# Patient Record
Sex: FEMALE | Race: BLACK OR AFRICAN AMERICAN | NOT HISPANIC OR LATINO | Employment: OTHER | URBAN - METROPOLITAN AREA
[De-identification: names, ages, dates, MRNs, and addresses within clinical notes are randomized per-mention and may not be internally consistent; named-entity substitution may affect disease eponyms.]

---

## 2017-01-01 ENCOUNTER — NURSE TRIAGE (OUTPATIENT)
Dept: ADMINISTRATIVE | Facility: CLINIC | Age: 42
End: 2017-01-01

## 2017-01-02 NOTE — TELEPHONE ENCOUNTER
Pt had hysterectomy on Friday. Pt reports that she is having vaginal pain that comes and goes. Each episode last from 1 minute to 5 minutes.. Denies burning when she urinating. Denies any vaginal bleeding. Denies fever. Pt has pain medicine but has not taken it today. Pt does report that since surgery she has had a few episodes of dizziness that is relieved with resting. Pt denies being dizzy right now. Pt advised to take the pain medication as prescribed to help with the pain. Home care advice was given.    Reason for Disposition   Information only question and nurse able to answer    Protocols used: ST NO PROTOCOL CALL: INFORMATION ONLY-A-[place]

## 2017-01-04 ENCOUNTER — TELEPHONE (OUTPATIENT)
Dept: OBSTETRICS AND GYNECOLOGY | Facility: CLINIC | Age: 42
End: 2017-01-04

## 2017-01-04 ENCOUNTER — OFFICE VISIT (OUTPATIENT)
Dept: OBSTETRICS AND GYNECOLOGY | Facility: CLINIC | Age: 42
End: 2017-01-04
Attending: OBSTETRICS & GYNECOLOGY
Payer: OTHER GOVERNMENT

## 2017-01-04 VITALS
DIASTOLIC BLOOD PRESSURE: 82 MMHG | HEIGHT: 64 IN | BODY MASS INDEX: 30.26 KG/M2 | WEIGHT: 177.25 LBS | SYSTOLIC BLOOD PRESSURE: 124 MMHG

## 2017-01-04 DIAGNOSIS — N32.89 BLADDER SPASMS: Primary | ICD-10-CM

## 2017-01-04 DIAGNOSIS — Z90.710 S/P LAPAROSCOPIC HYSTERECTOMY: ICD-10-CM

## 2017-01-04 PROCEDURE — 81001 URINALYSIS AUTO W/SCOPE: CPT

## 2017-01-04 PROCEDURE — 99213 OFFICE O/P EST LOW 20 MIN: CPT | Mod: PBBFAC | Performed by: OBSTETRICS & GYNECOLOGY

## 2017-01-04 PROCEDURE — 87086 URINE CULTURE/COLONY COUNT: CPT

## 2017-01-04 PROCEDURE — 99999 PR PBB SHADOW E&M-EST. PATIENT-LVL III: CPT | Mod: PBBFAC,,, | Performed by: OBSTETRICS & GYNECOLOGY

## 2017-01-04 PROCEDURE — 99024 POSTOP FOLLOW-UP VISIT: CPT | Mod: ,,, | Performed by: OBSTETRICS & GYNECOLOGY

## 2017-01-04 RX ORDER — PHENAZOPYRIDINE HYDROCHLORIDE 200 MG/1
200 TABLET, FILM COATED ORAL
Qty: 9 TABLET | Refills: 0 | Status: SHIPPED | OUTPATIENT
Start: 2017-01-04 | End: 2017-01-07

## 2017-01-04 RX ORDER — IBUPROFEN 800 MG/1
800 TABLET ORAL EVERY 6 HOURS PRN
Qty: 30 TABLET | Refills: 2 | Status: SHIPPED | OUTPATIENT
Start: 2017-01-04 | End: 2018-01-04

## 2017-01-04 RX ORDER — HYDROCODONE BITARTRATE AND ACETAMINOPHEN 10; 325 MG/1; MG/1
1 TABLET ORAL EVERY 6 HOURS PRN
Qty: 20 TABLET | Refills: 0 | Status: SHIPPED | OUTPATIENT
Start: 2017-01-04 | End: 2017-06-23

## 2017-01-04 NOTE — TELEPHONE ENCOUNTER
Returned pt call regarding experiencing complications after Sx.  Pt stated that she is experiencing pain during urination and a sharp vaginal pain.  Pt stated that she is experiencing nausea and hot and cold flashes.  Pt is also requesting a refill on her pain medication. Informed pt that Dr Wilkins does not refill narcotics and that she already has refills on her Ibuprofen.  Pt stated that the Ibuprofen is not helping her pain and that she thought she would feel better by now.  Informed pt that I will speak with Dr Wilkins to she how she would like to proceed and give her a call back.  Pt verbalized understanding.

## 2017-01-04 NOTE — TELEPHONE ENCOUNTER
Called pt to inform her to come in today to be seen for a post op F/U per Dr RODRIGUEZ.  Pt verbalized understanding.

## 2017-01-04 NOTE — TELEPHONE ENCOUNTER
----- Message from Sondra Arteaga sent at 1/4/2017 11:54 AM CST -----  Contact: pt  x_  1st Request  _  2nd Request  _  3rd Request        Who: BK COLLINS [[id number]]    Why: pt states she is 10 days post-op and os still having complications. Pt also states she is  almost out of her medication. Pt states she would like a call from Keila.     What Number to Call Back: [phone]    When to Expect a call back: (Before the end of the day)   -- if call after 3:00 call back will be tomorrow.

## 2017-01-04 NOTE — PROGRESS NOTES
"Chief Complaint: Interim postpartum visit    Tiffany Cruz is a 41 y.o. female  presents s/p TLH on  with complaint of pain after urination.  She reports after taking percocet, her lips were itching.  No vaginal bleeding or discharge.    Visit Vitals    /82    Ht 5' 4" (1.626 m)    Wt 80.4 kg (177 lb 4 oz)    LMP 2016 (Approximate)    BMI 30.42 kg/m2       ROS:  GENERAL: No fever, chills, fatigability or weight loss.  VULVAR: No pain, no lesions and no itching.  VAGINAL: No relaxation, no itching, no discharge, no abnormal bleeding and no lesions.  ABDOMEN: No abdominal pain. Denies nausea. Denies vomiting. No diarrhea. No constipation  BREAST: Denies pain. No lumps. No discharge.  URINARY: No incontinence, no nocturia, no frequency and positive for dysuria.  CARDIOVASCULAR: No chest pain. No shortness of breath. No leg cramps.  NEUROLOGICAL: No headaches. No vision changes.    Physical exam:      PELVIC: Normal external genitalia without lesions.  Normal hair distribution.  Adequate perineal body, normal urethral meatus.  Vagina moist and well rugated without lesions or discharge.      1. Bladder spasms  phenazopyridine (PYRIDIUM) 200 MG tablet    Urine culture    Urinalysis   2. S/p TLH/BS on  for SUF  ibuprofen (ADVIL,MOTRIN) 800 MG tablet    hydrocodone-acetaminophen 10-325mg (NORCO)  mg Tab       Discussed pain that is most likely related to bladder spasms.  Prescription sent.    "

## 2017-01-05 ENCOUNTER — PATIENT MESSAGE (OUTPATIENT)
Dept: OBSTETRICS AND GYNECOLOGY | Facility: CLINIC | Age: 42
End: 2017-01-05

## 2017-01-05 DIAGNOSIS — N39.0 URINARY TRACT INFECTION, SITE UNSPECIFIED: Primary | ICD-10-CM

## 2017-01-05 LAB
AMORPH CRY UR QL COMP ASSIST: ABNORMAL
BACTERIA #/AREA URNS AUTO: ABNORMAL /HPF
BILIRUB UR QL STRIP: NEGATIVE
CLARITY UR REFRACT.AUTO: ABNORMAL
COLOR UR AUTO: YELLOW
GLUCOSE UR QL STRIP: NEGATIVE
HGB UR QL STRIP: ABNORMAL
KETONES UR QL STRIP: NEGATIVE
LEUKOCYTE ESTERASE UR QL STRIP: ABNORMAL
MICROSCOPIC COMMENT: ABNORMAL
NITRITE UR QL STRIP: NEGATIVE
PH UR STRIP: 5 [PH] (ref 5–8)
PROT UR QL STRIP: NEGATIVE
RBC #/AREA URNS AUTO: 3 /HPF (ref 0–4)
SP GR UR STRIP: 1.02 (ref 1–1.03)
SQUAMOUS #/AREA URNS AUTO: 5 /HPF
URN SPEC COLLECT METH UR: ABNORMAL
UROBILINOGEN UR STRIP-ACNC: NEGATIVE EU/DL
WBC #/AREA URNS AUTO: 13 /HPF (ref 0–5)

## 2017-01-05 RX ORDER — NITROFURANTOIN 25; 75 MG/1; MG/1
100 CAPSULE ORAL 2 TIMES DAILY
Qty: 14 CAPSULE | Refills: 0 | Status: SHIPPED | OUTPATIENT
Start: 2017-01-05 | End: 2017-01-13

## 2017-01-06 LAB — BACTERIA UR CULT: NO GROWTH

## 2017-01-09 ENCOUNTER — LAB VISIT (OUTPATIENT)
Dept: LAB | Facility: HOSPITAL | Age: 42
End: 2017-01-09
Attending: INTERNAL MEDICINE
Payer: OTHER GOVERNMENT

## 2017-01-09 DIAGNOSIS — D50.9 MICROCYTIC ANEMIA: ICD-10-CM

## 2017-01-09 LAB
ANISOCYTOSIS BLD QL SMEAR: SLIGHT
BASOPHILS # BLD AUTO: 0.06 K/UL
BASOPHILS NFR BLD: 0.7 %
DIFFERENTIAL METHOD: ABNORMAL
EOSINOPHIL # BLD AUTO: 0.2 K/UL
EOSINOPHIL NFR BLD: 2.9 %
ERYTHROCYTE [DISTWIDTH] IN BLOOD BY AUTOMATED COUNT: 13.8 %
FERRITIN SERPL-MCNC: 329 NG/ML
HCT VFR BLD AUTO: 33.4 %
HGB BLD-MCNC: 10.7 G/DL
HYPOCHROMIA BLD QL SMEAR: ABNORMAL
IRON SERPL-MCNC: 75 UG/DL
LYMPHOCYTES # BLD AUTO: 3 K/UL
LYMPHOCYTES NFR BLD: 35.7 %
MCH RBC QN AUTO: 22.2 PG
MCHC RBC AUTO-ENTMCNC: 32 %
MCV RBC AUTO: 69 FL
MONOCYTES # BLD AUTO: 1 K/UL
MONOCYTES NFR BLD: 11.6 %
NEUTROPHILS # BLD AUTO: 4.1 K/UL
NEUTROPHILS NFR BLD: 49.1 %
OVALOCYTES BLD QL SMEAR: ABNORMAL
PLATELET # BLD AUTO: 448 K/UL
PMV BLD AUTO: 10.3 FL
RBC # BLD AUTO: 4.83 M/UL
SATURATED IRON: 24 %
TARGETS BLD QL SMEAR: ABNORMAL
TOTAL IRON BINDING CAPACITY: 312 UG/DL
TRANSFERRIN SERPL-MCNC: 211 MG/DL
WBC # BLD AUTO: 8.28 K/UL

## 2017-01-09 PROCEDURE — 36415 COLL VENOUS BLD VENIPUNCTURE: CPT

## 2017-01-09 PROCEDURE — 83540 ASSAY OF IRON: CPT

## 2017-01-09 PROCEDURE — 85025 COMPLETE CBC W/AUTO DIFF WBC: CPT

## 2017-01-09 PROCEDURE — 82728 ASSAY OF FERRITIN: CPT

## 2017-01-10 ENCOUNTER — TELEPHONE (OUTPATIENT)
Dept: NEUROLOGY | Facility: CLINIC | Age: 42
End: 2017-01-10

## 2017-01-10 ENCOUNTER — OFFICE VISIT (OUTPATIENT)
Dept: HEMATOLOGY/ONCOLOGY | Facility: CLINIC | Age: 42
End: 2017-01-10
Attending: INTERNAL MEDICINE
Payer: OTHER GOVERNMENT

## 2017-01-10 VITALS
HEIGHT: 64 IN | TEMPERATURE: 97 F | BODY MASS INDEX: 29.99 KG/M2 | HEART RATE: 78 BPM | DIASTOLIC BLOOD PRESSURE: 70 MMHG | SYSTOLIC BLOOD PRESSURE: 140 MMHG | OXYGEN SATURATION: 98 % | WEIGHT: 175.69 LBS | RESPIRATION RATE: 20 BRPM

## 2017-01-10 DIAGNOSIS — D50.9 MICROCYTIC ANEMIA: Primary | ICD-10-CM

## 2017-01-10 PROCEDURE — 99213 OFFICE O/P EST LOW 20 MIN: CPT | Mod: PBBFAC | Performed by: INTERNAL MEDICINE

## 2017-01-10 PROCEDURE — 99214 OFFICE O/P EST MOD 30 MIN: CPT | Mod: S$PBB,,, | Performed by: INTERNAL MEDICINE

## 2017-01-10 PROCEDURE — 99999 PR PBB SHADOW E&M-EST. PATIENT-LVL III: CPT | Mod: PBBFAC,,, | Performed by: INTERNAL MEDICINE

## 2017-01-10 NOTE — TELEPHONE ENCOUNTER
"Call placed to pt. She states that she saw Dr Lemus today for f/u status post hysterectomy on 12/23. Her blood work and exam are stable. She also states that she is stable from ob/gyn standpoint. However, 2-3 days ago she began having tingling and numbness down her right arm continuously.    As above, denies infections. She did admit that her "mouth is broken out from the percocet." It is difficult to eat because of this--denies that it is thrush. She has been switched to [place] for pain control. She is interested in an urgent appt here. She is scheduled with Roseanna tomorrow at 1:30.    "

## 2017-01-10 NOTE — TELEPHONE ENCOUNTER
----- Message from Ronak Young sent at 1/10/2017  3:01 PM CST -----  Contact: Self [phone]  Patient is requesting requesting a return call from Roseanna she is having complication post surgery, pls call

## 2017-01-10 NOTE — TELEPHONE ENCOUNTER
----- Message from Alyssa Hyde sent at 1/10/2017  3:26 PM CST -----  Contact: self @ [phone]  Pt says she is returning mitch's call.

## 2017-01-10 NOTE — PROGRESS NOTES
Subjective:       Patient ID: Tiffany Cruz is a 41 y.o. female.    Chief Complaint: Follow-up    HPI     Returns for follow-up of anemia  Underwent a TLH 12/23/16 with Dr. Recinos as she had been having heavy periods with fibroids leading to fatigue and iron deficiency anemia  Slowly recovering    In regards to her MS since surgery she has noted some numbness in her right hand off and on - she was advised to notify her neurologist.    Review of Systems   Constitutional: Positive for fatigue. Negative for activity change, appetite change, chills, fever and unexpected weight change.   HENT: Negative for congestion, ear discharge, ear pain, hearing loss, mouth sores, nosebleeds, postnasal drip, rhinorrhea, sinus pressure, sneezing, sore throat and trouble swallowing.    Eyes: Negative for redness and visual disturbance.   Respiratory: Negative for cough, chest tightness, shortness of breath and wheezing.    Cardiovascular: Negative for chest pain, palpitations (noted at times) and leg swelling.   Gastrointestinal: Negative for abdominal distention, abdominal pain, blood in stool, constipation, nausea and vomiting.   Genitourinary: Negative for decreased urine volume, difficulty urinating, dysuria, frequency, hematuria, menstrual problem and urgency.   Musculoskeletal: Negative for arthralgias, back pain and joint swelling.   Skin: Negative for color change, pallor, rash and wound.   Allergic/Immunologic: Positive for environmental allergies.   Neurological: Positive for weakness (generalized, chronic). Negative for dizziness, light-headedness, numbness (rt sided, reports new in hand as well) and headaches.   Hematological: Negative for adenopathy. Does not bruise/bleed easily.   Psychiatric/Behavioral: Negative for confusion, decreased concentration, dysphoric mood and sleep disturbance. The patient is not nervous/anxious.        Objective:      Physical Exam   Constitutional: She is oriented to person,  place, and time. She appears well-developed and well-nourished. No distress.   Presents alone   HENT:   Head: Normocephalic and atraumatic.   Right Ear: External ear normal.   Left Ear: External ear normal.   Nose: Nose normal.   Mouth/Throat: Oropharynx is clear and moist. No oropharyngeal exudate.   Eyes: Conjunctivae and EOM are normal. Pupils are equal, round, and reactive to light. Right eye exhibits no discharge. Left eye exhibits no discharge. No scleral icterus. Right pupil is round and reactive. Left pupil is round and reactive.   Neck: Normal range of motion. Neck supple. No tracheal deviation present. No thyromegaly present.   Cardiovascular: Normal rate, regular rhythm, normal heart sounds and intact distal pulses.  Exam reveals no gallop and no friction rub.    No murmur heard.  Pulmonary/Chest: Effort normal and breath sounds normal. No respiratory distress. She has no wheezes. She has no rales. She exhibits no tenderness.   Abdominal: Soft. Bowel sounds are normal. She exhibits no distension and no mass. There is no hepatosplenomegaly. There is no tenderness. There is no rebound and no guarding.   No organomegaly  Well healed surgical scars   Musculoskeletal: Normal range of motion. She exhibits no edema, tenderness or deformity.   Lymphadenopathy:        Head (right side): No submandibular adenopathy present.        Head (left side): No submandibular adenopathy present.     She has no cervical adenopathy.        Right cervical: No superficial cervical, no deep cervical and no posterior cervical adenopathy present.       Left cervical: No superficial cervical, no deep cervical and no posterior cervical adenopathy present.        Right: No inguinal and no supraclavicular adenopathy present.        Left: No inguinal and no supraclavicular adenopathy present.   Neurological: She is alert and oriented to person, place, and time. She has normal strength. No cranial nerve deficit or sensory deficit. She  exhibits normal muscle tone. Coordination normal.   Skin: Skin is warm and dry. No bruising, no lesion, no petechiae and no rash noted. She is not diaphoretic. No erythema. No pallor.   Psychiatric: She has a normal mood and affect. Her behavior is normal. Judgment and thought content normal. Her mood appears not anxious. She does not exhibit a depressed mood.   Nursing note and vitals reviewed.    Labs- reviewed  Assessment:       1. Microcytic anemia        Plan:     1. Mild anemia, iron corrected  RTC 6-8 weeks for a recheck    Advised to call her neurologist regarding hand numbness noted

## 2017-01-11 ENCOUNTER — TELEPHONE (OUTPATIENT)
Dept: NEUROLOGY | Facility: CLINIC | Age: 42
End: 2017-01-11

## 2017-01-11 ENCOUNTER — OFFICE VISIT (OUTPATIENT)
Dept: NEUROLOGY | Facility: CLINIC | Age: 42
End: 2017-01-11
Payer: OTHER GOVERNMENT

## 2017-01-11 VITALS
HEIGHT: 64 IN | DIASTOLIC BLOOD PRESSURE: 84 MMHG | BODY MASS INDEX: 30.41 KG/M2 | SYSTOLIC BLOOD PRESSURE: 139 MMHG | WEIGHT: 178.13 LBS | HEART RATE: 82 BPM

## 2017-01-11 DIAGNOSIS — Z71.89 COUNSELING REGARDING GOALS OF CARE: ICD-10-CM

## 2017-01-11 DIAGNOSIS — G35 MULTIPLE SCLEROSIS: Primary | ICD-10-CM

## 2017-01-11 DIAGNOSIS — R20.0 NUMBNESS AND TINGLING: ICD-10-CM

## 2017-01-11 DIAGNOSIS — R20.2 NUMBNESS AND TINGLING: ICD-10-CM

## 2017-01-11 PROCEDURE — 99999 PR PBB SHADOW E&M-EST. PATIENT-LVL III: CPT | Mod: PBBFAC,,, | Performed by: CLINICAL NURSE SPECIALIST

## 2017-01-11 PROCEDURE — 99215 OFFICE O/P EST HI 40 MIN: CPT | Mod: S$PBB,,, | Performed by: CLINICAL NURSE SPECIALIST

## 2017-01-11 PROCEDURE — 99213 OFFICE O/P EST LOW 20 MIN: CPT | Mod: PBBFAC | Performed by: CLINICAL NURSE SPECIALIST

## 2017-01-11 RX ORDER — DIAZEPAM 5 MG/1
TABLET ORAL
Qty: 4 TABLET | Refills: 0 | Status: SHIPPED | OUTPATIENT
Start: 2017-01-11 | End: 2017-06-23

## 2017-01-11 RX ORDER — ASPIRIN 325 MG
50000 TABLET, DELAYED RELEASE (ENTERIC COATED) ORAL WEEKLY
Qty: 12 CAPSULE | Refills: 1 | Status: SHIPPED | OUTPATIENT
Start: 2017-01-11 | End: 2017-06-23 | Stop reason: SDUPTHER

## 2017-01-11 NOTE — TELEPHONE ENCOUNTER
Received message from Claudia Kim in pre-auth informing me Dean is requesting referral from pt's PCP prior to approving MRI's. Pt notified and will obtain referral.

## 2017-01-11 NOTE — PROGRESS NOTES
"Subjective:       Patient ID: Tiffany Cruz is a 41 y.o. female who presents today for an urgent clinic visit for MS. She was last seen by Dr. Tompkins in August. The history has been provided by the patient.     MS HPI:  · DMT: N/A  · Side effects from DMT? N/A  · Taking vitamin D3 as recommended? Yes -  Dose: 10,000 units daily.   · She recently had a hysterectomy (ovaries were not removed) on December 23rd.   · For the past 4 days, she has had tingling and numbness and a shooting pain in the right arm and hand. She feels it for about "75% of the day." She also has pain down right thigh. This is not new.  She describes it as an "aching, dull pain." She does not take any medication for this. This pain comes and goes.   ·     SOCIAL HISTORY  Social History   Substance Use Topics    Smoking status: Never Smoker    Smokeless tobacco: Never Used    Alcohol use No     Living arrangements - the patient lives with her family.  Employment: She is an , but she has been out for the past several weeks because of her surgery.     MS ROS:  · Fatigue: Yes - Energy has been low. She is trying to rest throughout the day.   · Sleep Disturbance: Yes - She is having trouble finding a comfortable position at night.   · Bladder Dysfunction: Yes - She is having bladder spasms, and Dr. Wilkins prescribed her an antibiotic.   · Bowel Dysfunction: Yes - Her bowels were more regular after surgery than they are now. She has BM 4-5 times per week.   · Spasticity: No  · Visual Symptoms: No  · Cognitive: Yes - She states that she is having trouble remembering if she took medications.   · Mood Disorder: No  · Gait Disturbance: Yes - She feels like her walking speed has slowed down a bit since surgery.   · Falls: No  · Hand Dysfunction: No  · Pain: Yes - She has abdominal pain since recent surgery. She also has pain in her right leg and lower back pain.   · Sexual Dysfunction: Not Assessed  · Skin Breakdown: Yes - She has " small scars on her abdomen from surgery.   · Tremors: No  · Dysphagia:  No  · Dysarthria:  No  · Heat sensitivity:  Yes - She gets hot flashes at times.   · Any un-met adaptive needs? No  · Copay Assist?  N/A      Objective:        1. 25 foot timed walk: 4.8 seconds today without assist; was 3.7 seconds at last visit without assist (patient states that she is unable to walk faster because it is uncomfortable post-surgery).     Neurologic Exam     Mental Status   Oriented to person, place, and time.   Attention: normal. Concentration: normal.   Speech: speech is normal   Level of consciousness: alert  Knowledge: good.     Cranial Nerves     CN III, IV, VI   Pupils are equal, round, and reactive to light.  Extraocular motions are normal.   Right pupil: Shape: regular. Reactivity: brisk. Consensual response: intact.   Left pupil: Shape: regular. Reactivity: brisk. Consensual response: intact.   CN III: no CN III palsy  CN VI: no CN VI palsy  Nystagmus: none     CN V   Right facial sensation deficit: none (She reports that she feels light touch less on her cheeks than forehead and mandible. )  Left facial sensation deficit: none    CN VII   Right facial weakness: none  Left facial weakness: none    CN VIII   Hearing: intact    CN IX, X   Palate: symmetric    CN XI   Right sternocleidomastoid strength: normal  Left sternocleidomastoid strength: normal  Right trapezius strength: normal  Left trapezius strength: normal    CN XII   Tongue deviation: none    Motor Exam   Muscle bulk: normal  Overall muscle tone: normal  Right arm tone: normal  Left arm tone: normal  Right leg tone: normal  Left leg tone: normal    Strength   Right neck flexion: 5/5  Left neck flexion: 5/5  Right neck extension: 5/5  Left neck extension: 5/5  Right deltoid: 5/5  Left deltoid: 5/5  Right biceps: 5/5  Left biceps: 5/5  Right triceps: 5/5  Left triceps: 5/5  Right wrist flexion: 5/5  Left wrist flexion: 5/5  Right wrist extension: 5/5  Left  wrist extension: 5/5  Right interossei: 5/5  Left interossei: 5/5  Right iliopsoas: 5/5  Left iliopsoas: 5/5  Right quadriceps: 5/5  Left quadriceps: 5/5  Right hamstrin/5  Left hamstrin/5  Right anterior tibial: 5/5  Left anterior tibial: 5/5  Right gastroc: 5/5  Left gastroc: 5/5    Sensory Exam   Right arm light touch: normal  Left arm light touch: normal  Right leg light touch: normal  Left leg light touch: normal  Right arm vibration: normal  Left arm vibration: normal  Right leg vibration: normal  Left leg vibration: normal  Right arm pinprick: normal  Left arm pinprick: normal  Right leg pinprick: normal  Left leg pinprick: normal    Gait, Coordination, and Reflexes     Gait  Gait: normal    Coordination   Romberg: negative  Finger to nose coordination: normal  Tandem walking coordination: abnormal    Tremor   Resting tremor: absent    Reflexes   Right brachioradialis: 2+  Left brachioradialis: 2+  Right biceps: 2+  Left biceps: 2+  Right patellar: 1+  Left patellar: 1+  Right achilles: 1+  Left achilles: 1+  Right plantar: normal  Left plantar: normal            Labs:     .  Lab Results   Component Value Date    WBC 8.28 2017    HGB 10.7 (L) 2017    HCT 33.4 (L) 2017    MCV 69 (L) 2017     (H) 2017       Lab Results   Component Value Date    PPFBIMWI76EL 50 2016    HZYOUANF41PC 45 2015    [id number] 37 2014     No results found for: JCVINDEX, JCVANTIBODY  No results found for: NR6LLZUU, ABSOLUTECD3, XD5HCKAC, ABSOLUTECD8, CM4GPSGA, ABSOLUTECD4, LABCD48    Diagnosis/Assessment/Plan:    1. Multiple Sclerosis  · Assessment: Evelyn is having numbness and tingling in the right arm and hand that is present most of the day. It is not impairing her function, but it is new. She recently had CBC and urine test, and there was no  infection. She did just have surgery, so perhaps this is a pseudoexacerbation from the trauma of surgery. She is due for annual  brain MRI for MS next month, but in light of the arm and hand numbness, we will also get cervical spine MRI.  She also has intermittent pain to the right thigh and lower back pain. Her PCP had ordered L-spine MRI earlier this year, but she did not have this done. She will contact PCP to get this scheduled.   · Imaging: MRI and cervical spine soon. Patient does not want both of these done on the same day, so we will schedule them separately. Will provide Valium to help with anxiety during procedure.   · Disease Modifying Therapies: She is not on DMT. She has an appt with Dr. Tompkins in mid-February, and we will plan to discuss DMT options then. She reports some injection fatigue and pain at injection sites, so perhaps one of the oral options, such as Aubagio, could be a good choice for her. She has taken Tecfidera in the past, but stopped because she felt like she had new symptoms while taking it. Continue Vitamin D. I provided the patient with a printed prescription for Vitamin D 50,000 units to be taken weekly, as per her request.     Over 50% of this 40 minute visit was spent in direct face to face counseling of the patient about MS, her current symptoms, and the plan for MRIs and follow-up. The patient agrees with the plan of care.         There are no diagnoses linked to this encounter.

## 2017-01-12 ENCOUNTER — TELEPHONE (OUTPATIENT)
Dept: OBSTETRICS AND GYNECOLOGY | Facility: CLINIC | Age: 42
End: 2017-01-12

## 2017-01-12 ENCOUNTER — HOSPITAL ENCOUNTER (OUTPATIENT)
Dept: RADIOLOGY | Facility: HOSPITAL | Age: 42
Discharge: HOME | End: 2017-01-12
Attending: PSYCHIATRY & NEUROLOGY
Payer: OTHER GOVERNMENT

## 2017-01-12 DIAGNOSIS — G35 MS (MULTIPLE SCLEROSIS): ICD-10-CM

## 2017-01-12 PROCEDURE — 25500020 PHARM REV CODE 255: Performed by: PSYCHIATRY & NEUROLOGY

## 2017-01-12 PROCEDURE — 70553 MRI BRAIN STEM W/O & W/DYE: CPT | Mod: 26,,, | Performed by: RADIOLOGY

## 2017-01-12 PROCEDURE — A9585 GADOBUTROL INJECTION: HCPCS | Performed by: PSYCHIATRY & NEUROLOGY

## 2017-01-12 PROCEDURE — 70553 MRI BRAIN STEM W/O & W/DYE: CPT | Mod: TC

## 2017-01-12 RX ORDER — GADOBUTROL 604.72 MG/ML
8 INJECTION INTRAVENOUS
Status: COMPLETED | OUTPATIENT
Start: 2017-01-12 | End: 2017-01-12

## 2017-01-12 RX ADMIN — GADOBUTROL 8 ML: 604.72 INJECTION INTRAVENOUS at 03:01

## 2017-01-12 NOTE — TELEPHONE ENCOUNTER
Returned pt call regarding confirming her appt.  Informed pt that her appt is scheduled for 1/13 at 1 pm.  Pt verbalized understanding.

## 2017-01-12 NOTE — TELEPHONE ENCOUNTER
----- Message from Sondra Arteaga sent at 1/12/2017  9:18 AM CST -----  Contact: BK COLLINS [[id number]]  _ x 1st Request  _  2nd Request  _  3rd Request        Who: BK COLLINS [[id number]]    Why: patient states she would like a call back in regards to her appt today    What Number to Call Back: [phone]    When to Expect a call back: (Before the end of the day)   -- if call after 3:00 call back will be tomorrow.

## 2017-01-13 ENCOUNTER — NURSE TRIAGE (OUTPATIENT)
Dept: ADMINISTRATIVE | Facility: CLINIC | Age: 42
End: 2017-01-13

## 2017-01-13 ENCOUNTER — OFFICE VISIT (OUTPATIENT)
Dept: OBSTETRICS AND GYNECOLOGY | Facility: CLINIC | Age: 42
End: 2017-01-13
Attending: OBSTETRICS & GYNECOLOGY
Payer: OTHER GOVERNMENT

## 2017-01-13 VITALS
SYSTOLIC BLOOD PRESSURE: 126 MMHG | BODY MASS INDEX: 30.19 KG/M2 | HEIGHT: 64 IN | DIASTOLIC BLOOD PRESSURE: 88 MMHG | WEIGHT: 176.81 LBS

## 2017-01-13 DIAGNOSIS — Z09 POSTOP CHECK: Primary | ICD-10-CM

## 2017-01-13 PROCEDURE — 99212 OFFICE O/P EST SF 10 MIN: CPT | Mod: PBBFAC | Performed by: OBSTETRICS & GYNECOLOGY

## 2017-01-13 PROCEDURE — 99999 PR PBB SHADOW E&M-EST. PATIENT-LVL II: CPT | Mod: PBBFAC,,, | Performed by: OBSTETRICS & GYNECOLOGY

## 2017-01-13 PROCEDURE — 99024 POSTOP FOLLOW-UP VISIT: CPT | Mod: ,,, | Performed by: OBSTETRICS & GYNECOLOGY

## 2017-01-13 NOTE — PROGRESS NOTES
"CC: Postoperative visit    Tiffany Cruz is a 41 y.o. female  presents for a postoperative visit s/p [facility], bilateral salpingectomy on 2016.  Her postoperative course was uncomplicated.  She is doing well postoperative.    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  FALLOPIAN TUBES (LEFT AND RIGHT): PARATUBAL CYST.    Visit Vitals    /88    Ht 5' 4" (1.626 m)    Wt 80.2 kg (176 lb 12.9 oz)    LMP 2016 (Approximate)    BMI 30.35 kg/m2       ROS:  GENERAL: No fever, chills, fatigability or weight loss.  VULVAR: No pain, no lesions and no itching.  VAGINAL: No relaxation, no itching, no discharge, no abnormal bleeding and no lesions.  ABDOMEN: No abdominal pain. Denies nausea. Denies vomiting. No diarrhea. No constipation  BREAST: Denies pain. No lumps. No discharge.  URINARY: No incontinence, no nocturia, no frequency and no dysuria.  CARDIOVASCULAR: No chest pain. No shortness of breath. No leg cramps.  NEUROLOGICAL: No headaches. No vision changes.    Physical Exam    INCISION: Clean, dry, intact.  Well healed.    PELVIC: Normal external genitalia without lesions.  Normal hair distribution.  Adequate perineal body, normal urethral meatus.  Vagina moist and well rugated without lesions or discharge.  Cervix pink, without lesions, discharge or tenderness.  No significant cystocele or rectocele.  Bimanual exam shows uterus to be normal size, regular, mobile and nontender.  Adnexa without masses or tenderness.      1. Postop check         Discussed with patient that she is having routine post-op course.  Return to normal activities in 3-4 weeks.  "

## 2017-01-13 NOTE — TELEPHONE ENCOUNTER
"  Reason for Disposition   General activity, questions about    Protocols used: ST POST-OP SYMPTOMS AND [id number]-CM  pt states she had hysterectomy 12/23/16/ saw MD for f/u today and was told everything looked fine/ states she got trapped in stairwell and had to walk down 7 flights of steps/ reports her legs feel weak, her abdomen is sore and feels a "pulling" sensation/ unsure if any bleeding/ took Motrin 10 min prior to call/ states she is not home yet    Advised pt to go home and lie down w/ legs elevated and rest/ explained she did a lot of activity for someone who had major surgery < 1 mo ago    Call back for any changes or concerns    Yessenia Elliott RN  "

## 2017-01-15 ENCOUNTER — HOSPITAL ENCOUNTER (OUTPATIENT)
Dept: RADIOLOGY | Facility: HOSPITAL | Age: 42
Discharge: HOME | End: 2017-01-15
Attending: CLINICAL NURSE SPECIALIST
Payer: OTHER GOVERNMENT

## 2017-01-15 DIAGNOSIS — G35 MULTIPLE SCLEROSIS: ICD-10-CM

## 2017-01-15 PROCEDURE — A9585 GADOBUTROL INJECTION: HCPCS | Performed by: CLINICAL NURSE SPECIALIST

## 2017-01-15 PROCEDURE — 25500020 PHARM REV CODE 255: Performed by: CLINICAL NURSE SPECIALIST

## 2017-01-15 PROCEDURE — 72156 MRI NECK SPINE W/O & W/DYE: CPT | Mod: 26,,, | Performed by: RADIOLOGY

## 2017-01-15 PROCEDURE — 72156 MRI NECK SPINE W/O & W/DYE: CPT | Mod: TC

## 2017-01-15 PROCEDURE — 63600175 PHARM REV CODE 636 W HCPCS: Performed by: RADIOLOGY

## 2017-01-15 RX ORDER — MIDAZOLAM HYDROCHLORIDE 1 MG/ML
2 INJECTION INTRAMUSCULAR; INTRAVENOUS ONCE
Status: COMPLETED | OUTPATIENT
Start: 2017-01-15 | End: 2017-01-15

## 2017-01-15 RX ORDER — GADOBUTROL 604.72 MG/ML
8 INJECTION INTRAVENOUS
Status: COMPLETED | OUTPATIENT
Start: 2017-01-15 | End: 2017-01-15

## 2017-01-15 RX ADMIN — MIDAZOLAM HYDROCHLORIDE 2 MG: 1 INJECTION, SOLUTION INTRAMUSCULAR; INTRAVENOUS at 03:01

## 2017-01-15 RX ADMIN — GADOBUTROL 8 ML: 604.72 INJECTION INTRAVENOUS at 04:01

## 2017-01-17 ENCOUNTER — TELEPHONE (OUTPATIENT)
Dept: NEUROLOGY | Facility: CLINIC | Age: 42
End: 2017-01-17

## 2017-01-17 DIAGNOSIS — G35 MULTIPLE SCLEROSIS: Primary | ICD-10-CM

## 2017-01-17 DIAGNOSIS — Z79.899 HIGH RISK MEDICATION USE: ICD-10-CM

## 2017-01-17 RX ORDER — METHYLPREDNISOLONE SODIUM SUCCINATE 1 G/16ML
INJECTION INTRAMUSCULAR; INTRAVENOUS
Qty: 3000 MG | Refills: 0 | Status: SHIPPED | OUTPATIENT
Start: 2017-01-17 | End: 2017-12-18

## 2017-01-17 NOTE — TELEPHONE ENCOUNTER
I spoke with Evelyn and made her aware of cervical spine MRI results. We will plan for 3 days of Solumedrol smoothie starting today, as she is still having numbness  to the right side.     We discussed starting Aubagio as DMT, and she is open to this, but wants to know if she should wait before starting treatment since she is post-op 3 weeks from hysterectomy (procedure was on 12/23).     We discussed side effects of stomach upset, elevated liver enzymes, and hair loss.

## 2017-01-18 ENCOUNTER — LAB VISIT (OUTPATIENT)
Dept: LAB | Facility: HOSPITAL | Age: 42
End: 2017-01-18
Payer: OTHER GOVERNMENT

## 2017-01-18 DIAGNOSIS — Z79.899 HIGH RISK MEDICATION USE: ICD-10-CM

## 2017-01-18 DIAGNOSIS — G35 MULTIPLE SCLEROSIS: ICD-10-CM

## 2017-01-18 PROCEDURE — 86480 TB TEST CELL IMMUN MEASURE: CPT

## 2017-01-18 PROCEDURE — 36415 COLL VENOUS BLD VENIPUNCTURE: CPT

## 2017-01-20 LAB
MITOGEN NIL: >10 IU/ML
NIL: 0.04 IU/ML
TB ANTIGEN NIL: 0 IU/ML
TB ANTIGEN: 0.04 IU/ML
TB GOLD: NEGATIVE

## 2017-01-23 ENCOUNTER — TELEPHONE (OUTPATIENT)
Dept: NEUROLOGY | Facility: CLINIC | Age: 42
End: 2017-01-23

## 2017-01-23 DIAGNOSIS — M79.2 NEUROPATHIC PAIN: Primary | ICD-10-CM

## 2017-01-23 RX ORDER — HEPARIN SODIUM (PORCINE) LOCK FLUSH IV SOLN 100 UNIT/ML 100 UNIT/ML
100 SOLUTION INTRAVENOUS
Status: CANCELLED | OUTPATIENT
Start: 2017-01-23

## 2017-01-23 RX ORDER — GABAPENTIN 100 MG/1
100 CAPSULE ORAL 3 TIMES DAILY
Qty: 90 CAPSULE | Refills: 0 | Status: SHIPPED | OUTPATIENT
Start: 2017-01-23 | End: 2017-06-23 | Stop reason: SDUPTHER

## 2017-01-23 RX ORDER — SODIUM CHLORIDE 0.9 % (FLUSH) 0.9 %
10 SYRINGE (ML) INJECTION
Status: CANCELLED | OUTPATIENT
Start: 2017-01-23

## 2017-01-23 RX ORDER — METHYLPREDNISOLONE SODIUM SUCCINATE 1 G/16ML
1000 INJECTION INTRAMUSCULAR; INTRAVENOUS DAILY
Status: CANCELLED | OUTPATIENT
Start: 2017-01-23

## 2017-01-23 NOTE — TELEPHONE ENCOUNTER
Tiffany continues to have numbness and tingling and pain in her right arm and extreme fatigue. She would like to proceed with 2 additional days of IV steroids. Therapy plan in. Spoke with Ashley in ID infusion. Patient will be scheduled for Thursday and Friday this week.

## 2017-01-23 NOTE — TELEPHONE ENCOUNTER
Received a message from Evelyn stating that she is having sharp shooting pains to both sides of her head today. She will try Advil, but I also sent a Rx for gabapentin 100mg (up to three times daily) to the pharmacy. I made her aware of potential side effects of fatigue and dizziness and advised that she be mindful of operating a vehicle or other machinery until she knows how this medication makes her feel. I advised that she not take it if she doesn't need it.

## 2017-01-24 ENCOUNTER — DOCUMENTATION ONLY (OUTPATIENT)
Dept: NEUROLOGY | Facility: CLINIC | Age: 42
End: 2017-01-24

## 2017-01-24 ENCOUNTER — INFUSION (OUTPATIENT)
Dept: INFUSION THERAPY | Facility: HOSPITAL | Age: 42
End: 2017-01-24
Attending: PSYCHIATRY & NEUROLOGY
Payer: OTHER GOVERNMENT

## 2017-01-24 DIAGNOSIS — G35 MS (MULTIPLE SCLEROSIS): Primary | ICD-10-CM

## 2017-01-24 PROCEDURE — 25000003 PHARM REV CODE 250: Performed by: PSYCHIATRY & NEUROLOGY

## 2017-01-24 PROCEDURE — 96365 THER/PROPH/DIAG IV INF INIT: CPT

## 2017-01-24 PROCEDURE — 63600175 PHARM REV CODE 636 W HCPCS: Performed by: PSYCHIATRY & NEUROLOGY

## 2017-01-24 RX ORDER — METHYLPREDNISOLONE SODIUM SUCCINATE 1 G/16ML
1000 INJECTION INTRAMUSCULAR; INTRAVENOUS DAILY
Status: DISCONTINUED | OUTPATIENT
Start: 2017-01-24 | End: 2017-01-24

## 2017-01-24 RX ORDER — HEPARIN SODIUM (PORCINE) LOCK FLUSH IV SOLN 100 UNIT/ML 100 UNIT/ML
100 SOLUTION INTRAVENOUS
Status: CANCELLED | OUTPATIENT
Start: 2017-01-25

## 2017-01-24 RX ORDER — METHYLPREDNISOLONE SODIUM SUCCINATE 1 G/16ML
1000 INJECTION INTRAMUSCULAR; INTRAVENOUS DAILY
Status: CANCELLED | OUTPATIENT
Start: 2017-01-25

## 2017-01-24 RX ORDER — SODIUM CHLORIDE 0.9 % (FLUSH) 0.9 %
10 SYRINGE (ML) INJECTION
Status: CANCELLED | OUTPATIENT
Start: 2017-01-25

## 2017-01-24 RX ADMIN — DEXTROSE: 50 INJECTION, SOLUTION INTRAVENOUS at 01:01

## 2017-01-24 NOTE — NURSING
Pt tolerated solumedrol well. Discharge instructions given to pt and she verbalized understanding.

## 2017-01-25 ENCOUNTER — INFUSION (OUTPATIENT)
Dept: INFUSION THERAPY | Facility: HOSPITAL | Age: 42
End: 2017-01-25
Attending: PSYCHIATRY & NEUROLOGY
Payer: OTHER GOVERNMENT

## 2017-01-25 VITALS — BODY MASS INDEX: 30.35 KG/M2 | WEIGHT: 176.81 LBS

## 2017-01-25 DIAGNOSIS — G35 MS (MULTIPLE SCLEROSIS): Primary | ICD-10-CM

## 2017-01-25 PROCEDURE — 96365 THER/PROPH/DIAG IV INF INIT: CPT

## 2017-01-25 PROCEDURE — 25000003 PHARM REV CODE 250: Performed by: PSYCHIATRY & NEUROLOGY

## 2017-01-25 PROCEDURE — 63600175 PHARM REV CODE 636 W HCPCS: Performed by: PSYCHIATRY & NEUROLOGY

## 2017-01-25 RX ORDER — HEPARIN SODIUM (PORCINE) LOCK FLUSH IV SOLN 100 UNIT/ML 100 UNIT/ML
100 SOLUTION INTRAVENOUS
Status: CANCELLED | OUTPATIENT
Start: 2017-01-26

## 2017-01-25 RX ORDER — SODIUM CHLORIDE 0.9 % (FLUSH) 0.9 %
10 SYRINGE (ML) INJECTION
Status: CANCELLED | OUTPATIENT
Start: 2017-01-26

## 2017-01-25 RX ORDER — METHYLPREDNISOLONE SODIUM SUCCINATE 1 G/16ML
1000 INJECTION INTRAMUSCULAR; INTRAVENOUS DAILY
Status: CANCELLED | OUTPATIENT
Start: 2017-01-26

## 2017-01-25 RX ORDER — METHYLPREDNISOLONE SODIUM SUCCINATE 1 G/16ML
1000 INJECTION INTRAMUSCULAR; INTRAVENOUS DAILY
Status: DISCONTINUED | OUTPATIENT
Start: 2017-01-25 | End: 2017-01-25

## 2017-01-25 RX ADMIN — DEXTROSE: 50 INJECTION, SOLUTION INTRAVENOUS at 09:01

## 2017-02-03 DIAGNOSIS — G47.00 INSOMNIA, UNSPECIFIED TYPE: ICD-10-CM

## 2017-02-03 RX ORDER — ZOLPIDEM TARTRATE 5 MG/1
5 TABLET ORAL NIGHTLY PRN
Qty: 30 TABLET | Refills: 5 | Status: SHIPPED | OUTPATIENT
Start: 2017-02-03 | End: 2017-06-23 | Stop reason: SDUPTHER

## 2017-02-10 ENCOUNTER — OFFICE VISIT (OUTPATIENT)
Dept: NEUROLOGY | Facility: CLINIC | Age: 42
End: 2017-02-10
Payer: OTHER GOVERNMENT

## 2017-02-10 VITALS
DIASTOLIC BLOOD PRESSURE: 90 MMHG | WEIGHT: 179.31 LBS | HEIGHT: 64 IN | SYSTOLIC BLOOD PRESSURE: 150 MMHG | BODY MASS INDEX: 30.61 KG/M2 | HEART RATE: 83 BPM

## 2017-02-10 DIAGNOSIS — G35 MS (MULTIPLE SCLEROSIS): Primary | ICD-10-CM

## 2017-02-10 DIAGNOSIS — Z71.89 COUNSELING REGARDING GOALS OF CARE: ICD-10-CM

## 2017-02-10 DIAGNOSIS — Z79.899 ENCOUNTER FOR LONG-TERM (CURRENT) USE OF HIGH-RISK MEDICATION: ICD-10-CM

## 2017-02-10 PROCEDURE — 99999 PR PBB SHADOW E&M-EST. PATIENT-LVL III: CPT | Mod: PBBFAC,,, | Performed by: PSYCHIATRY & NEUROLOGY

## 2017-02-10 PROCEDURE — 99214 OFFICE O/P EST MOD 30 MIN: CPT | Mod: S$PBB,,, | Performed by: PSYCHIATRY & NEUROLOGY

## 2017-02-10 PROCEDURE — 99213 OFFICE O/P EST LOW 20 MIN: CPT | Mod: PBBFAC | Performed by: PSYCHIATRY & NEUROLOGY

## 2017-02-10 RX ORDER — TRAZODONE HYDROCHLORIDE 100 MG/1
100 TABLET ORAL NIGHTLY
Qty: 30 TABLET | Refills: 11 | Status: SHIPPED | OUTPATIENT
Start: 2017-02-10 | End: 2017-06-23

## 2017-02-10 RX ORDER — AMOXICILLIN 500 MG
2 CAPSULE ORAL DAILY
COMMUNITY
End: 2019-09-13

## 2017-02-10 RX ORDER — CALCITONIN SALMON 200 [IU]/.09ML
11000 SPRAY, METERED NASAL DAILY
COMMUNITY
End: 2017-06-23

## 2017-02-10 NOTE — MR AVS SNAPSHOT
[facility]  [address]  [facility] [address]  Phone: [phone]                  Tiffany Cruz   2/10/2017 9:40 AM   Office Visit    Description:  Female : 1975   Provider:  Marika Tompkins MD   Department:  [facility]           Reason for Visit     Neurologic Problem                To Do List           Future Appointments        Provider Department Dept Phone    2017 10:30 AM [facility] [phone]    2017 2:00 PM Dori Lemus MD [facility] Hematology Oncology [phone]    2017 8:30 AM Elizabeth Wilkins MD [facility] OB/GYN [address] [phone]    2017 11:30 AM Roseanna Riddle APRN, CNS [facility] Multiple Sclerosis [phone]      Goals (5 Years of Data)     None       These Medications        Disp Refills Start End    trazodone (DESYREL) 100 MG tablet 30 tablet 11 2/10/2017 2/10/2018    Take 1 tablet (100 mg total) by mouth every evening. - Oral    Pharmacy: [facility] [address] [place] [address] [address] AT [facility] Ph #: [phone]         [facility]     [facility] -  Assistance  Registered nurses in the [facility] provide clinical advisement, health education, appointment booking, and other advisory services.  Call for this free service at [phone].             Medications           Message regarding Medications     Verify the changes and/or additions to your medication regime listed below are the same as discussed with your clinician today.  If any of these changes or additions are incorrect, please notify your healthcare provider.        START taking these NEW medications        Refills    trazodone (DESYREL) 100 MG tablet 11    Sig: Take 1 tablet (100 mg total) by mouth every evening.    Class: Normal    Route: Oral      STOP taking these medications      "cholecalciferol, vitamin D3, 10,000 unit Tab Take 1 tablet by mouth as needed.           Verify that the below list of medications is an accurate representation of the medications you are currently taking.  If none reported, the list may be blank. If incorrect, please contact your healthcare provider. Carry this list with you in case of emergency.           Current Medications     calcitonin, salmon, (FORTICAL) 200 unit/actuation nasal spray Take 11,000 sprays by mouth once daily.    chlorhexidine (PERIDEX) 0.12 % solution FOLLOW INDICATED INTRCTIONS UTD    cholecalciferol, vitamin D3, 50,000 unit capsule Take 1 capsule (50,000 Units total) by mouth once a week.    fish oil-omega-3 fatty acids 300-1,000 mg capsule Take 2 g by mouth once daily.    gabapentin (NEURONTIN) 100 MG capsule Take 1 capsule (100 mg total) by mouth 3 (three) times daily.    TURMERIC ROOT EXTRACT ORAL Take 900 mg by mouth.    zolpidem (AMBIEN) 5 MG Tab Take 1 tablet (5 mg total) by mouth nightly as needed.    diazePAM (VALIUM) 5 MG tablet Take 1 tablet by mouth 1 hour prior to and at the time of each MRI.    hydrocodone-acetaminophen 10-325mg (NORCO)  mg Tab Take 1 tablet by mouth every 6 (six) hours as needed for Pain.    ibuprofen (ADVIL,MOTRIN) 800 MG tablet Take 1 tablet (800 mg total) by mouth every 6 (six) hours as needed for Pain.    methylPREDNISolone sodium succinate (SOLU-MEDROL) 1,000 mg injection Solumedrol 1g IV solution to be mixed in smoothie or juice and taken orally daily for 3 days.    olmesartan (BENICAR) 20 MG tablet Take 20 mg by mouth daily as needed.    trazodone (DESYREL) 100 MG tablet Take 1 tablet (100 mg total) by mouth every evening.           Clinical Reference Information           Your Vitals Were     BP Pulse Height Weight Last Period BMI    150/90 83 5' 4" (1.626 m) 81.3 kg (179 lb 4.8 oz) 12/05/2016 (Approximate) 30.78 kg/m2      Blood Pressure          Most Recent Value    BP  (!)  150/90      Allergies " as of 2/10/2017     No Known Allergies      Immunizations Administered on Date of Encounter - 2/10/2017     None      Language Assistance Services     ATTENTION: Language assistance services are available, free of charge. Please call [phone].      ATENCIÓN: Si liam roman, tiene a verdin disposición servicios gratuitos de asistencia lingüística. Llame al [phone].     CHÚ Ý: N?u b?n nói Ti?ng Vi?t, có các d?ch v? h? tr? ngôn ng? mi?n phí dành cho b?n. G?i s? [phone].         [facility] complies with applicable Federal civil rights laws and does not discriminate on the basis of race, color, national origin, age, disability, or sex.

## 2017-02-10 NOTE — PROGRESS NOTES
Subjective:       Patient ID: Tiffany Cruz is a 41 y.o. female who presents today for a routine clinic visit for MS.    MS HPI:  · DMT: N/A  · Side effects from DMT? No  · Taking vitamin D3 as recommended? Yes -  Dose: 10,000 IU daily  · She had a relapse in January described as a right sided pain in hand and leg; there was new joanna + lesion in MRI;   · Aubagio has been started, and she awaits her first shipment;   · On gabapentin 100mg TID which helps with right sided tingling;   · Having issues with insomnia;     SOCIAL HISTORY  Social History   Substance Use Topics    Smoking status: Never Smoker    Smokeless tobacco: Never Used    Alcohol use No     Living arrangements - the patient lives with her family.  Employment She is an         Objective:       25 foot timed walk: 3.6 seconds without assist; was 4.8s in January  Neurologic Exam    MENTAL STATUS: Language is fluent, normal verbal comprehension,    CRANIAL NERVE EXAM: There is no dysarthria.    MOTOR: 5/5 all groups    SENSORY: nl to LT and vib  GAIT: Narrow Based and stable.     Imaging:   MRI C spine 1/2017 with new lesion in high C spine, joanna +  Brain MRI stable    Labs:     Lab Results   Component Value Date    EJHKXFPG62DO 50 03/18/2016    [id number] 45 04/02/2015    [id number] 37 02/11/2014         Diagnosis/Assessment/Plan:    1. Multiple Sclerosis  · Assessment: she has recovered well from recent relapse  · Imaging: will be planned in 6 months--aug 2017  · Disease Modifying Therapies: she plans to start Aubagio soon; will check baseline labs today    2. MS Symptom Assessment / Management  · Sleep Disturbance: will d/c Ambien; start Trazodone 100mg hs;   · Pain: continue gabapentin for NP pain    F/u 3 mo with [facility] Parks    Over 50% of this 30 minute visit was spent in direct face to face counseling of the patient about her MS and the management of her symptoms.        There are no diagnoses linked to this  encounter.

## 2017-02-20 ENCOUNTER — LAB VISIT (OUTPATIENT)
Dept: LAB | Facility: HOSPITAL | Age: 42
End: 2017-02-20
Attending: INTERNAL MEDICINE
Payer: OTHER GOVERNMENT

## 2017-02-20 DIAGNOSIS — D50.9 MICROCYTIC ANEMIA: ICD-10-CM

## 2017-02-20 DIAGNOSIS — Z79.899 HIGH RISK MEDICATION USE: ICD-10-CM

## 2017-02-20 DIAGNOSIS — G35 MULTIPLE SCLEROSIS: ICD-10-CM

## 2017-02-20 LAB
ALBUMIN SERPL BCP-MCNC: 3.8 G/DL
ALP SERPL-CCNC: 66 U/L
ALT SERPL W/O P-5'-P-CCNC: 24 U/L
AST SERPL-CCNC: 17 U/L
BILIRUB DIRECT SERPL-MCNC: 0.2 MG/DL
BILIRUB SERPL-MCNC: 0.4 MG/DL
ERYTHROCYTE [DISTWIDTH] IN BLOOD BY AUTOMATED COUNT: 14.3 %
FERRITIN SERPL-MCNC: 321 NG/ML
HCT VFR BLD AUTO: 34.6 %
HGB BLD-MCNC: 11.1 G/DL
IRON SERPL-MCNC: 60 UG/DL
MCH RBC QN AUTO: 22.3 PG
MCHC RBC AUTO-ENTMCNC: 32.1 %
MCV RBC AUTO: 70 FL
NEUTROPHILS # BLD AUTO: 4.7 K/UL
PLATELET # BLD AUTO: 425 K/UL
PMV BLD AUTO: 10.3 FL
PROT SERPL-MCNC: 7.4 G/DL
RBC # BLD AUTO: 4.97 M/UL
SATURATED IRON: 17 %
TOTAL IRON BINDING CAPACITY: 363 UG/DL
TRANSFERRIN SERPL-MCNC: 245 MG/DL
WBC # BLD AUTO: 7.92 K/UL

## 2017-02-20 PROCEDURE — 80076 HEPATIC FUNCTION PANEL: CPT

## 2017-02-20 PROCEDURE — 82728 ASSAY OF FERRITIN: CPT

## 2017-02-20 PROCEDURE — 85027 COMPLETE CBC AUTOMATED: CPT

## 2017-02-20 PROCEDURE — 36415 COLL VENOUS BLD VENIPUNCTURE: CPT

## 2017-02-20 PROCEDURE — 83540 ASSAY OF IRON: CPT

## 2017-02-21 ENCOUNTER — TELEPHONE (OUTPATIENT)
Dept: HEMATOLOGY/ONCOLOGY | Facility: CLINIC | Age: 42
End: 2017-02-21

## 2017-02-21 NOTE — TELEPHONE ENCOUNTER
Appt cancelled---voicemail left informing pt of this.   Fwd to Anahi Edward for reschedule.       ----- Message from Blanca Frances sent at 2/21/2017 12:58 PM CST -----  Contact: Pt  Pt is calling to r/s appt scheduled for today 2/21/17 and can be reached at [phone].    Thank you

## 2017-02-23 ENCOUNTER — OFFICE VISIT (OUTPATIENT)
Dept: OBSTETRICS AND GYNECOLOGY | Facility: CLINIC | Age: 42
End: 2017-02-23
Attending: OBSTETRICS & GYNECOLOGY
Payer: OTHER GOVERNMENT

## 2017-02-23 VITALS
SYSTOLIC BLOOD PRESSURE: 124 MMHG | DIASTOLIC BLOOD PRESSURE: 84 MMHG | WEIGHT: 178.81 LBS | HEIGHT: 64 IN | BODY MASS INDEX: 30.53 KG/M2

## 2017-02-23 DIAGNOSIS — R10.31 RIGHT LOWER QUADRANT PAIN: Primary | ICD-10-CM

## 2017-02-23 DIAGNOSIS — N63.0 BREAST SWELLING: ICD-10-CM

## 2017-02-23 DIAGNOSIS — R30.0 DYSURIA: ICD-10-CM

## 2017-02-23 DIAGNOSIS — Z80.3 FAMILY HISTORY OF BREAST CANCER IN FIRST DEGREE RELATIVE: ICD-10-CM

## 2017-02-23 PROCEDURE — 99999 PR PBB SHADOW E&M-EST. PATIENT-LVL III: CPT | Mod: PBBFAC,,, | Performed by: OBSTETRICS & GYNECOLOGY

## 2017-02-23 PROCEDURE — 99213 OFFICE O/P EST LOW 20 MIN: CPT | Mod: 24,S$PBB,, | Performed by: OBSTETRICS & GYNECOLOGY

## 2017-02-23 PROCEDURE — 87086 URINE CULTURE/COLONY COUNT: CPT

## 2017-02-23 PROCEDURE — 99213 OFFICE O/P EST LOW 20 MIN: CPT | Mod: PBBFAC | Performed by: OBSTETRICS & GYNECOLOGY

## 2017-02-23 NOTE — PROGRESS NOTES
"Chief Complaint: Right lower quadrant pain    Tiffany Cruz is a 41 y.o. female  presents with complaint of right lower quadrant pain x 3 months.  She reports light bleeding when wiping.  She also reports breast swelling and has a family history of breast cancer.      Visit Vitals    /84    Ht 5' 4" (1.626 m)    Wt 81.1 kg (178 lb 12.7 oz)    LMP 2016 (Approximate)    BMI 30.69 kg/m2       ROS:  GENERAL: No fever, chills, fatigability or weight loss.  VULVAR: No pain, no lesions and no itching.  VAGINAL: No relaxation, no itching, no discharge, no abnormal bleeding and no lesions.  ABDOMEN: Reports right lower quadrant abdominal pain. Denies nausea. Denies vomiting. No diarrhea. No constipation  BREAST: Denies pain. No lumps. No discharge but reports breast swelling.  URINARY: No incontinence, no nocturia, no frequency and no dysuria.  CARDIOVASCULAR: No chest pain. No shortness of breath. No leg cramps.  NEUROLOGICAL: No headaches. No vision changes.    Physical exam:      PELVIC: Normal external genitalia without lesions.  Normal hair distribution.  Adequate perineal body, normal urethral meatus.  Vagina moist and well rugated without lesions or discharge.  Vaginal cuff intact.  No significant cystocele or rectocele.  Bimanual exam shows uterus to be surgically absent.  Adnexa without masses or tenderness.      1. Right lower quadrant pain  US Pelvis Comp with Transvag NON-OB (xpd   2. Dysuria  Urine culture    Urinalysis   3. Breast swelling  Mammo Digital Diagnostic Bilat with Thien    Mammo Digital Diagnostic Bilat with Thien   4. Family history of breast cancer in first degree relative  Mammo Digital Diagnostic Bilat with Thien    Mammo Digital Diagnostic Bilat with Thien         "

## 2017-02-25 LAB — BACTERIA UR CULT: NO GROWTH

## 2017-03-02 ENCOUNTER — TELEPHONE (OUTPATIENT)
Dept: NEUROLOGY | Facility: CLINIC | Age: 42
End: 2017-03-02

## 2017-03-06 ENCOUNTER — PATIENT MESSAGE (OUTPATIENT)
Dept: UROLOGY | Facility: CLINIC | Age: 42
End: 2017-03-06

## 2017-03-06 ENCOUNTER — PATIENT MESSAGE (OUTPATIENT)
Dept: OBSTETRICS AND GYNECOLOGY | Facility: CLINIC | Age: 42
End: 2017-03-06

## 2017-03-06 ENCOUNTER — HOSPITAL ENCOUNTER (OUTPATIENT)
Dept: RADIOLOGY | Facility: OTHER | Age: 42
Discharge: HOME | End: 2017-03-06
Attending: OBSTETRICS & GYNECOLOGY
Payer: OTHER GOVERNMENT

## 2017-03-06 DIAGNOSIS — Z79.899 HIGH RISK MEDICATION USE: ICD-10-CM

## 2017-03-06 DIAGNOSIS — G35 MULTIPLE SCLEROSIS: Primary | ICD-10-CM

## 2017-03-06 DIAGNOSIS — R10.31 RIGHT LOWER QUADRANT PAIN: ICD-10-CM

## 2017-03-06 PROCEDURE — 76856 US EXAM PELVIC COMPLETE: CPT | Mod: 26,,, | Performed by: RADIOLOGY

## 2017-03-06 PROCEDURE — 76830 TRANSVAGINAL US NON-OB: CPT | Mod: 26,,, | Performed by: RADIOLOGY

## 2017-03-06 PROCEDURE — 76856 US EXAM PELVIC COMPLETE: CPT | Mod: TC

## 2017-03-07 ENCOUNTER — PATIENT MESSAGE (OUTPATIENT)
Dept: NEUROLOGY | Facility: CLINIC | Age: 42
End: 2017-03-07

## 2017-03-10 ENCOUNTER — TELEPHONE (OUTPATIENT)
Dept: UROLOGY | Facility: CLINIC | Age: 42
End: 2017-03-10

## 2017-03-10 NOTE — TELEPHONE ENCOUNTER
----- Message from Aimee Ortez sent at 3/10/2017  8:10 AM CST -----  Contact: pt  The pt called because she need an appointment ASAP. The pt states that the GYN told her that she should go to urology. Please call the pt at [phone]

## 2017-03-10 NOTE — TELEPHONE ENCOUNTER
----- Message from Aimee Ortez sent at 3/10/2017  8:10 AM CST -----  Contact: pt  The pt called because she need an appointment ASAP. The pt states that the GYN told her that she should go to urology. Please call the pt at [phone]

## 2017-03-10 NOTE — TELEPHONE ENCOUNTER
Per Dr. Grande, in previous correspondence, pt was to be given first available appointment as she is too soon post-op with her GYN. Pt scheduled for 4/17/2017.

## 2017-03-11 ENCOUNTER — PATIENT MESSAGE (OUTPATIENT)
Dept: UROLOGY | Facility: CLINIC | Age: 42
End: 2017-03-11

## 2017-03-16 ENCOUNTER — OFFICE VISIT (OUTPATIENT)
Dept: UROLOGY | Facility: CLINIC | Age: 42
End: 2017-03-16
Payer: OTHER GOVERNMENT

## 2017-03-16 VITALS
WEIGHT: 180.31 LBS | HEIGHT: 64 IN | SYSTOLIC BLOOD PRESSURE: 157 MMHG | BODY MASS INDEX: 30.78 KG/M2 | DIASTOLIC BLOOD PRESSURE: 94 MMHG | HEART RATE: 83 BPM

## 2017-03-16 DIAGNOSIS — Z87.42 H/O VAGINAL BLEEDING: Primary | ICD-10-CM

## 2017-03-16 DIAGNOSIS — R10.30 LOWER ABDOMINAL PAIN: ICD-10-CM

## 2017-03-16 LAB
CANDIDA RRNA VAG QL PROBE: NEGATIVE
G VAGINALIS RRNA GENITAL QL PROBE: POSITIVE
T VAGINALIS RRNA GENITAL QL PROBE: NEGATIVE

## 2017-03-16 PROCEDURE — 99214 OFFICE O/P EST MOD 30 MIN: CPT | Mod: PBBFAC | Performed by: UROLOGY

## 2017-03-16 PROCEDURE — 99213 OFFICE O/P EST LOW 20 MIN: CPT | Mod: S$PBB,,, | Performed by: UROLOGY

## 2017-03-16 PROCEDURE — 87086 URINE CULTURE/COLONY COUNT: CPT

## 2017-03-16 PROCEDURE — 99999 PR PBB SHADOW E&M-EST. PATIENT-LVL IV: CPT | Mod: PBBFAC,,, | Performed by: UROLOGY

## 2017-03-16 PROCEDURE — 87480 CANDIDA DNA DIR PROBE: CPT
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
## 2017-03-17 ENCOUNTER — TELEPHONE (OUTPATIENT)
Dept: UROLOGY | Facility: CLINIC | Age: 42
End: 2017-03-17

## 2017-03-17 DIAGNOSIS — B96.89 BACTERIAL VAGINOSIS: Primary | ICD-10-CM

## 2017-03-17 DIAGNOSIS — N76.0 BACTERIAL VAGINOSIS: Primary | ICD-10-CM

## 2017-03-17 LAB — BACTERIA UR CULT: NO GROWTH

## 2017-03-17 RX ORDER — METRONIDAZOLE 500 MG/1
500 TABLET ORAL 2 TIMES DAILY
Qty: 14 TABLET | Refills: 0 | Status: SHIPPED | OUTPATIENT
Start: 2017-03-17 | End: 2017-03-24

## 2017-03-17 NOTE — TELEPHONE ENCOUNTER
The patient's vaginosis screen was positive for Gardnarella.  Flagyl 500 mg bid sent to her preferred pharmacy after verifying.  Advised not to drink alcohol while taking the Flagyl

## 2017-04-04 ENCOUNTER — TELEPHONE (OUTPATIENT)
Dept: NEUROLOGY | Facility: CLINIC | Age: 42
End: 2017-04-04

## 2017-04-04 DIAGNOSIS — H53.8 BLURRED VISION: Primary | ICD-10-CM

## 2017-04-04 DIAGNOSIS — G35 MULTIPLE SCLEROSIS: ICD-10-CM

## 2017-04-04 DIAGNOSIS — H57.11 EYE PAIN, RIGHT: ICD-10-CM

## 2017-04-04 NOTE — TELEPHONE ENCOUNTER
Spoke with Evelyn. She reports that she has taken her 1st month of Aubagio, but stopped it on Sunday. She explains that she had a bad cold a few weeks ago and feels like her voice has been cracking more and that it takes much energy to do very simple things, such as folding towels. She reports that she read something online about Aubagio affecting the voice, but I am not aware of this. I will look into it. I explained that perhaps she is experiencing a pseudorelapse from recent cold and that she should take time to rest. She also reports that she has had worsening blurry vision and some discomfort above the right eye for the past few weeks. I will set her up with Dr. Avelar for evaluation. I have encouraged her to continue taking Aubagio for now.

## 2017-04-06 ENCOUNTER — TELEPHONE (OUTPATIENT)
Dept: NEUROLOGY | Facility: CLINIC | Age: 42
End: 2017-04-06

## 2017-04-06 DIAGNOSIS — Z79.899 HIGH RISK MEDICATION USE: ICD-10-CM

## 2017-04-06 DIAGNOSIS — G35 MULTIPLE SCLEROSIS: Primary | ICD-10-CM

## 2017-04-07 ENCOUNTER — INITIAL CONSULT (OUTPATIENT)
Dept: OPHTHALMOLOGY | Facility: CLINIC | Age: 42
End: 2017-04-07
Payer: OTHER GOVERNMENT

## 2017-04-07 ENCOUNTER — LAB VISIT (OUTPATIENT)
Dept: LAB | Facility: HOSPITAL | Age: 42
End: 2017-04-07
Attending: PSYCHIATRY & NEUROLOGY
Payer: OTHER GOVERNMENT

## 2017-04-07 DIAGNOSIS — G35 MS (MULTIPLE SCLEROSIS): Primary | ICD-10-CM

## 2017-04-07 DIAGNOSIS — G35 MULTIPLE SCLEROSIS: ICD-10-CM

## 2017-04-07 DIAGNOSIS — Z79.899 HIGH RISK MEDICATION USE: ICD-10-CM

## 2017-04-07 DIAGNOSIS — H04.123 INSUFFICIENCY OF TEAR FILM OF BOTH EYES: ICD-10-CM

## 2017-04-07 LAB
ALBUMIN SERPL BCP-MCNC: 3.6 G/DL
ALP SERPL-CCNC: 74 U/L
ALT SERPL W/O P-5'-P-CCNC: 38 U/L
AST SERPL-CCNC: 21 U/L
BASOPHILS # BLD AUTO: 0.06 K/UL
BASOPHILS NFR BLD: 1 %
BILIRUB DIRECT SERPL-MCNC: 0.1 MG/DL
BILIRUB SERPL-MCNC: 0.1 MG/DL
DIFFERENTIAL METHOD: ABNORMAL
EOSINOPHIL # BLD AUTO: 0.1 K/UL
EOSINOPHIL NFR BLD: 1.8 %
ERYTHROCYTE [DISTWIDTH] IN BLOOD BY AUTOMATED COUNT: 14.3 %
HCT VFR BLD AUTO: 36.3 %
HGB BLD-MCNC: 11.6 G/DL
LYMPHOCYTES # BLD AUTO: 2.4 K/UL
LYMPHOCYTES NFR BLD: 37.9 %
MCH RBC QN AUTO: 22.1 PG
MCHC RBC AUTO-ENTMCNC: 32 %
MCV RBC AUTO: 69 FL
MONOCYTES # BLD AUTO: 0.9 K/UL
MONOCYTES NFR BLD: 14.3 %
NEUTROPHILS # BLD AUTO: 2.8 K/UL
NEUTROPHILS NFR BLD: 45 %
PLATELET # BLD AUTO: 347 K/UL
PMV BLD AUTO: 10.6 FL
PROT SERPL-MCNC: 7.6 G/DL
RBC # BLD AUTO: 5.24 M/UL
WBC # BLD AUTO: 6.28 K/UL

## 2017-04-07 PROCEDURE — 92004 COMPRE OPH EXAM NEW PT 1/>: CPT | Mod: S$PBB,,, | Performed by: OPHTHALMOLOGY

## 2017-04-07 PROCEDURE — 99212 OFFICE O/P EST SF 10 MIN: CPT | Mod: PBBFAC | Performed by: OPHTHALMOLOGY

## 2017-04-07 PROCEDURE — 99999 PR PBB SHADOW E&M-EST. PATIENT-LVL II: CPT | Mod: PBBFAC,,, | Performed by: OPHTHALMOLOGY

## 2017-04-07 PROCEDURE — 80076 HEPATIC FUNCTION PANEL: CPT

## 2017-04-07 PROCEDURE — 85025 COMPLETE CBC W/AUTO DIFF WBC: CPT

## 2017-04-07 PROCEDURE — 36415 COLL VENOUS BLD VENIPUNCTURE: CPT

## 2017-04-07 RX ORDER — PHENAZOPYRIDINE HYDROCHLORIDE 200 MG/1
TABLET, FILM COATED ORAL
COMMUNITY
Start: 2017-01-10 | End: 2017-12-18

## 2017-04-07 NOTE — PROGRESS NOTES
HPI     Blurred Vision    Additional comments: Multiple sclerosis            Comments   Referred byJessrEshaLeda  Patient here for Evaluation of blurry vision.  Pt states being evaluated for MS.Pt states OU feels irritated and itchy   sometimes. Pt states OU vision blurry that comes and goes. Pt states OD   has a switching sensation that comes and goes.  No eye drops.     I have personally interviewed the patient, reviewed the history and   examined the patient and agree with the technician's exam.       Last edited by Luis Avelar MD on 4/7/2017  3:41 PM. (History)        ROS     Positive for: Neurological, Cardiovascular, Heme/Lymph    Negative for: Constitutional, Gastrointestinal, Skin, Genitourinary,   Musculoskeletal, HENT, Endocrine, Eyes, Respiratory, Psychiatric,   Allergic/Imm    Last edited by Luis Avelar MD on 4/7/2017  3:41 PM. (History)        Assessment /Plan     For exam results, see Encounter Report.    MS (multiple sclerosis)    Insufficiency of tear film of both eyes      Ms. Cruz is having intermittent visual blurring secondary to her tear film insufficiency. I found no evidence of involvement of the visual system by her multiple sclerosis. I recommended artificial tears up to four times daily. She will return as needed.

## 2017-04-07 NOTE — MR AVS SNAPSHOT
[facility]  [address]  [facility] [address]  Phone: [phone]  Fax: [phone]                  Tiffany Cruz   2017 3:00 PM   Initial consult    Description:  Female : 1975   Provider:  Luis Avelar MD   Department:  [facility]           Reason for Visit     Blurred Vision           Diagnoses this Visit        Comments    MS (multiple sclerosis)    -  Primary     Insufficiency of tear film of both eyes                To Do List           Future Appointments        Provider Department Dept Phone    2017 3:30 PM [facility] [phone]    2017 11:30 AM Roseanna Riddle APRN, CNS [facility] [phone]    2017 3:30 PM [facility] [phone]    2017 3:30 PM [facility] [phone]    2017 3:30 PM [facility] [phone]      Goals (5 Years of Data)     None      Follow-Up and Disposition     Return if symptoms worsen or fail to improve.      [facility]     [facility] Care Line -  Assistance  Unless otherwise directed by your provider, please contact [facility], our nurse care line that is available for  assistance.     Registered nurses in the [facility] provide: appointment scheduling, clinical advisement, health education, and other advisory services.  Call: [phone] (toll free)               Medications           Message regarding Medications     Verify the changes and/or additions to your medication regime listed below are the same as discussed with your clinician today.  If any of these changes or additions are incorrect, please notify your healthcare provider.             Verify that the below list of medications is an accurate representation of the medications you are currently taking.  If none reported,  the list may be blank. If incorrect, please contact your healthcare provider. Carry this list with you in case of emergency.           Current Medications     calcitonin, salmon, (FORTICAL) 200 unit/actuation nasal spray Take 11,000 sprays by mouth once daily.    chlorhexidine (PERIDEX) 0.12 % solution FOLLOW INDICATED INTRCTIONS UTD    cholecalciferol, vitamin D3, 50,000 unit capsule Take 1 capsule (50,000 Units total) by mouth once a week.    diazePAM (VALIUM) 5 MG tablet Take 1 tablet by mouth 1 hour prior to and at the time of each MRI.    fish oil-omega-3 fatty acids 300-1,000 mg capsule Take 2 g by mouth once daily.    gabapentin (NEURONTIN) 100 MG capsule Take 1 capsule (100 mg total) by mouth 3 (three) times daily.    hydrocodone-acetaminophen 10-325mg (NORCO)  mg Tab Take 1 tablet by mouth every 6 (six) hours as needed for Pain.    ibuprofen (ADVIL,MOTRIN) 800 MG tablet Take 1 tablet (800 mg total) by mouth every 6 (six) hours as needed for Pain.    methylPREDNISolone sodium succinate (SOLU-MEDROL) 1,000 mg injection Solumedrol 1g IV solution to be mixed in smoothie or juice and taken orally daily for 3 days.    olmesartan (BENICAR) 20 MG tablet Take 20 mg by mouth daily as needed.    phenazopyridine (PYRIDIUM) 200 MG tablet     trazodone (DESYREL) 100 MG tablet Take 1 tablet (100 mg total) by mouth every evening.    TURMERIC ROOT EXTRACT ORAL Take 900 mg by mouth.    zolpidem (AMBIEN) 5 MG Tab Take 1 tablet (5 mg total) by mouth nightly as needed.           Clinical Reference Information           Your Vitals Were     Last Period                   12/05/2016 (Approximate)           Allergies as of 4/7/2017     No Known Allergies      Immunizations Administered on Date of Encounter - 4/7/2017     None      Instructions    Artificial tears as desired up to four times per day. Return to me as needed.       Language Assistance Services     ATTENTION: Language assistance services are available, free of  charge. Please call [phone].      ATENCIÓN: Si habla español, tiene a verdin disposición servicios gratuitos de asistencia lingüística. Llame al [phone].     CHÚ Ý: N?u b?n nói Ti?ng Vi?t, có các d?ch v? h? tr? ngôn ng? mi?n phí dành cho b?n. G?i s? [phone].         [facility] complies with applicable Federal civil rights laws and does not discriminate on the basis of race, color, national origin, age, disability, or sex.

## 2017-04-07 NOTE — LETTER
April 7, 2017      Roseanna Riddle, APRN, CNS  [address]  [facility] [address]           [facility]  [address]  [facility] [address]  Phone: [phone]  Fax: [phone]          Patient: Tiffany Cruz   MR Number: [id number]   YOB: 1975   Date of Visit: 4/7/2017       Dear Roseanna Riddle:    Thank you for referring Tiffany Cruz to me for evaluation. Attached you will find relevant portions of my assessment and plan of care.    If you have questions, please do not hesitate to call me. I look forward to following Tiffany Cruz along with you.    Sincerely,    Luis Avelar MD    Enclosure  CC:  Marika Tompkins MD    If you would like to receive this communication electronically, please contact [email or web address] or [phone] to request more information on [facility] [employer] access.    For providers and/or their staff who would like to refer a patient to [facility], please contact us through our one-stop-shop provider referral line, [facility] [place], at [phone].    If you feel you have received this communication in error or would no longer like to receive these types of communications, please e-mail [email or web address]

## 2017-05-12 ENCOUNTER — TELEPHONE (OUTPATIENT)
Dept: NEUROLOGY | Facility: CLINIC | Age: 42
End: 2017-05-12

## 2017-05-22 ENCOUNTER — PATIENT MESSAGE (OUTPATIENT)
Dept: NEUROLOGY | Facility: CLINIC | Age: 42
End: 2017-05-22

## 2017-06-02 NOTE — TELEPHONE ENCOUNTER
Call placed to pt. Reminded her that she is scheduled for Aubagio safety labs on 6/5 and that she missed her May appt. Also scheduled her to see Roseanna on 6/23 at 2:30. Pt aware.

## 2017-06-06 ENCOUNTER — TELEPHONE (OUTPATIENT)
Dept: NEUROLOGY | Facility: CLINIC | Age: 42
End: 2017-06-06

## 2017-06-23 ENCOUNTER — OFFICE VISIT (OUTPATIENT)
Dept: NEUROLOGY | Facility: CLINIC | Age: 42
End: 2017-06-23
Payer: OTHER GOVERNMENT

## 2017-06-23 VITALS
HEIGHT: 64 IN | SYSTOLIC BLOOD PRESSURE: 117 MMHG | BODY MASS INDEX: 30.38 KG/M2 | WEIGHT: 177.94 LBS | DIASTOLIC BLOOD PRESSURE: 85 MMHG | HEART RATE: 78 BPM

## 2017-06-23 DIAGNOSIS — Z79.899 HIGH RISK MEDICATION USE: ICD-10-CM

## 2017-06-23 DIAGNOSIS — G47.00 INSOMNIA, UNSPECIFIED TYPE: ICD-10-CM

## 2017-06-23 DIAGNOSIS — R20.0 NUMBNESS AND TINGLING: ICD-10-CM

## 2017-06-23 DIAGNOSIS — D22.9 CHANGE IN MULTIPLE NEVI: ICD-10-CM

## 2017-06-23 DIAGNOSIS — Z71.89 COUNSELING REGARDING GOALS OF CARE: ICD-10-CM

## 2017-06-23 DIAGNOSIS — M79.2 NEUROPATHIC PAIN: ICD-10-CM

## 2017-06-23 DIAGNOSIS — R20.2 NUMBNESS AND TINGLING: ICD-10-CM

## 2017-06-23 DIAGNOSIS — G35 MULTIPLE SCLEROSIS: Primary | ICD-10-CM

## 2017-06-23 PROCEDURE — 99214 OFFICE O/P EST MOD 30 MIN: CPT | Mod: PBBFAC | Performed by: CLINICAL NURSE SPECIALIST

## 2017-06-23 PROCEDURE — 99999 PR PBB SHADOW E&M-EST. PATIENT-LVL IV: CPT | Mod: PBBFAC,,, | Performed by: CLINICAL NURSE SPECIALIST

## 2017-06-23 PROCEDURE — 99214 OFFICE O/P EST MOD 30 MIN: CPT | Mod: S$PBB,,, | Performed by: CLINICAL NURSE SPECIALIST

## 2017-06-23 RX ORDER — GABAPENTIN 100 MG/1
100 CAPSULE ORAL 3 TIMES DAILY
Qty: 270 CAPSULE | Refills: 1 | Status: SHIPPED | OUTPATIENT
Start: 2017-06-23 | End: 2018-04-09 | Stop reason: SDUPTHER

## 2017-06-23 RX ORDER — ASPIRIN 325 MG
50000 TABLET, DELAYED RELEASE (ENTERIC COATED) ORAL WEEKLY
Qty: 12 CAPSULE | Refills: 1 | Status: SHIPPED | OUTPATIENT
Start: 2017-06-23 | End: 2018-05-27 | Stop reason: SDUPTHER

## 2017-06-23 RX ORDER — ZOLPIDEM TARTRATE 5 MG/1
5 TABLET ORAL NIGHTLY PRN
Qty: 90 TABLET | Refills: 1 | Status: SHIPPED | OUTPATIENT
Start: 2017-06-23 | End: 2018-04-09 | Stop reason: SDUPTHER

## 2017-06-23 NOTE — PROGRESS NOTES
"Subjective:       Patient ID: Tiffany Cruz is a 41 y.o. female who presents today for a routine clinic visit for MS. She was last seen by Dr. Tompkins in February. The history has been provided by the patient.     MS HPI:  · DMT: Aubagio--She has had some hair loss from Aubagio. She has not taken it in about 30 days.    · Side effects from DMT? As above.   · Taking vitamin D3 as recommended? Yes -  Dose: 50,000 units weekly   She feels that from an MS standpoint, everything has been stable.   She had a fall yesterday, but she reports that her high heel got stuck in a crack on the sidewalk. She fell onto her right knee, and she is taking Advil now.     SOCIAL HISTORY  Social History   Substance Use Topics    Smoking status: Never Smoker    Smokeless tobacco: Never Used    Alcohol use No     Living arrangements - the patient lives with her family.  Employment: works as a ; she is running for [employer] in October    MS ROS:  · Fatigue: Yes - Energy level is "so-so." She does not feel like her fatigue interferes with function.   · Sleep Disturbance: Yes - She has not been taking trazodone. She reports that it made her feel groggy. She has been taking Ambien 5mg, which is helpful.   · Bladder Dysfunction: Yes - She has seen her urologist. She will have cystoscopy on 7/6. She has pain and pressure when urinating and blood in the urine. She is not having urgency, but does have some retention.   · Bowel Dysfunction: Yes - She has "dumping syndrome."   · Spasticity: Yes - Stable. She does not take tizanidine, as it made her feel too sleepy.   · Visual Symptoms: Yes - She wears reading glasses.   · Cognitive: No  · Mood Disorder: No  · Gait Disturbance: No  · Falls: Yes - As above.   · Hand Dysfunction: No  · Pain: Yes - She takes gabapentin as needed for nerve pain down the right side, which is helpful.  · Sexual Dysfunction: Not Assessed  · Skin Breakdown: Yes - She has noticed some changes in multiple moles. "   · Tremors: No  · Dysphagia:  No  · Dysarthria:  No  · Heat sensitivity:  No  · Any un-met adaptive needs? No  · Copay Assist?  N/A         Objective:        1. 25 foot timed walk: 4.4 seconds today without assist (patient has knee pain); was 3.6 seconds at last visit without assist     Neurologic Exam     Mental Status   Oriented to person, place, and time.   Attention: normal. Concentration: normal.   Speech: speech is normal   Level of consciousness: alert  Knowledge: good.     Cranial Nerves     CN III, IV, VI   Pupils are equal, round, and reactive to light.  Extraocular motions are normal.   Right pupil: Shape: regular. Reactivity: brisk. Consensual response: intact.   Left pupil: Shape: regular. Reactivity: brisk. Consensual response: intact.   CN III: no CN III palsy  CN VI: no CN VI palsy  Nystagmus: none     CN V   Right facial sensation deficit: none  Left facial sensation deficit: none    CN VII   Right facial weakness: none  Left facial weakness: none    CN VIII   Hearing: intact    CN IX, X   Palate: symmetric    CN XI   Right sternocleidomastoid strength: normal  Left sternocleidomastoid strength: normal  Right trapezius strength: normal  Left trapezius strength: normal    CN XII   Tongue deviation: none    Motor Exam   Muscle bulk: normal  Overall muscle tone: normal  Right arm tone: normal  Left arm tone: normal  Right leg tone: normal  Left leg tone: normal    Strength   Right neck flexion: 5/5  Left neck flexion: 5/5  Right neck extension: 5/5  Left neck extension: 5/5  Right deltoid: 5/5  Left deltoid: 5/5  Right biceps: 5/5  Left biceps: 5/5  Right triceps: 5/5  Left triceps: 5/5  Right wrist flexion: 5/5  Left wrist flexion: 5/5  Right wrist extension: 5/5  Left wrist extension: 5/5  Right interossei: 5/5  Left interossei: 5/5  Right iliopsoas: 5/5  Left iliopsoas: 5/5  Right quadriceps: 5/5  Left quadriceps: 5/5  Right hamstrin/5  Left hamstrin/5  Right anterior tibial: 5/5  Left  anterior tibial: 5/5  Right gastroc: 5/5  Left gastroc: 5/5    Sensory Exam   Right arm light touch: normal  Left arm light touch: normal  Right leg light touch: normal  Left leg light touch: normal  Right arm vibration: normal  Left arm vibration: normal  Right leg vibration: normal  Left leg vibration: normal  Right arm pinprick: normal  Left arm pinprick: normal  Right leg pinprick: normal  Left leg pinprick: normal    Gait, Coordination, and Reflexes     Gait  Gait: normal    Coordination   Finger to nose coordination: normal  Tandem walking coordination: normal    Tremor   Resting tremor: absent    Reflexes   Right brachioradialis: 2+  Left brachioradialis: 2+  Right biceps: 2+  Left biceps: 2+  Right patellar: 1+  Left patellar: 1+  Right achilles: 1+  Left achilles: 1+  Right plantar: normal  Left plantar: normal          Labs:     Lab Results   Component Value Date    VHGZRPQY26ID 50 03/18/2016    OLUVJPJW74MD 45 04/02/2015    PZGUWEBX49WQ 37 02/11/2014     Lab Results   Component Value Date    WBC 6.28 04/07/2017    HGB 11.6 (L) 04/07/2017    HCT 36.3 (L) 04/07/2017    MCV 69 (L) 04/07/2017     04/07/2017     Lab Results   Component Value Date    ALT 38 04/07/2017    AST 21 04/07/2017    ALKPHOS 74 04/07/2017    BILITOT 0.1 04/07/2017         Diagnosis/Assessment/Plan:    1. Multiple Sclerosis  · Assessment: Evelyn is clinically stable today. Her walk is slightly slower, but this is related to knee pain from fall yesterday.   · Imaging: MRI brain and cervical spine in September   · Disease Modifying Therapies: Patient has not been consistent with Aubagio. We discussed the importance of taking disease modifying therapy, especially in light of the fact that she had an active c-spine lesion in January. We discussed hair loss due to Aubagio and that this should only last for one hair cycle. She will do CBC and LFT labs today. Continue Vitamin D. Rx refilled today. Will check Vitamin D level.     2. MS  Symptom Assessment / Management  · Sleep Disturbance: She is not taking Trazodone. Continue Ambien. Rx refilled today.   · Bladder Dysfunction: She is being evaluated by urology now and has cystoscopy scheduled next month.   · Bowel Dysfunction: Stable.   · Falls: She had a fall yesterday and injured her right knee.   · Pain: Continue gabapentin as needed. Rx refilled today.   · Skin: I have referred her to dermatology for mole changes.     Over 50% of this 35 minute visit was spent in direct face to face counseling of the patient about MS and the management of her symptoms. The patient agrees with the plan of care. She will follow up with Dr. Tompkins in October.         There are no diagnoses linked to this encounter.

## 2017-06-27 ENCOUNTER — TELEPHONE (OUTPATIENT)
Dept: NEUROLOGY | Facility: CLINIC | Age: 42
End: 2017-06-27

## 2017-06-27 NOTE — TELEPHONE ENCOUNTER
"Pt again "no showed" to [facility] lab appt on 6/23 (same day as appt with Roseanna). Roseanna is aware.  "

## 2017-07-28 ENCOUNTER — LAB VISIT (OUTPATIENT)
Dept: LAB | Facility: OTHER | Age: 42
End: 2017-07-28
Attending: CLINICAL NURSE SPECIALIST
Payer: OTHER GOVERNMENT

## 2017-07-28 DIAGNOSIS — Z79.899 HIGH RISK MEDICATION USE: ICD-10-CM

## 2017-07-28 DIAGNOSIS — G35 MULTIPLE SCLEROSIS: ICD-10-CM

## 2017-07-28 LAB
ALBUMIN SERPL BCP-MCNC: 3.7 G/DL
ALP SERPL-CCNC: 66 U/L
ALT SERPL W/O P-5'-P-CCNC: 29 U/L
ANISOCYTOSIS BLD QL SMEAR: SLIGHT
AST SERPL-CCNC: 20 U/L
BASOPHILS # BLD AUTO: 0.04 K/UL
BASOPHILS NFR BLD: 0.4 %
BILIRUB DIRECT SERPL-MCNC: 0.1 MG/DL
BILIRUB SERPL-MCNC: 0.2 MG/DL
DIFFERENTIAL METHOD: ABNORMAL
EOSINOPHIL # BLD AUTO: 0.1 K/UL
EOSINOPHIL NFR BLD: 1.5 %
ERYTHROCYTE [DISTWIDTH] IN BLOOD BY AUTOMATED COUNT: 14.3 %
GIANT PLATELETS BLD QL SMEAR: PRESENT
HCT VFR BLD AUTO: 35.5 %
HGB BLD-MCNC: 11.2 G/DL
HYPOCHROMIA BLD QL SMEAR: ABNORMAL
LYMPHOCYTES # BLD AUTO: 3.1 K/UL
LYMPHOCYTES NFR BLD: 32.5 %
MCH RBC QN AUTO: 21.8 PG
MCHC RBC AUTO-ENTMCNC: 31.5 G/DL
MCV RBC AUTO: 69 FL
MONOCYTES # BLD AUTO: 1.3 K/UL
MONOCYTES NFR BLD: 13.5 %
NEUTROPHILS # BLD AUTO: 4.9 K/UL
NEUTROPHILS NFR BLD: 52.1 %
OVALOCYTES BLD QL SMEAR: ABNORMAL
PLATELET # BLD AUTO: 363 K/UL
PLATELET BLD QL SMEAR: ABNORMAL
PMV BLD AUTO: 10.4 FL
POIKILOCYTOSIS BLD QL SMEAR: SLIGHT
PROT SERPL-MCNC: 7.6 G/DL
RBC # BLD AUTO: 5.14 M/UL
WBC # BLD AUTO: 9.5 K/UL

## 2017-07-28 PROCEDURE — 80076 HEPATIC FUNCTION PANEL: CPT

## 2017-07-28 PROCEDURE — 36415 COLL VENOUS BLD VENIPUNCTURE: CPT

## 2017-07-28 PROCEDURE — 85025 COMPLETE CBC W/AUTO DIFF WBC: CPT

## 2017-09-27 ENCOUNTER — HOSPITAL ENCOUNTER (OUTPATIENT)
Dept: RADIOLOGY | Facility: OTHER | Age: 42
Discharge: HOME | End: 2017-09-27
Attending: CLINICAL NURSE SPECIALIST
Payer: OTHER GOVERNMENT

## 2017-09-27 DIAGNOSIS — G35 MULTIPLE SCLEROSIS: ICD-10-CM

## 2017-09-27 PROCEDURE — A9585 GADOBUTROL INJECTION: HCPCS | Performed by: CLINICAL NURSE SPECIALIST

## 2017-09-27 PROCEDURE — 70553 MRI BRAIN STEM W/O & W/DYE: CPT | Mod: TC

## 2017-09-27 PROCEDURE — 72156 MRI NECK SPINE W/O & W/DYE: CPT | Mod: TC

## 2017-09-27 PROCEDURE — 72156 MRI NECK SPINE W/O & W/DYE: CPT | Mod: 26,,, | Performed by: RADIOLOGY

## 2017-09-27 PROCEDURE — 25500020 PHARM REV CODE 255: Performed by: CLINICAL NURSE SPECIALIST

## 2017-09-27 PROCEDURE — 70553 MRI BRAIN STEM W/O & W/DYE: CPT | Mod: 26,,, | Performed by: RADIOLOGY

## 2017-09-27 RX ORDER — GADOBUTROL 604.72 MG/ML
7 INJECTION INTRAVENOUS
Status: COMPLETED | OUTPATIENT
Start: 2017-09-27 | End: 2017-09-27

## 2017-09-27 RX ADMIN — GADOBUTROL 7 ML: 604.72 INJECTION INTRAVENOUS at 11:09

## 2017-09-29 ENCOUNTER — TELEPHONE (OUTPATIENT)
Dept: NEUROLOGY | Facility: CLINIC | Age: 42
End: 2017-09-29

## 2017-10-20 ENCOUNTER — TELEPHONE (OUTPATIENT)
Dept: NEUROLOGY | Facility: CLINIC | Age: 42
End: 2017-10-20

## 2017-10-23 ENCOUNTER — PATIENT MESSAGE (OUTPATIENT)
Dept: ENDOCRINOLOGY | Facility: CLINIC | Age: 42
End: 2017-10-23

## 2017-10-24 ENCOUNTER — PATIENT MESSAGE (OUTPATIENT)
Dept: NEUROLOGY | Facility: CLINIC | Age: 42
End: 2017-10-24

## 2017-10-24 NOTE — TELEPHONE ENCOUNTER
Call placed to pt. Informed her that recent MRI showed new brain lesion. Pt admits that she only took a few doses of Aubagio in September and a few in October. She states she is probably going to start taking it consistently in November. Reminded pt of the risk she is taking by not consistently taking DMT. Verbalizes understanding and will keep this office informed. States she will go to the lab this week as requested. Advised pt to let this office know once she restarts DMT as she will need monthly lab work. Verbalizes understanding of all.

## 2017-12-05 NOTE — TELEPHONE ENCOUNTER
"Call placed to Evelyn. She states she forgot to go to the lab this week and is agreeable to going today before her [facility] appt. Reminded her of the importance of monthly lab monitoring at the beginning of Aubagio therapy. Verbalizes understanding.    Pt also wanted to let Roseanna know that she believes she is having some side effects from the Aubagio. She has noticed recently that she occasionally has short lived palpitations/flutters and pain in her chest (last only a few minutes but painful). She also feels as though she has a "pebble" on one side of her throat. She read on the internet that this is caused by Aubagio. She really likes this medication, however, and doesn't want to be taken off of it. She would like to know what she should do.     Of note, she has seen a cardiologist a little over a year ago for chest pains and had stress test, which was normal. At that time she was diagnosed with a mild murmur. She states this chest pain is different than what she was having back then.  "
"Pt missed lab appt for [facility] labs on 4/4/17. Attempted to call pt. Pt answers phone and states "I'm in class right now" and then hangs up. Appt rescheduled for tomorrow prior to visit with Dr Avelar. Will attempt again later.  "
Call placed to pt. Informed her that neither Roseanna nor Dr Tompkins feel as though what she's experiencing is related to the Aubagio. She states she read through the symptoms listed on drugs.com, and cardiac implications and white sores in mouth were listed as possible side effects. I cautioned pt regarding untrusted websites and obtain medical advice from them. Verbalizes understanding and states she will continue to take Aubagio as directed.  
I have reached out to [facility].   
Other (specify)/1. Suggest: PO diet rx: Regular; PO supplement: Ensure Enlive 8oz. 3x daily (will provide additional ~1050 Kcal, ~60 gm Protein);           2. Encourage & assist Pt with meals; Monitor PO diet tolerance; Honor food preferences;         3. Suggest: Standing weights if feasible;           4. Monitor labs, hydration status;

## 2017-12-14 ENCOUNTER — TELEPHONE (OUTPATIENT)
Dept: NEUROLOGY | Facility: CLINIC | Age: 42
End: 2017-12-14

## 2017-12-14 NOTE — TELEPHONE ENCOUNTER
----- Message from Jerome Mayes MA sent at 12/14/2017  2:59 PM CST -----  Pt is requesting a refill:    1.Vit D3 50,000 iu    2.Ibuprofen 800 mg     Pt stated that she would be getting labs soon; informed of what labs needs to be drawn. Pt also rescheduled her 12/15 appointment to 1/2/18-due to starting a new job.

## 2017-12-18 ENCOUNTER — HOSPITAL ENCOUNTER (EMERGENCY)
Facility: OTHER | Age: 42
Discharge: HOME | End: 2017-12-18
Attending: EMERGENCY MEDICINE
Payer: OTHER GOVERNMENT

## 2017-12-18 VITALS
OXYGEN SATURATION: 98 % | BODY MASS INDEX: 28.32 KG/M2 | RESPIRATION RATE: 18 BRPM | DIASTOLIC BLOOD PRESSURE: 76 MMHG | HEIGHT: 65 IN | HEART RATE: 74 BPM | WEIGHT: 170 LBS | TEMPERATURE: 98 F | SYSTOLIC BLOOD PRESSURE: 142 MMHG

## 2017-12-18 DIAGNOSIS — R51.9 NONINTRACTABLE EPISODIC HEADACHE, UNSPECIFIED HEADACHE TYPE: Primary | ICD-10-CM

## 2017-12-18 DIAGNOSIS — I10 ESSENTIAL HYPERTENSION: ICD-10-CM

## 2017-12-18 DIAGNOSIS — R07.9 CHEST PAIN: ICD-10-CM

## 2017-12-18 LAB
ALBUMIN SERPL-MCNC: 3.4 G/DL (ref 3.3–5.5)
ALP SERPL-CCNC: 62 U/L (ref 42–141)
BILIRUB SERPL-MCNC: 0.4 MG/DL (ref 0.2–1.6)
BUN SERPL-MCNC: 9 MG/DL (ref 7–22)
CALCIUM SERPL-MCNC: 8.6 MG/DL (ref 8–10.3)
CHLORIDE SERPL-SCNC: 100 MMOL/L (ref 98–108)
CREAT SERPL-MCNC: 0.6 MG/DL (ref 0.6–1.2)
GLUCOSE SERPL-MCNC: 86 MG/DL (ref 73–118)
POC ALT (SGPT): 21 U/L (ref 10–47)
POC AST (SGOT): 21 U/L (ref 11–38)
POC B-TYPE NATRIURETIC PEPTIDE: 17.5 PG/ML (ref 0–100)
POC CARDIAC TROPONIN I: 0 NG/ML
POC TCO2: 25 MMOL/L (ref 18–33)
POTASSIUM BLD-SCNC: 3 MMOL/L (ref 3.6–5.1)
PROTEIN, POC: 6.9 G/DL (ref 6.4–8.1)
SAMPLE: NORMAL
SODIUM BLD-SCNC: 140 MMOL/L (ref 128–145)

## 2017-12-18 PROCEDURE — 80053 COMPREHEN METABOLIC PANEL: CPT

## 2017-12-18 PROCEDURE — 99284 EMERGENCY DEPT VISIT MOD MDM: CPT

## 2017-12-18 PROCEDURE — 85025 COMPLETE CBC W/AUTO DIFF WBC: CPT

## 2017-12-18 PROCEDURE — 80048 BASIC METABOLIC PNL TOTAL CA: CPT

## 2017-12-18 PROCEDURE — 25000003 PHARM REV CODE 250: Performed by: EMERGENCY MEDICINE

## 2017-12-18 PROCEDURE — 84484 ASSAY OF TROPONIN QUANT: CPT

## 2017-12-18 RX ORDER — LORATADINE 10 MG/1
10 TABLET ORAL DAILY
Qty: 30 TABLET | Refills: 0 | Status: SHIPPED | OUTPATIENT
Start: 2017-12-18 | End: 2018-11-01

## 2017-12-18 RX ORDER — ASPIRIN 325 MG
325 TABLET ORAL
Status: COMPLETED | OUTPATIENT
Start: 2017-12-18 | End: 2017-12-18

## 2017-12-18 RX ORDER — BENZONATATE 100 MG/1
100 CAPSULE ORAL 3 TIMES DAILY PRN
Qty: 20 CAPSULE | Refills: 0 | Status: SHIPPED | OUTPATIENT
Start: 2017-12-18 | End: 2017-12-28

## 2017-12-18 RX ORDER — BUTALBITAL, ACETAMINOPHEN AND CAFFEINE 50; 325; 40 MG/1; MG/1; MG/1
1 TABLET ORAL
Status: COMPLETED | OUTPATIENT
Start: 2017-12-18 | End: 2017-12-18

## 2017-12-18 RX ORDER — FLUTICASONE PROPIONATE 50 MCG
2 SPRAY, SUSPENSION (ML) NASAL DAILY
Qty: 16 G | Refills: 0 | Status: SHIPPED | OUTPATIENT
Start: 2017-12-18 | End: 2018-11-01

## 2017-12-18 RX ADMIN — BUTALBITAL, ACETAMINOPHEN, AND CAFFEINE 1 TABLET: 50; 325; 40 TABLET ORAL at 02:12

## 2017-12-18 RX ADMIN — ASPIRIN 325 MG ORAL TABLET 325 MG: 325 PILL ORAL at 02:12

## 2017-12-18 NOTE — ED TRIAGE NOTES
Pt presents to ER with c/o her blood pressure running high today.  She took it about 3 times at home and most recent was 153/107.  Pt took an extra Benicar that she takes daily as instructed in the past if her pressure is running high.  She is also c/o head, neck and back pain that started when her blood pressure was high.  She denies any other symptoms at present.
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
## 2018-01-29 ENCOUNTER — LAB VISIT (OUTPATIENT)
Dept: LAB | Facility: HOSPITAL | Age: 43
End: 2018-01-29
Payer: OTHER GOVERNMENT

## 2018-01-29 DIAGNOSIS — Z79.899 HIGH RISK MEDICATION USE: ICD-10-CM

## 2018-01-29 DIAGNOSIS — G35 MULTIPLE SCLEROSIS: ICD-10-CM

## 2018-01-29 LAB
ALBUMIN SERPL BCP-MCNC: 3.8 G/DL
ALBUMIN SERPL BCP-MCNC: 3.8 G/DL
ALP SERPL-CCNC: 68 U/L
ALP SERPL-CCNC: 68 U/L
ALT SERPL W/O P-5'-P-CCNC: 23 U/L
ALT SERPL W/O P-5'-P-CCNC: 23 U/L
ANISOCYTOSIS BLD QL SMEAR: SLIGHT
AST SERPL-CCNC: 15 U/L
AST SERPL-CCNC: 15 U/L
BASOPHILS # BLD AUTO: 0.06 K/UL
BASOPHILS NFR BLD: 0.8 %
BILIRUB DIRECT SERPL-MCNC: 0.1 MG/DL
BILIRUB DIRECT SERPL-MCNC: 0.1 MG/DL
BILIRUB SERPL-MCNC: 0.3 MG/DL
BILIRUB SERPL-MCNC: 0.3 MG/DL
DIFFERENTIAL METHOD: ABNORMAL
EOSINOPHIL # BLD AUTO: 0.1 K/UL
EOSINOPHIL NFR BLD: 1.1 %
ERYTHROCYTE [DISTWIDTH] IN BLOOD BY AUTOMATED COUNT: 14 %
HCT VFR BLD AUTO: 35.6 %
HGB BLD-MCNC: 11.4 G/DL
HYPOCHROMIA BLD QL SMEAR: ABNORMAL
LYMPHOCYTES # BLD AUTO: 3 K/UL
LYMPHOCYTES NFR BLD: 38.3 %
MCH RBC QN AUTO: 22.2 PG
MCHC RBC AUTO-ENTMCNC: 32 G/DL
MCV RBC AUTO: 69 FL
MONOCYTES # BLD AUTO: 0.7 K/UL
MONOCYTES NFR BLD: 9.2 %
NEUTROPHILS # BLD AUTO: 4 K/UL
NEUTROPHILS NFR BLD: 50.7 %
PLATELET # BLD AUTO: 328 K/UL
PMV BLD AUTO: 11.3 FL
PROT SERPL-MCNC: 7.2 G/DL
PROT SERPL-MCNC: 7.2 G/DL
RBC # BLD AUTO: 5.13 M/UL
WBC # BLD AUTO: 7.91 K/UL

## 2018-01-29 PROCEDURE — 85025 COMPLETE CBC W/AUTO DIFF WBC: CPT

## 2018-01-29 PROCEDURE — 80076 HEPATIC FUNCTION PANEL: CPT

## 2018-01-29 PROCEDURE — 36415 COLL VENOUS BLD VENIPUNCTURE: CPT

## 2018-01-29 PROCEDURE — 82306 VITAMIN D 25 HYDROXY: CPT

## 2018-01-30 LAB — 25(OH)D3+25(OH)D2 SERPL-MCNC: 33 NG/ML

## 2018-03-15 RX ORDER — TERIFLUNOMIDE 14 MG/1
TABLET, FILM COATED ORAL
Qty: 84 TABLET | Refills: 50 | OUTPATIENT
Start: 2018-03-15

## 2018-04-06 ENCOUNTER — TELEPHONE (OUTPATIENT)
Dept: NEUROLOGY | Facility: CLINIC | Age: 43
End: 2018-04-06

## 2018-04-06 DIAGNOSIS — G35 MULTIPLE SCLEROSIS: Primary | ICD-10-CM

## 2018-04-07 ENCOUNTER — LAB VISIT (OUTPATIENT)
Dept: LAB | Facility: HOSPITAL | Age: 43
End: 2018-04-07
Payer: OTHER GOVERNMENT

## 2018-04-07 DIAGNOSIS — G35 MULTIPLE SCLEROSIS: Primary | ICD-10-CM

## 2018-04-07 LAB
ALBUMIN SERPL BCP-MCNC: 3.8 G/DL
ALP SERPL-CCNC: 68 U/L
ALT SERPL W/O P-5'-P-CCNC: 26 U/L
ANION GAP SERPL CALC-SCNC: 10 MMOL/L
ANISOCYTOSIS BLD QL SMEAR: SLIGHT
AST SERPL-CCNC: 18 U/L
BASOPHILS # BLD AUTO: 0.07 K/UL
BASOPHILS NFR BLD: 0.8 %
BILIRUB SERPL-MCNC: 0.3 MG/DL
BILIRUB UR QL STRIP: NEGATIVE
BUN SERPL-MCNC: 12 MG/DL
CALCIUM SERPL-MCNC: 9.1 MG/DL
CHLORIDE SERPL-SCNC: 107 MMOL/L
CLARITY UR: CLEAR
CO2 SERPL-SCNC: 22 MMOL/L
COLOR UR: YELLOW
CREAT SERPL-MCNC: 0.8 MG/DL
DIFFERENTIAL METHOD: ABNORMAL
EOSINOPHIL # BLD AUTO: 0.2 K/UL
EOSINOPHIL NFR BLD: 2.3 %
ERYTHROCYTE [DISTWIDTH] IN BLOOD BY AUTOMATED COUNT: 14.5 %
EST. GFR  (AFRICAN AMERICAN): >60 ML/MIN/1.73 M^2
EST. GFR  (NON AFRICAN AMERICAN): >60 ML/MIN/1.73 M^2
GLUCOSE SERPL-MCNC: 82 MG/DL
GLUCOSE UR QL STRIP: NEGATIVE
HCT VFR BLD AUTO: 35.4 %
HGB BLD-MCNC: 11.4 G/DL
HGB UR QL STRIP: ABNORMAL
HYPOCHROMIA BLD QL SMEAR: ABNORMAL
KETONES UR QL STRIP: NEGATIVE
LEUKOCYTE ESTERASE UR QL STRIP: NEGATIVE
LYMPHOCYTES # BLD AUTO: 3.1 K/UL
LYMPHOCYTES NFR BLD: 35.3 %
MCH RBC QN AUTO: 21.6 PG
MCHC RBC AUTO-ENTMCNC: 32.2 G/DL
MCV RBC AUTO: 67 FL
MICROSCOPIC COMMENT: NORMAL
MONOCYTES # BLD AUTO: 1 K/UL
MONOCYTES NFR BLD: 11.2 %
NEUTROPHILS # BLD AUTO: 4.4 K/UL
NEUTROPHILS NFR BLD: 50.4 %
NITRITE UR QL STRIP: NEGATIVE
OVALOCYTES BLD QL SMEAR: ABNORMAL
PH UR STRIP: 5 [PH] (ref 5–8)
PLATELET # BLD AUTO: 301 K/UL
PMV BLD AUTO: 11.8 FL
POTASSIUM SERPL-SCNC: 4.1 MMOL/L
PROT SERPL-MCNC: 7.3 G/DL
PROT UR QL STRIP: NEGATIVE
RBC # BLD AUTO: 5.27 M/UL
RBC #/AREA URNS HPF: 1 /HPF (ref 0–4)
SODIUM SERPL-SCNC: 139 MMOL/L
SP GR UR STRIP: 1.02 (ref 1–1.03)
SQUAMOUS #/AREA URNS HPF: 2 /HPF
URN SPEC COLLECT METH UR: ABNORMAL
UROBILINOGEN UR STRIP-ACNC: NEGATIVE EU/DL
WBC # BLD AUTO: 8.81 K/UL

## 2018-04-07 PROCEDURE — 80053 COMPREHEN METABOLIC PANEL: CPT

## 2018-04-07 PROCEDURE — 81000 URINALYSIS NONAUTO W/SCOPE: CPT

## 2018-04-07 PROCEDURE — 85025 COMPLETE CBC W/AUTO DIFF WBC: CPT

## 2018-04-07 PROCEDURE — 36415 COLL VENOUS BLD VENIPUNCTURE: CPT

## 2018-04-09 ENCOUNTER — OFFICE VISIT (OUTPATIENT)
Dept: NEUROLOGY | Facility: CLINIC | Age: 43
End: 2018-04-09
Payer: OTHER GOVERNMENT

## 2018-04-09 VITALS — HEIGHT: 65 IN | WEIGHT: 165 LBS | BODY MASS INDEX: 27.49 KG/M2

## 2018-04-09 DIAGNOSIS — G35 MULTIPLE SCLEROSIS: Primary | ICD-10-CM

## 2018-04-09 DIAGNOSIS — Z79.899 HIGH RISK MEDICATION USE: ICD-10-CM

## 2018-04-09 DIAGNOSIS — Z29.89 PROPHYLACTIC IMMUNOTHERAPY: ICD-10-CM

## 2018-04-09 DIAGNOSIS — R51.9 FREQUENT HEADACHES: ICD-10-CM

## 2018-04-09 DIAGNOSIS — G47.00 INSOMNIA, UNSPECIFIED TYPE: ICD-10-CM

## 2018-04-09 DIAGNOSIS — M79.2 NEUROPATHIC PAIN: ICD-10-CM

## 2018-04-09 DIAGNOSIS — B00.1 COLD SORE: ICD-10-CM

## 2018-04-09 DIAGNOSIS — Z71.89 COUNSELING REGARDING GOALS OF CARE: ICD-10-CM

## 2018-04-09 PROCEDURE — 99999 PR PBB SHADOW E&M-EST. PATIENT-LVL II: CPT | Mod: PBBFAC,,, | Performed by: CLINICAL NURSE SPECIALIST

## 2018-04-09 PROCEDURE — 99212 OFFICE O/P EST SF 10 MIN: CPT | Mod: PBBFAC | Performed by: CLINICAL NURSE SPECIALIST

## 2018-04-09 PROCEDURE — 99215 OFFICE O/P EST HI 40 MIN: CPT | Mod: S$PBB,,, | Performed by: CLINICAL NURSE SPECIALIST

## 2018-04-09 RX ORDER — GABAPENTIN 100 MG/1
100 CAPSULE ORAL 3 TIMES DAILY
Qty: 270 CAPSULE | Refills: 1 | Status: SHIPPED | OUTPATIENT
Start: 2018-04-09 | End: 2019-02-12 | Stop reason: SDUPTHER

## 2018-04-09 RX ORDER — VALACYCLOVIR HYDROCHLORIDE 1 G/1
2000 TABLET, FILM COATED ORAL 2 TIMES DAILY
Qty: 4 TABLET | Refills: 1 | Status: SHIPPED | OUTPATIENT
Start: 2018-04-09 | End: 2018-11-01

## 2018-04-09 RX ORDER — TERIFLUNOMIDE 14 MG/1
14 TABLET, FILM COATED ORAL DAILY
Qty: 90 TABLET | Refills: 0 | Status: SHIPPED | OUTPATIENT
Start: 2018-04-09 | End: 2018-08-06 | Stop reason: SDUPTHER

## 2018-04-09 RX ORDER — ZOLPIDEM TARTRATE 5 MG/1
5 TABLET ORAL NIGHTLY PRN
Qty: 90 TABLET | Refills: 1 | Status: SHIPPED | OUTPATIENT
Start: 2018-04-09 | End: 2018-11-01

## 2018-04-09 NOTE — Clinical Note
"Evelyn had MRI brain and c-spine in September when I was out. MRI brain showed new lesion, but she wasn't consistent with Aubagio then.  MRI c-spine report read as follows: "Diffusely decreased T1 marrow signal suggesting diffuse marrow replacement process. This can be seen with red marrow reconversion, though this can also be seen with myelodysplastic type processes."   What does this mean?  I was going to repeat them at [facility] in September, especially since she just restarted the Aubagio in early February. "

## 2018-04-09 NOTE — Clinical Note
Ask BB about Rx for ibuprofen. She has tried Aleve, and she had Motrin Rx that lasted for 6 months. Is this ok to refill for her? This is the only thing that has helped her right sided pain, in combination with gabapentin.

## 2018-04-09 NOTE — PROGRESS NOTES
"Subjective:       Patient ID: Tiffany Cruz is a 42 y.o. female who presents today for a routine clinic visit for MS.  The history has been provided by the patient.  She was last seen in June 2017.     MS HPI:  · DMT: Aubagio --she ran out about 2 weeks ago, but had been consistently taking it since early February.   · Side effects from DMT? Yes - Some hair thinning and development of "cold sores."   · Taking vitamin D3 as recommended? Yes -  Dose: not consistently   She has had some headaches lately. She has had some issues with controlling her blood pressure and has been to the ER twice with headaches coinciding with elevated BP. She also has right arm and leg pain that is not new, but is persistent. Motrin and gabapentin help (takes as needed).     SOCIAL HISTORY  Social History   Substance Use Topics    Smoking status: Never Smoker    Smokeless tobacco: Never Used    Alcohol use No     Living arrangements - the patient lives with her family.  Employment:      MS ROS:  · Fatigue: Yes - Energy level is "ok." She gets tired at times, but she is very busy. She has been drinking green tea.   · Sleep Disturbance: She takes Ambien as needed.   · Bladder Dysfunction: Yes -She has pain when urinating, but she has control over her bladder. She has seen Dr. Grande.   · Bowel Dysfunction: Yes - She has "dumping syndrome."   · Spasticity: No. She has dull, aching pain to the right side. She is not sure if this is spasticity or not.   · Visual Symptoms: Yes - She wears reading glasses.   · Cognitive: No. She is taking DHA minis. She says the wrong word sometimes.   · Mood Disorder: No  · Gait Disturbance: No  · Falls: Yes -No fall, but has had a near miss.   · Hand Dysfunction: No  · Pain: Yes - She takes gabapentin as needed for nerve pain down the right side, which is helpful. She feels that the nerve pain has gotten worse.   · Sexual Dysfunction: No  · Skin Breakdown: Yes - She has noticed some changes in " multiple moles. She has not seen dermatology.   · Tremors: No  · Dysphagia:  No  · Dysarthria:  No  · Heat sensitivity:  No  · Any un-met adaptive needs? No  · Copay Assist?  N/A         Objective:        1. 25 foot timed walk: 3.95 seconds today without assist; was 4.4 seconds at last visit without assist (patient had knee pain from fall)  Neurologic Exam     Mental Status   Oriented to person, place, and time.   Attention: normal. Concentration: normal.   Speech: speech is normal   Level of consciousness: alert  Knowledge: good.   Normal comprehension.     Cranial Nerves     CN III, IV, VI   Pupils are equal, round, and reactive to light.  Extraocular motions are normal.   Right pupil: Shape: regular. Reactivity: brisk. Consensual response: intact.   Left pupil: Shape: regular. Reactivity: brisk. Consensual response: intact.   CN III: no CN III palsy  CN VI: no CN VI palsy  Nystagmus: none     CN V   Right facial sensation deficit: none  Left facial sensation deficit: none    CN VII   Right facial weakness: none  Left facial weakness: none    CN VIII   Hearing: intact    CN IX, X   Palate: symmetric    CN XI   Right sternocleidomastoid strength: normal  Left sternocleidomastoid strength: normal  Right trapezius strength: normal  Left trapezius strength: normal    CN XII   Tongue deviation: none    Motor Exam   Muscle bulk: normal  Overall muscle tone: normal  Right arm tone: normal  Left arm tone: normal  Right leg tone: normal  Left leg tone: normal    Strength   Right neck flexion: 5/5  Left neck flexion: 5/5  Right neck extension: 5/5  Left neck extension: 5/5  Right deltoid: 5/5  Left deltoid: 5/5  Right biceps: 5/5  Left biceps: 5/5  Right triceps: 5/5  Left triceps: 5/5  Right wrist flexion: 5/5  Left wrist flexion: 5/5  Right wrist extension: 5/5  Left wrist extension: 5/5  Right interossei: 5/5  Left interossei: 5/5  Right iliopsoas: 5/5  Left iliopsoas: 5/5  Right quadriceps: 5/5  Left quadriceps:  5/5  Right hamstrin/5  Left hamstrin/5  Right anterior tibial: 5/5  Left anterior tibial: 5/5  Right gastroc: 5/5  Left gastroc: 5/5    Sensory Exam   Right arm vibration: normal  Left arm vibration: normal  Right leg vibration: normal  Left leg vibration: normal  Right arm pinprick: normal  Left arm pinprick: normal  Right leg pinprick: normal  Left leg pinprick: normal    Gait, Coordination, and Reflexes     Gait  Gait: normal    Coordination   Romberg: negative  Finger to nose coordination: normal  Tandem walking coordination: normal    Tremor   Resting tremor: absent    Reflexes   Right brachioradialis: 2+  Left brachioradialis: 2+  Right biceps: 2+  Left biceps: 2+  Right triceps: 1+  Left triceps: 1+  Right patellar: 1+  Left patellar: 1+  Right achilles: 1+  Left achilles: 1+  Right plantar: normal  Left plantar: normal  Normal rapid sequential movements in upper extremities.              Imaging:     Results for orders placed during the hospital encounter of 17   MRI Brain W WO Contrast    Impression    Numerous supratentorial White matter lesions in keeping with diagnosis of multiple sclerosis with moderate plaque burden.  Single new lesion in the left corona radiata without enhancing or diffusion restricting lesions to suggest active demyelination.       Electronically signed by: RISSA MCGEE  Date:     17  Time:    11:32      Results for orders placed during the hospital encounter of 17   MRI Cervical Spine W WO Cont    Impression No cord signal abnormality. Specifically, the T2 hyperintense lesion at the dorsal lateral cord at the level of C2 is no longer present.     Diffusely decreased T1 marrow signal suggesting diffuse marrow replacement process. This can be seen with red marrow reconversion, though this can also be seen with myelodysplastic type processes.      Electronically signed by: RISSA MCGEE  Date:     17  Time:    11:34          Labs:     Lab Results   Component  Value Date    [id number] 33 01/29/2018    [id number] 50 03/18/2016    [id number] 45 04/02/2015       Lab Results   Component Value Date    WBC 8.81 04/07/2018    RBC 5.27 04/07/2018    HGB 11.4 (L) 04/07/2018    HCT 35.4 (L) 04/07/2018    MCV 67 (L) 04/07/2018    MCH 21.6 (L) 04/07/2018    MCHC 32.2 04/07/2018    RDW 14.5 04/07/2018     04/07/2018    MPV 11.8 04/07/2018    GRAN 4.4 04/07/2018    GRAN 50.4 04/07/2018    LYMPH 3.1 04/07/2018    LYMPH 35.3 04/07/2018    MONO 1.0 04/07/2018    MONO 11.2 04/07/2018    EOS 0.2 04/07/2018    BASO 0.07 04/07/2018    EOSINOPHIL 2.3 04/07/2018    BASOPHIL 0.8 04/07/2018     Sodium   Date Value Ref Range Status   04/07/2018 139 136 - 145 mmol/L Final     Potassium   Date Value Ref Range Status   04/07/2018 4.1 3.5 - 5.1 mmol/L Final     Chloride   Date Value Ref Range Status   04/07/2018 107 95 - 110 mmol/L Final     CO2   Date Value Ref Range Status   04/07/2018 22 (L) 23 - 29 mmol/L Final     Glucose   Date Value Ref Range Status   04/07/2018 82 70 - 110 mg/dL Final     BUN, Bld   Date Value Ref Range Status   04/07/2018 12 6 - 20 mg/dL Final     Creatinine   Date Value Ref Range Status   04/07/2018 0.8 0.5 - 1.4 mg/dL Final     Calcium   Date Value Ref Range Status   04/07/2018 9.1 8.7 - 10.5 mg/dL Final     Total Protein   Date Value Ref Range Status   04/07/2018 7.3 6.0 - 8.4 g/dL Final     Albumin   Date Value Ref Range Status   04/07/2018 3.8 3.5 - 5.2 g/dL Final     Total Bilirubin   Date Value Ref Range Status   04/07/2018 0.3 0.1 - 1.0 mg/dL Final     Comment:     For infants and newborns, interpretation of results should be based  on gestational age, weight and in agreement with clinical  observations.  Premature Infant recommended reference ranges:  Up to 24 hours.............<8.0 mg/dL  Up to 48 hours............<12.0 mg/dL  3-5 days..................<15.0 mg/dL  6-29 days.................<15.0 mg/dL       Alkaline Phosphatase   Date Value Ref Range  Status   04/07/2018 68 55 - 135 U/L Final     AST   Date Value Ref Range Status   04/07/2018 18 10 - 40 U/L Final     ALT   Date Value Ref Range Status   04/07/2018 26 10 - 44 U/L Final     Anion Gap   Date Value Ref Range Status   04/07/2018 10 8 - 16 mmol/L Final     eGFR if    Date Value Ref Range Status   04/07/2018 >60 >60 mL/min/1.73 m^2 Final     eGFR if non    Date Value Ref Range Status   04/07/2018 >60 >60 mL/min/1.73 m^2 Final     Comment:     Calculation used to obtain the estimated glomerular filtration  rate (eGFR) is the CKD-EPI equation.        Lab Results   Component Value Date    COLORU Yellow 04/07/2018    APPEARANCEUA Clear 04/07/2018    PHUR 5.0 04/07/2018    SPECGRAV 1.025 04/07/2018    PROTEINUA Negative 04/07/2018    GLUCUA Negative 04/07/2018    KETONESU Negative 04/07/2018    BILIRUBINUA Negative 04/07/2018    OCCULTUA 2+ (A) 04/07/2018    NITRITE Negative 04/07/2018    UROBILINOGEN Negative 04/07/2018    LEUKOCYTESUR Negative 04/07/2018       Diagnosis/Assessment/Plan:    1. Multiple Sclerosis  · Assessment: Ms. Cruz is clinically stable today in [facility]. She did have a new brain lesion in September, but was not consistent with taking Aubagio at that time.   · Imaging: MRI brain and c-spine in September. Will order at next visit.   · Disease Modifying Therapies: Continue Aubagio. Refilled today. She must do CBC and LFT monthly for the next 6 months since she has essentially just restarted the medication. Standing orders have been placed, and she will plan to go to the lab at [facility] monthly.     2. MS Symptom Assessment / Management  · Sleep Disturbance: Continue Ambien. Rx sent to pharmacy.   · Pain: Gabapentin Rx refilled today. Will discuss Motrin Rx with Dr. Tomkpins.   · Skin Breakdown: I'm not sure if the sore in her mouth is an actual herpetic cold sore or if it is more of a canker sore. I advised that she try over the counter canker sore  remedies first. If these are not helpful, she can try Valtrex. I did send an Rx to pharmacy for 2g every 12 hours for 1 day, but she should not take this if she doesn't need it. She feels that she has gotten these sores more often since being on Aubagio, but never had cold sores or canker sores before. We will monitor. I encourage her to see dermatology for mole changes discussed at last visit and again today.       Over 50% of this 40 minute visit was spent in direct face to face counseling of the patient about MS, DMT considerations, and MS symptom management. The patient agrees with the plan of care. She will follow up with Dr. Tompkins in 4 months.     Roseanna Riddle, [facility]-BC, MSCN        There are no diagnoses linked to this encounter.

## 2018-04-10 ENCOUNTER — TELEPHONE (OUTPATIENT)
Dept: NEUROLOGY | Facility: CLINIC | Age: 43
End: 2018-04-10

## 2018-04-10 DIAGNOSIS — M79.10 MUSCLE PAIN: Primary | ICD-10-CM

## 2018-04-10 RX ORDER — IBUPROFEN 800 MG/1
800 TABLET ORAL DAILY PRN
Qty: 20 TABLET | Refills: 0 | Status: SHIPPED | OUTPATIENT
Start: 2018-04-10 | End: 2018-11-01

## 2018-04-10 NOTE — TELEPHONE ENCOUNTER
----- Message from Marika Tompkins MD sent at 4/9/2018  4:47 PM CDT -----  Sure, just make sure to limit them on sig  ----- Message -----  From: DIEUDONNE Hernandez CNS  Sent: 4/9/2018   3:31 PM  To: Marika Tompkins MD    Evelyn has this chronic right-sided nerve/muscle pain, for which she takes gabapentin as needed and ibuprofen 800mg very sparingly (24 pills lasted her for 4 months). The ibuprofen was originally prescribed by her GYN after a surgery, but it helps. Are you opposed to us prescribing this for her? She does plan to start taking gabapentin more regularly.   ----- Message -----  From: DIEUDONNE Hernandez CNS  Sent: 4/9/2018   2:39 PM  To: DIEUDONNE Hernandez CNS    Ask BB about Rx for ibuprofen. She has tried Aleve, and she had Motrin Rx that lasted for 6 months. Is this ok to refill for her? This is the only thing that has helped her right sided pain, in combination with gabapentin.

## 2018-05-27 DIAGNOSIS — R20.0 NUMBNESS AND TINGLING: ICD-10-CM

## 2018-05-27 DIAGNOSIS — R20.2 NUMBNESS AND TINGLING: ICD-10-CM

## 2018-05-27 DIAGNOSIS — G35 MULTIPLE SCLEROSIS: ICD-10-CM

## 2018-05-28 RX ORDER — CHOLECALCIFEROL (VITAMIN D3) 1250 MCG
CAPSULE ORAL
Qty: 12 CAPSULE | Refills: 0 | Status: SHIPPED | OUTPATIENT
Start: 2018-05-28 | End: 2018-11-01

## 2018-07-17 ENCOUNTER — OFFICE VISIT (OUTPATIENT)
Dept: NEUROLOGY | Facility: CLINIC | Age: 43
End: 2018-07-17
Payer: OTHER GOVERNMENT

## 2018-07-17 ENCOUNTER — LAB VISIT (OUTPATIENT)
Dept: LAB | Facility: HOSPITAL | Age: 43
End: 2018-07-17
Payer: OTHER GOVERNMENT

## 2018-07-17 VITALS
DIASTOLIC BLOOD PRESSURE: 99 MMHG | BODY MASS INDEX: 28.95 KG/M2 | WEIGHT: 173.75 LBS | HEIGHT: 65 IN | SYSTOLIC BLOOD PRESSURE: 146 MMHG | HEART RATE: 86 BPM

## 2018-07-17 DIAGNOSIS — Z87.440 HISTORY OF UTI: ICD-10-CM

## 2018-07-17 DIAGNOSIS — M62.838 MUSCLE SPASM: ICD-10-CM

## 2018-07-17 DIAGNOSIS — F41.9 ANXIETY: ICD-10-CM

## 2018-07-17 DIAGNOSIS — G35 MS (MULTIPLE SCLEROSIS): ICD-10-CM

## 2018-07-17 DIAGNOSIS — M54.9 BACK PAIN, UNSPECIFIED BACK LOCATION, UNSPECIFIED BACK PAIN LATERALITY, UNSPECIFIED CHRONICITY: ICD-10-CM

## 2018-07-17 DIAGNOSIS — M54.9 BACK PAIN, UNSPECIFIED BACK LOCATION, UNSPECIFIED BACK PAIN LATERALITY, UNSPECIFIED CHRONICITY: Primary | ICD-10-CM

## 2018-07-17 DIAGNOSIS — M54.2 NECK PAIN: ICD-10-CM

## 2018-07-17 LAB
BASOPHILS # BLD AUTO: 0.07 K/UL
BASOPHILS NFR BLD: 0.7 %
DIFFERENTIAL METHOD: ABNORMAL
EOSINOPHIL # BLD AUTO: 0.1 K/UL
EOSINOPHIL NFR BLD: 1.1 %
ERYTHROCYTE [DISTWIDTH] IN BLOOD BY AUTOMATED COUNT: 14.6 %
HCT VFR BLD AUTO: 39.5 %
HGB BLD-MCNC: 11.9 G/DL
IMM GRANULOCYTES # BLD AUTO: 0.02 K/UL
IMM GRANULOCYTES NFR BLD AUTO: 0.2 %
LYMPHOCYTES # BLD AUTO: 3.2 K/UL
LYMPHOCYTES NFR BLD: 33.1 %
MCH RBC QN AUTO: 21.4 PG
MCHC RBC AUTO-ENTMCNC: 30.1 G/DL
MCV RBC AUTO: 71 FL
MONOCYTES # BLD AUTO: 1 K/UL
MONOCYTES NFR BLD: 10.6 %
NEUTROPHILS # BLD AUTO: 5.2 K/UL
NEUTROPHILS NFR BLD: 54.3 %
NRBC BLD-RTO: 0 /100 WBC
PLATELET # BLD AUTO: 345 K/UL
PMV BLD AUTO: 11.5 FL
RBC # BLD AUTO: 5.55 M/UL
WBC # BLD AUTO: 9.63 K/UL

## 2018-07-17 PROCEDURE — 99999 PR PBB SHADOW E&M-EST. PATIENT-LVL III: CPT | Mod: PBBFAC,,, | Performed by: CLINICAL NURSE SPECIALIST

## 2018-07-17 PROCEDURE — 36415 COLL VENOUS BLD VENIPUNCTURE: CPT

## 2018-07-17 PROCEDURE — 85025 COMPLETE CBC W/AUTO DIFF WBC: CPT

## 2018-07-17 PROCEDURE — 99214 OFFICE O/P EST MOD 30 MIN: CPT | Mod: S$PBB,,, | Performed by: CLINICAL NURSE SPECIALIST

## 2018-07-17 PROCEDURE — 99213 OFFICE O/P EST LOW 20 MIN: CPT | Mod: PBBFAC | Performed by: CLINICAL NURSE SPECIALIST

## 2018-07-17 RX ORDER — BACLOFEN 10 MG/1
TABLET ORAL
Qty: 90 TABLET | Refills: 6 | Status: SHIPPED | OUTPATIENT
Start: 2018-07-17 | End: 2018-12-05 | Stop reason: SDUPTHER

## 2018-07-17 RX ORDER — DIAZEPAM 5 MG/1
TABLET ORAL
Qty: 2 TABLET | Refills: 0 | Status: SHIPPED | OUTPATIENT
Start: 2018-07-17 | End: 2018-11-01

## 2018-07-17 NOTE — PROGRESS NOTES
"Subjective:       Patient ID: Tiffany Cruz is a 42 y.o. female who presents today for a fit-in clinic visit for MS.  The history has been provided by the patient. She was last seen in April 2018.     MS HPI:  · DMT: She is not taking Aubagio regularly.   · Taking vitamin D3 as recommended? No  · She developed pain to the right middle back last Thursday. She tried OTC pain meds and Tramadol, which were not helpful. She rates this pain as 10/10. The pain is constant. She states "It feels like someone punched me in the back." She also has neck pain 4-5/10.   · Heat helps the pain a little bit (heated seats in her car). Wet heat last night was not helpful.   · She has never had pain like this before.   · She had a fall this morning while going up some wet stairs. She was able to get up quickly.   · She had some pain when urinating a few weeks ago. She saw Dr. Fleming, who ordered an antibiotic. She got a bad yeast infection from the antibiotics.   · Bowels have been regular.   · She denies any numbness or tingling.   · She denies any walking issues.     SOCIAL HISTORY  Social History   Substance Use Topics    Smoking status: Never Smoker    Smokeless tobacco: Never Used    Alcohol use No     Living arrangements - the patient lives with her family.  Employment:         Objective:        1. 25 foot timed walk: 3.69 seconds today without assist; was 3.95 seconds at last visit without assist   Neurologic Exam     Mental Status   Oriented to person, place, and time.   Attention: normal. Concentration: normal.   Speech: speech is normal   Level of consciousness: alert  Knowledge: good.   Normal comprehension.     Motor Exam   Muscle bulk: normal  Overall muscle tone: normal  Right arm tone: normal  Left arm tone: normal  Right leg tone: normal  Left leg tone: normal    Strength   Right neck flexion: 5/5  Left neck flexion: 5/5  Right neck extension: 5/5  Left neck extension: 5/5  Right deltoid: 5/5  Left " "deltoid: 5/5  Right biceps: 5/5  Left biceps: 5/5  Right triceps: 5/5  Left triceps: 5/5  Right wrist flexion: 5/5  Left wrist flexion: 5/5  Right wrist extension: 5/5  Left wrist extension: 5/5  Right interossei: 5/5  Left interossei: 5/5  Right iliopsoas: 5/5  Left iliopsoas: 5/5  Right quadriceps: 5/5  Left quadriceps: 5/5  Right hamstrin/5  Left hamstrin/5  Right anterior tibial: 5/5  Left anterior tibial: 5/5  Right gastroc: 5/5  Left gastroc: 5/5    Sensory Exam   Right arm pinprick: normal  Left arm pinprick: normal  Right leg pinprick: normal  Left leg pinprick: normal    Gait, Coordination, and Reflexes     Gait  Gait: normal    Tremor   Resting tremor: absent           She has large muscle spasm to right mid thoracic region. Area is tender to touch and wraps around left side. There are no signs of rash. She has some CVA tenderness.     Imaging:     Results for orders placed during the hospital encounter of 17   MRI Brain W WO Contrast    Impression    Numerous supratentorial White matter lesions in keeping with diagnosis of multiple sclerosis with moderate plaque burden.  Single new lesion in the left corona radiata without enhancing or diffusion restricting lesions to suggest active demyelination.       Electronically signed by: RISSA MCGEE  Date:     17  Time:    11:32      Results for orders placed during the hospital encounter of 17   MRI Cervical Spine W WO Cont    Impression No cord signal abnormality. Specifically, the T2 hyperintense lesion at the dorsal lateral cord at the level of C2 is no longer present.     Diffusely decreased T1 marrow signal suggesting diffuse marrow replacement process. This can be seen with red marrow reconversion, though this can also be seen with myelodysplastic type processes.      Electronically signed by: RISSA MCGEE  Date:     17  Time:    11:34      T-spine xray done externally on 18 with "mild dextroscoliosis and degenerative type " "findings within the thoracic spine. Otherwise unremarkable radiographic appearance of the thoracic portion of the spine with no compression fracture identified." Incidental note of mild anterior wedging of formation of what appears to be the C5 vertebral body where is also osteophyte formation on the swimmer's view."   Labs:     Lab Results   Component Value Date    WZZFWYCH32YI 33 01/29/2018    QVZCBMUX55IP 50 03/18/2016    CXGYGYYS90XG 45 04/02/2015     Lab Results   Component Value Date    WBC 8.81 04/07/2018    RBC 5.27 04/07/2018    HGB 11.4 (L) 04/07/2018    HCT 35.4 (L) 04/07/2018    MCV 67 (L) 04/07/2018    MCH 21.6 (L) 04/07/2018    MCHC 32.2 04/07/2018    RDW 14.5 04/07/2018     04/07/2018    MPV 11.8 04/07/2018    GRAN 4.4 04/07/2018    GRAN 50.4 04/07/2018    LYMPH 3.1 04/07/2018    LYMPH 35.3 04/07/2018    MONO 1.0 04/07/2018    MONO 11.2 04/07/2018    EOS 0.2 04/07/2018    BASO 0.07 04/07/2018    EOSINOPHIL 2.3 04/07/2018    BASOPHIL 0.8 04/07/2018     Sodium   Date Value Ref Range Status   04/07/2018 139 136 - 145 mmol/L Final     Potassium   Date Value Ref Range Status   04/07/2018 4.1 3.5 - 5.1 mmol/L Final     Chloride   Date Value Ref Range Status   04/07/2018 107 95 - 110 mmol/L Final     CO2   Date Value Ref Range Status   04/07/2018 22 (L) 23 - 29 mmol/L Final     Glucose   Date Value Ref Range Status   04/07/2018 82 70 - 110 mg/dL Final     BUN, Bld   Date Value Ref Range Status   04/07/2018 12 6 - 20 mg/dL Final     Creatinine   Date Value Ref Range Status   04/07/2018 0.8 0.5 - 1.4 mg/dL Final     Calcium   Date Value Ref Range Status   04/07/2018 9.1 8.7 - 10.5 mg/dL Final     Total Protein   Date Value Ref Range Status   04/07/2018 7.3 6.0 - 8.4 g/dL Final     Albumin   Date Value Ref Range Status   04/07/2018 3.8 3.5 - 5.2 g/dL Final     Total Bilirubin   Date Value Ref Range Status   04/07/2018 0.3 0.1 - 1.0 mg/dL Final     Comment:     For infants and newborns, interpretation of " results should be based  on gestational age, weight and in agreement with clinical  observations.  Premature Infant recommended reference ranges:  Up to 24 hours.............<8.0 mg/dL  Up to 48 hours............<12.0 mg/dL  3-5 days..................<15.0 mg/dL  6-29 days.................<15.0 mg/dL       Alkaline Phosphatase   Date Value Ref Range Status   04/07/2018 68 55 - 135 U/L Final     AST   Date Value Ref Range Status   04/07/2018 18 10 - 40 U/L Final     ALT   Date Value Ref Range Status   04/07/2018 26 10 - 44 U/L Final     Anion Gap   Date Value Ref Range Status   04/07/2018 10 8 - 16 mmol/L Final     eGFR if    Date Value Ref Range Status   04/07/2018 >60 >60 mL/min/1.73 m^2 Final     eGFR if non    Date Value Ref Range Status   04/07/2018 >60 >60 mL/min/1.73 m^2 Final     Comment:     Calculation used to obtain the estimated glomerular filtration  rate (eGFR) is the CKD-EPI equation.        Lab Results   Component Value Date    COLORU Yellow 04/07/2018    APPEARANCEUA Clear 04/07/2018    PHUR 5.0 04/07/2018    SPECGRAV 1.025 04/07/2018    PROTEINUA Negative 04/07/2018    GLUCUA Negative 04/07/2018    KETONESU Negative 04/07/2018    BILIRUBINUA Negative 04/07/2018    OCCULTUA 2+ (A) 04/07/2018    NITRITE Negative 04/07/2018    UROBILINOGEN Negative 04/07/2018    LEUKOCYTESUR Negative 04/07/2018       Diagnosis/Assessment/Plan:    1. Multiple Sclerosis        Mid thoracic right back pain  · Assessment: Tiffany is having significant back and neck pain. X-rays of thoracic spine were not conclusive of cause for pain. Her strength is intact, and she denies any sensory disturbance. Will proceed with MRI of cervical and thoracic spine to explore cause of pain further. She did have UTI and yeast infection a few weeks ago and had some CVA tenderness on exam today. Will also check CBC, UA, and Cx today to explore for possibility of pyelonephritis or kidney stones. If no sign of  infection, may treat with Medrol Dose glen to help with pain. In the meantime, I have prescribed Baclofen 5-10mg at bedtime for muscle spasm relief.   · Imaging: MRI C and T spine now; Valium sent to pharmacy.   · Disease Modifying Therapies: Not consistent; will discuss with Dr. Tompkins at next visit, or I can see her sooner.     2. MS Symptom Assessment / Management  · Bladder Dysfunction: As above, will check UA and culture   · Spasticity: Order for 5-10mg of Baclofen at bedtime sent to pharmacy.     Over 50% of this 25 minute visit was spent in direct face to face counseling of the patient about MS, DMT considerations, and MS symptom management. The patient agrees with the plan of care. She has follow up scheduled with Dr. Tompkins in August, but will probably need to reschedule this.     Roseanna Riddle, [facility], MSCN      There are no diagnoses linked to this encounter.

## 2018-07-18 ENCOUNTER — OFFICE VISIT (OUTPATIENT)
Dept: OPHTHALMOLOGY | Facility: CLINIC | Age: 43
End: 2018-07-18
Payer: OTHER GOVERNMENT

## 2018-07-18 DIAGNOSIS — H04.123 INSUFFICIENCY OF TEAR FILM OF BOTH EYES: Primary | ICD-10-CM

## 2018-07-18 DIAGNOSIS — G35 MULTIPLE SCLEROSIS: ICD-10-CM

## 2018-07-18 PROCEDURE — 99212 OFFICE O/P EST SF 10 MIN: CPT | Mod: PBBFAC | Performed by: OPHTHALMOLOGY

## 2018-07-18 PROCEDURE — 92014 COMPRE OPH EXAM EST PT 1/>: CPT | Mod: S$PBB,,, | Performed by: OPHTHALMOLOGY

## 2018-07-18 PROCEDURE — 99999 PR PBB SHADOW E&M-EST. PATIENT-LVL II: CPT | Mod: PBBFAC,,, | Performed by: OPHTHALMOLOGY

## 2018-07-18 NOTE — PROGRESS NOTES
HPI     Concerns About Ocular Health    Additional comments: MS (multiple sclerosis)            Comments   DLS:04/07/2017 Rosio  Patient here for yearly check up MS. Pt states OU vision blurry sometimes.  Pt has increased in readers +1.00 to +1.75.  No eye pain, but not lately.    Eye Drops: Systane prn OU    I have personally interviewed the patient, reviewed the history and   examined the patient and agree with the technician's exam.       Last edited by Luis Avelar MD on 7/18/2018 11:22 AM. (History)            Assessment /Plan     For exam results, see Encounter Report.    Insufficiency of tear film of both eyes    Multiple sclerosis      Ms. Cruz is doing well. She will continue using artificial tears as desired. She will return to me in one year or as needed.

## 2018-07-19 ENCOUNTER — HOSPITAL ENCOUNTER (OUTPATIENT)
Dept: RADIOLOGY | Facility: HOSPITAL | Age: 43
Discharge: HOME | End: 2018-07-19
Attending: CLINICAL NURSE SPECIALIST
Payer: OTHER GOVERNMENT

## 2018-07-19 DIAGNOSIS — G35 MS (MULTIPLE SCLEROSIS): ICD-10-CM

## 2018-07-19 DIAGNOSIS — M54.9 BACK PAIN, UNSPECIFIED BACK LOCATION, UNSPECIFIED BACK PAIN LATERALITY, UNSPECIFIED CHRONICITY: ICD-10-CM

## 2018-07-19 PROCEDURE — 72156 MRI NECK SPINE W/O & W/DYE: CPT | Mod: TC

## 2018-07-19 PROCEDURE — 72157 MRI CHEST SPINE W/O & W/DYE: CPT | Mod: TC

## 2018-07-19 PROCEDURE — A9585 GADOBUTROL INJECTION: HCPCS | Performed by: CLINICAL NURSE SPECIALIST

## 2018-07-19 PROCEDURE — 72157 MRI CHEST SPINE W/O & W/DYE: CPT | Mod: 26,,, | Performed by: RADIOLOGY

## 2018-07-19 PROCEDURE — 25500020 PHARM REV CODE 255: Performed by: CLINICAL NURSE SPECIALIST

## 2018-07-19 PROCEDURE — 72156 MRI NECK SPINE W/O & W/DYE: CPT | Mod: 26,,, | Performed by: RADIOLOGY

## 2018-07-19 RX ORDER — GADOBUTROL 604.72 MG/ML
7 INJECTION INTRAVENOUS
Status: COMPLETED | OUTPATIENT
Start: 2018-07-19 | End: 2018-07-19

## 2018-07-19 RX ADMIN — GADOBUTROL 7 ML: 604.72 INJECTION INTRAVENOUS at 08:07

## 2018-07-20 ENCOUNTER — PATIENT MESSAGE (OUTPATIENT)
Dept: NEUROLOGY | Facility: CLINIC | Age: 43
End: 2018-07-20

## 2018-07-20 DIAGNOSIS — M54.9 BACK PAIN, UNSPECIFIED BACK LOCATION, UNSPECIFIED BACK PAIN LATERALITY, UNSPECIFIED CHRONICITY: Primary | ICD-10-CM

## 2018-07-20 RX ORDER — METHYLPREDNISOLONE 4 MG/1
TABLET ORAL
Qty: 1 PACKAGE | Refills: 0 | Status: SHIPPED | OUTPATIENT
Start: 2018-07-20 | End: 2018-08-10

## 2018-08-02 ENCOUNTER — TELEPHONE (OUTPATIENT)
Dept: NEUROLOGY | Facility: CLINIC | Age: 43
End: 2018-08-02

## 2018-08-02 DIAGNOSIS — R79.89 ABNORMAL CBC: Primary | ICD-10-CM

## 2018-08-06 DIAGNOSIS — G35 MULTIPLE SCLEROSIS: ICD-10-CM

## 2018-08-07 DIAGNOSIS — G35 MULTIPLE SCLEROSIS: Primary | ICD-10-CM

## 2018-08-11 ENCOUNTER — LAB VISIT (OUTPATIENT)
Dept: LAB | Facility: HOSPITAL | Age: 43
End: 2018-08-11
Attending: PSYCHIATRY & NEUROLOGY
Payer: OTHER GOVERNMENT

## 2018-08-11 DIAGNOSIS — G35 MULTIPLE SCLEROSIS: ICD-10-CM

## 2018-08-11 LAB
ALBUMIN SERPL BCP-MCNC: 3.8 G/DL
ALP SERPL-CCNC: 54 U/L
ALT SERPL W/O P-5'-P-CCNC: 18 U/L
ANISOCYTOSIS BLD QL SMEAR: SLIGHT
AST SERPL-CCNC: 16 U/L
BASOPHILS # BLD AUTO: 0.05 K/UL
BASOPHILS NFR BLD: 0.7 %
BILIRUB DIRECT SERPL-MCNC: 0.1 MG/DL
BILIRUB SERPL-MCNC: 0.4 MG/DL
DIFFERENTIAL METHOD: ABNORMAL
EOSINOPHIL # BLD AUTO: 0.2 K/UL
EOSINOPHIL NFR BLD: 2.1 %
ERYTHROCYTE [DISTWIDTH] IN BLOOD BY AUTOMATED COUNT: 14.5 %
HCT VFR BLD AUTO: 34.6 %
HGB BLD-MCNC: 11.3 G/DL
LYMPHOCYTES # BLD AUTO: 3 K/UL
LYMPHOCYTES NFR BLD: 39.5 %
MCH RBC QN AUTO: 22.1 PG
MCHC RBC AUTO-ENTMCNC: 32.7 G/DL
MCV RBC AUTO: 68 FL
MONOCYTES # BLD AUTO: 0.7 K/UL
MONOCYTES NFR BLD: 9 %
NEUTROPHILS # BLD AUTO: 3.8 K/UL
NEUTROPHILS NFR BLD: 48.7 %
OVALOCYTES BLD QL SMEAR: ABNORMAL
PLATELET # BLD AUTO: 341 K/UL
PMV BLD AUTO: 10.9 FL
PROT SERPL-MCNC: 7 G/DL
RBC # BLD AUTO: 5.12 M/UL
WBC # BLD AUTO: 7.68 K/UL

## 2018-08-11 PROCEDURE — 36415 COLL VENOUS BLD VENIPUNCTURE: CPT

## 2018-08-11 PROCEDURE — 80076 HEPATIC FUNCTION PANEL: CPT

## 2018-08-11 PROCEDURE — 85025 COMPLETE CBC W/AUTO DIFF WBC: CPT

## 2018-08-21 RX ORDER — TERIFLUNOMIDE 14 MG/1
TABLET, FILM COATED ORAL
Qty: 84 TABLET | Refills: 0 | Status: SHIPPED | OUTPATIENT
Start: 2018-08-21 | End: 2018-12-16 | Stop reason: SDUPTHER

## 2018-09-06 ENCOUNTER — HOSPITAL ENCOUNTER (EMERGENCY)
Facility: HOSPITAL | Age: 43
Discharge: HOME | End: 2018-09-06
Attending: EMERGENCY MEDICINE
Payer: OTHER GOVERNMENT

## 2018-09-06 VITALS
DIASTOLIC BLOOD PRESSURE: 81 MMHG | SYSTOLIC BLOOD PRESSURE: 176 MMHG | TEMPERATURE: 99 F | BODY MASS INDEX: 28.32 KG/M2 | RESPIRATION RATE: 18 BRPM | HEIGHT: 65 IN | OXYGEN SATURATION: 100 % | HEART RATE: 76 BPM | WEIGHT: 170 LBS

## 2018-09-06 DIAGNOSIS — R07.9 CHEST PAIN: ICD-10-CM

## 2018-09-06 LAB
ALBUMIN SERPL BCP-MCNC: 4 G/DL
ALP SERPL-CCNC: 66 U/L
ALT SERPL W/O P-5'-P-CCNC: 23 U/L
ANION GAP SERPL CALC-SCNC: 9 MMOL/L
ANISOCYTOSIS BLD QL SMEAR: SLIGHT
AST SERPL-CCNC: 19 U/L
BASOPHILS # BLD AUTO: 0.04 K/UL
BASOPHILS NFR BLD: 0.5 %
BILIRUB SERPL-MCNC: 0.4 MG/DL
BNP SERPL-MCNC: 29 PG/ML
BUN SERPL-MCNC: 11 MG/DL
CALCIUM SERPL-MCNC: 9.3 MG/DL
CHLORIDE SERPL-SCNC: 103 MMOL/L
CO2 SERPL-SCNC: 26 MMOL/L
CREAT SERPL-MCNC: 0.8 MG/DL
D DIMER PPP IA.FEU-MCNC: 0.39 MG/L FEU
DIFFERENTIAL METHOD: ABNORMAL
EOSINOPHIL # BLD AUTO: 0.1 K/UL
EOSINOPHIL NFR BLD: 1.7 %
ERYTHROCYTE [DISTWIDTH] IN BLOOD BY AUTOMATED COUNT: 14.9 %
EST. GFR  (AFRICAN AMERICAN): >60 ML/MIN/1.73 M^2
EST. GFR  (NON AFRICAN AMERICAN): >60 ML/MIN/1.73 M^2
GLUCOSE SERPL-MCNC: 83 MG/DL
HCT VFR BLD AUTO: 37.7 %
HGB BLD-MCNC: 12.1 G/DL
LYMPHOCYTES # BLD AUTO: 2.3 K/UL
LYMPHOCYTES NFR BLD: 30.5 %
MCH RBC QN AUTO: 21.5 PG
MCHC RBC AUTO-ENTMCNC: 32.1 G/DL
MCV RBC AUTO: 67 FL
MONOCYTES # BLD AUTO: 0.9 K/UL
MONOCYTES NFR BLD: 11.3 %
NEUTROPHILS # BLD AUTO: 4.2 K/UL
NEUTROPHILS NFR BLD: 56 %
PLATELET # BLD AUTO: 316 K/UL
PMV BLD AUTO: 11 FL
POLYCHROMASIA BLD QL SMEAR: ABNORMAL
POTASSIUM SERPL-SCNC: 3.8 MMOL/L
PROT SERPL-MCNC: 7.7 G/DL
RBC # BLD AUTO: 5.62 M/UL
SODIUM SERPL-SCNC: 138 MMOL/L
TROPONIN I SERPL DL<=0.01 NG/ML-MCNC: <0.006 NG/ML
WBC # BLD AUTO: 7.5 K/UL

## 2018-09-06 PROCEDURE — 84484 ASSAY OF TROPONIN QUANT: CPT

## 2018-09-06 PROCEDURE — 93010 ELECTROCARDIOGRAM REPORT: CPT | Mod: ,,, | Performed by: INTERNAL MEDICINE

## 2018-09-06 PROCEDURE — 83880 ASSAY OF NATRIURETIC PEPTIDE: CPT

## 2018-09-06 PROCEDURE — 85379 FIBRIN DEGRADATION QUANT: CPT

## 2018-09-06 PROCEDURE — 85025 COMPLETE CBC W/AUTO DIFF WBC: CPT

## 2018-09-06 PROCEDURE — 93005 ELECTROCARDIOGRAM TRACING: CPT

## 2018-09-06 PROCEDURE — 25000003 PHARM REV CODE 250: Performed by: EMERGENCY MEDICINE

## 2018-09-06 PROCEDURE — 99284 EMERGENCY DEPT VISIT MOD MDM: CPT | Mod: 25

## 2018-09-06 PROCEDURE — 80053 COMPREHEN METABOLIC PANEL: CPT

## 2018-09-06 RX ORDER — OLMESARTAN MEDOXOMIL 5 MG/1
10 TABLET ORAL DAILY
Status: DISCONTINUED | OUTPATIENT
Start: 2018-09-06 | End: 2018-09-06 | Stop reason: HOSPADM

## 2018-09-06 RX ORDER — PANTOPRAZOLE SODIUM 20 MG/1
20 TABLET, DELAYED RELEASE ORAL DAILY
Qty: 30 TABLET | Refills: 0 | Status: SHIPPED | OUTPATIENT
Start: 2018-09-06 | End: 2018-11-01

## 2018-09-06 RX ADMIN — LIDOCAINE HYDROCHLORIDE: 20 SOLUTION ORAL; TOPICAL at 12:09

## 2018-09-06 RX ADMIN — OLMESARTAN MEDOXOMIL 10 MG: 5 TABLET, FILM COATED ORAL at 10:09
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
## 2018-09-06 NOTE — DISCHARGE INSTRUCTIONS
Rest.  Drink plenty of fluids.  Wait at least 30 min after eating before you lay down.  Return for any new or acute problems or concerns.

## 2018-09-06 NOTE — ED TRIAGE NOTES
"Pt arrived to ED via personal transport with c/o chest pain, neck pain and left shoulder pain. Pt reports "chest pain and left arm pain started approx 4 days ago and neck pain started this morning PTA". Pt denies SOB, nausea or vomiting. Pt reports dizziness that started 3 days ago intermittent.  "

## 2018-10-01 ENCOUNTER — TELEPHONE (OUTPATIENT)
Dept: NEUROLOGY | Facility: CLINIC | Age: 43
End: 2018-10-01

## 2018-10-01 NOTE — TELEPHONE ENCOUNTER
Left VM at 2:03PM informing patient that provider, Roseanna was leaving the office due to becoming ill.  Patient appointment will need to be rescheduled

## 2018-10-02 ENCOUNTER — TELEPHONE (OUTPATIENT)
Dept: NEUROLOGY | Facility: CLINIC | Age: 43
End: 2018-10-02

## 2018-10-10 ENCOUNTER — PATIENT MESSAGE (OUTPATIENT)
Dept: NEUROLOGY | Facility: CLINIC | Age: 43
End: 2018-10-10

## 2018-10-12 ENCOUNTER — TELEPHONE (OUTPATIENT)
Dept: NEUROLOGY | Facility: CLINIC | Age: 43
End: 2018-10-12

## 2018-10-12 NOTE — TELEPHONE ENCOUNTER
Unable to leave message for patient to r/s her appointment with Roseanna Riddle.  Mailbox is full at this time

## 2018-11-01 ENCOUNTER — TELEPHONE (OUTPATIENT)
Dept: UROLOGY | Facility: CLINIC | Age: 43
End: 2018-11-01

## 2018-11-01 ENCOUNTER — OFFICE VISIT (OUTPATIENT)
Dept: UROLOGY | Facility: CLINIC | Age: 43
End: 2018-11-01
Payer: OTHER GOVERNMENT

## 2018-11-01 VITALS
BODY MASS INDEX: 29.74 KG/M2 | WEIGHT: 174.19 LBS | DIASTOLIC BLOOD PRESSURE: 100 MMHG | SYSTOLIC BLOOD PRESSURE: 178 MMHG | HEIGHT: 64 IN | HEART RATE: 76 BPM

## 2018-11-01 DIAGNOSIS — R10.2 PELVIC PAIN IN FEMALE: ICD-10-CM

## 2018-11-01 DIAGNOSIS — R10.2 PELVIC PAIN IN FEMALE: Primary | ICD-10-CM

## 2018-11-01 DIAGNOSIS — Z90.710 H/O: HYSTERECTOMY: ICD-10-CM

## 2018-11-01 DIAGNOSIS — R30.0 DYSURIA: Primary | ICD-10-CM

## 2018-11-01 PROCEDURE — 99999 PR PBB SHADOW E&M-EST. PATIENT-LVL III: CPT | Mod: PBBFAC,,, | Performed by: UROLOGY

## 2018-11-01 PROCEDURE — 81002 URINALYSIS NONAUTO W/O SCOPE: CPT | Mod: PBBFAC | Performed by: UROLOGY

## 2018-11-01 PROCEDURE — 51701 INSERT BLADDER CATHETER: CPT | Mod: PBBFAC | Performed by: UROLOGY

## 2018-11-01 PROCEDURE — 99213 OFFICE O/P EST LOW 20 MIN: CPT | Mod: S$PBB,,, | Performed by: UROLOGY

## 2018-11-01 PROCEDURE — 99213 OFFICE O/P EST LOW 20 MIN: CPT | Mod: PBBFAC,25 | Performed by: UROLOGY

## 2018-11-01 PROCEDURE — 87086 URINE CULTURE/COLONY COUNT: CPT

## 2018-11-02 LAB — BACTERIA UR CULT: NO GROWTH
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
## 2018-11-05 ENCOUNTER — PATIENT MESSAGE (OUTPATIENT)
Dept: NEUROLOGY | Facility: CLINIC | Age: 43
End: 2018-11-05

## 2018-11-18 ENCOUNTER — LAB VISIT (OUTPATIENT)
Dept: LAB | Facility: HOSPITAL | Age: 43
End: 2018-11-18
Attending: PSYCHIATRY & NEUROLOGY
Payer: OTHER GOVERNMENT

## 2018-11-18 DIAGNOSIS — G35 MULTIPLE SCLEROSIS: ICD-10-CM

## 2018-11-18 PROCEDURE — 80053 COMPREHEN METABOLIC PANEL: CPT

## 2018-11-18 PROCEDURE — 85025 COMPLETE CBC W/AUTO DIFF WBC: CPT

## 2018-11-18 PROCEDURE — 36415 COLL VENOUS BLD VENIPUNCTURE: CPT

## 2018-11-19 LAB
ALBUMIN SERPL BCP-MCNC: 3.9 G/DL
ALBUMIN SERPL BCP-MCNC: 3.9 G/DL
ALP SERPL-CCNC: 69 U/L
ALP SERPL-CCNC: 69 U/L
ALT SERPL W/O P-5'-P-CCNC: 33 U/L
ALT SERPL W/O P-5'-P-CCNC: 33 U/L
ANION GAP SERPL CALC-SCNC: 9 MMOL/L
AST SERPL-CCNC: 22 U/L
AST SERPL-CCNC: 22 U/L
BASOPHILS # BLD AUTO: 0.08 K/UL
BASOPHILS NFR BLD: 0.9 %
BILIRUB DIRECT SERPL-MCNC: 0.1 MG/DL
BILIRUB SERPL-MCNC: 0.1 MG/DL
BILIRUB SERPL-MCNC: 0.1 MG/DL
BUN SERPL-MCNC: 16 MG/DL
CALCIUM SERPL-MCNC: 9.2 MG/DL
CHLORIDE SERPL-SCNC: 107 MMOL/L
CO2 SERPL-SCNC: 25 MMOL/L
CREAT SERPL-MCNC: 0.8 MG/DL
DIFFERENTIAL METHOD: ABNORMAL
EOSINOPHIL # BLD AUTO: 0.3 K/UL
EOSINOPHIL NFR BLD: 2.9 %
ERYTHROCYTE [DISTWIDTH] IN BLOOD BY AUTOMATED COUNT: 13.7 %
EST. GFR  (AFRICAN AMERICAN): >60 ML/MIN/1.73 M^2
EST. GFR  (NON AFRICAN AMERICAN): >60 ML/MIN/1.73 M^2
GLUCOSE SERPL-MCNC: 82 MG/DL
HCT VFR BLD AUTO: 37.1 %
HGB BLD-MCNC: 11.8 G/DL
LYMPHOCYTES # BLD AUTO: 3.4 K/UL
LYMPHOCYTES NFR BLD: 39.7 %
MCH RBC QN AUTO: 21.6 PG
MCHC RBC AUTO-ENTMCNC: 31.8 G/DL
MCV RBC AUTO: 68 FL
MONOCYTES # BLD AUTO: 0.9 K/UL
MONOCYTES NFR BLD: 10.5 %
NEUTROPHILS # BLD AUTO: 4 K/UL
NEUTROPHILS NFR BLD: 46.2 %
PLATELET # BLD AUTO: 358 K/UL
PMV BLD AUTO: 11.1 FL
POTASSIUM SERPL-SCNC: 3.6 MMOL/L
PROT SERPL-MCNC: 7.4 G/DL
PROT SERPL-MCNC: 7.4 G/DL
RBC # BLD AUTO: 5.47 M/UL
SODIUM SERPL-SCNC: 141 MMOL/L
WBC # BLD AUTO: 8.67 K/UL

## 2018-11-26 ENCOUNTER — OFFICE VISIT (OUTPATIENT)
Dept: NEUROLOGY | Facility: CLINIC | Age: 43
End: 2018-11-26
Payer: OTHER GOVERNMENT

## 2018-11-26 ENCOUNTER — TELEPHONE (OUTPATIENT)
Dept: NEUROLOGY | Facility: CLINIC | Age: 43
End: 2018-11-26

## 2018-11-26 VITALS
WEIGHT: 175.25 LBS | BODY MASS INDEX: 29.92 KG/M2 | HEIGHT: 64 IN | HEART RATE: 82 BPM | SYSTOLIC BLOOD PRESSURE: 153 MMHG | DIASTOLIC BLOOD PRESSURE: 99 MMHG

## 2018-11-26 DIAGNOSIS — Z79.899 ENCOUNTER FOR LONG-TERM (CURRENT) USE OF HIGH-RISK MEDICATION: ICD-10-CM

## 2018-11-26 DIAGNOSIS — G47.00 INSOMNIA, UNSPECIFIED TYPE: ICD-10-CM

## 2018-11-26 DIAGNOSIS — G35 MULTIPLE SCLEROSIS: ICD-10-CM

## 2018-11-26 DIAGNOSIS — Z29.89 PROPHYLACTIC IMMUNOTHERAPY: ICD-10-CM

## 2018-11-26 DIAGNOSIS — G35 MS (MULTIPLE SCLEROSIS): ICD-10-CM

## 2018-11-26 DIAGNOSIS — E55.9 VITAMIN D DEFICIENCY: Primary | ICD-10-CM

## 2018-11-26 DIAGNOSIS — G56.00 CARPAL TUNNEL SYNDROME, UNSPECIFIED LATERALITY: ICD-10-CM

## 2018-11-26 PROCEDURE — 99999 PR PBB SHADOW E&M-EST. PATIENT-LVL III: CPT | Mod: PBBFAC,,, | Performed by: PSYCHIATRY & NEUROLOGY

## 2018-11-26 PROCEDURE — 99215 OFFICE O/P EST HI 40 MIN: CPT | Mod: S$PBB,,, | Performed by: PSYCHIATRY & NEUROLOGY

## 2018-11-26 PROCEDURE — 99213 OFFICE O/P EST LOW 20 MIN: CPT | Mod: PBBFAC | Performed by: PSYCHIATRY & NEUROLOGY

## 2018-11-26 RX ORDER — IBUPROFEN 800 MG/1
800 TABLET ORAL
COMMUNITY
End: 2019-09-13 | Stop reason: SDUPTHER

## 2018-11-26 RX ORDER — ZOLPIDEM TARTRATE 5 MG/1
5 TABLET ORAL NIGHTLY PRN
Qty: 30 TABLET | Refills: 3 | Status: SHIPPED | OUTPATIENT
Start: 2018-11-26 | End: 2022-06-23

## 2018-11-26 RX ORDER — ASPIRIN 325 MG
50000 TABLET, DELAYED RELEASE (ENTERIC COATED) ORAL WEEKLY
Qty: 12 CAPSULE | Refills: 3 | COMMUNITY
Start: 2018-11-26 | End: 2018-12-05 | Stop reason: SDUPTHER

## 2018-11-26 RX ORDER — ERGOCALCIFEROL 1.25 MG/1
50000 CAPSULE ORAL
COMMUNITY
End: 2019-02-19 | Stop reason: SDUPTHER

## 2018-11-26 RX ORDER — ZOLPIDEM TARTRATE 5 MG/1
5 TABLET ORAL NIGHTLY PRN
COMMUNITY
End: 2018-11-26 | Stop reason: SDUPTHER

## 2018-11-26 NOTE — TELEPHONE ENCOUNTER
PLEASE CONTACT PATIENT TO SCHEDULED FOR EMG ASAP. AS OF RIGHT NOW, PT IS SCHEDULED FOR 1/23/19. ORDER/REFERRAL IN BY DR. GRANGER     [phone].

## 2018-11-26 NOTE — PROGRESS NOTES
Subjective:       Patient ID: Tiffany Cruz is a 43 y.o. female who presents today for a routine clinic visit for MS.      MS HPI:  · DMT: Aubagio, taking regularly;  Having significant hair loss, and does not feel its growing back.     · Taking vitamin D3 as recommended? Out of vitamin D;  Has not taken for several   · She describes a constant ache in her hands / arms that's worse in the AM.  Feels her hands are swollen; This wakes her up during the night;  Described as pins / needles;    · Walking is stable;     SOCIAL HISTORY  Social History     Tobacco Use    Smoking status: Never Smoker    Smokeless tobacco: Never Used   Substance Use Topics    Alcohol use: No     Alcohol/week: 0.0 oz    Drug use: No     Living arrangements - the patient lives with her family.  Employment:     MS ROS:  · Fatigue: Yes - but very mild;   · Sleep Disturbance: Yes - mild insomnia at times; takes Ambien prn  · Bladder Dysfunction: seeing Dr. Grande; has painful urination; w/u is ongoing;   · Bowel Dysfunction: No  · Spasticity: Yes - takes baclofen at night; stable;   · Visual Symptoms: No; has seen Dr. Avelar;   · Cognitive: No  · Mood Disorder: No  · Gait Disturbance: No  · Falls: No  · Hand Dysfunction: Yes - as in HPI  · Pain: Yes - as in HPI  · Sexual Dysfunction: Not Assessed  · Skin Breakdown: No  · Tremors: No  · Dysphagia:  No  · Dysarthria:  No  · Heat sensitivity:  no  · Any un-met adaptive needs? No  · Copay Assist?  Yes - $0  · Clinical Trial candidate? No        Objective:        25 foot timed walk: 3.9s without assist; was 3.69 seconds last visit;     Negative Phalan's and Tinnels;   Decreased PP in hands along median n. distribution bilaterally; no atrophy or hand weakness;     MENTAL STATUS: grossly intact  CRANIAL NERVE EXAM: There is no internuclear ophthalmoplegia. Extraocular   muscles are intact.  No facial   asymmetry.There is no dysarthria.   MOTOR EXAM: Normal bulk and tone throughout UE  and LE bilaterally. Rapid sequential movements are normal. Strength is 5/5 in all groups   in the lower extremities and upper extremities.   REFLEXES: Symmetric and 1+ throughout in all 4 extremities.   SENSORY EXAM: Normal to vibration t/o  COORDINATION: Normal finger-to-nose exam.   GAIT: Narrow based and stable.      Imaging:     Results for orders placed during the hospital encounter of 09/27/17   MRI Brain W WO Contrast    Impression    Numerous supratentorial White matter lesions in keeping with diagnosis of multiple sclerosis with moderate plaque burden.  Single new lesion in the left corona radiata without enhancing or diffusion restricting lesions to suggest active demyelination.       Electronically signed by: RISSA MCGEE  Date:     09/28/17  Time:    11:32      Results for orders placed during the hospital encounter of 09/27/17   MRI Cervical Spine W WO Cont    Impression No cord signal abnormality. Specifically, the T2 hyperintense lesion at the dorsal lateral cord at the level of C2 is no longer present.     Diffusely decreased T1 marrow signal suggesting diffuse marrow replacement process. This can be seen with red marrow reconversion, though this can also be seen with myelodysplastic type processes.      Electronically signed by: IRSSA MCGEE  Date:     09/28/17  Time:    11:34        Labs:     Lab Results   Component Value Date    [id number] 33 01/29/2018    [id number] 50 03/18/2016    SRSIUDIA34EY 45 04/02/2015     Lab Results   Component Value Date    WBC 8.67 11/18/2018    RBC 5.47 (H) 11/18/2018    HGB 11.8 (L) 11/18/2018    HCT 37.1 11/18/2018    MCV 68 (L) 11/18/2018    MCH 21.6 (L) 11/18/2018    MCHC 31.8 (L) 11/18/2018    RDW 13.7 11/18/2018     (H) 11/18/2018    MPV 11.1 11/18/2018    GRAN 4.0 11/18/2018    GRAN 46.2 11/18/2018    LYMPH 3.4 11/18/2018    LYMPH 39.7 11/18/2018    MONO 0.9 11/18/2018    MONO 10.5 11/18/2018    EOS 0.3 11/18/2018    BASO 0.08 11/18/2018    EOSINOPHIL 2.9  11/18/2018    BASOPHIL 0.9 11/18/2018     Sodium   Date Value Ref Range Status   11/18/2018 141 136 - 145 mmol/L Final     Potassium   Date Value Ref Range Status   11/18/2018 3.6 3.5 - 5.1 mmol/L Final     Chloride   Date Value Ref Range Status   11/18/2018 107 95 - 110 mmol/L Final     CO2   Date Value Ref Range Status   11/18/2018 25 23 - 29 mmol/L Final     Glucose   Date Value Ref Range Status   11/18/2018 82 70 - 110 mg/dL Final     BUN, Bld   Date Value Ref Range Status   11/18/2018 16 6 - 20 mg/dL Final     Creatinine   Date Value Ref Range Status   11/18/2018 0.8 0.5 - 1.4 mg/dL Final     Calcium   Date Value Ref Range Status   11/18/2018 9.2 8.7 - 10.5 mg/dL Final     Total Protein   Date Value Ref Range Status   11/18/2018 7.4 6.0 - 8.4 g/dL Final   11/18/2018 7.4 6.0 - 8.4 g/dL Final     Albumin   Date Value Ref Range Status   11/18/2018 3.9 3.5 - 5.2 g/dL Final   11/18/2018 3.9 3.5 - 5.2 g/dL Final     Total Bilirubin   Date Value Ref Range Status   11/18/2018 0.1 0.1 - 1.0 mg/dL Final     Comment:     For infants and newborns, interpretation of results should be based  on gestational age, weight and in agreement with clinical  observations.  Premature Infant recommended reference ranges:  Up to 24 hours.............<8.0 mg/dL  Up to 48 hours............<12.0 mg/dL  3-5 days..................<15.0 mg/dL  6-29 days.................<15.0 mg/dL     11/18/2018 0.1 0.1 - 1.0 mg/dL Final     Comment:     For infants and newborns, interpretation of results should be based  on gestational age, weight and in agreement with clinical  observations.  Premature Infant recommended reference ranges:  Up to 24 hours.............<8.0 mg/dL  Up to 48 hours............<12.0 mg/dL  3-5 days..................<15.0 mg/dL  6-29 days.................<15.0 mg/dL       Alkaline Phosphatase   Date Value Ref Range Status   11/18/2018 69 55 - 135 U/L Final   11/18/2018 69 55 - 135 U/L Final     AST   Date Value Ref Range Status    11/18/2018 22 10 - 40 U/L Final   11/18/2018 22 10 - 40 U/L Final     ALT   Date Value Ref Range Status   11/18/2018 33 10 - 44 U/L Final   11/18/2018 33 10 - 44 U/L Final     Anion Gap   Date Value Ref Range Status   11/18/2018 9 8 - 16 mmol/L Final     eGFR if    Date Value Ref Range Status   11/18/2018 >60 >60 mL/min/1.73 m^2 Final     eGFR if non    Date Value Ref Range Status   11/18/2018 >60 >60 mL/min/1.73 m^2 Final     Comment:     Calculation used to obtain the estimated glomerular filtration  rate (eGFR) is the CKD-EPI equation.        Lab Results   Component Value Date    COLORU Yellow 07/17/2018    APPEARANCEUA Hazy (A) 07/17/2018    PHUR 5.0 07/17/2018    SPECGRAV 1.015 07/17/2018    PROTEINUA Negative 07/17/2018    GLUCUA Negative 07/17/2018    KETONESU Negative 07/17/2018    BILIRUBINUA Negative 07/17/2018    OCCULTUA 2+ (A) 07/17/2018    NITRITE Negative 07/17/2018    UROBILINOGEN Negative 07/17/2018    LEUKOCYTESUR Negative 07/17/2018       Diagnosis/Assessment/Plan:    1. Multiple Sclerosis  · Assessment: Pt is clinically stable on Aubagio  · Imaging: MRI brain planned in next few weeks  · Disease Modifying Therapies: continue Aubagio; labs scheduled for today and quarterly over next year; continue vitamin D.     2. MS Symptom Assessment / Management  · Sleep: ambien refilled;   · No other changes to regimen described in ROS above            3. CTS: exam and history suggest CTS; advised she wear neutral posture wrist splint for now; EMG ordered;     F/u Roseanna Peskin CSN in 3mo    Over 50% of this 40 minute visit was spent in direct face to face counseling of the patient about MS, DMT considerations, and MS symptom management.     Problem List Items Addressed This Visit        1 - High    Multiple sclerosis    Overview     On Aubagio  Clinically stable            2     Encounter for long-term (current) use of high-risk medication    Overview     Aubagio; has  medication related alopecia            3     Prophylactic immunotherapy       4     Carpal tunnel syndrome    Overview     By exam;   NCV ordered  Wrist splints advised         Relevant Orders    EMG W/ ULTRASOUND AND NERVE CONDUCTION TEST 2 Extremities       Unprioritized    MS (multiple sclerosis)    Relevant Orders    CBC auto differential    Hepatic function panel    MRI Brain W WO Contrast      Other Visit Diagnoses     Vitamin D deficiency    -  Primary    Relevant Medications    cholecalciferol, vitamin D3, 50,000 unit capsule    Other Relevant Orders    Vitamin D    Insomnia, unspecified type        Relevant Medications    zolpidem (AMBIEN) 5 MG Tab

## 2018-11-27 ENCOUNTER — TELEPHONE (OUTPATIENT)
Dept: NEUROLOGY | Facility: CLINIC | Age: 43
End: 2018-11-27

## 2018-11-27 ENCOUNTER — DOCUMENTATION ONLY (OUTPATIENT)
Dept: NEUROLOGY | Facility: CLINIC | Age: 43
End: 2018-11-27

## 2018-11-27 DIAGNOSIS — R20.2 PARESTHESIAS: ICD-10-CM

## 2018-11-27 DIAGNOSIS — G35 MULTIPLE SCLEROSIS: ICD-10-CM

## 2018-11-27 DIAGNOSIS — M79.601 BILATERAL ARM PAIN: Primary | ICD-10-CM

## 2018-11-27 DIAGNOSIS — M79.602 BILATERAL ARM PAIN: Primary | ICD-10-CM

## 2018-11-27 NOTE — TELEPHONE ENCOUNTER
----- Message from Ronak Young sent at 11/27/2018 11:50 AM CST -----  Contact: Kevin Christensen office @ [phone]  Caller ( Dr Christensen office ) calling to get an authorization from [facility] for the EMG, pls call to advise

## 2018-11-27 NOTE — TELEPHONE ENCOUNTER
Received call from Dr. Christensen's office requesting auth from [facility] for EMG. Referral updated.

## 2018-11-28 NOTE — TELEPHONE ENCOUNTER
Call placed to Arianne. Informed her that referral/auth must come from PCP for  pts. States she will contact the pt.

## 2018-11-29 ENCOUNTER — PATIENT MESSAGE (OUTPATIENT)
Dept: NEUROLOGY | Facility: CLINIC | Age: 43
End: 2018-11-29

## 2018-12-03 PROBLEM — G56.00 CARPAL TUNNEL SYNDROME: Status: ACTIVE | Noted: 2018-12-03

## 2018-12-04 ENCOUNTER — DOCUMENTATION ONLY (OUTPATIENT)
Dept: NEUROLOGY | Facility: CLINIC | Age: 43
End: 2018-12-04

## 2018-12-04 ENCOUNTER — PATIENT MESSAGE (OUTPATIENT)
Dept: NEUROLOGY | Facility: CLINIC | Age: 43
End: 2018-12-04

## 2018-12-04 DIAGNOSIS — E55.9 VITAMIN D DEFICIENCY: ICD-10-CM

## 2018-12-04 DIAGNOSIS — G35 MULTIPLE SCLEROSIS: ICD-10-CM

## 2018-12-04 DIAGNOSIS — G35 MS (MULTIPLE SCLEROSIS): ICD-10-CM

## 2018-12-04 DIAGNOSIS — M54.9 BACK PAIN, UNSPECIFIED BACK LOCATION, UNSPECIFIED BACK PAIN LATERALITY, UNSPECIFIED CHRONICITY: ICD-10-CM

## 2018-12-04 DIAGNOSIS — R20.2 NUMBNESS AND TINGLING: ICD-10-CM

## 2018-12-04 DIAGNOSIS — M62.838 MUSCLE SPASM: ICD-10-CM

## 2018-12-04 DIAGNOSIS — R20.0 NUMBNESS AND TINGLING: ICD-10-CM

## 2018-12-04 NOTE — PROGRESS NOTES
Received EMG reports, completed on 11/29/18, from Dr. Christensen's office. Placed in Dr. Tompkins's folder for review.

## 2018-12-05 RX ORDER — CHOLECALCIFEROL (VITAMIN D3) 1250 MCG
CAPSULE ORAL
Qty: 12 CAPSULE | Refills: 0 | Status: SHIPPED | OUTPATIENT
Start: 2018-12-05 | End: 2019-02-19 | Stop reason: SDUPTHER

## 2018-12-07 RX ORDER — BACLOFEN 10 MG/1
TABLET ORAL
Qty: 90 TABLET | Refills: 1 | Status: SHIPPED | OUTPATIENT
Start: 2018-12-07 | End: 2019-04-25 | Stop reason: SDUPTHER

## 2018-12-07 RX ORDER — ASPIRIN 325 MG
50000 TABLET, DELAYED RELEASE (ENTERIC COATED) ORAL WEEKLY
Qty: 12 CAPSULE | Refills: 3 | Status: SHIPPED | OUTPATIENT
Start: 2018-12-07 | End: 2019-09-03

## 2018-12-13 ENCOUNTER — ANESTHESIA EVENT (OUTPATIENT)
Dept: SURGERY | Facility: HOSPITAL | Age: 43
End: 2018-12-13
Payer: OTHER GOVERNMENT

## 2018-12-13 RX ORDER — TIZANIDINE HYDROCHLORIDE 6 MG/1
6 CAPSULE, GELATIN COATED ORAL 3 TIMES DAILY
COMMUNITY
End: 2019-02-19 | Stop reason: SDUPTHER

## 2018-12-13 NOTE — PRE ADMISSION SCREENING
Anesthesia Assessment: Preoperative EQUATION    Planned Procedure: Procedure(s) (LRB):  CYSTOSCOPY, WITH BLADDER BIOPSY, WITH FULGURATION IF INDICATED (N/A)  Requested Anesthesia Type:Monitor Anesthesia Care  Surgeon: Monse Grande MD  Service: Urology  Known or anticipated Date of Surgery:12/18/2018    Surgeon notes: reviewed    Electronic QUestionnaire Assessment completed via nurse interview with patient.      NO AQ    Triage considerations:     The patient has no apparent active cardiac condition (No unstable coronary Syndrome such as severe unstable angina or recent [<1 month] myocardial infarction, decompensated CHF, severe valvular   disease or significant arrhythmia)    Previous anesthesia records:GETA and No problems   12/23/2016 TLH, with BSO, cysto  Airway/Jaw/Neck:  Jaw/Neck Findings: Neck ROM: Normal ROM        Last PCP note: No PCP visits noted  Subspecialty notes: Hematology/Oncology, Neurology    Other important co-morbidities: MS, HTN, Migraine, Thalassemia Trait, Lumbar Herniated Nucleus pulposus     Tests already available:  Available tests,  within 1 month , within [facility] . 11/19/18 Hepatic Function Panel, CBC, CMP; 9/6/2018 EKG            Instructions given. (See in Nurse's note)    Optimization:  Anesthesia Preop Clinic Assessment  Indicated: Not required for this procedure      Plan:   Testing:  None needed    Patient  has previously scheduled Medical Appointment: 12/20/2018    Navigation: Straight Line to surgery.              No tests, anesthesia preop clinic visit, or consult required.

## 2018-12-13 NOTE — ANESTHESIA PREPROCEDURE EVALUATION
Erica Chaparro RN   Registered Nurse      Pre Admission Screening   Signed                     Anesthesia Assessment: Preoperative EQUATION     Planned Procedure: Procedure(s) (LRB):  CYSTOSCOPY, WITH BLADDER BIOPSY, WITH FULGURATION IF INDICATED (N/A)  Requested Anesthesia Type:Monitor Anesthesia Care  Surgeon: Monse Grande MD  Service: Urology  Known or anticipated Date of Surgery:12/18/2018     Surgeon notes: reviewed     Electronic QUestionnaire Assessment completed via nurse interview with patient.      NO AQ     Triage considerations:      The patient has no apparent active cardiac condition (No unstable coronary Syndrome such as severe unstable angina or recent [<1 month] myocardial infarction, decompensated CHF, severe valvular   disease or significant arrhythmia)     Previous anesthesia records:GETA and No problems   12/23/2016 TLH, with BSO, cysto  Airway/Jaw/Neck:  Jaw/Neck Findings: Neck ROM: Normal ROM         Last PCP note: No PCP visits noted  Subspecialty notes: Hematology/Oncology, Neurology     Other important co-morbidities: MS, HTN, Migraine, Thalassemia Trait, Lumbar Herniated Nucleus pulposus     Tests already available:  Available tests,  within 1 month , within [facility] . 11/19/18 Hepatic Function Panel, CBC, CMP; 9/6/2018 EKG                            Instructions given. (See in Nurse's note)     Optimization:  Anesthesia Preop Clinic Assessment  Indicated: Not required for this procedure                Plan:   Testing:  None needed               Patient  has previously scheduled Medical Appointment: 12/20/2018     Navigation: Straight Line to surgery.                         No tests, anesthesia preop clinic visit, or consult required.          Electronically signed by Erica Chaparro RN at 12/13/2018  9:34 AM                                                                                                                 12/13/2018  Tiffany Cruz is a 43 y.o.,  female.    Anesthesia Evaluation         Review of Systems  Anesthesia Hx:  No problems with previous Anesthesia History of prior surgery of interest to airway management or planning: Previous anesthesia: General 12/23/2016 TLH with BSO, cysto with general anesthesia.  Procedure performed at an [facility] Facility. Denies Family Hx of Anesthesia complications.   Denies Personal Hx of Anesthesia complications.   Social:  Non-Smoker, No Alcohol Use    Hematology/Oncology:     Oncology Normal    -- Anemia: Hereditary Anemia (Thalassemia Trait)    EENT/Dental:  EENT/Dental Normal Denies Active Dental Problems  Denies Jaw Problems   Cardiovascular:   Hypertension (borderline) Denies MI.    Denies Angina.  Functional Capacity good / => 4 METS  Hypertension, Essential Hypertension , Untreated    Pulmonary:  Pulmonary Normal  Denies Asthma.  Denies Shortness of breath.  Denies Recent URI.    Renal/:  Renal/ Normal  Denies Chronic Renal Disease.     Hepatic/GI:  Hepatic/GI Normal Denies Liver Disease.    Musculoskeletal:  Lumbar Spine Disorders Lumbar herniated nucleus pulposus  Neurological:   Denies TIA. Denies CVA. Denies Seizures.  Dx of Headaches, Migraine Headache Neuromuscular Disease, Multiple Sclerosis   Endocrine:  Endocrine Normal Denies Diabetes.    Dermatological:  Skin Normal    Psych:   depression          Physical Exam  General:  Well nourished    Airway/Jaw/Neck:  Airway Findings: Mouth Opening: Normal Tongue: Normal  General Airway Assessment: Adult, Average  Mallampati: II  TM Distance: Normal, at least 6 cm  Jaw/Neck Findings:  Neck ROM: Normal ROM       Chest/Lungs:  Chest/Lungs Findings: Clear to auscultation     Heart/Vascular:  Heart Findings: Rate: Normal  Rhythm: Regular Rhythm  Sounds: Normal        Mental Status:  Mental Status Findings:  Cooperative, Alert and Oriented         Anesthesia Plan  Type of Anesthesia, risks & benefits discussed:  Anesthesia Type:  general  Patient's Preference:    Intra-op Monitoring Plan:   Intra-op Monitoring Plan Comments:   Post Op Pain Control Plan:   Post Op Pain Control Plan Comments: As per surgeon's plan  Induction:   IV  Beta Blocker:  Patient is not currently on a Beta-Blocker (No further documentation required).       Informed Consent: Patient understands risks and agrees with Anesthesia plan.  Questions answered. Anesthesia consent signed with patient.  ASA Score: 2     Day of Surgery Review of History & Physical:    H&P update referred to the surgeon.         Ready For Surgery From Anesthesia Perspective.

## 2018-12-16 DIAGNOSIS — G35 MULTIPLE SCLEROSIS: ICD-10-CM

## 2018-12-17 ENCOUNTER — TELEPHONE (OUTPATIENT)
Dept: UROLOGY | Facility: CLINIC | Age: 43
End: 2018-12-17

## 2018-12-17 RX ORDER — TERIFLUNOMIDE 14 MG/1
TABLET, FILM COATED ORAL
Qty: 84 TABLET | Refills: 0 | Status: SHIPPED | OUTPATIENT
Start: 2018-12-17 | End: 2019-02-04 | Stop reason: SDUPTHER

## 2018-12-18 ENCOUNTER — ANESTHESIA (OUTPATIENT)
Dept: SURGERY | Facility: HOSPITAL | Age: 43
End: 2018-12-18
Payer: OTHER GOVERNMENT

## 2018-12-18 ENCOUNTER — HOSPITAL ENCOUNTER (OUTPATIENT)
Facility: HOSPITAL | Age: 43
Discharge: HOME | End: 2018-12-18
Attending: UROLOGY | Admitting: UROLOGY
Payer: OTHER GOVERNMENT

## 2018-12-18 VITALS
BODY MASS INDEX: 30.05 KG/M2 | SYSTOLIC BLOOD PRESSURE: 168 MMHG | HEIGHT: 64 IN | WEIGHT: 176 LBS | TEMPERATURE: 98 F | OXYGEN SATURATION: 100 % | DIASTOLIC BLOOD PRESSURE: 73 MMHG | HEART RATE: 81 BPM | RESPIRATION RATE: 18 BRPM

## 2018-12-18 DIAGNOSIS — R10.2 PELVIC PAIN: ICD-10-CM

## 2018-12-18 PROCEDURE — D9220A PRA ANESTHESIA: Mod: ANES,,, | Performed by: ANESTHESIOLOGY

## 2018-12-18 PROCEDURE — D9220A PRA ANESTHESIA: Mod: CRNA,,, | Performed by: NURSE ANESTHETIST, CERTIFIED REGISTERED

## 2018-12-18 PROCEDURE — 63600175 PHARM REV CODE 636 W HCPCS: Performed by: NURSE ANESTHETIST, CERTIFIED REGISTERED

## 2018-12-18 PROCEDURE — 63600175 PHARM REV CODE 636 W HCPCS: Performed by: STUDENT IN AN ORGANIZED HEALTH CARE EDUCATION/TRAINING PROGRAM

## 2018-12-18 PROCEDURE — 58999 UNLISTED PX FML GENITAL SYS: CPT | Mod: ,,, | Performed by: UROLOGY

## 2018-12-18 PROCEDURE — 71000015 HC POSTOP RECOV 1ST HR: Performed by: UROLOGY

## 2018-12-18 PROCEDURE — 25000003 PHARM REV CODE 250

## 2018-12-18 PROCEDURE — 00910 ANES TRANSURETHRAL PX NOS: CPT | Performed by: UROLOGY

## 2018-12-18 PROCEDURE — 52000 CYSTOURETHROSCOPY: CPT | Mod: 59,,, | Performed by: UROLOGY

## 2018-12-18 PROCEDURE — 37000009 HC ANESTHESIA EA ADD 15 MINS: Performed by: UROLOGY

## 2018-12-18 PROCEDURE — 37000008 HC ANESTHESIA 1ST 15 MINUTES: Performed by: UROLOGY

## 2018-12-18 PROCEDURE — 36000707: Performed by: UROLOGY

## 2018-12-18 PROCEDURE — 25000003 PHARM REV CODE 250: Performed by: UROLOGY

## 2018-12-18 PROCEDURE — 25000003 PHARM REV CODE 250: Performed by: STUDENT IN AN ORGANIZED HEALTH CARE EDUCATION/TRAINING PROGRAM

## 2018-12-18 PROCEDURE — 71000044 HC DOSC ROUTINE RECOVERY FIRST HOUR: Performed by: UROLOGY

## 2018-12-18 PROCEDURE — 36000706: Performed by: UROLOGY

## 2018-12-18 RX ORDER — PROPOFOL 10 MG/ML
VIAL (ML) INTRAVENOUS CONTINUOUS PRN
Status: DISCONTINUED | OUTPATIENT
Start: 2018-12-18 | End: 2018-12-18

## 2018-12-18 RX ORDER — MEPERIDINE HYDROCHLORIDE 25 MG/ML
12.5 INJECTION INTRAMUSCULAR; INTRAVENOUS; SUBCUTANEOUS EVERY 10 MIN PRN
Status: DISCONTINUED | OUTPATIENT
Start: 2018-12-18 | End: 2018-12-18 | Stop reason: HOSPADM

## 2018-12-18 RX ORDER — TRAMADOL HYDROCHLORIDE 50 MG/1
50 TABLET ORAL EVERY 4 HOURS PRN
Status: DISCONTINUED | OUTPATIENT
Start: 2018-12-18 | End: 2018-12-18 | Stop reason: HOSPADM

## 2018-12-18 RX ORDER — SODIUM CHLORIDE 9 MG/ML
INJECTION, SOLUTION INTRAVENOUS CONTINUOUS
Status: DISCONTINUED | OUTPATIENT
Start: 2018-12-18 | End: 2018-12-18 | Stop reason: HOSPADM

## 2018-12-18 RX ORDER — FENTANYL CITRATE 50 UG/ML
INJECTION, SOLUTION INTRAMUSCULAR; INTRAVENOUS
Status: DISCONTINUED | OUTPATIENT
Start: 2018-12-18 | End: 2018-12-18

## 2018-12-18 RX ORDER — TRAMADOL HYDROCHLORIDE 50 MG/1
50 TABLET ORAL EVERY 6 HOURS PRN
Qty: 3 TABLET | Refills: 0 | Status: SHIPPED | OUTPATIENT
Start: 2018-12-18 | End: 2018-12-28

## 2018-12-18 RX ORDER — SODIUM CHLORIDE 0.9 % (FLUSH) 0.9 %
3 SYRINGE (ML) INJECTION
Status: DISCONTINUED | OUTPATIENT
Start: 2018-12-18 | End: 2018-12-18 | Stop reason: HOSPADM

## 2018-12-18 RX ORDER — LIDOCAINE HYDROCHLORIDE 20 MG/ML
JELLY TOPICAL
Status: DISCONTINUED | OUTPATIENT
Start: 2018-12-18 | End: 2018-12-18 | Stop reason: HOSPADM

## 2018-12-18 RX ORDER — LABETALOL HYDROCHLORIDE 5 MG/ML
5 INJECTION, SOLUTION INTRAVENOUS ONCE
Status: DISCONTINUED | OUTPATIENT
Start: 2018-12-18 | End: 2018-12-18

## 2018-12-18 RX ORDER — LIDOCAINE HYDROCHLORIDE 10 MG/ML
1 INJECTION, SOLUTION EPIDURAL; INFILTRATION; INTRACAUDAL; PERINEURAL ONCE
Status: COMPLETED | OUTPATIENT
Start: 2018-12-18 | End: 2018-12-18

## 2018-12-18 RX ORDER — TRAMADOL HYDROCHLORIDE 50 MG/1
TABLET ORAL
Status: COMPLETED
Start: 2018-12-18 | End: 2018-12-18

## 2018-12-18 RX ORDER — CEFAZOLIN SODIUM 1 G/3ML
2 INJECTION, POWDER, FOR SOLUTION INTRAMUSCULAR; INTRAVENOUS
Status: COMPLETED | OUTPATIENT
Start: 2018-12-18 | End: 2018-12-18

## 2018-12-18 RX ORDER — HYDROMORPHONE HYDROCHLORIDE 1 MG/ML
0.2 INJECTION, SOLUTION INTRAMUSCULAR; INTRAVENOUS; SUBCUTANEOUS EVERY 5 MIN PRN
Status: DISCONTINUED | OUTPATIENT
Start: 2018-12-18 | End: 2018-12-18 | Stop reason: HOSPADM

## 2018-12-18 RX ORDER — LABETALOL HYDROCHLORIDE 5 MG/ML
INJECTION, SOLUTION INTRAVENOUS
Status: DISCONTINUED
Start: 2018-12-18 | End: 2018-12-18 | Stop reason: WASHOUT

## 2018-12-18 RX ORDER — LIDOCAINE HCL/PF 100 MG/5ML
SYRINGE (ML) INTRAVENOUS
Status: DISCONTINUED | OUTPATIENT
Start: 2018-12-18 | End: 2018-12-18

## 2018-12-18 RX ORDER — ONDANSETRON 8 MG/1
8 TABLET, ORALLY DISINTEGRATING ORAL EVERY 8 HOURS PRN
Status: DISCONTINUED | OUTPATIENT
Start: 2018-12-18 | End: 2018-12-18 | Stop reason: HOSPADM

## 2018-12-18 RX ORDER — MIDAZOLAM HYDROCHLORIDE 1 MG/ML
INJECTION, SOLUTION INTRAMUSCULAR; INTRAVENOUS
Status: DISCONTINUED | OUTPATIENT
Start: 2018-12-18 | End: 2018-12-18

## 2018-12-18 RX ORDER — TELMISARTAN 20 MG/1
20 TABLET ORAL DAILY PRN
COMMUNITY
End: 2019-03-15 | Stop reason: CLARIF

## 2018-12-18 RX ADMIN — LIDOCAINE HYDROCHLORIDE 0.3 MG: 10 INJECTION, SOLUTION EPIDURAL; INFILTRATION; INTRACAUDAL at 02:12

## 2018-12-18 RX ADMIN — FENTANYL CITRATE 50 MCG: 50 INJECTION, SOLUTION INTRAMUSCULAR; INTRAVENOUS at 03:12

## 2018-12-18 RX ADMIN — LIDOCAINE HYDROCHLORIDE 60 MG: 20 INJECTION, SOLUTION INTRAVENOUS at 03:12

## 2018-12-18 RX ADMIN — CEFAZOLIN 2 G: 330 INJECTION, POWDER, FOR SOLUTION INTRAMUSCULAR; INTRAVENOUS at 03:12

## 2018-12-18 RX ADMIN — SODIUM CHLORIDE: 0.9 INJECTION, SOLUTION INTRAVENOUS at 02:12

## 2018-12-18 RX ADMIN — MIDAZOLAM HYDROCHLORIDE 1 MG: 1 INJECTION, SOLUTION INTRAMUSCULAR; INTRAVENOUS at 03:12

## 2018-12-18 RX ADMIN — TRAMADOL HYDROCHLORIDE 50 MG: 50 TABLET ORAL at 03:12

## 2018-12-18 RX ADMIN — PROPOFOL 150 MCG/KG/MIN: 10 INJECTION, EMULSION INTRAVENOUS at 03:12

## 2018-12-18 RX ADMIN — ONDANSETRON 8 MG: 8 TABLET, ORALLY DISINTEGRATING ORAL at 04:12

## 2018-12-18 RX ADMIN — MIDAZOLAM HYDROCHLORIDE 2 MG: 1 INJECTION, SOLUTION INTRAMUSCULAR; INTRAVENOUS at 03:12

## 2018-12-18 RX ADMIN — TRAMADOL HYDROCHLORIDE 50 MG: 50 TABLET, FILM COATED ORAL at 03:12

## 2018-12-18 NOTE — PLAN OF CARE
Problem: Adult Inpatient Plan of Care  Goal: Plan of Care Review  Outcome: Outcome(s) achieved Date Met: 12/18/18    Discharge instructions and prescription given to patient and spouse. Verbalized understanding. Patient stable, tolerating fluids. Voided. No complaints at this time. Patient adequate for discharge.

## 2018-12-18 NOTE — TRANSFER OF CARE
"Anesthesia Transfer of Care Note    Patient: Tiffany Cruz    Procedure(s) Performed: Procedure(s):  VAGINOSCOPY  CYSTOSCOPY    Patient location: PACU    Anesthesia Type: general    Transport from OR: Transported from OR on room air with adequate spontaneous ventilation    Post pain: adequate analgesia    Post assessment: no apparent anesthetic complications    Post vital signs: stable    Level of consciousness: awake and sedated    Nausea/Vomiting: no nausea/vomiting    Complications: none    Transfer of care protocol was followed      Last vitals:   Visit Vitals  BP (!) 154/84 (BP Location: Left arm, Patient Position: Lying)   Pulse 77   Temp 36.8 °C (98.2 °F) (Oral)   Resp 17   Ht 5' 4" (1.626 m)   Wt 79.8 kg (176 lb)   LMP 12/05/2016 (Approximate)   SpO2 98%   Breastfeeding? No   BMI 30.21 kg/m²     "

## 2018-12-18 NOTE — DISCHARGE INSTRUCTIONS
Cystoscopy    Cystoscopy is a procedure that lets your doctor look directly inside your urethra and bladder. It can be used to:  · Help diagnose a problem with your urethra, bladder, or kidneys.  · Take a sample (biopsy) of bladder or urethral tissue.  · Treat certain problems (such as removing kidney stones).  · Place a stent to bypass an obstruction.  · Take special X-rays of the kidneys.  Based on the findings, your doctor may recommend other tests or treatments.  What is a cystoscope?  A cystoscope is a telescope-like instrument that contains lenses and fiberoptics (small glass wires that make bright light). The cystoscope may be straight and rigid, or flexible to bend around curves in the urethra. The doctor may look directly into the cystoscope, or project the image onto a monitor.  Getting ready  · Ask your doctor if you should stop taking any medicines before the procedure.  · Ask whether you should avoid eating or drinking anything after midnight before the procedure.  · Follow any other instructions your doctor gives you.  Tell your doctor before the exam if you:  · Take any medicines, such as aspirin or blood thinners  · Have allergies to any medicines  · Are pregnant   The procedure  Cystoscopy is done in the doctors office, surgery center, or hospital. The doctor and a nurse are present during the procedure. It takes only a few minutes, longer if a biopsy, X-ray, or treatment needs to be done.  During the procedure:  · You lie on an exam table on your back, knees bent and legs apart. You are covered with a drape.  · Your urethra and the area around it are washed. Anesthetic jelly may be applied to numb the urethra. Other pain medicine is usually not needed. In some cases, you may be offered a mild sedative to help you relax. If a more extensive procedure is to be done, such as a biopsy or kidney stone removal, general anesthesia may be needed.  · The cystoscope is inserted. A sterile fluid is put  into the bladder to expand it. You may feel pressure from this fluid.  · When the procedure is done, the cystoscope is removed.  After the procedure  If you had a sedative, general anesthesia, or spinal anesthesia, you must have someone drive you home. Once youre home:  · Drink plenty of fluids.  · You may have burning or light bleeding when you urinate--this is normal.  · Medicines may be prescribed to ease any discomfort or prevent infection. Take these as directed.  · Call your doctor if you have heavy bleeding or blood clots, burning that lasts more than a day, a fever over 100°F  (38° C), or trouble urinating.  Date Last Reviewed: 1/1/2017  © 0788-1090 [employer]. [address] [place] [address], [place] [address]. All rights reserved. This information is not intended as a substitute for professional medical care. Always follow your healthcare professional's instructions.

## 2018-12-18 NOTE — OP NOTE
Date of procedure:  12/18/2018    CYSTOSCOPY REPORT    Pre Procedure Diagnosis:  dysuria    Post Procedure Diagnosis:  Normal lower urinary tract    Anesthesia: MAC    IV Fluids:  Please see anesthesia report    EBL:  None    Complications:  None    Rigid cystoscopy performed    FINDINGS:  Dome, anterior, posterior, lateral walls and bladder base free of urothelial abnormalities. Right and left ureteral orifices in the normal postion and configuration, both effluxed clear urine.  Bladder neck and urethra were normal.  There was no discoloration, foreign body or lesions.      Specimen:  none    The patient was taken to the cystoscopy OR suite and anesthesia was established.  She was then placed in dorsal lithotomy position.  The genitalia was prepped and draped  in the usual sterile fashion.  The cystoscope was inserted in the urethra and passed into the bladder with an obturator in place.  The dome, anterior, posterior and lateral walls were examined systematically.  The ureteral orifices were in their usual position and and both were noted to be heaped, but when they effluxed, there was no ureterocele.  The urethra was visualized upon removal of the cystoscope and it was noted to be normal.  The sheath with obturator was reinserted to drain the bladder and 2% lidocaine gel was placed in the bladder.      Next, after the cystoscopy was performed, vaginoscopy was performed and there were no foreign bodies, no sutures in the vagina.  The apex is well healed.  The vaginal epithelium was pink.  There were no palpable masses with bimanual exam.        ASSESSMENT/PLAN:  43 year old woman status post cystoscopy and vaginoscopy.  1. Push fluids for 24 hours.  2. May see blood in the urine, this should gradually improve over the next 2-3 days.  3. The patient was instructed to return to the office or go to the emergency should fever, chills, cloudy urine, or inability to urinate develop.  4. Follow up as needed.

## 2018-12-18 NOTE — DISCHARGE SUMMARY
[facility]  Discharge Note  Short Stay    Admit Date: 12/18/2018    Discharge Date and Time: 12/18/2018 3:33 PM      Attending Physician: Monse Grande MD     Discharge Provider: Saul Guajardo    Diagnoses:  Active Hospital Problems    Diagnosis  POA    Pelvic pain [R10.2]  Yes      Resolved Hospital Problems   No resolved problems to display.       Discharged Condition: good    Hospital Course: Patient was admitted for cystoscopy, and vaginoscopy and tolerated the procedure well with no complications. The patient was discharged home in good condition on the same day.       Final Diagnoses: Same as principal problem.    Disposition: Home or Self Care    Follow up/Patient Instructions:    Medications:  Reconciled Home Medications:   Current Discharge Medication List      START taking these medications    Details   traMADol (ULTRAM) 50 mg tablet Take 1 tablet (50 mg total) by mouth every 6 (six) hours as needed for Pain.  Qty: 3 tablet, Refills: 0         CONTINUE these medications which have NOT CHANGED    Details   AUBAGIO 14 mg Tab TAKE 1 TABLET DAILY  Qty: 84 tablet, Refills: 0    Associated Diagnoses: Multiple sclerosis      baclofen (LIORESAL) 10 MG tablet Take 1/2 to 1 tab by mouth at bedtime.  Qty: 90 tablet, Refills: 1    Associated Diagnoses: MS (multiple sclerosis); Back pain, unspecified back location, unspecified back pain laterality, unspecified chronicity; Muscle spasm      !! cholecalciferol, vitamin D3, 50,000 unit capsule Take 1 capsule (50,000 Units total) by mouth once a week.  Qty: 12 capsule, Refills: 3    Associated Diagnoses: Vitamin D deficiency      !! DECARA 50,000 unit capsule TAKE ONE CAPSULE BY MOUTH ONCE A WEEK  Qty: 12 capsule, Refills: 0    Associated Diagnoses: Multiple sclerosis; Numbness and tingling      ergocalciferol (VITAMIN D2) 50,000 unit Cap Take 50,000 Units by mouth every 7 days.      gabapentin (NEURONTIN) 100 MG capsule Take 1 capsule (100 mg total) by mouth  3 (three) times daily.  Qty: 270 capsule, Refills: 1    Associated Diagnoses: Neuropathic pain; Multiple sclerosis      ibuprofen (ADVIL,MOTRIN) 800 MG tablet Take 800 mg by mouth as needed for Pain.      telmisartan (MICARDIS) 20 MG Tab Take 20 mg by mouth daily as needed.      tiZANidine (ZANAFLEX) 6 mg capsule Take 6 mg by mouth 3 (three) times daily.      zolpidem (AMBIEN) 5 MG Tab Take 1 tablet (5 mg total) by mouth nightly as needed.  Qty: 30 tablet, Refills: 3    Associated Diagnoses: Insomnia, unspecified type      fish oil-omega-3 fatty acids 300-1,000 mg capsule Take 2 g by mouth once daily.       !! - Potential duplicate medications found. Please discuss with provider.        Discharge Procedure Orders   Diet general     Call MD for:  temperature >100.4     Call MD for:  persistent nausea and vomiting     Call MD for:  severe uncontrolled pain     Call MD for:  difficulty breathing, headache or visual disturbances     Call MD for:  hives     Call MD for:  persistent dizziness or light-headedness     Call MD for:  extreme fatigue     Follow-up Information     Monse Grande MD In 2 weeks.    Specialty:  Urology  Contact information:  [address] [facility] [id number] [phone]                 As above.

## 2018-12-18 NOTE — PLAN OF CARE
Patient assigned to this writer by charge nurse. The patient was  escorted to [facility] room 3 by Saira Iqbal. The patient is currently  changing into a hospital gown. This writer is completing a chart review and reviewing MD orders.
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
## 2018-12-19 ENCOUNTER — TELEPHONE (OUTPATIENT)
Dept: UROLOGY | Facility: HOSPITAL | Age: 43
End: 2018-12-19

## 2018-12-19 NOTE — ANESTHESIA POSTPROCEDURE EVALUATION
"Anesthesia Post Evaluation    Patient: Tiffany Cruz    Procedure(s) Performed: Procedure(s):  VAGINOSCOPY  CYSTOSCOPY    Final Anesthesia Type: general  Patient location during evaluation: PACU  Patient participation: Yes- Able to Participate  Level of consciousness: awake and alert and oriented  Post-procedure vital signs: reviewed and stable  Pain management: adequate  Airway patency: patent  PONV status at discharge: No PONV  Anesthetic complications: no      Cardiovascular status: stable  Respiratory status: unassisted, spontaneous ventilation and room air  Hydration status: euvolemic  Follow-up not needed.  Comments: Interview done in PACU yesterday but charted today            Visit Vitals  BP (!) 168/73   Pulse 81   Temp 36.6 °C (97.9 °F) (Temporal)   Resp 18   Ht 5' 4" (1.626 m)   Wt 79.8 kg (176 lb)   LMP 12/05/2016 (Approximate)   SpO2 100%   Breastfeeding? No   BMI 30.21 kg/m²       Pain/Nina Score: Pain Rating Prior to Med Admin: 3 (12/18/2018  3:44 PM)  Nina Score: 10 (12/18/2018  4:52 PM)        "

## 2018-12-19 NOTE — TELEPHONE ENCOUNTER
Called the patient to reassure her that the bladder was completely normal.  No foreign bodies, no sutures, no lesions.  Also looked in the vagina and again, the cuff was well healed, there were no sutures, the walls were pink.  I did a bimanual exam and did not feel a mass.     She asked about the discomfort that occurs on occasion when she urinates.  Possibilities include an adhesion, either to the bowels or to the abdominal wall.  As she did not have a lesion within the bladder, we treat symptomatically.  OK to take AZO Sandard, 1-2 days out of the month.  If she takes daily for several weeks, can cause liver damage.  She expressed understanding and will let me know how she does.      Return prn.

## 2018-12-19 NOTE — ANESTHESIA RELEASE NOTE
"Anesthesia Release from PACU Note    Patient: Tiffany Cruz    Procedure(s) Performed: Procedure(s):  VAGINOSCOPY  CYSTOSCOPY    Anesthesia type: GEN    Post pain: Adequate analgesia reported    Post assessment: no apparent anesthetic complications, tolerated procedure well and no evidence of recall    Post vital signs: BP (!) 168/73   Pulse 81   Temp 36.6 °C (97.9 °F) (Temporal)   Resp 18   Ht 5' 4" (1.626 m)   Wt 79.8 kg (176 lb)   LMP 12/05/2016 (Approximate)   SpO2 100%   Breastfeeding? No   BMI 30.21 kg/m²     Level of consciousness: awake, alert and oriented    Nausea/Vomiting: no nausea/no vomiting    Complications: none    Airway Patency: patent    Respiratory: unassisted, spontaneous ventilation, room air    Cardiovascular: stable and blood pressure at baseline    Hydration: euvolemic    Interview done in PACU yesterday but charted today    "

## 2019-01-03 ENCOUNTER — OFFICE VISIT (OUTPATIENT)
Dept: UROLOGY | Facility: CLINIC | Age: 44
End: 2019-01-03
Payer: OTHER GOVERNMENT

## 2019-01-03 VITALS
HEIGHT: 64 IN | DIASTOLIC BLOOD PRESSURE: 98 MMHG | WEIGHT: 175.94 LBS | SYSTOLIC BLOOD PRESSURE: 152 MMHG | BODY MASS INDEX: 30.04 KG/M2 | HEART RATE: 72 BPM

## 2019-01-03 DIAGNOSIS — B37.31 VAGINAL YEAST INFECTION: Primary | ICD-10-CM

## 2019-01-03 LAB
CANDIDA RRNA VAG QL PROBE: NEGATIVE
G VAGINALIS RRNA GENITAL QL PROBE: NEGATIVE
T VAGINALIS RRNA GENITAL QL PROBE: POSITIVE

## 2019-01-03 PROCEDURE — 99999 PR PBB SHADOW E&M-EST. PATIENT-LVL IV: CPT | Mod: PBBFAC,,, | Performed by: UROLOGY

## 2019-01-03 PROCEDURE — 99213 OFFICE O/P EST LOW 20 MIN: CPT | Mod: S$PBB,,, | Performed by: UROLOGY

## 2019-01-03 PROCEDURE — 99214 OFFICE O/P EST MOD 30 MIN: CPT | Mod: PBBFAC | Performed by: UROLOGY

## 2019-01-03 PROCEDURE — 99213 PR OFFICE/OUTPT VISIT, EST, LEVL III, 20-29 MIN: ICD-10-PCS | Mod: S$PBB,,, | Performed by: UROLOGY

## 2019-01-03 PROCEDURE — 87480 CANDIDA DNA DIR PROBE: CPT

## 2019-01-03 PROCEDURE — 99999 PR PBB SHADOW E&M-EST. PATIENT-LVL IV: ICD-10-PCS | Mod: PBBFAC,,, | Performed by: UROLOGY

## 2019-01-03 PROCEDURE — 81002 URINALYSIS NONAUTO W/O SCOPE: CPT | Mod: PBBFAC | Performed by: UROLOGY

## 2019-01-03 RX ORDER — FLUCONAZOLE 150 MG/1
150 TABLET ORAL ONCE
Qty: 2 TABLET | Refills: 0 | Status: SHIPPED | OUTPATIENT
Start: 2019-01-03 | End: 2019-01-03
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
## 2019-01-07 ENCOUNTER — TELEPHONE (OUTPATIENT)
Dept: UROLOGY | Facility: CLINIC | Age: 44
End: 2019-01-07

## 2019-01-07 DIAGNOSIS — A59.01 TRICHOMONAS VAGINALIS (TV) INFECTION: Primary | ICD-10-CM

## 2019-01-07 RX ORDER — METRONIDAZOLE 500 MG/1
2000 TABLET ORAL ONCE
Qty: 4 TABLET | Refills: 0 | Status: SHIPPED | OUTPATIENT
Start: 2019-01-07 | End: 2019-01-07

## 2019-01-08 ENCOUNTER — PATIENT MESSAGE (OUTPATIENT)
Dept: UROLOGY | Facility: CLINIC | Age: 44
End: 2019-01-08

## 2019-01-23 ENCOUNTER — PATIENT MESSAGE (OUTPATIENT)
Dept: UROLOGY | Facility: CLINIC | Age: 44
End: 2019-01-23

## 2019-01-23 DIAGNOSIS — N76.0 BACTERIAL VAGINOSIS: Primary | ICD-10-CM

## 2019-01-23 DIAGNOSIS — B96.89 BACTERIAL VAGINOSIS: Primary | ICD-10-CM

## 2019-01-24 RX ORDER — METRONIDAZOLE 500 MG/1
500 TABLET ORAL 2 TIMES DAILY
Qty: 14 TABLET | Refills: 0 | Status: SHIPPED | OUTPATIENT
Start: 2019-01-24 | End: 2019-04-25

## 2019-02-04 ENCOUNTER — PATIENT MESSAGE (OUTPATIENT)
Dept: NEUROLOGY | Facility: CLINIC | Age: 44
End: 2019-02-04

## 2019-02-04 ENCOUNTER — PATIENT MESSAGE (OUTPATIENT)
Dept: UROLOGY | Facility: CLINIC | Age: 44
End: 2019-02-04

## 2019-02-04 DIAGNOSIS — G35 MULTIPLE SCLEROSIS: ICD-10-CM

## 2019-02-04 RX ORDER — TERIFLUNOMIDE 14 MG/1
1 TABLET, FILM COATED ORAL DAILY
Qty: 28 TABLET | Refills: 0 | Status: SHIPPED | OUTPATIENT
Start: 2019-02-04 | End: 2019-02-21 | Stop reason: SDUPTHER

## 2019-02-12 DIAGNOSIS — G35 MULTIPLE SCLEROSIS: ICD-10-CM

## 2019-02-12 DIAGNOSIS — M79.2 NEUROPATHIC PAIN: ICD-10-CM

## 2019-02-12 RX ORDER — GABAPENTIN 100 MG/1
100 CAPSULE ORAL 3 TIMES DAILY
Qty: 270 CAPSULE | Refills: 1 | Status: SHIPPED | OUTPATIENT
Start: 2019-02-12 | End: 2019-09-13

## 2019-02-19 ENCOUNTER — OFFICE VISIT (OUTPATIENT)
Dept: NEUROLOGY | Facility: CLINIC | Age: 44
End: 2019-02-19
Payer: OTHER GOVERNMENT

## 2019-02-19 ENCOUNTER — TELEPHONE (OUTPATIENT)
Dept: NEUROLOGY | Facility: CLINIC | Age: 44
End: 2019-02-19

## 2019-02-19 ENCOUNTER — LAB VISIT (OUTPATIENT)
Dept: LAB | Facility: HOSPITAL | Age: 44
End: 2019-02-19
Attending: PSYCHIATRY & NEUROLOGY
Payer: OTHER GOVERNMENT

## 2019-02-19 VITALS
WEIGHT: 175.63 LBS | HEART RATE: 79 BPM | DIASTOLIC BLOOD PRESSURE: 82 MMHG | BODY MASS INDEX: 29.98 KG/M2 | HEIGHT: 64 IN | SYSTOLIC BLOOD PRESSURE: 139 MMHG

## 2019-02-19 DIAGNOSIS — G35 MULTIPLE SCLEROSIS: ICD-10-CM

## 2019-02-19 DIAGNOSIS — G56.00 CARPAL TUNNEL SYNDROME, UNSPECIFIED LATERALITY: ICD-10-CM

## 2019-02-19 DIAGNOSIS — M79.2 NEUROPATHIC PAIN: ICD-10-CM

## 2019-02-19 DIAGNOSIS — M62.838 MUSCLE SPASM: ICD-10-CM

## 2019-02-19 DIAGNOSIS — Z29.89 PROPHYLACTIC IMMUNOTHERAPY: ICD-10-CM

## 2019-02-19 DIAGNOSIS — Z71.89 COUNSELING REGARDING GOALS OF CARE: ICD-10-CM

## 2019-02-19 DIAGNOSIS — G35 MS (MULTIPLE SCLEROSIS): ICD-10-CM

## 2019-02-19 DIAGNOSIS — G35 MULTIPLE SCLEROSIS: Primary | ICD-10-CM

## 2019-02-19 LAB
25(OH)D3+25(OH)D2 SERPL-MCNC: 31 NG/ML
ALBUMIN SERPL BCP-MCNC: 3.7 G/DL
ALBUMIN SERPL BCP-MCNC: 3.7 G/DL
ALP SERPL-CCNC: 67 U/L
ALP SERPL-CCNC: 67 U/L
ALT SERPL W/O P-5'-P-CCNC: 41 U/L
ALT SERPL W/O P-5'-P-CCNC: 41 U/L
ANION GAP SERPL CALC-SCNC: 8 MMOL/L
AST SERPL-CCNC: 27 U/L
AST SERPL-CCNC: 27 U/L
BASOPHILS # BLD AUTO: 0.09 K/UL
BASOPHILS NFR BLD: 1.2 %
BILIRUB DIRECT SERPL-MCNC: <0.1 MG/DL
BILIRUB SERPL-MCNC: 0.2 MG/DL
BILIRUB SERPL-MCNC: 0.2 MG/DL
BUN SERPL-MCNC: 11 MG/DL
CALCIUM SERPL-MCNC: 9.3 MG/DL
CHLORIDE SERPL-SCNC: 105 MMOL/L
CO2 SERPL-SCNC: 26 MMOL/L
CREAT SERPL-MCNC: 0.7 MG/DL
DIFFERENTIAL METHOD: ABNORMAL
EOSINOPHIL # BLD AUTO: 0.2 K/UL
EOSINOPHIL NFR BLD: 2.1 %
ERYTHROCYTE [DISTWIDTH] IN BLOOD BY AUTOMATED COUNT: 14.6 %
EST. GFR  (AFRICAN AMERICAN): >60 ML/MIN/1.73 M^2
EST. GFR  (NON AFRICAN AMERICAN): >60 ML/MIN/1.73 M^2
GLUCOSE SERPL-MCNC: 83 MG/DL
HCT VFR BLD AUTO: 34.8 %
HGB BLD-MCNC: 10.5 G/DL
IMM GRANULOCYTES # BLD AUTO: 0.04 K/UL
IMM GRANULOCYTES NFR BLD AUTO: 0.5 %
LYMPHOCYTES # BLD AUTO: 3 K/UL
LYMPHOCYTES NFR BLD: 38.9 %
MCH RBC QN AUTO: 21.1 PG
MCHC RBC AUTO-ENTMCNC: 30.2 G/DL
MCV RBC AUTO: 70 FL
MONOCYTES # BLD AUTO: 0.9 K/UL
MONOCYTES NFR BLD: 11.6 %
NEUTROPHILS # BLD AUTO: 3.5 K/UL
NEUTROPHILS NFR BLD: 45.7 %
NRBC BLD-RTO: 0 /100 WBC
PLATELET # BLD AUTO: 295 K/UL
PMV BLD AUTO: 12 FL
POTASSIUM SERPL-SCNC: 3.4 MMOL/L
PROT SERPL-MCNC: 7.2 G/DL
PROT SERPL-MCNC: 7.2 G/DL
RBC # BLD AUTO: 4.97 M/UL
SODIUM SERPL-SCNC: 139 MMOL/L
WBC # BLD AUTO: 7.6 K/UL

## 2019-02-19 PROCEDURE — 99999 PR PBB SHADOW E&M-EST. PATIENT-LVL III: ICD-10-PCS | Mod: PBBFAC,,, | Performed by: CLINICAL NURSE SPECIALIST

## 2019-02-19 PROCEDURE — 85025 COMPLETE CBC W/AUTO DIFF WBC: CPT

## 2019-02-19 PROCEDURE — 99214 PR OFFICE/OUTPT VISIT, EST, LEVL IV, 30-39 MIN: ICD-10-PCS | Mod: S$PBB,,, | Performed by: CLINICAL NURSE SPECIALIST

## 2019-02-19 PROCEDURE — 99999 PR PBB SHADOW E&M-EST. PATIENT-LVL III: CPT | Mod: PBBFAC,,, | Performed by: CLINICAL NURSE SPECIALIST

## 2019-02-19 PROCEDURE — 80076 HEPATIC FUNCTION PANEL: CPT

## 2019-02-19 PROCEDURE — 36415 COLL VENOUS BLD VENIPUNCTURE: CPT

## 2019-02-19 PROCEDURE — 99214 OFFICE O/P EST MOD 30 MIN: CPT | Mod: S$PBB,,, | Performed by: CLINICAL NURSE SPECIALIST

## 2019-02-19 PROCEDURE — 99213 OFFICE O/P EST LOW 20 MIN: CPT | Mod: PBBFAC | Performed by: CLINICAL NURSE SPECIALIST

## 2019-02-19 PROCEDURE — 80053 COMPREHEN METABOLIC PANEL: CPT

## 2019-02-19 PROCEDURE — 82306 VITAMIN D 25 HYDROXY: CPT

## 2019-02-19 RX ORDER — TERIFLUNOMIDE 14 MG/1
1 TABLET, FILM COATED ORAL DAILY
Qty: 28 TABLET | Refills: 0 | Status: CANCELLED | OUTPATIENT
Start: 2019-02-19

## 2019-02-19 RX ORDER — TIZANIDINE HYDROCHLORIDE 6 MG/1
6 CAPSULE, GELATIN COATED ORAL 3 TIMES DAILY
Qty: 90 CAPSULE | Refills: 3 | Status: SHIPPED | OUTPATIENT
Start: 2019-02-19 | End: 2021-08-04 | Stop reason: SDUPTHER

## 2019-02-19 RX ORDER — GABAPENTIN 100 MG/1
100 CAPSULE ORAL 3 TIMES DAILY
Qty: 270 CAPSULE | Refills: 1 | Status: CANCELLED | OUTPATIENT
Start: 2019-02-19 | End: 2020-02-19

## 2019-02-19 NOTE — TELEPHONE ENCOUNTER
----- Message from Joel Zazueta sent at 2/19/2019  1:31 PM CST -----  Contact: Self/ [phone]  Patient was calling to let the office know that she on her way to her appt now.

## 2019-02-19 NOTE — PROGRESS NOTES
Subjective:       Patient ID: Tiffany Cruz is a 43 y.o. female who presents today for a routine clinic visit for MS.  The history has been provided by the patient. She was last seen by Dr. Tompkins in November 2018.     MS HPI:  · DMT: Aubagio; she states that she is more adherent to this medication than she ever has been to another DMT.   · Side effects from DMT? Yes - Hair loss   · Taking vitamin D3 as recommended? Yes -  Dose: 10,000 units about once a month  She did not do her MRI after last visit.   She continues to have some issues with hair loss.   She has been taking apple cider vinegar for high blood pressure.   She denies any other new or different symptoms.   She saw Dr. Christensen and had EMG and was diagnosed with bilateral carpal tunnel. She is using wrist splints at night.     SOCIAL HISTORY  Social History     Tobacco Use    Smoking status: Never Smoker    Smokeless tobacco: Never Used   Substance Use Topics    Alcohol use: No     Alcohol/week: 0.0 oz    Drug use: No     Living arrangements - the patient lives with their family.  Employment:      MS ROS:  · Fatigue: Yes -Some days are better than others  · Sleep Disturbance: Yes - sleep is overall ok; takes Ambien prn  · Bladder Dysfunction: She has been seeing Dr. Grande. She has not had any recent UTIs. She feels like she is emptying completely.   · Bowel Dysfunction: No; regular for her   · Spasticity: Yes - takes baclofen at night; stable;   · Visual Symptoms: No  · Cognitive: No  · Mood Disorder: No; she denies any depression or anxiety  · Gait Disturbance: No  · Falls: No  · Hand Dysfunction: Yes - as in HPI;  Feels carpal tunnel symptoms more in the morning. Her hands get tired with certain activities. She would like a referral to a hand specialist.   · Pain: Yes--stable   · Sexual Dysfunction: Not active right now   · Skin Breakdown: No  · Tremors: No  · Dysphagia:  No  · Dysarthria:  No  · Heat sensitivity:  No. She feels tired  when overheated.   · Any un-met adaptive needs? No  · Copay Assist?  Yes - $0  · Clinical Trial candidate? No         Objective:        1. 25 foot timed walk: 3.6 seconds today without assist; was 3.9 seconds at last visit without assist   Neurologic Exam     Mental Status   Oriented to person, place, and time.   Attention: normal. Concentration: normal.   Speech: speech is normal   Level of consciousness: alert  Knowledge: good.   Normal comprehension.     Cranial Nerves     CN III, IV, VI   Pupils are equal, round, and reactive to light.  Extraocular motions are normal.   Right pupil: Shape: regular. Reactivity: brisk.   Left pupil: Shape: regular. Reactivity: brisk.   Nystagmus: none     CN V   Right facial sensation deficit: none  Left facial sensation deficit: none    CN VII   Right facial weakness: none  Left facial weakness: none    CN VIII   Hearing: intact    CN IX, X   Palate: symmetric    CN XI   Right sternocleidomastoid strength: normal  Left sternocleidomastoid strength: normal  Right trapezius strength: normal  Left trapezius strength: normal    CN XII   Tongue deviation: none    Motor Exam   Muscle bulk: normal  Overall muscle tone: normal  Right arm tone: normal  Left arm tone: normal  Right leg tone: normal  Left leg tone: normal    Strength   Right neck flexion: 5/5  Left neck flexion: 5/5  Right neck extension: 5/5  Left neck extension: 5/5  Right deltoid: 5/5  Left deltoid: 5/5  Right biceps: 5/5  Left biceps: 5/5  Right triceps: 5/5  Left triceps: 5/5  Right wrist flexion: 5/5  Left wrist flexion: 5/5  Right wrist extension: 5/5  Left wrist extension: 5/5  Right interossei: 5/5  Left interossei: 5/5  Right iliopsoas: 5/5  Left iliopsoas: 5/5  Right quadriceps: 5/5  Left quadriceps: 5/5  Right hamstrin/5  Left hamstrin/5  Right anterior tibial: 5/5  Left anterior tibial: 5/5  Right gastroc: 5/5  Left gastroc: 5/5    Sensory Exam   Right arm vibration: normal  Left arm vibration:  normal  Right leg vibration: normal  Left leg vibration: normal    Gait, Coordination, and Reflexes     Gait  Gait: normal    Coordination   Romberg: positive (mild sway)  Finger to nose coordination: normal  Tandem walking coordination: normal    Tremor   Resting tremor: absent    Reflexes   Right brachioradialis: 1+  Left brachioradialis: 1+  Right biceps: 1+  Left biceps: 1+  Right triceps: 1+  Left triceps: 1+  Right patellar: 0  Left patellar: 1+  Right achilles: 1+  Left achilles: 1+  Right plantar: equivocal  Left plantar: equivocal    Normal rapid sequential movements in upper and lower extremities.            Imaging:     No new imaging to review.     Labs:     Lab Results   Component Value Date    WBC 8.67 11/18/2018    RBC 5.47 (H) 11/18/2018    HGB 11.8 (L) 11/18/2018    HCT 37.1 11/18/2018    MCV 68 (L) 11/18/2018    MCH 21.6 (L) 11/18/2018    MCHC 31.8 (L) 11/18/2018    RDW 13.7 11/18/2018     (H) 11/18/2018    MPV 11.1 11/18/2018    GRAN 4.0 11/18/2018    GRAN 46.2 11/18/2018    LYMPH 3.4 11/18/2018    LYMPH 39.7 11/18/2018    MONO 0.9 11/18/2018    MONO 10.5 11/18/2018    EOS 0.3 11/18/2018    BASO 0.08 11/18/2018    EOSINOPHIL 2.9 11/18/2018    BASOPHIL 0.9 11/18/2018     Sodium   Date Value Ref Range Status   11/18/2018 141 136 - 145 mmol/L Final     Potassium   Date Value Ref Range Status   11/18/2018 3.6 3.5 - 5.1 mmol/L Final     Chloride   Date Value Ref Range Status   11/18/2018 107 95 - 110 mmol/L Final     CO2   Date Value Ref Range Status   11/18/2018 25 23 - 29 mmol/L Final     Glucose   Date Value Ref Range Status   11/18/2018 82 70 - 110 mg/dL Final     BUN, Bld   Date Value Ref Range Status   11/18/2018 16 6 - 20 mg/dL Final     Creatinine   Date Value Ref Range Status   11/18/2018 0.8 0.5 - 1.4 mg/dL Final     Calcium   Date Value Ref Range Status   11/18/2018 9.2 8.7 - 10.5 mg/dL Final     Total Protein   Date Value Ref Range Status   11/18/2018 7.4 6.0 - 8.4 g/dL Final    11/18/2018 7.4 6.0 - 8.4 g/dL Final     Albumin   Date Value Ref Range Status   11/18/2018 3.9 3.5 - 5.2 g/dL Final   11/18/2018 3.9 3.5 - 5.2 g/dL Final     Total Bilirubin   Date Value Ref Range Status   11/18/2018 0.1 0.1 - 1.0 mg/dL Final     Comment:     For infants and newborns, interpretation of results should be based  on gestational age, weight and in agreement with clinical  observations.  Premature Infant recommended reference ranges:  Up to 24 hours.............<8.0 mg/dL  Up to 48 hours............<12.0 mg/dL  3-5 days..................<15.0 mg/dL  6-29 days.................<15.0 mg/dL     11/18/2018 0.1 0.1 - 1.0 mg/dL Final     Comment:     For infants and newborns, interpretation of results should be based  on gestational age, weight and in agreement with clinical  observations.  Premature Infant recommended reference ranges:  Up to 24 hours.............<8.0 mg/dL  Up to 48 hours............<12.0 mg/dL  3-5 days..................<15.0 mg/dL  6-29 days.................<15.0 mg/dL       Alkaline Phosphatase   Date Value Ref Range Status   11/18/2018 69 55 - 135 U/L Final   11/18/2018 69 55 - 135 U/L Final     AST   Date Value Ref Range Status   11/18/2018 22 10 - 40 U/L Final   11/18/2018 22 10 - 40 U/L Final     ALT   Date Value Ref Range Status   11/18/2018 33 10 - 44 U/L Final   11/18/2018 33 10 - 44 U/L Final     Anion Gap   Date Value Ref Range Status   11/18/2018 9 8 - 16 mmol/L Final     eGFR if    Date Value Ref Range Status   11/18/2018 >60 >60 mL/min/1.73 m^2 Final     eGFR if non    Date Value Ref Range Status   11/18/2018 >60 >60 mL/min/1.73 m^2 Final     Comment:     Calculation used to obtain the estimated glomerular filtration  rate (eGFR) is the CKD-EPI equation.            Diagnosis/Assessment/Plan:    1. Multiple Sclerosis  · Assessment: Ms. Cruz is clinically stable on Aubagio.   · Imaging: Will prioritize brain MRI that was ordered at last  visit.   · Disease Modifying Therapies: Continue Aubagio and Vitamin D. Will check LFT, CBC, and Vitamin D today.     2. MS Symptom Assessment / Management  · Spasticity: Continue Baclofen and Tizanidine.   · Hand Dysfunction: Referral entered for Dr. Marmolejo for carpal tunnel       Over 50% of this 30 minute visit was spent in direct face to face counseling of the patient about MS, DMT considerations, and MS symptom management. The patient agrees with the plan of care. I will see her back in 3-4 months.    Roseanna Riddle, [facility]-BC, MSCN        There are no diagnoses linked to this encounter.

## 2019-02-21 RX ORDER — TERIFLUNOMIDE 14 MG/1
1 TABLET, FILM COATED ORAL DAILY
Qty: 28 TABLET | Refills: 2 | Status: SHIPPED | OUTPATIENT
Start: 2019-02-21 | End: 2019-09-13

## 2019-02-22 DIAGNOSIS — M79.642 BILATERAL HAND PAIN: Primary | ICD-10-CM

## 2019-02-22 DIAGNOSIS — M79.641 BILATERAL HAND PAIN: Primary | ICD-10-CM

## 2019-02-28 ENCOUNTER — OFFICE VISIT (OUTPATIENT)
Dept: ORTHOPEDICS | Facility: CLINIC | Age: 44
End: 2019-02-28
Payer: OTHER GOVERNMENT

## 2019-02-28 ENCOUNTER — HOSPITAL ENCOUNTER (OUTPATIENT)
Dept: RADIOLOGY | Facility: OTHER | Age: 44
Discharge: HOME | End: 2019-02-28
Attending: ORTHOPAEDIC SURGERY
Payer: OTHER GOVERNMENT

## 2019-02-28 VITALS
BODY MASS INDEX: 29.88 KG/M2 | HEIGHT: 64 IN | HEART RATE: 80 BPM | SYSTOLIC BLOOD PRESSURE: 145 MMHG | DIASTOLIC BLOOD PRESSURE: 93 MMHG | WEIGHT: 175 LBS

## 2019-02-28 DIAGNOSIS — M79.642 BILATERAL HAND PAIN: ICD-10-CM

## 2019-02-28 DIAGNOSIS — M79.641 BILATERAL HAND PAIN: ICD-10-CM

## 2019-02-28 DIAGNOSIS — G56.00 CARPAL TUNNEL SYNDROME, UNSPECIFIED LATERALITY: Primary | ICD-10-CM

## 2019-02-28 PROCEDURE — 73130 X-RAY EXAM OF HAND: CPT | Mod: 50,TC,FY

## 2019-02-28 PROCEDURE — 99999 PR PBB SHADOW E&M-EST. PATIENT-LVL III: ICD-10-PCS | Mod: PBBFAC,,, | Performed by: ORTHOPAEDIC SURGERY

## 2019-02-28 PROCEDURE — 73130 XR HAND COMPLETE 3 VIEWS BILATERAL: ICD-10-PCS | Mod: 26,50,, | Performed by: RADIOLOGY

## 2019-02-28 PROCEDURE — 99999 PR PBB SHADOW E&M-EST. PATIENT-LVL III: CPT | Mod: PBBFAC,,, | Performed by: ORTHOPAEDIC SURGERY

## 2019-02-28 PROCEDURE — 20526 PR INJECT CARPAL TUNNEL: ICD-10-PCS | Mod: S$PBB,RT,, | Performed by: ORTHOPAEDIC SURGERY

## 2019-02-28 PROCEDURE — 76942 ECHO GUIDE FOR BIOPSY: CPT | Mod: PBBFAC | Performed by: ORTHOPAEDIC SURGERY

## 2019-02-28 PROCEDURE — 76942 PR U/S GUIDANCE FOR NEEDLE GUIDANCE: ICD-10-PCS | Mod: 26,S$PBB,, | Performed by: ORTHOPAEDIC SURGERY

## 2019-02-28 PROCEDURE — 99204 PR OFFICE/OUTPT VISIT, NEW, LEVL IV, 45-59 MIN: ICD-10-PCS | Mod: 25,S$PBB,, | Performed by: ORTHOPAEDIC SURGERY

## 2019-02-28 PROCEDURE — 20526 THER INJECTION CARP TUNNEL: CPT | Mod: S$PBB,RT,, | Performed by: ORTHOPAEDIC SURGERY

## 2019-02-28 PROCEDURE — 99213 OFFICE O/P EST LOW 20 MIN: CPT | Mod: PBBFAC,25 | Performed by: ORTHOPAEDIC SURGERY

## 2019-02-28 PROCEDURE — 76942 ECHO GUIDE FOR BIOPSY: CPT | Mod: 26,S$PBB,, | Performed by: ORTHOPAEDIC SURGERY

## 2019-02-28 PROCEDURE — 99204 OFFICE O/P NEW MOD 45 MIN: CPT | Mod: 25,S$PBB,, | Performed by: ORTHOPAEDIC SURGERY

## 2019-02-28 PROCEDURE — 73130 X-RAY EXAM OF HAND: CPT | Mod: 26,50,, | Performed by: RADIOLOGY
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
## 2019-03-01 ENCOUNTER — PATIENT MESSAGE (OUTPATIENT)
Dept: ORTHOPEDICS | Facility: CLINIC | Age: 44
End: 2019-03-01

## 2019-03-01 ENCOUNTER — PATIENT MESSAGE (OUTPATIENT)
Dept: NEUROLOGY | Facility: CLINIC | Age: 44
End: 2019-03-01

## 2019-03-01 NOTE — TELEPHONE ENCOUNTER
Called to inform patient that she can call on 3/11/19 [phone] to schedule pre-op appointment, and that I will book her surgery for 3/18/19. Patient verbalized understanding.

## 2019-03-04 RX ORDER — DEXAMETHASONE SODIUM PHOSPHATE 4 MG/ML
4 INJECTION, SOLUTION INTRA-ARTICULAR; INTRALESIONAL; INTRAMUSCULAR; INTRAVENOUS; SOFT TISSUE
Status: DISCONTINUED | OUTPATIENT
Start: 2019-03-28 | End: 2019-03-04 | Stop reason: HOSPADM

## 2019-03-04 NOTE — PROCEDURES
Carpal Tunnel: R radiocarpal  Date/Time: 3/28/2019 9:02 AM  Performed by: Tiffany Marmolejo MD  Authorized by: Tiffany Marmolejo MD     Consent Done?:  Yes (Verbal)  Indications:  Pain  Timeout: Prior to procedure the correct patient, procedure, and site was verified      Location:  Wrist  Site:  R radiocarpal  Prep: Patient was prepped and draped in usual sterile fashion    Ultrasonic Guidance for needle placement: Yes  Images are saved and documented.  Needle size:  25 G  Approach:  Volar  Medications:  4 mg dexamethasone 4 mg/mL

## 2019-03-04 NOTE — PROGRESS NOTES
I have personally taken the history and examined this patient. I agree with the resident's note as stated above. Pt seen and examined. Plan for CT injection  Left CT and cubital tunnel release.  I have explained the risks, benefits, and alternatives of the procedure to the patient in great detail. The patient voices understanding and all questions have been answered. The patient agrees with to proceed as planned. Consents were performed in clinic.  Pt having a lot of pain and wishes to have injection today.

## 2019-03-11 DIAGNOSIS — G56.22 ULNAR NERVE COMPRESSION, LEFT: ICD-10-CM

## 2019-03-11 DIAGNOSIS — G56.02 CARPAL TUNNEL SYNDROME OF LEFT WRIST: Primary | ICD-10-CM

## 2019-03-14 ENCOUNTER — PATIENT MESSAGE (OUTPATIENT)
Dept: ORTHOPEDICS | Facility: CLINIC | Age: 44
End: 2019-03-14

## 2019-03-14 RX ORDER — ACETAMINOPHEN AND CODEINE PHOSPHATE 300; 30 MG/1; MG/1
1 TABLET ORAL EVERY 4 HOURS PRN
Qty: 33 TABLET | Refills: 0 | Status: ON HOLD | OUTPATIENT
Start: 2019-03-14 | End: 2019-03-18 | Stop reason: SDUPTHER

## 2019-03-15 ENCOUNTER — TELEPHONE (OUTPATIENT)
Dept: ORTHOPEDICS | Facility: CLINIC | Age: 44
End: 2019-03-15

## 2019-03-15 RX ORDER — TELMISARTAN AND HYDROCHLORTHIAZIDE 80; 12.5 MG/1; MG/1
1 TABLET ORAL 2 TIMES DAILY
COMMUNITY
End: 2019-09-13 | Stop reason: SDUPTHER

## 2019-03-15 NOTE — TELEPHONE ENCOUNTER
Tiffany Cruz notified of arrival time 0600 for surgery on 3/18/19 with Dr. LILLIAM Marmolejo at [facility]. Post Op appointment made, slip in mail. Reminded of need for a ride home from surgery.

## 2019-03-18 ENCOUNTER — HOSPITAL ENCOUNTER (OUTPATIENT)
Facility: OTHER | Age: 44
Discharge: HOME | End: 2019-03-18
Attending: ORTHOPAEDIC SURGERY | Admitting: ORTHOPAEDIC SURGERY
Payer: OTHER GOVERNMENT

## 2019-03-18 ENCOUNTER — ANESTHESIA EVENT (OUTPATIENT)
Dept: SURGERY | Facility: OTHER | Age: 44
End: 2019-03-18
Payer: OTHER GOVERNMENT

## 2019-03-18 ENCOUNTER — ANESTHESIA (OUTPATIENT)
Dept: SURGERY | Facility: OTHER | Age: 44
End: 2019-03-18
Payer: OTHER GOVERNMENT

## 2019-03-18 VITALS
OXYGEN SATURATION: 100 % | WEIGHT: 172 LBS | TEMPERATURE: 98 F | RESPIRATION RATE: 16 BRPM | SYSTOLIC BLOOD PRESSURE: 142 MMHG | HEIGHT: 64 IN | HEART RATE: 80 BPM | BODY MASS INDEX: 29.37 KG/M2 | DIASTOLIC BLOOD PRESSURE: 73 MMHG

## 2019-03-18 DIAGNOSIS — G56.00 CARPAL TUNNEL SYNDROME, UNSPECIFIED LATERALITY: Primary | ICD-10-CM

## 2019-03-18 DIAGNOSIS — G56.02 CARPAL TUNNEL SYNDROME ON LEFT: ICD-10-CM

## 2019-03-18 DIAGNOSIS — G56.21 LESION OF RIGHT ULNAR NERVE: ICD-10-CM

## 2019-03-18 PROCEDURE — 64721 CARPAL TUNNEL SURGERY: CPT | Mod: 51,LT,, | Performed by: ORTHOPAEDIC SURGERY

## 2019-03-18 PROCEDURE — 01710 ANES PX NRV MUSC UPR A&E NOS: CPT | Performed by: ORTHOPAEDIC SURGERY

## 2019-03-18 PROCEDURE — 63600175 PHARM REV CODE 636 W HCPCS: Performed by: STUDENT IN AN ORGANIZED HEALTH CARE EDUCATION/TRAINING PROGRAM

## 2019-03-18 PROCEDURE — 25000003 PHARM REV CODE 250: Performed by: STUDENT IN AN ORGANIZED HEALTH CARE EDUCATION/TRAINING PROGRAM

## 2019-03-18 PROCEDURE — 71000039 HC RECOVERY, EACH ADD'L HOUR: Performed by: ORTHOPAEDIC SURGERY

## 2019-03-18 PROCEDURE — 64721 PR REVISE MEDIAN N/CARPAL TUNNEL SURG: ICD-10-PCS | Mod: 51,LT,, | Performed by: ORTHOPAEDIC SURGERY

## 2019-03-18 PROCEDURE — 25000003 PHARM REV CODE 250: Performed by: ANESTHESIOLOGY

## 2019-03-18 PROCEDURE — 36000707: Performed by: ORTHOPAEDIC SURGERY

## 2019-03-18 PROCEDURE — 64718 PR REVISE ULNAR NERVE AT ELBOW: ICD-10-PCS | Mod: LT,,, | Performed by: ORTHOPAEDIC SURGERY

## 2019-03-18 PROCEDURE — 25000003 PHARM REV CODE 250: Performed by: ORTHOPAEDIC SURGERY

## 2019-03-18 PROCEDURE — 36000706: Performed by: ORTHOPAEDIC SURGERY

## 2019-03-18 PROCEDURE — 71000016 HC POSTOP RECOV ADDL HR: Performed by: ORTHOPAEDIC SURGERY

## 2019-03-18 PROCEDURE — 71000033 HC RECOVERY, INTIAL HOUR: Performed by: ORTHOPAEDIC SURGERY

## 2019-03-18 PROCEDURE — 25000003 PHARM REV CODE 250: Performed by: NURSE ANESTHETIST, CERTIFIED REGISTERED

## 2019-03-18 PROCEDURE — 71000015 HC POSTOP RECOV 1ST HR: Performed by: ORTHOPAEDIC SURGERY

## 2019-03-18 PROCEDURE — 37000008 HC ANESTHESIA 1ST 15 MINUTES: Performed by: ORTHOPAEDIC SURGERY

## 2019-03-18 PROCEDURE — 63600175 PHARM REV CODE 636 W HCPCS: Performed by: NURSE ANESTHETIST, CERTIFIED REGISTERED

## 2019-03-18 PROCEDURE — 37000009 HC ANESTHESIA EA ADD 15 MINS: Performed by: ORTHOPAEDIC SURGERY

## 2019-03-18 PROCEDURE — 64718 REVISE ULNAR NERVE AT ELBOW: CPT | Mod: LT,,, | Performed by: ORTHOPAEDIC SURGERY

## 2019-03-18 PROCEDURE — 63600175 PHARM REV CODE 636 W HCPCS: Performed by: ANESTHESIOLOGY

## 2019-03-18 DEVICE — MATRIX AMNIOCLARIX FLO 50MG: Type: IMPLANTABLE DEVICE | Site: ARM | Status: FUNCTIONAL

## 2019-03-18 RX ORDER — MUPIROCIN 20 MG/G
1 OINTMENT TOPICAL 2 TIMES DAILY
Status: CANCELLED | OUTPATIENT
Start: 2019-03-18 | End: 2019-03-23

## 2019-03-18 RX ORDER — ONDANSETRON 2 MG/ML
4 INJECTION INTRAMUSCULAR; INTRAVENOUS DAILY PRN
Status: DISCONTINUED | OUTPATIENT
Start: 2019-03-18 | End: 2019-03-18 | Stop reason: HOSPADM

## 2019-03-18 RX ORDER — CEFAZOLIN SODIUM 1 G/3ML
2 INJECTION, POWDER, FOR SOLUTION INTRAMUSCULAR; INTRAVENOUS
Status: COMPLETED | OUTPATIENT
Start: 2019-03-18 | End: 2019-03-18

## 2019-03-18 RX ORDER — SODIUM CHLORIDE 0.9 % (FLUSH) 0.9 %
3 SYRINGE (ML) INJECTION
Status: DISCONTINUED | OUTPATIENT
Start: 2019-03-18 | End: 2019-03-18 | Stop reason: HOSPADM

## 2019-03-18 RX ORDER — OXYCODONE HYDROCHLORIDE 5 MG/1
5 TABLET ORAL
Status: DISCONTINUED | OUTPATIENT
Start: 2019-03-18 | End: 2019-03-18 | Stop reason: HOSPADM

## 2019-03-18 RX ORDER — PHENYLEPHRINE HYDROCHLORIDE 10 MG/ML
INJECTION INTRAVENOUS
Status: DISCONTINUED | OUTPATIENT
Start: 2019-03-18 | End: 2019-03-18

## 2019-03-18 RX ORDER — SODIUM CHLORIDE 9 MG/ML
INJECTION, SOLUTION INTRAVENOUS CONTINUOUS
Status: ACTIVE | OUTPATIENT
Start: 2019-03-18

## 2019-03-18 RX ORDER — ROPIVACAINE HYDROCHLORIDE 5 MG/ML
INJECTION, SOLUTION EPIDURAL; INFILTRATION; PERINEURAL
Status: DISCONTINUED | OUTPATIENT
Start: 2019-03-18 | End: 2019-03-18

## 2019-03-18 RX ORDER — PROMETHAZINE HYDROCHLORIDE 12.5 MG/1
12.5 TABLET ORAL EVERY 6 HOURS PRN
Qty: 60 TABLET | Refills: 0 | Status: SHIPPED | OUTPATIENT
Start: 2019-03-18 | End: 2019-04-25

## 2019-03-18 RX ORDER — MIDAZOLAM HYDROCHLORIDE 5 MG/ML
INJECTION INTRAMUSCULAR; INTRAVENOUS
Status: DISCONTINUED | OUTPATIENT
Start: 2019-03-18 | End: 2019-03-18

## 2019-03-18 RX ORDER — ACETAMINOPHEN AND CODEINE PHOSPHATE 300; 30 MG/1; MG/1
1 TABLET ORAL EVERY 4 HOURS PRN
Qty: 33 TABLET | Refills: 0 | Status: SHIPPED | OUTPATIENT
Start: 2019-03-18 | End: 2019-04-25

## 2019-03-18 RX ORDER — MUPIROCIN 20 MG/G
OINTMENT TOPICAL
Status: DISPENSED | OUTPATIENT
Start: 2019-03-18

## 2019-03-18 RX ORDER — SODIUM CHLORIDE 0.9 % (FLUSH) 0.9 %
5 SYRINGE (ML) INJECTION
Status: DISCONTINUED | OUTPATIENT
Start: 2019-03-18 | End: 2019-03-18 | Stop reason: HOSPADM

## 2019-03-18 RX ORDER — ONDANSETRON HYDROCHLORIDE 2 MG/ML
INJECTION, SOLUTION INTRAMUSCULAR; INTRAVENOUS
Status: DISCONTINUED | OUTPATIENT
Start: 2019-03-18 | End: 2019-03-18

## 2019-03-18 RX ORDER — MIDAZOLAM HYDROCHLORIDE 1 MG/ML
2 INJECTION INTRAMUSCULAR; INTRAVENOUS
Status: CANCELLED | OUTPATIENT
Start: 2019-03-18 | End: 2019-03-18

## 2019-03-18 RX ORDER — HYDROCODONE BITARTRATE AND ACETAMINOPHEN 5; 325 MG/1; MG/1
1 TABLET ORAL EVERY 4 HOURS PRN
Status: CANCELLED | OUTPATIENT
Start: 2019-03-18

## 2019-03-18 RX ORDER — FENTANYL CITRATE 50 UG/ML
25 INJECTION, SOLUTION INTRAMUSCULAR; INTRAVENOUS EVERY 5 MIN PRN
Status: COMPLETED | OUTPATIENT
Start: 2019-03-18 | End: 2019-03-18

## 2019-03-18 RX ORDER — DEXAMETHASONE SODIUM PHOSPHATE 4 MG/ML
INJECTION, SOLUTION INTRA-ARTICULAR; INTRALESIONAL; INTRAMUSCULAR; INTRAVENOUS; SOFT TISSUE
Status: DISCONTINUED | OUTPATIENT
Start: 2019-03-18 | End: 2019-03-18

## 2019-03-18 RX ORDER — FENTANYL CITRATE 50 UG/ML
INJECTION, SOLUTION INTRAMUSCULAR; INTRAVENOUS
Status: DISCONTINUED | OUTPATIENT
Start: 2019-03-18 | End: 2019-03-18

## 2019-03-18 RX ORDER — MEPERIDINE HYDROCHLORIDE 25 MG/ML
12.5 INJECTION INTRAMUSCULAR; INTRAVENOUS; SUBCUTANEOUS ONCE AS NEEDED
Status: DISCONTINUED | OUTPATIENT
Start: 2019-03-18 | End: 2019-03-18 | Stop reason: HOSPADM

## 2019-03-18 RX ORDER — KETOROLAC TROMETHAMINE 30 MG/ML
30 INJECTION, SOLUTION INTRAMUSCULAR; INTRAVENOUS ONCE
Status: COMPLETED | OUTPATIENT
Start: 2019-03-18 | End: 2019-03-18

## 2019-03-18 RX ORDER — LIDOCAINE HCL/PF 100 MG/5ML
SYRINGE (ML) INTRAVENOUS
Status: DISCONTINUED | OUTPATIENT
Start: 2019-03-18 | End: 2019-03-18

## 2019-03-18 RX ORDER — SODIUM CHLORIDE, SODIUM LACTATE, POTASSIUM CHLORIDE, CALCIUM CHLORIDE 600; 310; 30; 20 MG/100ML; MG/100ML; MG/100ML; MG/100ML
INJECTION, SOLUTION INTRAVENOUS CONTINUOUS PRN
Status: DISCONTINUED | OUTPATIENT
Start: 2019-03-18 | End: 2019-03-18

## 2019-03-18 RX ORDER — HYDROMORPHONE HYDROCHLORIDE 2 MG/ML
0.4 INJECTION, SOLUTION INTRAMUSCULAR; INTRAVENOUS; SUBCUTANEOUS EVERY 5 MIN PRN
Status: DISCONTINUED | OUTPATIENT
Start: 2019-03-18 | End: 2019-03-18 | Stop reason: HOSPADM

## 2019-03-18 RX ORDER — PROPOFOL 10 MG/ML
VIAL (ML) INTRAVENOUS
Status: DISCONTINUED | OUTPATIENT
Start: 2019-03-18 | End: 2019-03-18

## 2019-03-18 RX ORDER — ACETAMINOPHEN 10 MG/ML
INJECTION, SOLUTION INTRAVENOUS
Status: DISCONTINUED | OUTPATIENT
Start: 2019-03-18 | End: 2019-03-18

## 2019-03-18 RX ORDER — ONDANSETRON 8 MG/1
8 TABLET, ORALLY DISINTEGRATING ORAL EVERY 8 HOURS PRN
Status: CANCELLED | OUTPATIENT
Start: 2019-03-18

## 2019-03-18 RX ORDER — FENTANYL CITRATE 50 UG/ML
100 INJECTION, SOLUTION INTRAMUSCULAR; INTRAVENOUS EVERY 5 MIN PRN
Status: DISCONTINUED | OUTPATIENT
Start: 2019-03-18 | End: 2019-03-18 | Stop reason: HOSPADM

## 2019-03-18 RX ORDER — BUPIVACAINE HYDROCHLORIDE 2.5 MG/ML
INJECTION, SOLUTION EPIDURAL; INFILTRATION; INTRACAUDAL
Status: DISCONTINUED | OUTPATIENT
Start: 2019-03-18 | End: 2019-03-18 | Stop reason: HOSPADM

## 2019-03-18 RX ADMIN — SODIUM CHLORIDE, SODIUM LACTATE, POTASSIUM CHLORIDE, AND CALCIUM CHLORIDE: 600; 310; 30; 20 INJECTION, SOLUTION INTRAVENOUS at 07:03

## 2019-03-18 RX ADMIN — ONDANSETRON 4 MG: 2 INJECTION INTRAMUSCULAR; INTRAVENOUS at 09:03

## 2019-03-18 RX ADMIN — FENTANYL CITRATE 50 MCG: 50 INJECTION, SOLUTION INTRAMUSCULAR; INTRAVENOUS at 08:03

## 2019-03-18 RX ADMIN — CEFAZOLIN 2 G: 330 INJECTION, POWDER, FOR SOLUTION INTRAMUSCULAR; INTRAVENOUS at 07:03

## 2019-03-18 RX ADMIN — PROPOFOL 200 MG: 10 INJECTION, EMULSION INTRAVENOUS at 07:03

## 2019-03-18 RX ADMIN — Medication 10 MG: at 08:03

## 2019-03-18 RX ADMIN — PROMETHAZINE HYDROCHLORIDE 6.25 MG: 25 INJECTION INTRAMUSCULAR; INTRAVENOUS at 09:03

## 2019-03-18 RX ADMIN — ONDANSETRON 4 MG: 2 INJECTION, SOLUTION INTRAMUSCULAR; INTRAVENOUS at 07:03

## 2019-03-18 RX ADMIN — FENTANYL CITRATE 25 MCG: 50 INJECTION, SOLUTION INTRAMUSCULAR; INTRAVENOUS at 09:03

## 2019-03-18 RX ADMIN — MIDAZOLAM 2 MG: 5 INJECTION INTRAMUSCULAR; INTRAVENOUS at 07:03

## 2019-03-18 RX ADMIN — MUPIROCIN: 20 OINTMENT TOPICAL at 07:03

## 2019-03-18 RX ADMIN — FENTANYL CITRATE 100 MCG: 50 INJECTION, SOLUTION INTRAMUSCULAR; INTRAVENOUS at 07:03

## 2019-03-18 RX ADMIN — DEXAMETHASONE SODIUM PHOSPHATE 8 MG: 4 INJECTION, SOLUTION INTRAMUSCULAR; INTRAVENOUS at 07:03

## 2019-03-18 RX ADMIN — ACETAMINOPHEN 1000 MG: 10 INJECTION, SOLUTION INTRAVENOUS at 08:03

## 2019-03-18 RX ADMIN — KETOROLAC TROMETHAMINE 30 MG: 30 INJECTION, SOLUTION INTRAMUSCULAR; INTRAVENOUS at 09:03

## 2019-03-18 RX ADMIN — HYDROMORPHONE HYDROCHLORIDE 0.4 MG: 2 INJECTION INTRAMUSCULAR; INTRAVENOUS; SUBCUTANEOUS at 09:03

## 2019-03-18 RX ADMIN — PHENYLEPHRINE HYDROCHLORIDE 100 MCG: 10 INJECTION INTRAVENOUS at 08:03

## 2019-03-18 RX ADMIN — OXYCODONE HYDROCHLORIDE 5 MG: 5 TABLET ORAL at 09:03

## 2019-03-18 RX ADMIN — ROPIVACAINE HYDROCHLORIDE 30 ML: 5 INJECTION, SOLUTION EPIDURAL; INFILTRATION; PERINEURAL at 10:03

## 2019-03-18 RX ADMIN — LIDOCAINE HYDROCHLORIDE 60 MG: 20 INJECTION, SOLUTION INTRAVENOUS at 07:03

## 2019-03-18 NOTE — ANESTHESIA POSTPROCEDURE EVALUATION
"Anesthesia Post Evaluation    Patient: Tiffany Cruz    Procedure(s) Performed: Procedure(s) (LRB):  RELEASE, CARPAL TUNNEL left (Left)  DECOMPRESSION, NERVE, ULNAR left elbow (Left)    Final Anesthesia Type: general  Patient location during evaluation: PACU  Patient participation: Yes- Able to Participate  Level of consciousness: awake and alert  Post-procedure vital signs: reviewed and stable  Pain management: adequate  Airway patency: patent  PONV status at discharge: No PONV  Anesthetic complications: no      Cardiovascular status: hemodynamically stable  Respiratory status: unassisted  Hydration status: euvolemic  Follow-up not needed.        Visit Vitals  /63   Pulse 82   Temp 37.1 °C (98.8 °F) (Oral)   Resp 16   Ht 5' 4" (1.626 m)   Wt 78 kg (172 lb)   LMP 12/05/2016 (Approximate)   SpO2 96%   Breastfeeding? No   BMI 29.52 kg/m²       Pain/Nina Score: Pain Rating Prior to Med Admin: 5 (3/18/2019  9:54 AM)  Pain Rating Post Med Admin: 5 (3/18/2019  9:50 AM)  Nina Score: 10 (3/18/2019  9:50 AM)        "

## 2019-03-18 NOTE — ANESTHESIA PREPROCEDURE EVALUATION
03/18/2019  Tiffany Cruz is a 43 y.o., female.    Anesthesia Evaluation    I have reviewed the Patient Summary Reports.    I have reviewed the Nursing Notes.   I have reviewed the Medications.     Review of Systems  Anesthesia Hx:  No problems with previous Anesthesia    Social:  Non-Smoker    Cardiovascular:   Hypertension, well controlled    Pulmonary:  Pulmonary Normal    Hepatic/GI:  Hepatic/GI Normal    Neurological:   Neuromuscular Disease, Headaches    Endocrine:  Endocrine Normal    Psych:   Psychiatric History          Physical Exam  General:  Well nourished    Airway/Jaw/Neck:  Airway Findings: Mouth Opening: Normal Tongue: Normal  General Airway Assessment: Adult  Mallampati: II  TM Distance: Normal, at least 6 cm  Jaw/Neck Findings:     Neck ROM: Normal ROM      Dental:  Dental Findings: In tact             Anesthesia Plan  Type of Anesthesia, risks & benefits discussed:  Anesthesia Type:  MAC  Patient's Preference:   Intra-op Monitoring Plan:   Intra-op Monitoring Plan Comments:   Post Op Pain Control Plan:   Post Op Pain Control Plan Comments:   Induction:   IV  Beta Blocker:         Informed Consent: Patient understands risks and agrees with Anesthesia plan.  Questions answered. Anesthesia consent signed with patient.  ASA Score: 2     Day of Surgery Review of History & Physical:    H&P update referred to the surgeon.         Ready For Surgery From Anesthesia Perspective.

## 2019-03-18 NOTE — OR NURSING
Requests prescription for Phenergan be called in  To [facility] pharmacy [facility]  Dr. Dhillon's resident notified.  States he will escribe prescription.

## 2019-03-18 NOTE — TRANSFER OF CARE
"Anesthesia Transfer of Care Note    Patient: Tiffany Cruz    Procedure(s) Performed: Procedure(s) (LRB):  RELEASE, CARPAL TUNNEL left (Left)  DECOMPRESSION, NERVE, ULNAR left elbow (Left)    Patient location: PACU    Anesthesia Type: general    Transport from OR: Transported from OR on 2-3 L/min O2 by NC with adequate spontaneous ventilation    Post pain: adequate analgesia    Post assessment: no apparent anesthetic complications and tolerated procedure well    Post vital signs: stable    Level of consciousness: awake and alert    Nausea/Vomiting: no nausea/vomiting    Complications: none    Transfer of care protocol was followed      Last vitals:   Visit Vitals  /78 (BP Location: Right arm, Patient Position: Lying)   Pulse (P) 100   Temp (P) 37.1 °C (98.8 °F) (Oral)   Resp (P) 16   Ht 5' 4" (1.626 m)   Wt 78 kg (172 lb)   LMP 12/05/2016 (Approximate)   SpO2 (P) 100%   Breastfeeding? No   BMI 29.52 kg/m²     "

## 2019-03-18 NOTE — DISCHARGE INSTRUCTIONS
Please keep splint clean dry and intact until follow up  Follow up in  2 weeks  Agresively ice and elevate extremity  Non weight bearing LUE  Pain control per prescription           Discharge Instructions: After Your Surgery  Youve just had surgery. During surgery, you were given medicine called anesthesia to keep you relaxed and free of pain. After surgery, you may have some pain or nausea. This is common. Here are some tips for feeling better and getting well after surgery.     Stay on schedule with your medicine.     Going home  Your healthcare provider will show you how to take care of yourself when you go home. He or she will also answer your questions. Have an adult family member or friend drive you home. For the first 24 hours after your surgery:    · Do not drive or use heavy equipment.  · Do not make important decisions or sign legal papers.  · Do not drink alcohol.  · Have someone stay with you, if needed. He or she can watch for problems and help keep you safe.    Be sure to go to all follow-up visits with your healthcare provider. And rest after your surgery for as long as your healthcare provider tells you to.    Coping with pain  If you have pain after surgery, pain medicine will help you feel better. Take it as told, before pain becomes severe. Also, ask your healthcare provider or pharmacist about other ways to control pain. This might be with heat, ice, or relaxation. And follow any other instructions your surgeon or nurse gives you.    Tips for taking pain medicine  To get the best relief possible, remember these points:    · Pain medicines can upset your stomach. Taking them with a little food may help.  · Most pain relievers taken by mouth need at least 20 to 30 minutes to start to work.  · Taking medicine on a schedule can help you remember to take it. Try to time your medicine so that you can take it before starting an activity. This might be before you get dressed, go for a walk, or sit down  for dinner.  · Constipation is a common side effect of pain medicines. Call your healthcare provider before taking any medicines such as laxatives or stool softeners to help ease constipation. Also ask if you should skip any foods. Drinking lots of fluids and eating foods such as fruits and vegetables that are high in fiber can also help. Remember, do not take laxatives unless your surgeon has prescribed them.  · Drinking alcohol and taking pain medicine can cause dizziness and slow your breathing. It can even be deadly. Do not drink alcohol while taking pain medicine.  · Pain medicine can make you react more slowly to things. Do not drive or run machinery while taking pain medicine.    Your healthcare provider may tell you to take acetaminophen to help ease your pain. Ask him or her how much you are supposed to take each day. Acetaminophen or other pain relievers may interact with your prescription medicines or other over-the-counter (OTC) medicines. Some prescription medicines have acetaminophen and other ingredients. Using both prescription and OTC acetaminophen for pain can cause you to overdose. Read the labels on your OTC medicines with care. This will help you to clearly know the list of ingredients, how much to take, and any warnings. It may also help you not take too much acetaminophen. If you have questions or do not understand the information, ask your pharmacist or healthcare provider to explain it to you before you take the OTC medicine.    Managing nausea  Some people have an upset stomach after surgery. This is often because of anesthesia, pain, or pain medicine, or the stress of surgery. These tips will help you handle nausea and eat healthy foods as you get better. If you were on a special food plan before surgery, ask your healthcare provider if you should follow it while you get better. These tips may help:    · Do not push yourself to eat. Your body will tell you when to eat and how  much.  · Start off with clear liquids and soup. They are easier to digest.  · Next try semi-solid foods, such as mashed potatoes, applesauce, and gelatin, as you feel ready.  · Slowly move to solid foods. Dont eat fatty, rich, or spicy foods at first.  · Do not force yourself to have 3 large meals a day. Instead eat smaller amounts more often.  · Take pain medicines with a small amount of solid food, such as crackers or toast, to avoid nausea.     Call your surgeon if  · You still have pain an hour after taking medicine. The medicine may not be strong enough.  · You feel too sleepy, dizzy, or groggy. The medicine may be too strong.  · You have side effects like nausea, vomiting, or skin changes, such as rash, itching, or hives.       If you have obstructive sleep apnea  You were given anesthesia medicine during surgery to keep you comfortable and free of pain. After surgery, you may have more apnea spells because of this medicine and other medicines you were given. The spells may last longer than usual.   At home:    · Keep using the continuous positive airway pressure (CPAP) device when you sleep. Unless your healthcare provider tells you not to, use it when you sleep, day or night. CPAP is a common device used to treat obstructive sleep apnea.  · Talk with your provider before taking any pain medicine, muscle relaxants, or sedatives. Your provider will tell you about the possible dangers of taking these medicines.    Date Last Reviewed: 12/1/2016  © 8110-0326 [employer]. [address] [place] [address], [place] [address]. All rights reserved. This information is not intended as a substitute for professional medical care. Always follow your healthcare professional's instructions.    PLEASE FOLLOW ANY OTHER INSTRUCTIONS PROVIDED TO YOU BY DR. ELLIOTT!

## 2019-03-18 NOTE — BRIEF OP NOTE
[facility]  Brief Operative Note     SUMMARY     Surgery Date: 3/18/2019     Surgeon(s) and Role:     * Tiffany Marmolejo MD - Primary    Assisting Surgeon: None    Pre-op Diagnosis:  Carpal tunnel syndrome of left wrist [G56.02]  Ulnar nerve compression, left [G56.22]    Post-op Diagnosis:  Post-Op Diagnosis Codes:     * Carpal tunnel syndrome of left wrist [G56.02]     * Ulnar nerve compression, left [G56.22]    Procedure(s) (LRB):  RELEASE, CARPAL TUNNEL left (Left)  DECOMPRESSION, NERVE, ULNAR left elbow (Left)    Anesthesia: General    Description of the findings of the procedure: see op note    Findings/Key Components: see op note    Estimated Blood Loss: * No values recorded between 3/18/2019  8:02 AM and 3/18/2019  8:44 AM *         Specimens:   Specimen (12h ago, onward)    None          Discharge Note    SUMMARY     Admit Date: 3/18/2019    Discharge Date and Time: No discharge date for patient encounter.    Hospital Course (synopsis of major diagnoses, care, treatment, and services provided during the course of the hospital stay): the patient arrived to pre-op area for proper pre-operative management.  Upon completion of pre-operative preparation, the patient was taken back to the operative theatre.  A Left cubital tunnel release and left carpal tunnel release was performed without complication and the patient was transported to the post anesthesia care unit in stable condition. After appropriate recovery from the anaesthetic agents used during the surgery the patient was deemed safe for DC, they were discharged home.           Final Diagnosis: Post-Op Diagnosis Codes:     * Carpal tunnel syndrome of left wrist [G56.02]     * Ulnar nerve compression, left [G56.22]    Disposition: Home or Self Care    Follow Up/Patient Instructions:     Medications:  Reconciled Home Medications:      Medication List      CHANGE how you take these medications    teriflunomide 14 mg Tab  Commonly  known as:  AUBAGIO  Take 1 tablet by mouth once daily.  What changed:  when to take this        CONTINUE taking these medications    acetaminophen-codeine 300-30mg 300-30 mg Tab  Commonly known as:  TYLENOL #3  Take 1 tablet by mouth every 4 (four) hours as needed.     APPLE CIDER VINEGAR ORAL  Take by mouth.     baclofen 10 MG tablet  Commonly known as:  LIORESAL  Take 1/2 to 1 tab by mouth at bedtime.     cholecalciferol (vitamin D3) 50,000 unit capsule  Take 1 capsule (50,000 Units total) by mouth once a week.     fish oil-omega-3 fatty acids 300-1,000 mg capsule  Take 2 g by mouth once daily.     gabapentin 100 MG capsule  Commonly known as:  NEURONTIN  Take 1 capsule (100 mg total) by mouth 3 (three) times daily.     ibuprofen 800 MG tablet  Commonly known as:  ADVIL,MOTRIN  Take 800 mg by mouth as needed for Pain.     metroNIDAZOLE 500 MG tablet  Commonly known as:  FLAGYL  Take 1 tablet (500 mg total) by mouth 2 (two) times daily.     telmisartan-hydrochlorothiazide 80-12.5 mg per tablet  Commonly known as:  MICARDIS HCT  Take 1 tablet by mouth 2 (two) times daily.     tiZANidine 6 mg capsule  Commonly known as:  ZANAFLEX  Take 1 capsule (6 mg total) by mouth 3 (three) times daily.     VICKS DAYQUIL LIQUICAPS ORAL  Take by mouth.     zolpidem 5 MG Tab  Commonly known as:  AMBIEN  Take 1 tablet (5 mg total) by mouth nightly as needed.          Discharge Procedure Orders   [facility] [employer] MEDIUM FOR HOME USE     Diet general     Diet general     Call MD for:  temperature >100.4     Call MD for:  persistent nausea and vomiting     Call MD for:  severe uncontrolled pain     Call MD for:  difficulty breathing, headache or visual disturbances     Call MD for:  redness, tenderness, or signs of infection (pain, swelling, redness, odor or green/yellow discharge around incision site)     Call MD for:  hives     Call MD for:  persistent dizziness or light-headedness     Call MD for:  extreme fatigue     Keep surgical  extremity elevated     No driving, operating heavy equipment or signing legal documents while taking pain medication.     Leave dressing on - Keep it clean, dry, and intact until clinic visit     Call MD for:  temperature >100.4     Call MD for:  persistent nausea and vomiting     Call MD for:  severe uncontrolled pain     Call MD for:  difficulty breathing, headache or visual disturbances     Call MD for:  redness, tenderness, or signs of infection (pain, swelling, redness, odor or green/yellow discharge around incision site)     Call MD for:  hives     Call MD for:  persistent dizziness or light-headedness     Call MD for:  extreme fatigue     Keep surgical extremity elevated     Ice to affected area     Leave dressing on - Keep it clean, dry, and intact until clinic visit

## 2019-03-18 NOTE — PROGRESS NOTES
Pt reports pain is 5 out of 10 on pain scale.   Pt is sleeping/snoring.   Pt states pain 5 is not tolerable.   Notified anesthesia Dr. Lombardi   Pt will be blocked   Pt tolerated well     vital signs within normal range.

## 2019-03-18 NOTE — ANESTHESIA PROCEDURE NOTES
Peripheral Block    Patient location during procedure: pre-op   Block not for primary anesthetic.  Reason for block: at surgeon's request and post-op pain management   Post-op Pain Location: left elbow pain  Start time: 3/18/2019 10:16 AM  Timeout: 3/18/2019 10:15 AM   End time: 3/18/2019 10:20 AM  Staffing  Anesthesiologist: Cordell Lombardi MD  Performed: anesthesiologist   Preanesthetic Checklist  Completed: patient identified, site marked, surgical consent, pre-op evaluation, timeout performed, IV checked, risks and benefits discussed and monitors and equipment checked  Peripheral Block  Patient position: supine  Prep: ChloraPrep  Patient monitoring: heart rate, cardiac monitor, continuous pulse ox, continuous capnometry and frequent blood pressure checks  Block type: supraclavicular  Laterality: left  Injection technique: single shot  Needle  Needle type: Stimuplex   Needle gauge: 21 G  Needle length: 3.5 in  Needle localization: anatomical landmarks and ultrasound guidance   -ultrasound image captured on disc.  Assessment  Injection assessment: negative aspiration, negative parasthesia and local visualized surrounding nerve  Paresthesia pain: none  Heart rate change: no  Slow fractionated injection: yes  Additional Notes  VSS.  [facility] RN monitoring vitals throughout procedure.  Patient tolerated procedure well.

## 2019-03-18 NOTE — PLAN OF CARE
Tiffany Cruz has met all discharge criteria from Phase II. Vital Signs are stable, ambulating  without difficulty.Pain is now under control and tolerable for the pt. Pain score 0 at this time. States nausea has lessened but still present.  Refuses medication, stating  that it will make her too drowsy.  Discharge instructions given, patient verbalized understanding. Discharged from facility via wheelchair in stable condition.

## 2019-03-18 NOTE — BRIEF OP NOTE
[facility]  Surgery Department  Operative Note    SUMMARY     Date of Procedure: 3/18/2019     Procedure: Procedure(s) (LRB):  RELEASE, CARPAL TUNNEL left (Left)  DECOMPRESSION, NERVE, ULNAR left elbow (Left)     Surgeon(s) and Role:     * Tiffany Marmolejo MD - Primary    Assisting Surgeon: None    Pre-Operative Diagnosis: Carpal tunnel syndrome of left wrist [G56.02]  Ulnar nerve compression, left [G56.22]    Post-Operative Diagnosis: Post-Op Diagnosis Codes:     * Carpal tunnel syndrome of left wrist [G56.02]     * Ulnar nerve compression, left [G56.22]    Anesthesia: General    Technical Procedures Used:  1.  Ulnar nerve decompression at the elbow left 2.  Carpal tunnel release left 3.  Splint application left upper extremity    Description of the Findings of the Procedure:  Ms. Thornton 43-year-old female with numbness and tingling of her left upper extremity EMG nerve conduction studies were performed which do show carpal tunnel syndrome on provocative examination she also has ulnar nerve compression at the elbow after failing conservative treatment surgical treatment was indicated risk and benefits were explained to the patient in clinic since before formed in clinic procedure in detail after correct site was marked with the patient patient holding area patient was brought to the operating placed in supine position underwent general anesthesia left upper extremity was prepped and draped in normal sterile fashion. A time-out was called with correct type seizure patient be indicated IV antibiotics were given to the patient preoperatively well-padded sterile tourniquet was placed on the left upper extremity incisions marked out 1.  Posterior to the medial epicondyle the left side 2.  Using Sutherland's cardinal lines in the palm for the carpal tunnel there was exsanguinated and Esmarch tourniquet was insufflated 250 mmHg knots were remained for the remainder of the case was less than 1 hr  our attention was 1st turned to the elbow incision was made careful dissection down to the ulnar nerve was maintained all the while protecting the medial antebrachial cutaneous nerve of the ulnar nerve was identified it was completely decompressed proximal to the cubital tunnel throughout the cubital tunnel distal to the cubital tunnel including the fascia of the FCU the nerve did appear very hyperemic and flattened in appearance of the elbow was then placed through range of motion showed that the nerve did not sublux the areas irrigated copious amounts normal saline Vicryl Monocryl closed the skin clear injectable was placed to decrease scarring our attention was then turned to the carpal tunnel incision was made careful dissection down to the palmar fascia was maintained the palmar fascia was sharply incised transverse ligament was identified and sharply incised the mosquito hemostat was passed on the undersurface of the transverse ligament proximally and distally to free it from the median nerve Metzenbaum scissors were then used to cut the transverse ligament proximally and distally mosquito hemostat was passed once again to confirm a complete release the areas irrigated copious amounts normal saline nylon closed the skin sterile dressing was applied tourniquet was deflated brisk cap refill and seated patient was placed in a well-padded long-arm posterior splint tolerated procedure well was brought to recovery in stable condition    Postop plans patient she is to keep the dressing clean dry and intact nonweightbearing for the left upper extremity 2 weeks postoperative she will come back to clinic we will take the splint down sutures are to be removed at that time we will assess whether or not patient does need formal hand therapy    Significant Surgical Tasks Conducted by the Assistant(s), if Applicable: none    Complications: No    Estimated Blood Loss (EBL): * No values recorded between 3/18/2019  8:02 AM and  3/18/2019  8:35 AM *           Implants:   Implant Name Type Inv. Item Serial No.  Lot No. LRB No. Used   MATRIX AMNIOCLARIX JOSELO 50MG - [id number]  MATRIX AMNIOCLARIX JOSELO 50MG [id number] [employer]  Left 1       Specimens:   Specimen (12h ago, onward)    None                  Condition: Stable    Disposition: PACU - hemodynamically stable.    Attestation: I was present and scrubbed for the entire procedure.    Discharge Note    SUMMARY     Admit Date: 3/18/2019    Discharge Date and Time:  03/18/2019 8:44 AM    Hospital Course (synopsis of major diagnoses, care, treatment, and services provided during the course of the hospital stay): surgery     Final Diagnosis: Post-Op Diagnosis Codes:     * Carpal tunnel syndrome of left wrist [G56.02]     * Ulnar nerve compression, left [G56.22]    Disposition: Home or Self Care    Follow Up/Patient Instructions:     Medications:  Reconciled Home Medications:      Medication List      CHANGE how you take these medications    teriflunomide 14 mg Tab  Commonly known as:  AUBAGIO  Take 1 tablet by mouth once daily.  What changed:  when to take this        CONTINUE taking these medications    acetaminophen-codeine 300-30mg 300-30 mg Tab  Commonly known as:  TYLENOL #3  Take 1 tablet by mouth every 4 (four) hours as needed.     APPLE CIDER VINEGAR ORAL  Take by mouth.     baclofen 10 MG tablet  Commonly known as:  LIORESAL  Take 1/2 to 1 tab by mouth at bedtime.     cholecalciferol (vitamin D3) 50,000 unit capsule  Take 1 capsule (50,000 Units total) by mouth once a week.     fish oil-omega-3 fatty acids 300-1,000 mg capsule  Take 2 g by mouth once daily.     gabapentin 100 MG capsule  Commonly known as:  NEURONTIN  Take 1 capsule (100 mg total) by mouth 3 (three) times daily.     ibuprofen 800 MG tablet  Commonly known as:  ADVIL,MOTRIN  Take 800 mg by mouth as needed for Pain.     metroNIDAZOLE 500 MG tablet  Commonly known as:  FLAGYL  Take 1  tablet (500 mg total) by mouth 2 (two) times daily.     telmisartan-hydrochlorothiazide 80-12.5 mg per tablet  Commonly known as:  MICARDIS HCT  Take 1 tablet by mouth 2 (two) times daily.     tiZANidine 6 mg capsule  Commonly known as:  ZANAFLEX  Take 1 capsule (6 mg total) by mouth 3 (three) times daily.     VICKS DAYQUIL LIQUICAPS ORAL  Take by mouth.     zolpidem 5 MG Tab  Commonly known as:  AMBIEN  Take 1 tablet (5 mg total) by mouth nightly as needed.          Discharge Procedure Orders   [facility]IUM FOR HOME USE     Diet general     Call MD for:  temperature >100.4     Call MD for:  persistent nausea and vomiting     Call MD for:  severe uncontrolled pain     Call MD for:  difficulty breathing, headache or visual disturbances     Call MD for:  redness, tenderness, or signs of infection (pain, swelling, redness, odor or green/yellow discharge around incision site)     Call MD for:  hives     Call MD for:  persistent dizziness or light-headedness     Call MD for:  extreme fatigue     Keep surgical extremity elevated     No driving, operating heavy equipment or signing legal documents while taking pain medication.     Leave dressing on - Keep it clean, dry, and intact until clinic visit

## 2019-03-21 ENCOUNTER — TELEPHONE (OUTPATIENT)
Dept: ORTHOPEDICS | Facility: CLINIC | Age: 44
End: 2019-03-21

## 2019-03-21 ENCOUNTER — OFFICE VISIT (OUTPATIENT)
Dept: ORTHOPEDICS | Facility: CLINIC | Age: 44
End: 2019-03-21
Payer: OTHER GOVERNMENT

## 2019-03-21 VITALS
WEIGHT: 171.94 LBS | SYSTOLIC BLOOD PRESSURE: 130 MMHG | BODY MASS INDEX: 29.35 KG/M2 | DIASTOLIC BLOOD PRESSURE: 91 MMHG | HEART RATE: 89 BPM | HEIGHT: 64 IN

## 2019-03-21 DIAGNOSIS — Z98.890 POST-OPERATIVE STATE: Primary | ICD-10-CM

## 2019-03-21 PROCEDURE — 99213 OFFICE O/P EST LOW 20 MIN: CPT | Mod: PBBFAC | Performed by: PHYSICIAN ASSISTANT

## 2019-03-21 PROCEDURE — 99024 PR POST-OP FOLLOW-UP VISIT: ICD-10-PCS | Mod: ,,, | Performed by: PHYSICIAN ASSISTANT

## 2019-03-21 PROCEDURE — 99999 PR PBB SHADOW E&M-EST. PATIENT-LVL III: CPT | Mod: PBBFAC,,, | Performed by: PHYSICIAN ASSISTANT

## 2019-03-21 PROCEDURE — 99999 PR PBB SHADOW E&M-EST. PATIENT-LVL III: ICD-10-PCS | Mod: PBBFAC,,, | Performed by: PHYSICIAN ASSISTANT

## 2019-03-21 PROCEDURE — 99024 POSTOP FOLLOW-UP VISIT: CPT | Mod: ,,, | Performed by: PHYSICIAN ASSISTANT

## 2019-03-21 NOTE — TELEPHONE ENCOUNTER
Patient states that she is in excruciating pain, and needs to be seen today. Informed patient that pain is expected since she is currently 3 days s/p. Patient would like to discuss concerns today .Patient scheduled for an appointment today. Patient verbalized understanding.

## 2019-03-21 NOTE — PROGRESS NOTES
"Tiffany Cruz is 3 days s/p left carpal tunnel release and ulnar nerve decompression at the elbow by Dr. Marmolejo on 3/18/19. She notes pain in the area of the proximal forearm and elbow. She has been taking Tylenol 3 as needed with some pain relief. She does not feel the splint is too tight.     Exam   Vitals:    03/21/19 1445   BP: (!) 130/91   Pulse: 89   Weight: 78 kg (171 lb 15.3 oz)   Height: 5' 4" (1.626 m)   PainSc:   7     Long arm plaster splint in place. Appears to be well fitting. NVI. Good motion of exposed fingers. Normal capillary refill.     Plans   -options discussed with pt. She does not feel splint needs to be loosened/adjusted. We will keep splint in place and adjust her pain medications. Script for Norco 5-325 #28 given. She may also take Ibuprofen prn pain.   -RTC regularly scheduled post op, call sooner if needed       Marylu Barr PA-C  [facility]   [facility]    "

## 2019-03-28 RX ADMIN — DEXAMETHASONE SODIUM PHOSPHATE 4 MG: 4 INJECTION INTRA-ARTICULAR; INTRALESIONAL; INTRAMUSCULAR; INTRAVENOUS; SOFT TISSUE at 09:03

## 2019-04-01 ENCOUNTER — OFFICE VISIT (OUTPATIENT)
Dept: ORTHOPEDICS | Facility: CLINIC | Age: 44
End: 2019-04-01
Payer: OTHER GOVERNMENT

## 2019-04-01 VITALS
DIASTOLIC BLOOD PRESSURE: 85 MMHG | SYSTOLIC BLOOD PRESSURE: 135 MMHG | WEIGHT: 171 LBS | HEART RATE: 96 BPM | HEIGHT: 64 IN | BODY MASS INDEX: 29.19 KG/M2

## 2019-04-01 DIAGNOSIS — Z47.89 ORTHOPEDIC AFTERCARE: ICD-10-CM

## 2019-04-01 DIAGNOSIS — G56.22 LESION OF LEFT ULNAR NERVE: ICD-10-CM

## 2019-04-01 DIAGNOSIS — G56.02 CARPAL TUNNEL SYNDROME ON LEFT: Primary | ICD-10-CM

## 2019-04-01 PROBLEM — G56.00 CARPAL TUNNEL SYNDROME: Status: RESOLVED | Noted: 2018-12-03 | Resolved: 2019-04-01

## 2019-04-01 PROCEDURE — 99999 PR PBB SHADOW E&M-EST. PATIENT-LVL V: ICD-10-PCS | Mod: PBBFAC,,, | Performed by: PHYSICIAN ASSISTANT

## 2019-04-01 PROCEDURE — 99999 PR PBB SHADOW E&M-EST. PATIENT-LVL V: CPT | Mod: PBBFAC,,, | Performed by: PHYSICIAN ASSISTANT

## 2019-04-01 PROCEDURE — 99215 OFFICE O/P EST HI 40 MIN: CPT | Mod: PBBFAC | Performed by: PHYSICIAN ASSISTANT

## 2019-04-01 PROCEDURE — 99024 PR POST-OP FOLLOW-UP VISIT: ICD-10-PCS | Mod: ,,, | Performed by: PHYSICIAN ASSISTANT

## 2019-04-01 PROCEDURE — 99024 POSTOP FOLLOW-UP VISIT: CPT | Mod: ,,, | Performed by: PHYSICIAN ASSISTANT

## 2019-04-01 NOTE — PROGRESS NOTES
"Ms. Cruz is here today for a post-operative visit.  She is 14 days status post Ulnar nerve decompression at the elbow left and Carpal tunnel release left by Dr. Marmolejo on 3/18/19. She reports that she is doing well.  Pain is mild.  She is not taking pain medication. She has mild intermittent shooting pains in the hand.  She denies fever, chills, and sweats since the time of the surgery.     Physical exam:    Vitals:    04/01/19 1402   BP: 135/85   Pulse: 96   Weight: 77.6 kg (171 lb)   Height: 5' 4" (1.626 m)   PainSc:   2     Vital signs are stable, patient is afebrile.  Patient is well dressed and well groomed, no acute distress.  Alert and oriented to person, place, and time.  Post op dressing taken down.  Incision is clean, dry and intact.  There is no erythema or exudate.  There is no sign of any infection. She is NVI. Sutures and suture tails removed without difficulty.      Assessment: 14 days status post Ulnar nerve decompression at the elbow left and Carpal tunnel release left        Plan:  Tiffany was seen today for post-op evaluation.    Diagnoses and all orders for this visit:    Carpal tunnel syndrome on left    Lesion of left ulnar nerve    Orthopedic aftercare        - PO instruction reviewed and provided to patient  - Gel brace provided  - ACE wrap for comfort  - Orders for OT  - Discussed scar massage  - Follow up in 4 weeks   - Call with questions or concerns    "

## 2019-04-10 ENCOUNTER — TELEPHONE (OUTPATIENT)
Dept: ORTHOPEDICS | Facility: CLINIC | Age: 44
End: 2019-04-10

## 2019-04-10 NOTE — TELEPHONE ENCOUNTER
----- Message from Cassandra Beaver LPN sent at 4/9/2019  6:12 PM CDT -----  Contact: Pt      ----- Message -----  From: Yael Brooks  Sent: 4/9/2019   1:02 PM  To: Fredy Alexander Staff    Name of Who is Calling:BK COLLINS [[id number]]    What is the request in detail: patient would like a call back regarding her incision Please contact to further discuss and advise    Can the clinic reply by [facility]: No    [facility] Back [facility]: [phone]

## 2019-04-14 ENCOUNTER — PATIENT MESSAGE (OUTPATIENT)
Dept: ORTHOPEDICS | Facility: CLINIC | Age: 44
End: 2019-04-14

## 2019-04-16 ENCOUNTER — CLINICAL SUPPORT (OUTPATIENT)
Dept: REHABILITATION | Facility: HOSPITAL | Age: 44
End: 2019-04-16
Payer: OTHER GOVERNMENT

## 2019-04-16 DIAGNOSIS — M25.60 RANGE OF MOTION DEFICIT: ICD-10-CM

## 2019-04-16 DIAGNOSIS — M79.642 HAND PAIN, LEFT: ICD-10-CM

## 2019-04-16 DIAGNOSIS — R29.898 DECREASED GRIP STRENGTH OF LEFT HAND: ICD-10-CM

## 2019-04-16 PROCEDURE — G8984 CARRY CURRENT STATUS: HCPCS | Mod: CL

## 2019-04-16 PROCEDURE — 97110 THERAPEUTIC EXERCISES: CPT

## 2019-04-16 PROCEDURE — G8985 CARRY GOAL STATUS: HCPCS | Mod: CK

## 2019-04-16 PROCEDURE — 97165 OT EVAL LOW COMPLEX 30 MIN: CPT

## 2019-04-16 PROCEDURE — 97018 PARAFFIN BATH THERAPY: CPT | Mod: 59

## 2019-04-16 NOTE — PROGRESS NOTES
[facility] Occupational Therapy  Initial Hand Evaluation     Date: 4/16/2019  Name: Tiffany Cruz  Clinic Number: [id number]    Medical Diagnosis: left CT and ulnar nerve decompression 3/18/2019   Therapy Diagnosis:   Encounter Diagnoses   Name Primary?    Hand pain, left     Decreased  strength of left hand     Range of motion deficit      Physician: Billie Daniel PA; Dr. LILLIAM Vaughan     Physician Orders: status post Ulnar nerve decompression at the elbow left and Carpal tunnel release left  Eval and Treat, modalities as needed  1-2 times/week for 6+ weeks    Surgical Procedure and Date: 3/18/2019  Evaluation Date: 4/16/2019    Plan of Care Certification Period: *6/16/2019  Date of Return to MD: 4/30/2019    Visit # / Visits authorized: 1 / 16  Insurance Authorization Period Expiration: 8/8/2019  FOTO  - perform next visit due to late visit.     Time In:830  Time Out: 915  Total Billable Time: 45 minutes    Precautions:  Standard    Subjective     Involved Side: left   Dominant Side: Right  Date of Onset: 3/18/2019   Mechanism of Injury: Repetitive stress   History of Current Condition: worsening condition resulting in surgery option   Imaging:  See chart   Previous Therapy: none    Past Medical History/Physical Systems Review:   Tiffany Cruz  has a past medical history of Anemia, Essential hypertension, HNP (herniated nucleus pulposus), lumbar, Insomnia, and Migraine headache.    Tiffany Cruz  has a past surgical history that includes none; Cholecystectomy; Hysterectomy; Vaginoscopy (12/18/2018); Cystoscopy (12/18/2018); and Carpal tunnel release (Left, 3/18/2019).    Tiffany has a current medication list which includes the following prescription(s): acetaminophen-codeine 300-30mg, cider vinegar, baclofen, cholecalciferol (vitamin d3), fish oil-omega-3 fatty acids, gabapentin, ibuprofen, metronidazole, promethazine, pseudoeph/dm/guaifen/acetamin,  "telmisartan-hydrochlorothiazide, teriflunomide, tizanidine, and zolpidem, and the following Facility-Administered Medications: sodium chloride 0.9%, alteplase, ferric carboxymaltose, heparin, porcine (pf), mupirocin, sodium chloride 0.9% 100 mL flush bag, and sodium chloride 0.9%.    Review of patient's allergies indicates:  No Known Allergies     Patient's Goals for Therapy: get full use of my hand back     Pain:  Functional Pain Scale Rating 0-10:   4/10 on average  3/10 at best  8/10 at worst  Location: volar wrist/palm   Description: Sharp and stinging "feels dead"   Aggravating Factors: use    Easing Factors: rest    Occupation:  Legal Profession and Teacher   Working presently: employed  Duties: typing, writing, holding things, court, lectures     Functional Limitations/Social History:    Previous functional status includes: Independent with all ADLs.     Current FunctionalStatus   Home/Living environment : lives with their daughter      Limitation of Functional Status as follows:   ADLs/IADLs:     - Feeding: IND     - Bathing/ Hygiene: limited use of left for bathing, hygiene     - Dressing/Grooming: limited use for buttons, zippers, hair care , increased time required in am.     - Driving: limited use for gripping     - writing / typing limited use for typing, writing     - /pinch limited use for pinching, gripping, opening containers, jars, bottles, holding things, carrying things.     - work activities: writing, typing, lifting, holding files      Leisure: "work is my hobby"         Objective     Observation/Appearance:  Skin dry       Sensation:   Slightly diminished median nerve distribution per report     Wound/Scar:   3 cm volar wrist and 9 cm post elbow, both healing well, slightly hypertrophic and hypersensitive. Elbow "aching"     Edema. Measured in centimeters.   MP's 18 cm   PPC 20.5 cm   PWC 16.5 cm     Hand ROM. Measured in degrees.     Wrist ext/flex 20 / 50   RD / UD 5 / 18     Thumb MP " "0/46   Thumb IP 0/30   opp to middle finger ,   Finger ROM WFLs          Manual Muscle Testing   FPL/B 3-/5   EPL/B 3-/5   OP 3-/5   FCR/U 3-/5     Special Tests:  NT         Strength (Dyanmometer) and Pinch Strength (Pinch Gauge)  Measured in pounds and psi. Average of three trials.   4/16/2019 4/16/2019    RIGHT LEFT    Rung II TBA TBA   Key Pinch TBA  TBA    3pt Pinch TBA  TBA    2pt Pinch TBA  TBA          CMS Impairment/Limitation/Restriction for FOTO Initial Survey    Therapist reviewed FOTO scores for Tiffany Cruz on 4/16/2019.   FOTO documents entered into [employer] - see Media section.    Limitation Score: __63_____%  Category: "Carrying"           Treatment     Treatment Time In: 850 AM   Treatment Time Out: 915 AM N  Total Treatment time separate from Evaluation time:25     Tiffany received the following supervised modalities after being cleared for contradictions for 10 minutes:   -Patient received paraffin bath to LEFT  hand(s) for 10 minutes to increase blood flow, circulation, pain management and for tissue elasticity prior to therex.     Tiffany received therapeutic exercises for 15 minutes including:  - Tendon glides including hook, wave, flat and composite fist, ab/ad and digit extension, thumb IP flexion, thumb flexion, opposition , ab/ad and wrist flex, ext, RD, UD x 10 reps, 3-4 x daily.  Instructed and performed scar massage with mini vibrator to decrease adhesions and improve tensile glide.  Reviewed modality use, mederma and scar pad use.  Performed brief debridement of eschar tissue and re-dressed with band aid at patient's request.     Home Exercise Program/Education:  Issued HEP (see patient instructions in EMR) and educated on  use of hot/cold modality use for pain, stiffness and edema management . Exercises were reviewed and Tiffany was able to demonstrate them prior to the end of the session.   Pt received a written copy of exercises to perform at home. Tiffany demonstrated " good  understanding of the education provided.  Pt was advised to perform these exercises with minimal pain/discomfort of 3-4 out of 10 at worse, discontinue if pain worsens.    Patient/Family Education: role of OT, goals for OT, scheduling/cancellations - pt verbalized understanding. Discussed insurance limitations with patient.    Additional Education provided: per above    Assessment     Tiffany Cruz is a 43 y.o. year old referred to outpatient occupational therapy and presents with a medical diagnosis of left CTR and ulnar nerve decompession , resulting in Decreased ROM, Decreased  strength, Decreased pinch strength, Decreased muscle strength, Decreased functional hand use, Increased pain, Edema, Joint Stiffness, Scar Adhesions and Diminished/Impaired Sensation and demonstrates limitations as described in the chart below.   Following medical record review it is determined that pt will benefit from occupational therapy services in order to maximize pain free and/or functional use of left hand/UE   The following goals were discussed with the patient and patient is in agreement with them as to be addressed in the treatment plan. The patient's rehab potential is Good.     Anticipated barriers to occupational therapy: None   Pt has no cultural, educational or language barriers to learning provided.    Profile and History Assessment of Occupational Performance Level of Clinical Decision Making Complexity Score   Occupational Profile:   Tiffany Cruz is a 43 y.o. female who lives with their daughter and is currently employed as legal profession and as a teacher . Tiffany Cruz has difficulty with  bathing, grooming and dressing  driving/transportation management, shopping, phone/computer use and housework/household chores  affecting his/her daily functional abilities. His/her main goal for therapy is regain full use of hand .     Comorbidities:   Cholecystectomy; Hysterectomy;  Vaginoscopy (12/18/2018); Cystoscopy (12/18/2018); and Carpal tunnel release (Left, 3/18/2019).    Medical and Therapy History Review:   Brief               Performance Deficits    Physical:  Joint Mobility  Muscle Power/Strength  Muscle Endurance  Skin Integrity/Scar Formation  Edema   Strength  Pinch Strength  Gross Motor Coordination  Fine Motor Coordination  Proprioception Functions  Pain    Cognitive:  No Deficits    Psychosocial:    Habits  Routines     Clinical Decision Making:  low    Assessment Process:  Problem-Focused Assessments    Modification/Need for Assistance:  Not Necessary    Intervention Selection:  Limited Treatment Options       Low   Based on PMHX, co morbidities , data from assessments and functional level of assistance required with task and clinical presentation directly impacting function.       The following goals were discussed with the patient and patient is in agreement with them as to be addressed in the treatment plan.       Goals:   Short Term Goals (4 weeks)   1)  Patient to be IND with HEP and modalities for pain management  2)  Increase ROM 3-5 degrees to increase functional hand use for ADLs/work/leisure activities  3)   Decrease edema .2-.3 mm to increase joint mobility /flexibility for functional hand use.   4)  Assess  and pinch and set goals accordingly   5)  Patient to score at _30-50____%  or less on FOTO to demonstrate improved perception of functional Left hand  Use.      Long Term Goals (8 weeks)   1)  Increase  strength 2-8 lbs. For holding files, driving.   2)  Increase pinch strength 1-4 psi's for typing, buttons, zippers       Plan   Recommend continued Outpatient Occupataional Therapy  1x week for 8 weeks to include the following interventions Paraffin, Manual therapy/joint mobilizations, Modalities for pain management, US 3 mhz, Therapeutic exercises/activities., Strengthening, Edema Control, Scar Management, Wound Care and Electrical  Modalities.    Within the Certification Period/Plan of care expiration: 4/16/2019 to 6/16/2019    I certify the need for these services furnished under the plan of treatment and while under my care.     ____________________________________________________________________  Physician/Referring Practitioner   Date of Signature   .      Amanda Chacon, OT

## 2019-04-16 NOTE — PATIENT INSTRUCTIONS
"[facility] - OCCUPATIONAL THERAPY  HOME EXERCISE PROGRAM     RUN HAND UNDER HOT WATER OR USE HOT WET COMPRESS FOR 5-10 MIN BEFORE EXERCISES    USE COLD PACK X 10 MIN AT NIGHT FOR PAIN, SWELLING.   Complete the following massages for 5 minutes each, 1-2x/day.                       Complete the following exercises with 10 repetitions each, 3-5x/day.       AROM: Isolated MCP Flexion / Extension ("Wave")   Bend only your large, bottom knuckles. Hold 3 seconds. Keep the tips of your fingers straight. Straighten fingers.    AROM: Isolated IPJ Flexion / Extension ("Hook")  Bend only your middle and end knuckles. Hold 3 seconds.   Straighten your fingers.        AROM: Composite Flexion / Extension ("Full Fist")  Bend every joint in your hand into a fist. Hold 3 seconds. Straighten your fingers.       AROM: Abduction / Adduction  With hand flat on table, spread all fingers apart, then bring them together as close as possible.                                           AROM: Wrist Flexion / Extension               Bend your wrist forward and back as far as possible.            AROM: Wrist Radial / Ulnar Deviation   Make a fist then bend your wrist toward your body, then away.         Copyright © I. All rights reserved.     Therapist: AVERY Christianson CHT           "

## 2019-04-25 ENCOUNTER — OFFICE VISIT (OUTPATIENT)
Dept: NEUROLOGY | Facility: CLINIC | Age: 44
End: 2019-04-25
Payer: OTHER GOVERNMENT

## 2019-04-25 VITALS
HEIGHT: 64 IN | SYSTOLIC BLOOD PRESSURE: 134 MMHG | BODY MASS INDEX: 30.08 KG/M2 | WEIGHT: 176.19 LBS | DIASTOLIC BLOOD PRESSURE: 82 MMHG

## 2019-04-25 DIAGNOSIS — M62.838 MUSCLE SPASM: ICD-10-CM

## 2019-04-25 DIAGNOSIS — M79.2 NEUROPATHIC PAIN: ICD-10-CM

## 2019-04-25 DIAGNOSIS — Z29.89 PROPHYLACTIC IMMUNOTHERAPY: ICD-10-CM

## 2019-04-25 DIAGNOSIS — G35 MS (MULTIPLE SCLEROSIS): Primary | ICD-10-CM

## 2019-04-25 DIAGNOSIS — Z79.899 HIGH RISK MEDICATION USE: ICD-10-CM

## 2019-04-25 DIAGNOSIS — M54.9 BACK PAIN, UNSPECIFIED BACK LOCATION, UNSPECIFIED BACK PAIN LATERALITY, UNSPECIFIED CHRONICITY: ICD-10-CM

## 2019-04-25 DIAGNOSIS — G35 MULTIPLE SCLEROSIS: ICD-10-CM

## 2019-04-25 DIAGNOSIS — Z71.89 COUNSELING REGARDING GOALS OF CARE: ICD-10-CM

## 2019-04-25 PROCEDURE — 99999 PR PBB SHADOW E&M-EST. PATIENT-LVL II: ICD-10-PCS | Mod: PBBFAC,,, | Performed by: CLINICAL NURSE SPECIALIST

## 2019-04-25 PROCEDURE — 99214 PR OFFICE/OUTPT VISIT, EST, LEVL IV, 30-39 MIN: ICD-10-PCS | Mod: S$PBB,,, | Performed by: CLINICAL NURSE SPECIALIST

## 2019-04-25 PROCEDURE — 99999 PR PBB SHADOW E&M-EST. PATIENT-LVL II: CPT | Mod: PBBFAC,,, | Performed by: CLINICAL NURSE SPECIALIST

## 2019-04-25 PROCEDURE — 99214 OFFICE O/P EST MOD 30 MIN: CPT | Mod: S$PBB,,, | Performed by: CLINICAL NURSE SPECIALIST

## 2019-04-25 PROCEDURE — 99212 OFFICE O/P EST SF 10 MIN: CPT | Mod: PBBFAC | Performed by: CLINICAL NURSE SPECIALIST

## 2019-04-25 RX ORDER — BACLOFEN 10 MG/1
TABLET ORAL
Qty: 90 TABLET | Refills: 1 | Status: SHIPPED | OUTPATIENT
Start: 2019-04-25 | End: 2021-08-04 | Stop reason: SDUPTHER

## 2019-04-25 RX ORDER — GABAPENTIN 300 MG/1
300 CAPSULE ORAL NIGHTLY
Qty: 90 CAPSULE | Refills: 1 | Status: SHIPPED | OUTPATIENT
Start: 2019-04-25 | End: 2020-04-24

## 2019-04-25 RX ORDER — DIAZEPAM 5 MG/1
TABLET ORAL
Qty: 2 TABLET | Refills: 0 | Status: SHIPPED | OUTPATIENT
Start: 2019-04-25 | End: 2019-06-13 | Stop reason: ALTCHOICE

## 2019-04-25 NOTE — PROGRESS NOTES
Subjective:       Patient ID: Tiffany Cruz is a 43 y.o. female who presents today for a fit-in clinic visit for MS.  The history has been provided by the patient. She was last seen in February 2019.     MS HPI:  · DMT: Aubagio 14mg daily--She has been consistent with taking this medication.   · Side effects from DMT? Yes - She had hair loss, which seems to have stalled. She has not had any regrowth. She has developed some sores in her mouth since starting Aubagio that always appear in the same spots. These are bothersome.   · Taking vitamin D3 as recommended? Yes -  Dose: 10,000 units daily   · She is interested in increasing gabapentin at night for right-sided nerve pain. She has been taking 300mg at night, which has helped her to sleep, but only has 100mg tablets. She would prefer 300mg tabs. She does not take gabapentin when she takes baclofen/tizanidine.   · She had left carpal tunnel surgery on 3/18. She is still having a lot of pain in the left hand and arm from the surgery, and she is also having weakness and pain on the right hand from carpal tunnel. She does not plan to go through the surgery on the right hand.   · She reports that her tongue feels swollen, and if she talks for 6-7 minutes, her tongue gets heavy, and she feels like she has an accumulation of saliva. Dr. Fleming encouraged her to   · She also has episodes of light-headedness and imbalance that last for 30 seconds to a minute.   · She denies any current infection, but did have a sinus infection over Mardi Gras.   · She has not started any new medications.    · She denies any issues with swallowing. She thinks she may have a mild slur at times when she is speaking.   · She has noticed that her abdomen feels hard at times.     SOCIAL HISTORY  Social History     Tobacco Use    Smoking status: Never Smoker    Smokeless tobacco: Never Used   Substance Use Topics    Alcohol use: No     Alcohol/week: 0.0 oz    Drug use: No     Living  arrangements - the patient lives with their family.  Employment:          Objective:        1. 25 foot timed walk: 3.9 seconds today without assist (wearing heels); was 3.6 seconds at last visit without assist   Neurologic Exam     Mental Status   Oriented to person, place, and time.   Attention: normal. Concentration: normal.   Speech: speech is normal   Level of consciousness: alert  Knowledge: good.   Normal comprehension.     Cranial Nerves     CN III, IV, VI   Pupils are equal, round, and reactive to light.  Extraocular motions are normal.   Right pupil: Shape: regular. Reactivity: brisk.   Left pupil: Shape: regular. Reactivity: brisk.   Nystagmus: none     CN V   Right facial sensation deficit: none  Left facial sensation deficit: none    CN VII   Right facial weakness: none  Left facial weakness: none    CN VIII   Hearing: intact    CN IX, X   Palate: symmetric    CN XI   Right sternocleidomastoid strength: normal  Left sternocleidomastoid strength: normal  Right trapezius strength: normal  Left trapezius strength: normal    CN XII   Tongue deviation: none    Motor Exam   Muscle bulk: normal  Overall muscle tone: normal  Right arm tone: normal  Left arm tone: normal  Right leg tone: normal  Left leg tone: normal    Strength   Right neck flexion: 5/5  Left neck flexion: 5/5  Right neck extension: 5/5  Left neck extension: 5/5  Right deltoid: 5/5  Left deltoid: 5/5  Right biceps: 5/5  Left biceps: 5/5  Right triceps: 5/5  Left triceps: 4/5  Right wrist flexion: 5/5  Right wrist extension: 5/5  Right interossei: 5/5  Left interossei: 4/5  Right iliopsoas: 5/5  Left iliopsoas: 5/5  Right quadriceps: 5/5  Left quadriceps: 5/5  Right hamstrin/5  Left hamstrin/5  Right anterior tibial: 5/5  Left anterior tibial: 5/5  Right gastroc: 5/5  Left gastroc: 5/5Weakness to left arm since carpal tunnel surgery     Sensory Exam   Right arm vibration: normal  Left arm vibration: normal  Right leg vibration:  normal  Left leg vibration: normal    Gait, Coordination, and Reflexes     Gait  Gait: normal    Coordination   Romberg: negative  Finger to nose coordination: normal  Tandem walking coordination: normal    Tremor   Resting tremor: absent    Reflexes   Right brachioradialis: 1+  Left brachioradialis: 1+  Right biceps: 1+  Left biceps: 1+  Right triceps: 1+  Left triceps: 1+  Right patellar: 0  Left patellar: 1+  Right achilles: 1+  Left achilles: 1+  Right plantar: equivocal  Left plantar: equivocal    Normal rapid sequential movements in upper and lower extremities.          Imaging:     Results for orders placed during the hospital encounter of 09/27/17   MRI Brain W WO Contrast    Impression    Numerous supratentorial White matter lesions in keeping with diagnosis of multiple sclerosis with moderate plaque burden.  Single new lesion in the left corona radiata without enhancing or diffusion restricting lesions to suggest active demyelination.       Electronically signed by: RISSA MCGEE  Date:     09/28/17  Time:    11:32      Results for orders placed during the hospital encounter of 09/27/17   MRI Cervical Spine W WO Cont    Impression No cord signal abnormality. Specifically, the T2 hyperintense lesion at the dorsal lateral cord at the level of C2 is no longer present.     Diffusely decreased T1 marrow signal suggesting diffuse marrow replacement process. This can be seen with red marrow reconversion, though this can also be seen with myelodysplastic type processes.      Electronically signed by: RISSA MCGEE  Date:     09/28/17  Time:    11:34      Results for orders placed during the hospital encounter of 11/08/11   MRI Thoracic Spine W WO Cont         Labs:     Lab Results   Component Value Date    YNCFRLKS13CU 31 02/19/2019    NHSVHHOR84JU 33 01/29/2018    AUZKZZHQ74KJ 50 03/18/2016     No results found for: JCVINDEX, JCVANTIBODY  No results found for: JW6BJLTE, ABSOLUTECD3, YZ7BPRQL, ABSOLUTECD8, EO9RQJVJ,  ABSOLUTECD4, LABCD48  Lab Results   Component Value Date    WBC 7.60 02/19/2019    RBC 4.97 02/19/2019    HGB 10.5 (L) 02/19/2019    HCT 34.8 (L) 02/19/2019    MCV 70 (L) 02/19/2019    MCH 21.1 (L) 02/19/2019    MCHC 30.2 (L) 02/19/2019    RDW 14.6 (H) 02/19/2019     02/19/2019    MPV 12.0 02/19/2019    GRAN 3.5 02/19/2019    GRAN 45.7 02/19/2019    LYMPH 3.0 02/19/2019    LYMPH 38.9 02/19/2019    MONO 0.9 02/19/2019    MONO 11.6 02/19/2019    EOS 0.2 02/19/2019    BASO 0.09 02/19/2019    EOSINOPHIL 2.1 02/19/2019    BASOPHIL 1.2 02/19/2019     Sodium   Date Value Ref Range Status   02/19/2019 139 136 - 145 mmol/L Final     Potassium   Date Value Ref Range Status   02/19/2019 3.4 (L) 3.5 - 5.1 mmol/L Final     Chloride   Date Value Ref Range Status   02/19/2019 105 95 - 110 mmol/L Final     CO2   Date Value Ref Range Status   02/19/2019 26 23 - 29 mmol/L Final     Glucose   Date Value Ref Range Status   02/19/2019 83 70 - 110 mg/dL Final     BUN, Bld   Date Value Ref Range Status   02/19/2019 11 6 - 20 mg/dL Final     Creatinine   Date Value Ref Range Status   02/19/2019 0.7 0.5 - 1.4 mg/dL Final     Calcium   Date Value Ref Range Status   02/19/2019 9.3 8.7 - 10.5 mg/dL Final     Total Protein   Date Value Ref Range Status   02/19/2019 7.2 6.0 - 8.4 g/dL Final   02/19/2019 7.2 6.0 - 8.4 g/dL Final     Albumin   Date Value Ref Range Status   02/19/2019 3.7 3.5 - 5.2 g/dL Final   02/19/2019 3.7 3.5 - 5.2 g/dL Final     Total Bilirubin   Date Value Ref Range Status   02/19/2019 0.2 0.1 - 1.0 mg/dL Final     Comment:     For infants and newborns, interpretation of results should be based  on gestational age, weight and in agreement with clinical  observations.  Premature Infant recommended reference ranges:  Up to 24 hours.............<8.0 mg/dL  Up to 48 hours............<12.0 mg/dL  3-5 days..................<15.0 mg/dL  6-29 days.................<15.0 mg/dL     02/19/2019 0.2 0.1 - 1.0 mg/dL Final      Comment:     For infants and newborns, interpretation of results should be based  on gestational age, weight and in agreement with clinical  observations.  Premature Infant recommended reference ranges:  Up to 24 hours.............<8.0 mg/dL  Up to 48 hours............<12.0 mg/dL  3-5 days..................<15.0 mg/dL  6-29 days.................<15.0 mg/dL       Alkaline Phosphatase   Date Value Ref Range Status   02/19/2019 67 55 - 135 U/L Final   02/19/2019 67 55 - 135 U/L Final     AST   Date Value Ref Range Status   02/19/2019 27 10 - 40 U/L Final   02/19/2019 27 10 - 40 U/L Final     ALT   Date Value Ref Range Status   02/19/2019 41 10 - 44 U/L Final   02/19/2019 41 10 - 44 U/L Final     Anion Gap   Date Value Ref Range Status   02/19/2019 8 8 - 16 mmol/L Final     eGFR if    Date Value Ref Range Status   02/19/2019 >60.0 >60 mL/min/1.73 m^2 Final     eGFR if non    Date Value Ref Range Status   02/19/2019 >60.0 >60 mL/min/1.73 m^2 Final     Comment:     Calculation used to obtain the estimated glomerular filtration  rate (eGFR) is the CKD-EPI equation.        Lab Results   Component Value Date    COLORU Yellow 07/17/2018    APPEARANCEUA Hazy (A) 07/17/2018    PHUR 5.0 07/17/2018    SPECGRAV 1.015 07/17/2018    PROTEINUA Negative 07/17/2018    GLUCUA Negative 07/17/2018    KETONESU Negative 07/17/2018    BILIRUBINUA Negative 07/17/2018    OCCULTUA 2+ (A) 07/17/2018    NITRITE Negative 07/17/2018    UROBILINOGEN Negative 07/17/2018    LEUKOCYTESUR Negative 07/17/2018       Diagnosis/Assessment/Plan:    1. Multiple Sclerosis  · Assessment: In light of the new symptoms (tongue heaviness and lightheadedness), I recommend that we get her brain MRI as soon as possible. She has been adherent to Aubagio. If she has disease activity, we will consider DMT switch. If MRIs are stable, we may still consider a switch since she has had significant hair loss and development of mouth sores since  starting Aubagio.   · Imaging: MRI brain now; Valium sent in for MRI   · Disease Modifying Therapies: Continue Aubagio and Vitamin D for now. Will check CBC and CMP today.     2. MS Symptom Assessment / Management  · No change to symptom management plan.   · Baclofen and gabapentin renewed. She has been taking gabapentin 300mg at bedtime and reports that this works well for her. New Rx sent for 300mg tabs.       Over 50% of this 30 minute visit was spent in direct face to face counseling of the patient about MS, DMT considerations, and MS symptom management. The patient agrees with the plan of care. I will see her back in 3-4 weeks.     Roseanna Riddle, [facility]-BC, MSCN        There are no diagnoses linked to this encounter.

## 2019-04-26 ENCOUNTER — HOSPITAL ENCOUNTER (OUTPATIENT)
Dept: RADIOLOGY | Facility: HOSPITAL | Age: 44
Discharge: HOME | End: 2019-04-26
Attending: PSYCHIATRY & NEUROLOGY
Payer: OTHER GOVERNMENT

## 2019-04-26 DIAGNOSIS — G35 MS (MULTIPLE SCLEROSIS): ICD-10-CM

## 2019-04-26 PROCEDURE — 70553 MRI BRAIN STEM W/O & W/DYE: CPT | Mod: 26,,, | Performed by: RADIOLOGY

## 2019-04-26 PROCEDURE — 70553 MRI BRAIN STEM W/O & W/DYE: CPT | Mod: TC

## 2019-04-26 PROCEDURE — 70553 MRI BRAIN DEMYELINATING W/ WO CONTRAST: ICD-10-PCS | Mod: 26,,, | Performed by: RADIOLOGY

## 2019-04-26 PROCEDURE — 25500020 PHARM REV CODE 255: Performed by: PSYCHIATRY & NEUROLOGY

## 2019-04-26 PROCEDURE — A9585 GADOBUTROL INJECTION: HCPCS | Performed by: PSYCHIATRY & NEUROLOGY

## 2019-04-26 RX ORDER — GADOBUTROL 604.72 MG/ML
9 INJECTION INTRAVENOUS
Status: COMPLETED | OUTPATIENT
Start: 2019-04-26 | End: 2019-04-26

## 2019-04-26 RX ADMIN — GADOBUTROL 9 ML: 604.72 INJECTION INTRAVENOUS at 06:04

## 2019-04-29 ENCOUNTER — CLINICAL SUPPORT (OUTPATIENT)
Dept: REHABILITATION | Facility: HOSPITAL | Age: 44
End: 2019-04-29
Payer: OTHER GOVERNMENT

## 2019-04-29 DIAGNOSIS — R29.898 DECREASED GRIP STRENGTH OF LEFT HAND: Primary | ICD-10-CM

## 2019-04-29 DIAGNOSIS — M79.642 HAND PAIN, LEFT: ICD-10-CM

## 2019-04-29 DIAGNOSIS — M25.60 RANGE OF MOTION DEFICIT: ICD-10-CM

## 2019-04-29 PROCEDURE — 97140 MANUAL THERAPY 1/> REGIONS: CPT

## 2019-04-29 PROCEDURE — 97035 APP MDLTY 1+ULTRASOUND EA 15: CPT

## 2019-04-29 PROCEDURE — 97110 THERAPEUTIC EXERCISES: CPT

## 2019-04-29 NOTE — PATIENT INSTRUCTIONS
"[facility]  OCCUPATIONAL THERAPY  HOME EXERCISE PROGRAM     Complete the following strengthening program 10 x each 1-2x/day.                       2pt - thumb and index   3pt - thumb, index and middle finger       _   Key pinch "biscuit" - thumb and side of index                        STACY Lund, AVERY, CHT  Certified Hand Therapist  Occupational Therapist          "

## 2019-04-29 NOTE — PROGRESS NOTES
"  Occupational Therapy Daily Treatment Note     Date: 4/29/2019  Name: Tiffany Cruz  Clinic Number: [id number]    Medical Diagnosis: Left CT and ulnar nerve decompression 3/18/2019   Therapy Diagnosis:        Encounter Diagnoses   Name Primary?    Hand pain, left      Decreased  strength of left hand      Range of motion deficit        Physician: Billie Daniel PA; Dr. LILLIAM Vaughan      Physician Orders: status post Ulnar nerve decompression at the elbow left and Carpal tunnel release left  Eval and Treat, modalities as needed  1-2 times/week for 6+ weeks     Surgical Procedure and Date: 3/18/2019  Evaluation Date: 4/16/2019     Plan of Care Certification Period: *6/16/2019  Date of Return to MD: 4/30/2019     Visit # / Visits authorized: 1 / 16  Insurance Authorization Period Expiration: 8/8/2019  FOTO (visit 2)     Time In:115  Time Out: 2  Total Billable Time: 45 minutes    Precautions:  Standard      Subjective     Pt reports: "The elbow is just a little sensitive, its mostly the scar on my wrist is painful, but I'm using it"   she was compliant with home exercise program given last session.   Response to previous treatment: Improved ROM   Functional change: Increase functional use for typing    Pain: 3/10  Location: left wrists      Objective     Tiffany received the following supervised modalities after being cleared for contradictions for 10  minutes:   -Paraffin bath  x 10 min to left  hand(s) to increase blood flow, circulation and tissue elasticity prior to therex.     Tiffany received the following direct contact modalities after being cleared for contraindications for 8 minutes:  -Ultrasound 3 mhz 0.5 w/cm2 x 8 min @ 100% continuous to left volar wrist  to increase blood flow, circulation, tissue elasticity, for pain management and to promote scar/wound healing     Tiffany received the following manual therapy techniques for 12 minutes:   -Scar massage with massage stick and mini " vibrator to decrease adhesions and improve tensile glide.     Tiffany received therapeutic exercises for 15 minutes including:  -  Tendon glides including hook, wave, flat and composite fist, ab/ad and digit extension, thumb IP flexion, thumb flexion, opposition , ab/ad and wrist flex, ext, RD, UD x 10 reps.      -  Instructed and performed red theraputty for gross , key, 2 and 3 pt pinch  And weightbearing thru palm x 10 reps each.   Reviewed modality use, mederma and scar pad use.  Provided red theraputty for HEP 1-2 x daily as tolerated.     - desensitization with texture cube and rice x 1-2 min each for nerve re-education and pain management.  Encouraged performance at night for pain as needed.     - Provided scar tape for elbow/wrist for scar management and instructed in use/wearing schedule.         Home Exercises and Education Provided     Education provided:   - written HEP with theraputty; provided theraputty ; Reviewed desensitization techniques   - Progress towards goals     Written Home Exercises Provided: yes.  Exercises were reviewed and Tiffany was able to demonstrate them prior to the end of the session.  Tiffany demonstrated good  understanding of the HEP provided.   .   See EMR under Patient Instructions for exercises provided 4/29/2019. And prior visit        Assessment       Tiffany is progressing well towards her goals and there are no updates to goals at this time. Pt prognosis is Good.     Volar scar wound healing well, moderate hypertrophy noted.  Hypersensitivity noted.  Pt will continue to benefit from skilled outpatient occupational therapy to address the deficits listed in the problem list on initial evaluation to improve ROM, strength, pain management, /pinch strength, functional use, scar and edema management and to maximize pt's level of independence with ADLs in the home, work  and community environment.     Anticipated barriers to occupational therapy: None     Pt's  spiritual, cultural and educational needs considered and pt agreeable to plan of care and goals.    The following goals were discussed with the patient and patient is in agreement with them as to be addressed in the treatment plan.       Goals:   Short Term Goals (4 weeks)   1)  Patient to be IND with HEP and modalities for pain management  2)  Increase ROM 3-5 degrees to increase functional hand use for ADLs/work/leisure activities  3)   Decrease edema .2-.3 mm to increase joint mobility /flexibility for functional hand use.   4)  Assess  and pinch and set goals accordingly   5)  Patient to score at _30-40%____%  or less on FOTO to demonstrate improved perception of functional Left hand  Use.        Long Term Goals (8 weeks)   1)  Increase  strength 2-8 lbs. For holding files, driving.   2)  Increase pinch strength 1-4 psi's for typing, buttons, zippers         Plan   Recommend continued Outpatient Occupataional Therapy  1x week for 8 weeks to include the following interventions Paraffin, Manual therapy/joint mobilizations, Modalities for pain management, US 3 mhz, Therapeutic exercises/activities., Strengthening, Edema Control, Scar Management, Wound Care and Electrical Modalities.     Within the Certification Period/Plan of care expiration: 4/16/2019 to 6/16/2019         AVERY Christianson, MICAT

## 2019-05-01 ENCOUNTER — OFFICE VISIT (OUTPATIENT)
Dept: ORTHOPEDICS | Facility: CLINIC | Age: 44
End: 2019-05-01
Payer: OTHER GOVERNMENT

## 2019-05-01 VITALS
SYSTOLIC BLOOD PRESSURE: 131 MMHG | BODY MASS INDEX: 30.07 KG/M2 | HEART RATE: 82 BPM | DIASTOLIC BLOOD PRESSURE: 81 MMHG | HEIGHT: 64 IN | WEIGHT: 176.13 LBS

## 2019-05-01 DIAGNOSIS — G56.02 CARPAL TUNNEL SYNDROME ON LEFT: Primary | ICD-10-CM

## 2019-05-01 DIAGNOSIS — Z98.890 POST-OPERATIVE STATE: ICD-10-CM

## 2019-05-01 DIAGNOSIS — G56.22 LESION OF LEFT ULNAR NERVE: ICD-10-CM

## 2019-05-01 PROCEDURE — 99213 OFFICE O/P EST LOW 20 MIN: CPT | Mod: PBBFAC | Performed by: PHYSICIAN ASSISTANT

## 2019-05-01 PROCEDURE — 99024 PR POST-OP FOLLOW-UP VISIT: ICD-10-PCS | Mod: ,,, | Performed by: PHYSICIAN ASSISTANT

## 2019-05-01 PROCEDURE — 99999 PR PBB SHADOW E&M-EST. PATIENT-LVL III: CPT | Mod: PBBFAC,,, | Performed by: PHYSICIAN ASSISTANT

## 2019-05-01 PROCEDURE — 99024 POSTOP FOLLOW-UP VISIT: CPT | Mod: ,,, | Performed by: PHYSICIAN ASSISTANT

## 2019-05-01 PROCEDURE — 99999 PR PBB SHADOW E&M-EST. PATIENT-LVL III: ICD-10-PCS | Mod: PBBFAC,,, | Performed by: PHYSICIAN ASSISTANT

## 2019-05-01 NOTE — PROGRESS NOTES
"Ms. Cruz is here today for a post-operative visit.  She is 6 weeks status post Ulnar nerve decompression at the elbow left and Carpal tunnel release left by Dr. Marmolejo on 3/18/19. She reports that she has intermittent pain. She states pain is better than it was before surgery. She describes intermittent aching pain in the hand and forearm, both on the volar and dorsal aspect. She has some sensitivity over the scar. Pt is in therapy, she has attended two visits. She denies fever, chills, and sweats since the time of the surgery. She states she is not interested in discussing the right side at this time since the left is still painful.    Physical exam:    Vitals:    05/01/19 0845   BP: 131/81   Pulse: 82   Weight: 79.9 kg (176 lb 2.4 oz)   Height: 5' 4" (1.626 m)   PainSc:   2   PainLoc: Wrist     Vital signs are stable, patient is afebrile.  Patient is well dressed and well groomed, no acute distress.  Alert and oriented to person, place, and time.   Incision is clean, dry and intact, well healed.  There is no erythema or exudate.  There is no sign of any infection. She is NVI. Good finger, wrist motion.     Assessment: status post Ulnar nerve decompression at the elbow left and Carpal tunnel release left    Plan:  Diagnoses and all orders for this visit:    Carpal tunnel syndrome on left    Lesion of left ulnar nerve    Post-operative state      - PO instruction reviewed and provided to patient  -continue OT/HEP   -RTC 6 wks with Dr. Marmolejo if needed       Marylu Barr PA-C  [facility]   [facility] [place], [place]    "

## 2019-05-06 ENCOUNTER — CLINICAL SUPPORT (OUTPATIENT)
Dept: REHABILITATION | Facility: HOSPITAL | Age: 44
End: 2019-05-06
Payer: OTHER GOVERNMENT

## 2019-05-06 DIAGNOSIS — R29.898 DECREASED GRIP STRENGTH OF LEFT HAND: ICD-10-CM

## 2019-05-06 DIAGNOSIS — M25.60 RANGE OF MOTION DEFICIT: ICD-10-CM

## 2019-05-06 DIAGNOSIS — M79.642 HAND PAIN, LEFT: Primary | ICD-10-CM

## 2019-05-06 PROCEDURE — 97140 MANUAL THERAPY 1/> REGIONS: CPT

## 2019-05-06 PROCEDURE — 97110 THERAPEUTIC EXERCISES: CPT

## 2019-05-06 PROCEDURE — 97035 APP MDLTY 1+ULTRASOUND EA 15: CPT

## 2019-05-06 NOTE — PROGRESS NOTES
"  Occupational Therapy Daily Treatment Note     Date: 5/6/2019  Name: Tiffany Cruz  Clinic Number: [id number]    Medical Diagnosis: Left CT and ulnar nerve decompression 3/18/2019   Therapy Diagnosis:        Encounter Diagnoses   Name Primary?    Hand pain, left      Decreased  strength of left hand      Range of motion deficit        Physician: Billie Daniel PA; Dr. LILLIAM Vaughan      Physician Orders: status post Ulnar nerve decompression at the elbow left and Carpal tunnel release left  Eval and Treat, modalities as needed  1-2 times/week for 6+ weeks     Surgical Procedure and Date: 3/18/2019  Evaluation Date: 4/16/2019     Plan of Care Certification Period: 6/16/2019  Date of Return to MD: 4/30/2019     Visit # / Visits authorized: 3 / 16  Insurance Authorization Period Expiration: 8/8/2019  FOTO (visit 2)     Time In:835  Time Out: 915  Total Billable Time: 40 minutes    Precautions:  Standard      Subjective     Pt reports:  Patient apologized for being late, but due to her student at [employer] dying in car crash this weekend.  "I don't mean to take advantage of your time, I know I'm late, but my hand felt better after last session."   she was compliant with home exercise program given last session.   Response to previous treatment: Improved ROM   Functional change: Increase functional use for typing    Pain: 3/10  Location: left wrists      Objective     Tiffany received the following supervised modalities after being cleared for contradictions for 10  minutes:   -Paraffin bath  x 10 min to left  hand(s) to increase blood flow, circulation and tissue elasticity prior to therex.     Tiffany received the following direct contact modalities after being cleared for contraindications for 8 minutes:  -Ultrasound 3 mhz 0.5 w/cm2 x 8 min @ 100% continuous to left volar wrist  to increase blood flow, circulation, tissue elasticity, for pain management and to promote scar/wound healing "     Tiffany received the following manual therapy techniques for 12 minutes:   -Scar massage with massage stick and mini vibrator to decrease adhesions and improve tensile glide.     Tiffany received therapeutic exercises for 15 minutes including:  -  Tendon glides including hook, wave, flat and composite fist, ab/ad and digit extension, thumb IP flexion, thumb flexion, opposition , ab/ad and wrist flex, ext, RD, UD x 10 reps.      -  Instructed and performed red theraputty for gross , key, 2 and 3 pt pinch  And weightbearing thru palm x 10 reps each.   Reviewed modality use, mederma and scar pad use.  Provided red theraputty for HEP 1-2 x daily as tolerated.     - desensitization with texture cube and rice x 1-2 min each for nerve re-education and pain management.  Encouraged performance at night for pain as needed.     - Provided scar tape for elbow/wrist for scar management and instructed in use/wearing schedule.       Home Exercises and Education Provided     Education provided:   - written HEP with theraputty; provided theraputty ; Reviewed desensitization techniques   - Progress towards goals     Written Home Exercises Provided: yes.  Exercises were reviewed and Tiffany was able to demonstrate them prior to the end of the session.  Tiffany demonstrated good  understanding of the HEP provided.   .   See EMR under Patient Instructions for exercises provided 4/29/2019. And prior visit        Assessment       Tiffany is progressing well towards her goals and there are no updates to goals at this time. Pt prognosis is Good.     Volar scar wound healing well, moderate hypertrophy noted.  Hypersensitivity noted.  Pt will continue to benefit from skilled outpatient occupational therapy to address the deficits listed in the problem list on initial evaluation to improve ROM, strength, pain management, /pinch strength, functional use, scar and edema management and to maximize pt's level of independence with ADLs  in the home, work  and community environment.     Anticipated barriers to occupational therapy: None     Pt's spiritual, cultural and educational needs considered and pt agreeable to plan of care and goals.    The following goals were discussed with the patient and patient is in agreement with them as to be addressed in the treatment plan.       Goals:   Short Term Goals (4 weeks)   1)  Patient to be IND with HEP and modalities for pain management  2)  Increase ROM 3-5 degrees to increase functional hand use for ADLs/work/leisure activities  3)   Decrease edema .2-.3 mm to increase joint mobility /flexibility for functional hand use.   4)  Assess  and pinch and set goals accordingly   5)  Patient to score at _30-40%____%  or less on FOTO to demonstrate improved perception of functional Left hand  Use.        Long Term Goals (8 weeks)   1)  Increase  strength 2-8 lbs. For holding files, driving.   2)  Increase pinch strength 1-4 psi's for typing, buttons, zippers         Plan   Recommend continued Outpatient Occupataional Therapy  1x week for 8 weeks to include the following interventions Paraffin, Manual therapy/joint mobilizations, Modalities for pain management, US 3 mhz, Therapeutic exercises/activities., Strengthening, Edema Control, Scar Management, Wound Care and Electrical Modalities.     Within the Certification Period/Plan of care expiration: 4/16/2019 to 6/16/2019         AVERY Christianson, MICAT

## 2019-06-13 ENCOUNTER — OFFICE VISIT (OUTPATIENT)
Dept: ORTHOPEDICS | Facility: CLINIC | Age: 44
End: 2019-06-13
Payer: OTHER GOVERNMENT

## 2019-06-13 ENCOUNTER — OFFICE VISIT (OUTPATIENT)
Dept: NEUROLOGY | Facility: CLINIC | Age: 44
End: 2019-06-13
Payer: OTHER GOVERNMENT

## 2019-06-13 VITALS — HEIGHT: 64 IN | WEIGHT: 179.38 LBS | BODY MASS INDEX: 30.63 KG/M2

## 2019-06-13 VITALS
BODY MASS INDEX: 30.07 KG/M2 | HEIGHT: 64 IN | DIASTOLIC BLOOD PRESSURE: 87 MMHG | SYSTOLIC BLOOD PRESSURE: 154 MMHG | WEIGHT: 176.13 LBS | HEART RATE: 76 BPM

## 2019-06-13 DIAGNOSIS — G35 MULTIPLE SCLEROSIS: Primary | ICD-10-CM

## 2019-06-13 DIAGNOSIS — Z79.899 HIGH RISK MEDICATION USE: ICD-10-CM

## 2019-06-13 DIAGNOSIS — Z71.89 COUNSELING REGARDING GOALS OF CARE: ICD-10-CM

## 2019-06-13 DIAGNOSIS — K13.79 MOUTH SORES: ICD-10-CM

## 2019-06-13 DIAGNOSIS — Z29.89 PROPHYLACTIC IMMUNOTHERAPY: ICD-10-CM

## 2019-06-13 DIAGNOSIS — Z98.890 POST-OPERATIVE STATE: Primary | ICD-10-CM

## 2019-06-13 PROCEDURE — 99213 OFFICE O/P EST LOW 20 MIN: CPT | Mod: PBBFAC,27 | Performed by: ORTHOPAEDIC SURGERY

## 2019-06-13 PROCEDURE — 99999 PR PBB SHADOW E&M-EST. PATIENT-LVL III: CPT | Mod: PBBFAC,,, | Performed by: ORTHOPAEDIC SURGERY

## 2019-06-13 PROCEDURE — 99213 OFFICE O/P EST LOW 20 MIN: CPT | Mod: PBBFAC | Performed by: CLINICAL NURSE SPECIALIST

## 2019-06-13 PROCEDURE — 99999 PR PBB SHADOW E&M-EST. PATIENT-LVL III: CPT | Mod: PBBFAC,,, | Performed by: CLINICAL NURSE SPECIALIST

## 2019-06-13 PROCEDURE — 99999 PR PBB SHADOW E&M-EST. PATIENT-LVL III: ICD-10-PCS | Mod: PBBFAC,,, | Performed by: CLINICAL NURSE SPECIALIST

## 2019-06-13 PROCEDURE — 99024 POSTOP FOLLOW-UP VISIT: CPT | Mod: ,,, | Performed by: ORTHOPAEDIC SURGERY

## 2019-06-13 PROCEDURE — 99215 OFFICE O/P EST HI 40 MIN: CPT | Mod: S$PBB,,, | Performed by: CLINICAL NURSE SPECIALIST

## 2019-06-13 PROCEDURE — 99999 PR PBB SHADOW E&M-EST. PATIENT-LVL III: ICD-10-PCS | Mod: PBBFAC,,, | Performed by: ORTHOPAEDIC SURGERY

## 2019-06-13 PROCEDURE — 99215 PR OFFICE/OUTPT VISIT, EST, LEVL V, 40-54 MIN: ICD-10-PCS | Mod: S$PBB,,, | Performed by: CLINICAL NURSE SPECIALIST

## 2019-06-13 PROCEDURE — 99024 PR POST-OP FOLLOW-UP VISIT: ICD-10-PCS | Mod: ,,, | Performed by: ORTHOPAEDIC SURGERY

## 2019-06-13 NOTE — PROGRESS NOTES
Subjective:       Patient ID: Tiffany Cruz is a 43 y.o. female who presents today for a fit-in clinic visit for MS.  She was last seen in April 2019. The history has been provided by the patient.     MS HPI:  · DMT: Aubagio 14mg daily   · Side effects from DMT? Yes - See below  · Taking vitamin D3 as recommended? Yes -  Dose: 50,000 units once a week  · She is here today to discuss frequent mouth sores that she has experienced since starting Aubagio.  She uses baking soda, Orajel, and OTC canker sore treatment (Kanka). She reports that she has mouth sores about 75% of the month.  She usually gets them on the underside of her lips and the sides of her mouth. Certain foods irritate her mouth more, and talking can even be uncomfortable.  She has noticed that she has been involuntarily biting the inside of her mouth, which is painful. She has lost some hair, as well, but wears wigs, so this is not a huge deal for her.   · In the past, she has been on Tecfidera (did not tolerate--had flushing/itching) and Copaxone (had trouble with adherence).   · She has more itching lately, mostly at night.   · She has three unusual moles on her left thigh.   · She denies any other frequent infections.   · She is not interested in returning to injections. She would like to consider an alternative DMT. She does not think she can be adherent to Aubagio for the long term.     SOCIAL HISTORY  Social History     Tobacco Use    Smoking status: Never Smoker    Smokeless tobacco: Never Used   Substance Use Topics    Alcohol use: No     Alcohol/week: 0.0 oz    Drug use: No     Living arrangements - the patient lives with her family   Employment:          Objective:        1. 25 foot timed walk: was 3.9 seconds at last visit without assist   Neurologic Exam      Neuro exam deferred today.     Imaging:     Results for orders placed during the hospital encounter of 04/26/19   MRI Brain Demyelinating W W/O Contrast    Impression  Findings in the cerebral white matter which are typical for multiple sclerosis with moderate plaque burden.  No new or enhancing lesions to suggest active disease.    Decrease in signal intensity of previous lesion in the left corona radiata.      Electronically signed by: Deangelo Hart MD  Date:    04/28/2019  Time:    15:01     Labs:     Lab Results   Component Value Date    ELSJGLHW61YP 31 02/19/2019    YKYWMUFB71UB 33 01/29/2018    HSMNXRRS13UR 50 03/18/2016     Lab Results   Component Value Date    WBC 7.60 02/19/2019    RBC 4.97 02/19/2019    HGB 10.5 (L) 02/19/2019    HCT 34.8 (L) 02/19/2019    MCV 70 (L) 02/19/2019    MCH 21.1 (L) 02/19/2019    MCHC 30.2 (L) 02/19/2019    RDW 14.6 (H) 02/19/2019     02/19/2019    MPV 12.0 02/19/2019    GRAN 3.5 02/19/2019    GRAN 45.7 02/19/2019    LYMPH 3.0 02/19/2019    LYMPH 38.9 02/19/2019    MONO 0.9 02/19/2019    MONO 11.6 02/19/2019    EOS 0.2 02/19/2019    BASO 0.09 02/19/2019    EOSINOPHIL 2.1 02/19/2019    BASOPHIL 1.2 02/19/2019     Sodium   Date Value Ref Range Status   02/19/2019 139 136 - 145 mmol/L Final     Potassium   Date Value Ref Range Status   02/19/2019 3.4 (L) 3.5 - 5.1 mmol/L Final     Chloride   Date Value Ref Range Status   02/19/2019 105 95 - 110 mmol/L Final     CO2   Date Value Ref Range Status   02/19/2019 26 23 - 29 mmol/L Final     Glucose   Date Value Ref Range Status   02/19/2019 83 70 - 110 mg/dL Final     BUN, Bld   Date Value Ref Range Status   02/19/2019 11 6 - 20 mg/dL Final     Creatinine   Date Value Ref Range Status   02/19/2019 0.7 0.5 - 1.4 mg/dL Final     Calcium   Date Value Ref Range Status   02/19/2019 9.3 8.7 - 10.5 mg/dL Final     Total Protein   Date Value Ref Range Status   02/19/2019 7.2 6.0 - 8.4 g/dL Final   02/19/2019 7.2 6.0 - 8.4 g/dL Final     Albumin   Date Value Ref Range Status   02/19/2019 3.7 3.5 - 5.2 g/dL Final   02/19/2019 3.7 3.5 - 5.2 g/dL Final     Total Bilirubin   Date Value Ref Range Status    02/19/2019 0.2 0.1 - 1.0 mg/dL Final     Comment:     For infants and newborns, interpretation of results should be based  on gestational age, weight and in agreement with clinical  observations.  Premature Infant recommended reference ranges:  Up to 24 hours.............<8.0 mg/dL  Up to 48 hours............<12.0 mg/dL  3-5 days..................<15.0 mg/dL  6-29 days.................<15.0 mg/dL     02/19/2019 0.2 0.1 - 1.0 mg/dL Final     Comment:     For infants and newborns, interpretation of results should be based  on gestational age, weight and in agreement with clinical  observations.  Premature Infant recommended reference ranges:  Up to 24 hours.............<8.0 mg/dL  Up to 48 hours............<12.0 mg/dL  3-5 days..................<15.0 mg/dL  6-29 days.................<15.0 mg/dL       Alkaline Phosphatase   Date Value Ref Range Status   02/19/2019 67 55 - 135 U/L Final   02/19/2019 67 55 - 135 U/L Final     AST   Date Value Ref Range Status   02/19/2019 27 10 - 40 U/L Final   02/19/2019 27 10 - 40 U/L Final     ALT   Date Value Ref Range Status   02/19/2019 41 10 - 44 U/L Final   02/19/2019 41 10 - 44 U/L Final     Anion Gap   Date Value Ref Range Status   02/19/2019 8 8 - 16 mmol/L Final     eGFR if    Date Value Ref Range Status   02/19/2019 >60.0 >60 mL/min/1.73 m^2 Final     eGFR if non    Date Value Ref Range Status   02/19/2019 >60.0 >60 mL/min/1.73 m^2 Final     Comment:     Calculation used to obtain the estimated glomerular filtration  rate (eGFR) is the CKD-EPI equation.                    Diagnosis/Assessment/Plan:    1. Multiple Sclerosis  · Assessment: Evelyn has bothersome mouth sores that started after she initiated treatment with Aubagio. She also has hair loss that is not improving. Because of the bothersome mouth sores, she does not feel like Aubagio is a medication to which she can be adherent for much longer. She does not want to return to  injections. She understands the value in taking a disease modifying therapy to prevent new lesions, relapses, and progression of disability. My recommendation is Ocrevus. I provided her with some literature on this medication today, and we will speak on the phone next week to make the final decision.   · Imaging: MRI brain 6 months after Ocrevus start   · Disease Modifying Therapies: She will not proceed with future Aubagio doses at this time. As above, we will plan to start Ocrevus if patient agrees to this during our conversation next week. The patient was counseled about the risks associated with immune suppressive therapy, including a higher risk of serious infections (URI, UTI, PML) and malignancy, as well as the importance of avoiding all live virus vaccines while on immune suppressive medication. We discussed side effects of infusion reactions (itching, rash, swelling or itchiness in the throat) and administration of pre-meds to lessen this response. We also discussed other possible risks of side effects of an allergic reaction, constipation/diarrhea, nausea/vomiting, local damage at the IV site, sores on the feet, and blood in the urine. The patient verbalized understanding of risk and signed consent form. If she is ok with moving forward, we will get all pre-immunosuppression labs next week. We discussed referral to ID in light of immunosuppression, and she is open to this. She understands that she will need to do the accelerated elimination procedure for Aubagio and take Cholestyramine three times daily for 11 days. We will plan to schedule first infusion one month after completion of Cholestyramine. Once lab results are received and confirmed as negative, we can schedule infusion. She understands that she will need to do lab work for CBC and Rituxan sensitivity at month 5.     2. MS Symptom Assessment / Management  · No change to symptom management plan today.     Our visit today lasted 40 minutes, and  100% of this time was spent face to face with the patient. Over 50% of this visit included discussion of the treatment plan/medication changes/coordination of care. The patient agrees with the plan of care. I will see her back in 3 months.     Roseanna Riddle, [facility], MSCN          Problem List Items Addressed This Visit     None

## 2019-06-13 NOTE — Clinical Note
Jerome, just copying you on this because we might need to schedule labs next week. This is a reminder for me to touch base with her next week. Call patient next week--call twice in a rowIf agrees with Brittney Clancy accelerated elimination Rx to pharmacy ocrevus pre-labs and ID referral Wait a month after elimination, then start Ocrevus

## 2019-06-13 NOTE — PROGRESS NOTES
I have personally taken the history and examined the patient. I agree with the Hand Surgery PA's note. The plan will be OT . Pt has no N/T  Pt was late for appt this AM. SHe has misssed or been late for some of her scheduled postop OT appts. SHe has been to only 3 visits. She c/o hand swelling- no swelling noted on exam but did discuss the pathology of swelling after surgery, alsos discussed that the pt does not have erythema or warmth so infection is loww on DD. Pt c/o stretching difficulty.   Incision well healed but thickened scar, FROM hand, fingers, wrist, elbow. Sensation intact.  Plan for OT for scar massage

## 2019-06-13 NOTE — PROGRESS NOTES
"Ms. Cruz is here today for a post-operative visit.  She is 3 mos status post Ulnar nerve decompression at the elbow left and Carpal tunnel release left by Dr. Marmolejo on 3/18/19. She reports that she has intermittent pain throughout the arm from the elbow into the hand. She has tenderness over the elbow. She also complains of pain over the dorsal hand as well as volarly over the MCP joints. Pt has attended three therapy sessions since surgery, last visit over a month ago. She denies fever, chills, and sweats since the time of the surgery.       Physical exam:    Vitals:    06/13/19 0850   BP: (!) 154/87   Pulse: 76   Weight: 79.9 kg (176 lb 2.4 oz)   Height: 5' 4" (1.626 m)   PainSc:   2     Vital signs are stable, patient is afebrile.  Patient is well dressed and well groomed, no acute distress.  Alert and oriented to person, place, and time.   Incisions are clean, dry and intact, well healed.  There is no erythema or exudate.  There is no sign of any infection. She is NVI. Good elbow, wrist, and finger motion.     Assessment: status post Ulnar nerve decompression at the elbow left and Carpal tunnel release left    Plan:  Tiffany was seen today for pain and pain.    Diagnoses and all orders for this visit:    Post-operative state      -discussed need for OT   -      Marylu Barr PA-C  [facility]   [facility]  [place], [place]    "

## 2019-06-19 ENCOUNTER — CLINICAL SUPPORT (OUTPATIENT)
Dept: REHABILITATION | Facility: HOSPITAL | Age: 44
End: 2019-06-19
Payer: OTHER GOVERNMENT

## 2019-06-19 DIAGNOSIS — M25.522 ELBOW PAIN, LEFT: ICD-10-CM

## 2019-06-19 DIAGNOSIS — L90.5 SCAR ADHERENT: ICD-10-CM

## 2019-06-19 DIAGNOSIS — M25.60 RANGE OF MOTION DEFICIT: ICD-10-CM

## 2019-06-19 DIAGNOSIS — M79.642 HAND PAIN, LEFT: Primary | ICD-10-CM

## 2019-06-19 PROCEDURE — 97140 MANUAL THERAPY 1/> REGIONS: CPT

## 2019-06-19 PROCEDURE — 97035 APP MDLTY 1+ULTRASOUND EA 15: CPT

## 2019-06-19 PROCEDURE — 97110 THERAPEUTIC EXERCISES: CPT

## 2019-06-19 NOTE — PROGRESS NOTES
"  Occupational Therapy Daily Treatment Note     Date: 6/19/2019  Name: Tiffany Cruz  Clinic Number: [id number]    Medical Diagnosis: Left CT and ulnar nerve decompression 3/18/2019   Therapy Diagnosis:        Encounter Diagnoses   Name Primary?    Hand pain, left      Decreased  strength of left hand      Range of motion deficit        Physician: Billie Daniel PA; Dr. LILLIAM Vaughan      Physician Orders: status post Ulnar nerve decompression at the elbow left and Carpal tunnel release left  Eval and Treat, modalities as needed  1-2 times/week for 6+ weeks     Surgical Procedure and Date: 3/18/2019  Evaluation Date: 4/16/2019     Plan of Care Certification Period: 6/16/2019  Date of Return to MD:      Visit # / Visits authorized: 4 / 16  Insurance Authorization Period Expiration: 8/8/2019  FOTO (visit 2)     Time In:220 pm   Time Out: 3 pm   Total Billable Time: 40 minutes    Precautions:  Standard      Subjective     Pt reports: " I have mostly stiffness, and can't lean on my elbow"  she was compliant with home exercise program given last session.   Response to previous treatment: Improved ROM   Functional change: Increase functional use for typing    Pain: 3/10  Location: left wrists      Objective     Tiffany received the following supervised modalities after being cleared for contradictions for 10  minutes:   -Paraffin bath  x 10 min to left  hand(s) to increase blood flow, circulation and tissue elasticity prior to therex.     Tiffany received the following direct contact modalities after being cleared for contraindications for 10 min (5 each)  minutes:  -Ultrasound 3 mhz 0.5 w/cm2 x 8 min @ 100% continuous to left volar wrist (5 min) and elbow scar adhesion (5 min)  to increase blood flow, circulation, tissue elasticity, for pain management and to promote scar/wound healing     Tiffany received the following manual therapy techniques for 12 minutes:   -Scar massage with massage stick and " mini vibrator to decrease adhesions and improve tensile glide.     Tiffany received therapeutic exercises for 15 minutes including:  -  Tendon glides including hook, wave, flat and composite fist, ab/ad and digit extension, thumb IP flexion, thumb flexion, opposition , ab/ad and wrist flex, ext, RD, UD x 10 reps.      -  Instructed and performed red theraputty for gross , key, 2 and 3 pt pinch  And weightbearing thru palm x 10 reps each.   Reviewed modality use, mederma and scar pad use.      - desensitization with texture cube and rice x 1-2 min each for nerve re-education and pain management.  Encouraged performance at night for pain as needed.     - Provided scar tape for elbow/wrist for scar management and instructed in use/wearing schedule.       Home Exercises and Education Provided     Education provided:   - written HEP with theraputty; provided theraputty ; Reviewed desensitization techniques   - Progress towards goals     Written Home Exercises Provided: yes.  Exercises were reviewed and Tiffany was able to demonstrate them prior to the end of the session.  Tiffany demonstrated good  understanding of the HEP provided.   .   See EMR under Patient Instructions for exercises provided 4/29/2019. And prior visit        Assessment       Tiffany is progressing well towards her goals and there are no updates to goals at this time. Pt prognosis is Good.      Mild keloids noted on elbow incision; Attempting US and scar pads to assist with scar management.  Generalized finger stiffness; Will progress with strengthening as tolerated. Pt will continue to benefit from skilled outpatient occupational therapy to address the deficits listed in the problem list on initial evaluation to improve ROM, strength, pain management, /pinch strength, functional use, scar and edema management and to maximize pt's level of independence with ADLs in the home, work  and community environment.     Anticipated barriers to  occupational therapy: None     Pt's spiritual, cultural and educational needs considered and pt agreeable to plan of care and goals.    The following goals were discussed with the patient and patient is in agreement with them as to be addressed in the treatment plan.       Goals:   Short Term Goals (4 weeks)   1)  Patient to be IND with HEP and modalities for pain management  2)  Increase ROM 3-5 degrees to increase functional hand use for ADLs/work/leisure activities  3)   Decrease edema .2-.3 mm to increase joint mobility /flexibility for functional hand use.   4)  Assess  and pinch and set goals accordingly   5)  Patient to score at _30-40%____%  or less on FOTO to demonstrate improved perception of functional Left hand  Use.        Long Term Goals (8 weeks)   1)  Increase  strength 2-8 lbs. For holding files, driving.   2)  Increase pinch strength 1-4 psi's for typing, buttons, zippers         Plan   Recommend continued Outpatient Occupataional Therapy  1x week for 8 weeks to include the following interventions Paraffin, Manual therapy/joint mobilizations, Modalities for pain management, US 3 mhz, Therapeutic exercises/activities., Strengthening, Edema Control, Scar Management, Wound Care and Electrical Modalities.     Within the Certification Period/Plan of care expiration: 4/16/2019 to 6/16/2019         AVERY Christianson, MICAT

## 2019-06-24 ENCOUNTER — TELEPHONE (OUTPATIENT)
Dept: NEUROLOGY | Facility: CLINIC | Age: 44
End: 2019-06-24

## 2019-06-24 ENCOUNTER — CLINICAL SUPPORT (OUTPATIENT)
Dept: REHABILITATION | Facility: HOSPITAL | Age: 44
End: 2019-06-24
Payer: OTHER GOVERNMENT

## 2019-06-24 DIAGNOSIS — L90.5 SCAR ADHERENT: ICD-10-CM

## 2019-06-24 DIAGNOSIS — D84.9 IMMUNOSUPPRESSION: Primary | ICD-10-CM

## 2019-06-24 DIAGNOSIS — G35 MULTIPLE SCLEROSIS: ICD-10-CM

## 2019-06-24 DIAGNOSIS — M25.522 ELBOW PAIN, LEFT: ICD-10-CM

## 2019-06-24 PROCEDURE — 97035 APP MDLTY 1+ULTRASOUND EA 15: CPT

## 2019-06-24 PROCEDURE — 97110 THERAPEUTIC EXERCISES: CPT

## 2019-06-24 PROCEDURE — 97140 MANUAL THERAPY 1/> REGIONS: CPT

## 2019-06-24 RX ORDER — CHOLESTYRAMINE 4 G/9G
8 POWDER, FOR SUSPENSION ORAL EVERY 8 HOURS
Qty: 66 PACKET | Refills: 0 | Status: SHIPPED | OUTPATIENT
Start: 2019-06-24 | End: 2019-09-13 | Stop reason: ALTCHOICE

## 2019-06-24 NOTE — PROGRESS NOTES
"  Occupational Therapy Daily Treatment Note     Date: 6/24/2019  Name: Tiffany Cruz  Clinic Number: [id number]    Medical Diagnosis: Left CT and ulnar nerve decompression 3/18/2019   Therapy Diagnosis:        Encounter Diagnoses   Name Primary?    Hand pain, left      Decreased  strength of left hand      Range of motion deficit        Physician: Billie Daniel PA; Dr. LILLIAM Vaughan      Physician Orders: status post Ulnar nerve decompression at the elbow left and Carpal tunnel release left  Eval and Treat, modalities as needed  1-2 times/week for 6+ weeks     Surgical Procedure and Date: 3/18/2019  Evaluation Date: 4/16/2019     Plan of Care Certification Period: 6/16/2019  Date of Return to MD:      Visit # / Visits authorized: 5 / 16  Insurance Authorization Period Expiration: 8/8/2019  FOTO (visit 2)     Time In:820 am   Time Out: 900 am   Total Billable Time: 40 minutes    Precautions:  Standard      Subjective     Pt reports:  "I worked on it this weekend, bending the finger."   she was compliant with home exercise program given last session.   Response to previous treatment: Improved ROM   Functional change: Increase functional use for typing    Pain: 3/10  Location: left wrists      Objective     Tiffany received the following supervised modalities after being cleared for contradictions for 10  minutes:   -Paraffin bath  x 10 min to left  hand(s) to increase blood flow, circulation and tissue elasticity prior to therex.     Tiffany received the following direct contact modalities after being cleared for contraindications for 10 min (5 each)  minutes:  -Ultrasound 3 mhz 0.5 w/cm2 x 8 min @ 100% continuous to left volar wrist (5 min) and elbow scar adhesion (5 min)  to increase blood flow, circulation, tissue elasticity, for pain management and to promote scar/wound healing     Tiffany received the following manual therapy techniques for 12 minutes:   -Scar massage with massage stick and " mini vibrator to decrease adhesions and improve tensile glide.     Tiffany received therapeutic exercises for 15 minutes including:  -  Tendon glides including hook, wave, flat and composite fist, ab/ad and digit extension, thumb IP flexion, thumb flexion, opposition , ab/ad and wrist flex, ext, RD, UD x 10 reps.      -  Instructed and performed red theraputty for gross , key, 2 and 3 pt pinch  And weightbearing thru palm x 10 reps each.   Reviewed modality use, mederma and scar pad use.      - desensitization with texture cube and rice x 1-2 min each for nerve re-education and pain management.  Encouraged performance at night for pain as needed.     - Provided scar tape for elbow/wrist for scar management and instructed in use/wearing schedule.       Home Exercises and Education Provided     Education provided:   - written HEP with theraputty; provided theraputty ; Reviewed desensitization techniques   - Progress towards goals     Written Home Exercises Provided: yes.  Exercises were reviewed and Tiffany was able to demonstrate them prior to the end of the session.  Tiffany demonstrated good  understanding of the HEP provided.   .   See EMR under Patient Instructions for exercises provided 4/29/2019. And prior visit        Assessment       Tiffany is progressing well towards her goals and there are no updates to goals at this time. Pt prognosis is Good.      Decreased pain following treatment session reported.  Scar adhesion healing well, mild keloid formation on elbow.  Progressing with therex. Pt will continue to benefit from skilled outpatient occupational therapy to address the deficits listed in the problem list on initial evaluation to improve ROM, strength, pain management, /pinch strength, functional use, scar and edema management and to maximize pt's level of independence with ADLs in the home, work  and community environment.     Anticipated barriers to occupational therapy: None     Pt's  spiritual, cultural and educational needs considered and pt agreeable to plan of care and goals.    The following goals were discussed with the patient and patient is in agreement with them as to be addressed in the treatment plan.       Goals:   Short Term Goals (4 weeks)   1)  Patient to be IND with HEP and modalities for pain management  2)  Increase ROM 3-5 degrees to increase functional hand use for ADLs/work/leisure activities  3)   Decrease edema .2-.3 mm to increase joint mobility /flexibility for functional hand use.   4)  Assess  and pinch and set goals accordingly   5)  Patient to score at _30-40%____%  or less on FOTO to demonstrate improved perception of functional Left hand  Use.        Long Term Goals (8 weeks)   1)  Increase  strength 2-8 lbs. For holding files, driving.   2)  Increase pinch strength 1-4 psi's for typing, buttons, zippers         Plan   Recommend continued Outpatient Occupataional Therapy  1x week for 8 weeks to include the following interventions Paraffin, Manual therapy/joint mobilizations, Modalities for pain management, US 3 mhz, Therapeutic exercises/activities., Strengthening, Edema Control, Scar Management, Wound Care and Electrical Modalities.     Within the Certification Period/Plan of care expiration: 4/16/2019 to 6/16/2019         AVERY Christianson, MICAT

## 2019-06-24 NOTE — TELEPHONE ENCOUNTER
Confirmed with Evelyn that she would like to proceed with Ocrevus. All lab orders are in. ID referral is also in. Rx for cholestyramine sent to pharmacy     We will plan to schedule first Ocrevus infusion one month after completion of Cholestyramine (as long as labs are ok).

## 2019-06-26 ENCOUNTER — CLINICAL SUPPORT (OUTPATIENT)
Dept: REHABILITATION | Facility: HOSPITAL | Age: 44
End: 2019-06-26
Payer: OTHER GOVERNMENT

## 2019-06-26 DIAGNOSIS — M25.522 ELBOW PAIN, LEFT: ICD-10-CM

## 2019-06-26 DIAGNOSIS — L90.5 SCAR ADHERENT: ICD-10-CM

## 2019-06-26 DIAGNOSIS — R29.898 DECREASED GRIP STRENGTH OF LEFT HAND: ICD-10-CM

## 2019-06-26 DIAGNOSIS — M79.671 RIGHT FOOT PAIN: Primary | ICD-10-CM

## 2019-06-26 DIAGNOSIS — M79.642 HAND PAIN, LEFT: Primary | ICD-10-CM

## 2019-06-26 DIAGNOSIS — M25.60 RANGE OF MOTION DEFICIT: ICD-10-CM

## 2019-06-26 PROCEDURE — 97110 THERAPEUTIC EXERCISES: CPT

## 2019-06-26 PROCEDURE — 97035 APP MDLTY 1+ULTRASOUND EA 15: CPT

## 2019-06-26 PROCEDURE — 97140 MANUAL THERAPY 1/> REGIONS: CPT

## 2019-06-26 NOTE — PROGRESS NOTES
"  Occupational Therapy Daily Treatment Note     Date: 6/26/2019  Name: Tiffany Cruz  Clinic Number: [id number]    Medical Diagnosis: Left CT and ulnar nerve decompression 3/18/2019   Therapy Diagnosis:        Encounter Diagnoses   Name Primary?    Hand pain, left      Decreased  strength of left hand      Range of motion deficit        Physician: Billie Daniel PA; Dr. LILLIAM Vaughan      Physician Orders: status post Ulnar nerve decompression at the elbow left and Carpal tunnel release left  Eval and Treat, modalities as needed  1-2 times/week for 6+ weeks     Surgical Procedure and Date: 3/18/2019  Evaluation Date: 4/16/2019     Plan of Care Certification Period: 6/16/2019  Date of Return to MD:      Visit # / Visits authorized: 6/ 16  Insurance Authorization Period Expiration: 8/8/2019  FOTO (visit 2)     Time In:820 am   Time Out: 900 am   Total Billable Time: 40 minutes    Precautions:  Standard      Subjective     Pt reports:  "It was hurting more yesterday, but I think the middle finger is getting better, the index is still weak."   she was compliant with home exercise program given last session.   Response to previous treatment: Improved ROM   Functional change: Increase functional use for typing    Pain: 3/10  Location: left wrists      Objective     Tiffany received the following supervised modalities after being cleared for contradictions for 10  minutes:   -Paraffin bath  x 10 min to left  hand(s) to increase blood flow, circulation and tissue elasticity prior to therex.     Tiffany received the following direct contact modalities after being cleared for contraindications for 10 min (5 each)  minutes:  -Ultrasound 3 mhz 0.5 w/cm2 x 8 min @ 100% continuous to left volar wrist (5 min) and elbow scar adhesion (5 min)  to increase blood flow, circulation, tissue elasticity, for pain management and to promote scar/wound healing     Tiffany received the following manual therapy techniques for " 12 minutes:   -Scar massage with massage stick and mini vibrator to decrease adhesions and improve tensile glide.     Tiffany received therapeutic exercises for 15 minutes including:  -  Tendon glides including hook, wave, flat and composite fist, ab/ad and digit extension, thumb IP flexion, thumb flexion, opposition , ab/ad and wrist flex, ext, RD, UD x 10 reps.      -  Instructed and performed red theraputty for gross , key, 2 and 3 pt pinch  And weightbearing thru palm x 10 reps each.   Reviewed modality use, mederma and scar pad use.      -   - Provided scar tape for elbow/wrist for scar management and instructed in use/wearing schedule.       Home Exercises and Education Provided     Education provided:   - written HEP with theraputty; provided theraputty ; Reviewed desensitization techniques   - Progress towards goals     Written Home Exercises Provided: yes.  Exercises were reviewed and Tiffany was able to demonstrate them prior to the end of the session.  Tiffany demonstrated good  understanding of the HEP provided.   .   See EMR under Patient Instructions for exercises provided 4/29/2019. And prior visit        Assessment       Tiffany is progressing well towards her goals and there are no updates to goals at this time. Pt prognosis is Good.      Decreased pain following treatment session reported.  Scar adhesion healing well, mild keloid formation on elbow.  Progressing with therex.  Patient concerned with index/middle finger slight PIP lag secondary to muscle weakness and scar adhesion, but progressing. Pt will continue to benefit from skilled outpatient occupational therapy to address the deficits listed in the problem list on initial evaluation to improve ROM, strength, pain management, /pinch strength, functional use, scar and edema management and to maximize pt's level of independence with ADLs in the home, work  and community environment.     Anticipated barriers to occupational therapy: None      Pt's spiritual, cultural and educational needs considered and pt agreeable to plan of care and goals.    The following goals were discussed with the patient and patient is in agreement with them as to be addressed in the treatment plan.       Goals:   Short Term Goals (4 weeks)   1)  Patient to be IND with HEP and modalities for pain management  2)  Increase ROM 3-5 degrees to increase functional hand use for ADLs/work/leisure activities  3)   Decrease edema .2-.3 mm to increase joint mobility /flexibility for functional hand use.   4)  Assess  and pinch and set goals accordingly   5)  Patient to score at _30-40%____%  or less on FOTO to demonstrate improved perception of functional Left hand  Use.        Long Term Goals (8 weeks)   1)  Increase  strength 2-8 lbs. For holding files, driving.   2)  Increase pinch strength 1-4 psi's for typing, buttons, zippers         Plan   Recommend continued Outpatient Occupataional Therapy  1x week for 8 weeks to include the following interventions Paraffin, Manual therapy/joint mobilizations, Modalities for pain management, US 3 mhz, Therapeutic exercises/activities., Strengthening, Edema Control, Scar Management, Wound Care and Electrical Modalities.     Within the Certification Period/Plan of care expiration: 4/16/2019 to 6/16/2019         AVERY Christianson, MICAT

## 2019-07-03 ENCOUNTER — PATIENT MESSAGE (OUTPATIENT)
Dept: NEUROLOGY | Facility: CLINIC | Age: 44
End: 2019-07-03

## 2019-07-03 ENCOUNTER — CLINICAL SUPPORT (OUTPATIENT)
Dept: REHABILITATION | Facility: HOSPITAL | Age: 44
End: 2019-07-03
Payer: OTHER GOVERNMENT

## 2019-07-03 DIAGNOSIS — L90.5 SCAR ADHERENT: ICD-10-CM

## 2019-07-03 DIAGNOSIS — M25.522 ELBOW PAIN, LEFT: ICD-10-CM

## 2019-07-03 PROCEDURE — 97110 THERAPEUTIC EXERCISES: CPT

## 2019-07-03 PROCEDURE — 97140 MANUAL THERAPY 1/> REGIONS: CPT

## 2019-07-03 PROCEDURE — 97035 APP MDLTY 1+ULTRASOUND EA 15: CPT

## 2019-07-03 NOTE — PROGRESS NOTES
"  Occupational Therapy Daily Treatment Note     Date: 7/3/2019  Name: Tiffany Cruz  Clinic Number: [id number]    Medical Diagnosis: Left CT and ulnar nerve decompression 3/18/2019   Therapy Diagnosis:        Encounter Diagnoses   Name Primary?    Hand pain, left      Decreased  strength of left hand      Range of motion deficit        Physician: Billie Daniel PA; Dr. LILLIAM Vaughan      Physician Orders: status post Ulnar nerve decompression at the elbow left and Carpal tunnel release left  Eval and Treat, modalities as needed  1-2 times/week for 6+ weeks     Surgical Procedure and Date: 3/18/2019  Evaluation Date: 4/16/2019     Plan of Care Certification Period: 6/16/2019  Date of Return to MD:      Visit # / Visits authorized: 6/ 16  Insurance Authorization Period Expiration: 8/8/2019  FOTO (visit 2)     Time In:820 am   Time Out: 900 am   Total Billable Time: 40 minutes    Precautions:  Standard      Subjective     Pt reports:  "I am still doing the putty and stretching my wrist, the fingers are doing better. "   she was compliant with home exercise program given last session.   Response to previous treatment: Improved ROM   Functional change: Increase functional use for typing    Pain: 3/10  Location: left wrists      Objective     Tiffany received the following supervised modalities after being cleared for contradictions for 10  minutes:   -Paraffin bath  x 10 min to left  Hand and elbow  to increase blood flow, circulation and tissue elasticity prior to therex.     Tiffany received the following direct contact modalities after being cleared for contraindications for 10 min (5 each)  minutes:  -Ultrasound 3 mhz 0.5 w/cm2 x 8 min @ 100% continuous to left volar wrist (5 min) and elbow scar adhesion (5 min)  to increase blood flow, circulation, tissue elasticity, for pain management and to promote scar/wound healing     Tiffany received the following manual therapy techniques for 12 minutes: "   -Scar massage with massage stick and mini vibrator to decrease adhesions and improve tensile glide.     Tiffany received therapeutic exercises for 15 minutes including:  -  Tendon glides including hook, wave, flat and composite fist, ab/ad and digit extension, thumb IP flexion, thumb flexion, opposition , ab/ad and wrist flex, ext, RD, UD x 10 reps.      -  Instructed and performed red theraputty for gross , key, 2 and 3 pt pinch  And weightbearing thru palm x 10 reps each.   Reviewed modality use, mederma and scar pad use.      -   - Provided scar tape for elbow/wrist for scar management and instructed in use/wearing schedule.       Home Exercises and Education Provided     Education provided:   - written HEP with theraputty; provided theraputty ; Reviewed desensitization techniques   - Progress towards goals     Written Home Exercises Provided: yes.  Exercises were reviewed and Tiffany was able to demonstrate them prior to the end of the session.  Tiffany demonstrated good  understanding of the HEP provided.   .   See EMR under Patient Instructions for exercises provided 4/29/2019. And prior visit        Assessment       Tiffany is progressing well towards her goals and there are no updates to goals at this time. Pt prognosis is Good.      Decreased pain following treatment session reported.  Scar adhesion healing well, mild keloid formation on elbow.  Progressing with therex.  Patient concerned with index/middle finger slight PIP lag secondary to muscle weakness and scar adhesion, but progressing. Pt will continue to benefit from skilled outpatient occupational therapy to address the deficits listed in the problem list on initial evaluation to improve ROM, strength, pain management, /pinch strength, functional use, scar and edema management and to maximize pt's level of independence with ADLs in the home, work  and community environment.     Anticipated barriers to occupational therapy: None     Pt's  spiritual, cultural and educational needs considered and pt agreeable to plan of care and goals.    The following goals were discussed with the patient and patient is in agreement with them as to be addressed in the treatment plan.       Goals:   Short Term Goals (4 weeks)   1)  Patient to be IND with HEP and modalities for pain management  2)  Increase ROM 3-5 degrees to increase functional hand use for ADLs/work/leisure activities  3)   Decrease edema .2-.3 mm to increase joint mobility /flexibility for functional hand use.   4)  Assess  and pinch and set goals accordingly   5)  Patient to score at _30-40%____%  or less on FOTO to demonstrate improved perception of functional Left hand  Use.        Long Term Goals (8 weeks)   1)  Increase  strength 2-8 lbs. For holding files, driving.   2)  Increase pinch strength 1-4 psi's for typing, buttons, zippers         Plan   Recommend continued Outpatient Occupataional Therapy  1x week for 8 weeks to include the following interventions Paraffin, Manual therapy/joint mobilizations, Modalities for pain management, US 3 mhz, Therapeutic exercises/activities., Strengthening, Edema Control, Scar Management, Wound Care and Electrical Modalities.     Within the Certification Period/Plan of care expiration: 6/19/2019 to 8/19/2019     I certify the need for these services furnished under the plan of treatment and while under my care.     ____________________________________________________________________  Physician/Referring Practitioner   Date of Signature            AVERY Christianson, ABHIJIT

## 2019-07-08 ENCOUNTER — TELEPHONE (OUTPATIENT)
Dept: NEUROLOGY | Facility: CLINIC | Age: 44
End: 2019-07-08

## 2019-07-15 ENCOUNTER — PATIENT MESSAGE (OUTPATIENT)
Dept: NEUROLOGY | Facility: CLINIC | Age: 44
End: 2019-07-15

## 2019-07-16 ENCOUNTER — CLINICAL SUPPORT (OUTPATIENT)
Dept: REHABILITATION | Facility: HOSPITAL | Age: 44
End: 2019-07-16
Payer: OTHER GOVERNMENT

## 2019-07-16 ENCOUNTER — LAB VISIT (OUTPATIENT)
Dept: LAB | Facility: OTHER | Age: 44
End: 2019-07-16
Payer: OTHER GOVERNMENT

## 2019-07-16 DIAGNOSIS — M25.522 ELBOW PAIN, LEFT: ICD-10-CM

## 2019-07-16 DIAGNOSIS — G35 MULTIPLE SCLEROSIS: ICD-10-CM

## 2019-07-16 DIAGNOSIS — L90.5 SCAR ADHERENT: ICD-10-CM

## 2019-07-16 LAB
ALBUMIN SERPL BCP-MCNC: 3.7 G/DL (ref 3.5–5.2)
ALP SERPL-CCNC: 61 U/L (ref 55–135)
ALT SERPL W/O P-5'-P-CCNC: 24 U/L (ref 10–44)
ANION GAP SERPL CALC-SCNC: 10 MMOL/L (ref 8–16)
AST SERPL-CCNC: 20 U/L (ref 10–40)
BASOPHILS # BLD AUTO: 0.08 K/UL (ref 0–0.2)
BASOPHILS NFR BLD: 1 % (ref 0–1.9)
BILIRUB SERPL-MCNC: 0.3 MG/DL (ref 0.1–1)
BUN SERPL-MCNC: 11 MG/DL (ref 6–20)
CALCIUM SERPL-MCNC: 9.4 MG/DL (ref 8.7–10.5)
CHLORIDE SERPL-SCNC: 103 MMOL/L (ref 95–110)
CO2 SERPL-SCNC: 25 MMOL/L (ref 23–29)
CREAT SERPL-MCNC: 0.8 MG/DL (ref 0.5–1.4)
DIFFERENTIAL METHOD: ABNORMAL
EOSINOPHIL # BLD AUTO: 0.1 K/UL (ref 0–0.5)
EOSINOPHIL NFR BLD: 1.6 % (ref 0–8)
ERYTHROCYTE [DISTWIDTH] IN BLOOD BY AUTOMATED COUNT: 14.2 % (ref 11.5–14.5)
EST. GFR  (AFRICAN AMERICAN): >60 ML/MIN/1.73 M^2
EST. GFR  (NON AFRICAN AMERICAN): >60 ML/MIN/1.73 M^2
GLUCOSE SERPL-MCNC: 78 MG/DL (ref 70–110)
HBV CORE AB SERPL QL IA: NEGATIVE
HBV SURFACE AB SER-ACNC: NEGATIVE M[IU]/ML
HBV SURFACE AG SERPL QL IA: NEGATIVE
HCT VFR BLD AUTO: 38.4 % (ref 37–48.5)
HCV AB SERPL QL IA: NEGATIVE
HEPATITIS A ANTIBODY, IGG: NEGATIVE
HGB BLD-MCNC: 11.5 G/DL (ref 12–16)
HIV 1+2 AB+HIV1 P24 AG SERPL QL IA: NEGATIVE
IGA SERPL-MCNC: 358 MG/DL (ref 40–350)
IGG SERPL-MCNC: 1082 MG/DL (ref 650–1600)
IGM SERPL-MCNC: 51 MG/DL (ref 50–300)
IMM GRANULOCYTES # BLD AUTO: 0.02 K/UL (ref 0–0.04)
IMM GRANULOCYTES NFR BLD AUTO: 0.2 % (ref 0–0.5)
LYMPHOCYTES # BLD AUTO: 2 K/UL (ref 1–4.8)
LYMPHOCYTES NFR BLD: 25 % (ref 18–48)
MCH RBC QN AUTO: 20.9 PG (ref 27–31)
MCHC RBC AUTO-ENTMCNC: 29.9 G/DL (ref 32–36)
MCV RBC AUTO: 70 FL (ref 82–98)
MONOCYTES # BLD AUTO: 0.9 K/UL (ref 0.3–1)
MONOCYTES NFR BLD: 10.6 % (ref 4–15)
NEUTROPHILS # BLD AUTO: 5 K/UL (ref 1.8–7.7)
NEUTROPHILS NFR BLD: 61.6 % (ref 38–73)
NRBC BLD-RTO: 0 /100 WBC
PLATELET # BLD AUTO: 348 K/UL (ref 150–350)
PMV BLD AUTO: 11.6 FL (ref 9.2–12.9)
POTASSIUM SERPL-SCNC: 4 MMOL/L (ref 3.5–5.1)
PROT SERPL-MCNC: 7.4 G/DL (ref 6–8.4)
RBC # BLD AUTO: 5.5 M/UL (ref 4–5.4)
SODIUM SERPL-SCNC: 138 MMOL/L (ref 136–145)
WBC # BLD AUTO: 8.05 K/UL (ref 3.9–12.7)

## 2019-07-16 PROCEDURE — 87340 HEPATITIS B SURFACE AG IA: CPT

## 2019-07-16 PROCEDURE — 85025 COMPLETE CBC W/AUTO DIFF WBC: CPT

## 2019-07-16 PROCEDURE — 86790 VIRUS ANTIBODY NOS: CPT

## 2019-07-16 PROCEDURE — 86360 T CELL ABSOLUTE COUNT/RATIO: CPT

## 2019-07-16 PROCEDURE — 97110 THERAPEUTIC EXERCISES: CPT

## 2019-07-16 PROCEDURE — 86682 HELMINTH ANTIBODY: CPT

## 2019-07-16 PROCEDURE — 80053 COMPREHEN METABOLIC PANEL: CPT

## 2019-07-16 PROCEDURE — 97140 MANUAL THERAPY 1/> REGIONS: CPT

## 2019-07-16 PROCEDURE — 86359 T CELLS TOTAL COUNT: CPT

## 2019-07-16 PROCEDURE — 86803 HEPATITIS C AB TEST: CPT

## 2019-07-16 PROCEDURE — 86703 HIV-1/HIV-2 1 RESULT ANTBDY: CPT

## 2019-07-16 PROCEDURE — 86704 HEP B CORE ANTIBODY TOTAL: CPT

## 2019-07-16 PROCEDURE — 86787 VARICELLA-ZOSTER ANTIBODY: CPT

## 2019-07-16 PROCEDURE — 86706 HEP B SURFACE ANTIBODY: CPT

## 2019-07-16 PROCEDURE — 86592 SYPHILIS TEST NON-TREP QUAL: CPT

## 2019-07-16 PROCEDURE — 82784 ASSAY IGA/IGD/IGG/IGM EACH: CPT | Mod: 59

## 2019-07-16 PROCEDURE — 97035 APP MDLTY 1+ULTRASOUND EA 15: CPT

## 2019-07-16 NOTE — PROGRESS NOTES
"  Occupational Therapy Daily Treatment Note     Date: 7/16/2019  Name: Tiffany Cruz  Clinic Number: [id number]    Medical Diagnosis: Left CT and ulnar nerve decompression 3/18/2019   Therapy Diagnosis:        Encounter Diagnoses   Name Primary?    Hand pain, left      Decreased  strength of left hand      Range of motion deficit        Physician: Billie Daniel PA; Dr. LILLIAM Vaughan      Physician Orders: status post Ulnar nerve decompression at the elbow left and Carpal tunnel release left  Eval and Treat, modalities as needed  1-2 times/week for 6+ weeks     Surgical Procedure and Date: 3/18/2019  Evaluation Date: 4/16/2019     Plan of Care Certification Period: 6/16/2019  Date of Return to MD:      Visit # / Visits authorized: 7/ 16  Insurance Authorization Period Expiration: 8/8/2019  FOTO (visit 2)     Time In:8:30 am   Time Out: 9:15 am   Total Billable Time: 45 minutes    Precautions:  Standard      Subjective     Pt reports:  "I don't have any pain. My index finger is stiff. "   she was compliant with home exercise program given last session.   Response to previous treatment: Improved ROM   Functional change: Increase functional use for typing    Pain: 0/10  Location: left wrists      Objective     Tiffany received the following supervised modalities after being cleared for contradictions for 5 minutes:   -Paraffin bath  x 5 min to left  Hand and elbow  to increase blood flow, circulation and tissue elasticity prior to therex.     Tiffany received the following direct contact modalities after being cleared for contraindications for 10 min (5 each)  minutes:  -Ultrasound 3 mhz 0.5 w/cm2 x 8 min @ 100% continuous to left volar wrist (5 min) and elbow scar adhesion (5 min)  to increase blood flow, circulation, tissue elasticity, for pain management and to promote scar/wound healing     Tiffany received the following manual therapy techniques for 10 minutes:   -Scar massage with massage stick " "and mini vibrator to decrease adhesions and improve tensile glide.     Tiffany received therapeutic exercises for 20 minutes including:  -  Tendon glides including hook, wave, flat and composite fist, ab/ad and digit extension, thumb IP flexion, thumb flexion, opposition , ab/ad and wrist flex, ext, RD, UD x 10 reps.      -  Instructed and performed red theraputty for gross , key, 2 and 3 pt pinch  And weightbearing thru palm x 10 reps each.   - Elbow flex/ext, 1#, x 10 reps  - Wrist PRE, 1#, x 15 reps each way, 3 ways.   - Performed Prayer stretch 2 x 30"  Reviewed modality use, mederma and scar pad use.        - Provided scar tape again for elbow/wrist for scar management and instructed in use/wearing schedule.       Home Exercises and Education Provided     Education provided:   - Cont HEP, scar management, and desensitization   - Progress towards goals     Written Home Exercises Provided: yes.  Exercises were reviewed and Tiffany was able to demonstrate them prior to the end of the session.  Tiffany demonstrated good  understanding of the HEP provided.   .   See EMR under Patient Instructions for exercises provided 4/29/2019. And prior visit        Assessment       Tiffany is progressing well towards her goals and there are no updates to goals at this time. Pt prognosis is Good.     Pt tolerated tx well this date. Good participation and motivation. Denied pain with progression of ex this date.   Scar adhesion healing well, mild keloid formation remains on elbow.  Cont to encourage scar tape. Progressing with therex.  Cont to c/o stiffness and tight PIP on IF/LF. Pt will continue to benefit from skilled outpatient occupational therapy to address the deficits listed in the problem list on initial evaluation to improve ROM, strength, pain management, /pinch strength, functional use, scar and edema management and to maximize pt's level of independence with ADLs in the home, work  and community environment. "     Anticipated barriers to occupational therapy: None     Pt's spiritual, cultural and educational needs considered and pt agreeable to plan of care and goals.    The following goals were discussed with the patient and patient is in agreement with them as to be addressed in the treatment plan.       Goals:   Short Term Goals (4 weeks)   1)  Patient to be IND with HEP and modalities for pain management  2)  Increase ROM 3-5 degrees to increase functional hand use for ADLs/work/leisure activities  3)   Decrease edema .2-.3 mm to increase joint mobility /flexibility for functional hand use.   4)  Assess  and pinch and set goals accordingly   5)  Patient to score at _30-40%____%  or less on FOTO to demonstrate improved perception of functional Left hand  Use.        Long Term Goals (8 weeks)   1)  Increase  strength 2-8 lbs. For holding files, driving.   2)  Increase pinch strength 1-4 psi's for typing, buttons, zippers         Plan   Recommend continued Outpatient Occupataional Therapy  1x week for 8 weeks to include the following interventions Paraffin, Manual therapy/joint mobilizations, Modalities for pain management, US 3 mhz, Therapeutic exercises/activities., Strengthening, Edema Control, Scar Management, Wound Care and Electrical Modalities.     Within the Certification Period/Plan of care expiration: 6/19/2019 to 8/19/2019

## 2019-07-17 LAB
ABSOLUTE CD3: 1869 CELLS/UL (ref 700–2100)
ABSOLUTE CD8: 551 CELLS/UL (ref 200–900)
CD3%: 80.9 % (ref 55–83)
CD3+CD4+ CELLS # BLD: 1284 CELLS/UL (ref 300–1400)
CD3+CD4+ CELLS NFR BLD: 55.6 % (ref 28–57)
CD4/CD8 RATIO: 2.33 (ref 0.9–3.6)
CD8 % SUPPRESSOR T CELL: 23.9 % (ref 10–39)
RPR SER QL: NORMAL
VARICELLA INTERPRETATION: POSITIVE
VARICELLA ZOSTER IGG: 3.22 ISR (ref 0–0.9)

## 2019-07-18 LAB — STRONGYLOIDES ANTIBODY IGG: NEGATIVE

## 2019-07-19 ENCOUNTER — CLINICAL SUPPORT (OUTPATIENT)
Dept: REHABILITATION | Facility: HOSPITAL | Age: 44
End: 2019-07-19
Payer: OTHER GOVERNMENT

## 2019-07-19 DIAGNOSIS — M25.522 ELBOW PAIN, LEFT: ICD-10-CM

## 2019-07-19 DIAGNOSIS — L90.5 SCAR ADHERENT: ICD-10-CM

## 2019-07-19 PROCEDURE — 97035 APP MDLTY 1+ULTRASOUND EA 15: CPT

## 2019-07-19 PROCEDURE — 97110 THERAPEUTIC EXERCISES: CPT

## 2019-07-19 PROCEDURE — 97140 MANUAL THERAPY 1/> REGIONS: CPT | Mod: 59

## 2019-07-19 PROCEDURE — 97018 PARAFFIN BATH THERAPY: CPT | Mod: 59

## 2019-07-19 NOTE — PROGRESS NOTES
"  Occupational Therapy Daily Treatment Note     Date: 7/19/2019  Name: Tiffany Cruz  Clinic Number: [id number]    Medical Diagnosis: Left CT and ulnar nerve decompression 3/18/2019   Therapy Diagnosis:        Encounter Diagnoses   Name Primary?    Hand pain, left      Decreased  strength of left hand      Range of motion deficit        Physician: Billie Daniel PA; Dr. LILLIAM Vaughan      Physician Orders: status post Ulnar nerve decompression at the elbow left and Carpal tunnel release left  Eval and Treat, modalities as needed  1-2 times/week for 6+ weeks     Surgical Procedure and Date: 3/18/2019  Evaluation Date: 4/16/2019     Plan of Care Certification Period: 6/16/2019  Date of Return to MD:      Visit # / Visits authorized: 8/ 16  Insurance Authorization Period Expiration: 8/8/2019  FOTO (visit 2)     Time In: 9:20 am   Time Out: 10:10 am   Total Billable Time: 50 minutes    Precautions:  Standard      Subjective     Pt reports:  "I don't have any pain today. My fingers are stiff though. I've been trying to stretch them. "   she was compliant with home exercise program given last session.   Response to previous treatment: Improved ROM   Functional change: Increase functional use for typing    Pain: 0/10  Location: left wrists      Objective     Tiffany received the following supervised modalities after being cleared for contradictions for 10 minutes:   -Paraffin bath  x 10 min to left  Hand and elbow  to increase blood flow, circulation and tissue elasticity prior to therex.     Tiffany received the following direct contact modalities after being cleared for contraindications for 10 min (5 each)  minutes:  -Ultrasound 3 mhz 0.5 w/cm2 x 8 min @ 100% continuous to left volar wrist (5 min) and elbow scar adhesion (5 min)  to increase blood flow, circulation, tissue elasticity, for pain management and to promote scar/wound healing     Tiffany received the following manual therapy techniques for " "10 minutes:   -Scar massage with massage stick and mini vibrator to decrease adhesions and improve tensile glide.     Tiffany received therapeutic exercises for 20 minutes including:  -  Tendon glides including hook, wave, flat and composite fist, ab/ad and digit extension, thumb IP flexion, thumb flexion, opposition , ab/ad and wrist flex, ext, RD, UD x 10 reps.      -  Instructed and performed red theraputty for gross , key, 2 and 3 pt pinch  And weightbearing thru palm x 10 reps each.   - Elbow flex/ext (3 ways), 1#, x 10 reps  - Wrist PRE, 1#, x 20 reps each way, 3 ways.   - Performed Prayer stretch 2 x 30"  Reviewed modality use, mederma and scar pad use.            Home Exercises and Education Provided     Education provided:   - Cont HEP, scar management, and desensitization   - Progress towards goals     Written Home Exercises Provided: yes.  Exercises were reviewed and Tiffany was able to demonstrate them prior to the end of the session.  Tiffany demonstrated good  understanding of the HEP provided.   .   See EMR under Patient Instructions for exercises provided 4/29/2019. And prior visit        Assessment       Tiffany is progressing well towards her goals and there are no updates to goals at this time. Pt prognosis is Good.     Pt tolerated tx well this date. Good participation and motivation. Denied pain with progression of ex this date.   Mild keloid formation remains on elbow.  Cont to encourage scar tape. Progressing with therex.  Good tolerance of light strengthening. Cont to c/o stiffness and tight PIP on IF/LF. Good ROM noted however.  Pt will continue to benefit from skilled outpatient occupational therapy to address the deficits listed in the problem list on initial evaluation to improve ROM, strength, pain management, /pinch strength, functional use, scar and edema management and to maximize pt's level of independence with ADLs in the home, work  and community environment. "     Anticipated barriers to occupational therapy: None     Pt's spiritual, cultural and educational needs considered and pt agreeable to plan of care and goals.    The following goals were discussed with the patient and patient is in agreement with them as to be addressed in the treatment plan.       Goals:   Short Term Goals (4 weeks)   1)  Patient to be IND with HEP and modalities for pain management  2)  Increase ROM 3-5 degrees to increase functional hand use for ADLs/work/leisure activities  3)   Decrease edema .2-.3 mm to increase joint mobility /flexibility for functional hand use.   4)  Assess  and pinch and set goals accordingly   5)  Patient to score at _30-40%____%  or less on FOTO to demonstrate improved perception of functional Left hand  Use.        Long Term Goals (8 weeks)   1)  Increase  strength 2-8 lbs. For holding files, driving.   2)  Increase pinch strength 1-4 psi's for typing, buttons, zippers         Plan   Recommend continued Outpatient Occupataional Therapy  1x week for 8 weeks to include the following interventions Paraffin, Manual therapy/joint mobilizations, Modalities for pain management, US 3 mhz, Therapeutic exercises/activities., Strengthening, Edema Control, Scar Management, Wound Care and Electrical Modalities.     Within the Certification Period/Plan of care expiration: 6/19/2019 to 8/19/2019

## 2019-07-23 ENCOUNTER — PATIENT MESSAGE (OUTPATIENT)
Dept: NEUROLOGY | Facility: CLINIC | Age: 44
End: 2019-07-23

## 2019-07-23 NOTE — TELEPHONE ENCOUNTER
Labs reviewed.   Tb Gold negative.   VZV positive (past vaccination or infection)  Strongyloides negative  RPR negative   Hep B Surface Ab negative  Hep B Surface Ag negative  Hep B Core Ab negative   Hep C negative  HIV negative   Hep A negative   [id number]  Normal CMP   CBC with normal WBC, [id number]    She needs to schedule with ID. She can start cholestyramine now. Rx has been sent to pharmacy. She will take it three times daily for 11 days, and we will plan first Ocrevus one month after completion of Cholestyramine.

## 2019-07-26 ENCOUNTER — TELEPHONE (OUTPATIENT)
Dept: NEUROLOGY | Facility: CLINIC | Age: 44
End: 2019-07-26

## 2019-07-26 NOTE — TELEPHONE ENCOUNTER
----- Message from Han Khan sent at 7/24/2019  3:47 PM CDT -----  Contact: pt @ [phone]  Pt is asking to speak w/ you to schedule an appt

## 2019-07-29 ENCOUNTER — CLINICAL SUPPORT (OUTPATIENT)
Dept: REHABILITATION | Facility: HOSPITAL | Age: 44
End: 2019-07-29
Payer: OTHER GOVERNMENT

## 2019-07-29 DIAGNOSIS — L90.5 SCAR ADHERENT: ICD-10-CM

## 2019-07-29 DIAGNOSIS — M25.522 ELBOW PAIN, LEFT: ICD-10-CM

## 2019-07-29 PROCEDURE — G8984 CARRY CURRENT STATUS: HCPCS | Mod: CK

## 2019-07-29 PROCEDURE — 97140 MANUAL THERAPY 1/> REGIONS: CPT

## 2019-07-29 PROCEDURE — 97110 THERAPEUTIC EXERCISES: CPT

## 2019-07-29 PROCEDURE — G8985 CARRY GOAL STATUS: HCPCS | Mod: CJ

## 2019-07-29 PROCEDURE — 97035 APP MDLTY 1+ULTRASOUND EA 15: CPT

## 2019-07-29 NOTE — PROGRESS NOTES
"  Occupational Therapy Daily Treatment Note     Date: 7/29/2019  Name: Tiffany Cruz  Clinic Number: [id number]    Medical Diagnosis: Left CT and ulnar nerve decompression 3/18/2019   Therapy Diagnosis:        Encounter Diagnoses   Name Primary?    Hand pain, left      Decreased  strength of left hand      Range of motion deficit        Physician: Billie Daniel PA; Dr. LILLIAM Vaughan      Physician Orders: status post Ulnar nerve decompression at the elbow left and Carpal tunnel release left  Eval and Treat, modalities as needed  1-2 times/week for 6+ weeks     Surgical Procedure and Date: 3/18/2019  Evaluation Date: 4/16/2019     Plan of Care Certification Period: 6/16/2019  Date of Return to MD: as needed     Visit # / Visits authorized: 9/ 16  Insurance Authorization Period Expiration: 8/8/2019  FOTO (visit 2, 9)     Time In: 9:20 am   Time Out: 10:10 am   Total Billable Time: 50 minutes    Precautions:  Standard      Subjective     Pt reports:  "I just went to [place], my hand is just stiff."   she was compliant with home exercise program given last session.   Response to previous treatment: Improved ROM   Functional change: Increase functional use for typing    Pain: 0/10  Location: left wrists      Objective     Tiffany received the following supervised modalities after being cleared for contradictions for 10 minutes:   -Paraffin bath  x 10 min to left  Hand and elbow  to increase blood flow, circulation and tissue elasticity prior to therex.     Tiffany received the following direct contact modalities after being cleared for contraindications for 10 min (5 each)  minutes:  -Ultrasound 3 mhz 0.5 w/cm2 x 8 min @ 100% continuous to left volar wrist (5 min) and elbow scar adhesion (5 min)  to increase blood flow, circulation, tissue elasticity, for pain management and to promote scar/wound healing     Tiffany received the following manual therapy techniques for 10 minutes:   -Scar massage with " "massage stick and mini vibrator to decrease adhesions and improve tensile glide.   - DTM to dorsal forearm/wrist region due to muscle guarding and tightness to increase mobility and relaxation of forearm/wrist extensors.     Tiffany received therapeutic exercises for 20 minutes including:  -  Tendon glides including hook, wave, flat and composite fist, ab/ad and digit extension, thumb IP flexion, thumb flexion, opposition , ab/ad and wrist flex, ext, RD, UD x 10 reps.      -  Instructed and performed red theraputty for gross , key, 2 and 3 pt pinch  And weightbearing thru palm x 10 reps each.   - Elbow flex/ext (3 ways), 2#, x 10 reps  - Wrist PRE, 2#, x 20 reps each way, 3 ways.   - Performed Prayer stretch 2 x 30"  Reviewed modality use, mederma and scar pad use.        Edema. Measured in centimeters.   MP's 18 cm   PPC 19.2 cm (-1.3)    PWC 15.5 cm (-1)     Hand ROM. Measured in degrees.      Wrist ext/flex 50 (+30) / 50      R) 40 lbs.  L) 28 lbs.   Wilson pinch r) 9  L) 3   3pt pinch r) 9   L) 2   2pt pinch r) 6   L) 2.5       Focus on Therapeutic Outcomes (FOTO) Symptom Scale: Patient score indicates self perception of 40% impairment, limitation or restriction of function upon followup assessment. (+23%)    Home Exercises and Education Provided     Education provided:   - Cont HEP, scar management, and desensitization   - Progress towards goals     Written Home Exercises Provided: yes.  Exercises were reviewed and Tiffany was able to demonstrate them prior to the end of the session.  Tiffany demonstrated good  understanding of the HEP provided.   .   See EMR under Patient Instructions for exercises provided 4/29/2019. And prior visit        Assessment       Tiffany is progressing well towards her goals and there are no updates to goals at this time. Pt prognosis is Good.     Pt tolerated tx well this date. Good participation and motivation. Denied pain with progression of ex this date.   Mild keloid " formation remains on elbow.  Cont to encourage scar tape. Progressing with therex.  Good tolerance of light strengthening. Cont to c/o stiffness and tight PIP on IF/LF. Good ROM noted however.  Pt will continue to benefit from skilled outpatient occupational therapy to address the deficits listed in the problem list on initial evaluation to improve ROM, strength, pain management, /pinch strength, functional use, scar and edema management and to maximize pt's level of independence with ADLs in the home, work  and community environment.     Anticipated barriers to occupational therapy: None     Pt's spiritual, cultural and educational needs considered and pt agreeable to plan of care and goals.    The following goals were discussed with the patient and patient is in agreement with them as to be addressed in the treatment plan.       Goals:   Short Term Goals (4 weeks)   1)  Patient to be IND with HEP and modalities for pain management  2)  Increase ROM 3-5 degrees to increase functional hand use for ADLs/work/leisure activities--met  3)   Decrease edema .2-.3 mm to increase joint mobility /flexibility for functional hand use. --met  4)  Assess  and pinch and set goals accordingly --met  5)  Patient to score at _30-40%____%  or less on FOTO to demonstrate improved perception of functional Left hand  Use.--met        Long Term Goals (8 weeks)   1)  Increase  strength 2-8 lbs. For holding files, driving.   2)  Increase pinch strength 1-4 psi's for typing, buttons, zippers         Plan   Recommend continued Outpatient Occupataional Therapy  1x week for 8 weeks to include the following interventions Paraffin, Manual therapy/joint mobilizations, Modalities for pain management, US 3 mhz, Therapeutic exercises/activities., Strengthening, Edema Control, Scar Management, Wound Care and Electrical Modalities.     Within the Certification Period/Plan of care expiration: 6/19/2019 to 8/19/2019

## 2019-07-30 NOTE — TELEPHONE ENCOUNTER
[facility] therapy plan initiated. SMN/PAN faxed to [facility].   Verified Results  (1) AMYLASE [id number] 07:45AM Mommy Nearest     Test Name Result Flag Reference   Amylase, Serum 72 U/L       (1) CBC/PLT/DIFF [id number] 07:45AM Mommy Nearest     Test Name Result Flag Reference   WBC 6 9 x10E3/uL  3 4-10 8   RBC 4 30 x10E6/uL  3 77-5 28   Hemoglobin 13 5 g/dL  11 1-15 9   Hematocrit 39 1 %  34 0-46  6   MCV 91 fL  79-97   MCH 31 4 pg  26 6-33 0   MCHC 34 5 g/dL  31 5-35 7   RDW 13 5 %  12 3-15 4   Platelets 122 F94Y6/OH  150-379   Neutrophils 53 %     Lymphs 35 %     Monocytes 6 %     Eos 5 %     Basos 1 %     Neutrophils (Absolute) 3 7 x10E3/uL  1 4-7 0   Lymphs (Absolute) 2 4 x10E3/uL  0 7-3 1   Monocytes(Absolute) 0 4 x10E3/uL  0 1-0 9   Eos (Absolute) 0 3 x10E3/uL  0 0-0 4   Baso (Absolute) 0 1 x10E3/uL  0 0-0 2   Immature Granulocytes 0 %     Immature Grans (Abs) 0 0 x10E3/uL  0 0-0 1     (1) COMPREHENSIVE METABOLIC PANEL [id number] 58:68QX [facility]     Test Name Result Flag Reference   Glucose, Serum 178 mg/dL H 65-99   BUN 19 mg/dL  6-24   Creatinine, Serum 1 20 mg/dL H 0 57-1 00   eGFR If NonAfricn Am 54 mL/min/1 73 L >59   eGFR If Africn Am 62 mL/min/1 73  >59   BUN/Creatinine Ratio 16  9-23   Sodium, Serum 139 mmol/L  134-144   Potassium, Serum 4 0 mmol/L  3 5-5 2   Chloride, Serum 98 mmol/L     Carbon Dioxide, Total 25 mmol/L  18-29   Calcium, Serum 9 5 mg/dL  8 7-10 2   Protein, Total, Serum 6 4 g/dL  6 0-8 5   Albumin, Serum 4 2 g/dL  3 5-5 5   Globulin, Total 2 2 g/dL  1 5-4 5   A/G Ratio 1 9  1 1-2 5   **Effective March 13, 2017 the reference interval**                   for A/G Ratio will be changing to:                               Age                Male          Female                           0 -  7 days       1 1 - 2 3       1 1 - 2 3                           8 - 30 days       1 2 - 2 8       1 2 - 2 8                           1 -  6 months     1 3 - 3 6       1 3 - 3 6                    7 months -  5 years      1 5 - 2 6 1 5 - 2 6                              > 5 years      1 2 - 2 2       1 2 - 2 2   Bilirubin, Total 0 5 mg/dL  0 0-1 2   Alkaline Phosphatase, S 53 IU/L     AST (SGOT) 15 IU/L  0-40   ALT (SGPT) 11 IU/L  0-32     (1) HEMOGLOBIN A1C [id number] 07:45AM Dickie Barley     Test Name Result Flag Reference   Hemoglobin A1c 8 6 % H 4 8-5 6   Pre-diabetes: 5 7 - 6 4           Diabetes: >6 4           Glycemic control for adults with diabetes: <7 0     (1) LIPASE [id number] 07:45AM Dickie Barley     Test Name Result Flag Reference   Lipase, Serum 86 U/L H 0-59     (1) LIPID PANEL, FASTING [id number] 07:45AM Dickie Barley     Test Name Result Flag Reference   Cholesterol, Total 175 mg/dL  100-199   Triglycerides 293 mg/dL H 0-149   HDL Cholesterol 54 mg/dL  >39   VLDL Cholesterol Porfirio 59 mg/dL H 5-40   LDL Cholesterol Calc 62 mg/dL  0-99   T  Chol/HDL Ratio 3 2 ratio units  0 0-4 4   T  Chol/HDL Ratio                                                             Men  Women                                               1/2 Avg  Risk  3 4    3 3                                                   Avg Risk  5 0    4 4                                                2X Avg  Risk  9 6    7 1                                                3X Avg  Risk 23 4   11 0     (1) TSH WITH FT4 REFLEX [id number] 07:45AM Dickie Barley     Test Name Result Flag Reference   TSH 1 470 uIU/mL  0 450-4 500     (1) MAGNESIUM [id number] 07:45AM Dickie Barley     Test Name Result Flag Reference   Magnesium, Serum 1 5 mg/dL L 1 6-2 3     (LC) Rheumatoid Arthritis Factor [id number] 07:45AM Dickie Barley     Test Name Result Flag Reference   RA Latex Turbid   11 3 IU/mL  0 0-13 9     [facility]) Cardiovascular Risk Assessment [id number] 07:45AM Dickie Barley     Test Name Result Flag Reference   Interpretation NTAP     PDF Image Not applicable       [facility]) [employer] CKD Program [id number] 07:45AM Dickie Barley     Test Name Result Flag Reference Interpretation Note     -------------------------------  CARDIOVASCULAR REPORT:  -------------------------------  Current available clinical information suggests the  patient's risk is at least HIGH  Your patient has laboratory  results that suggest the presence of two CHD risk  equivalents (diabetes and chronic kidney disease)  -  Insulin resistance, obesity, excessive alcohol use, smoking,  thyroid disease, nephrotic syndrome, liver disease, and  certain medications are all causes of secondary  dyslipidemia  Consider evaluation if clinically indicated  -  Therapeutic lifestyle changes are always valuable to achieve  optimal blood lipid status (diet, exercise, weight  management)  -------------------------------  LIPID MANAGEMENT  Select one patient risk category based upon medical history  and clinical judgment  Additional risk factors such as  personal or family history of premature CHD, smoking, and  hypertension modify a patient's goals of therapy  In CVD  prevention, the intensity of therapy should be adjusted to  the level of patient risk  MODERATE intensity statin therapy  generally results in an average LDL-C reduction of 30% to  less than 50% from the untreated baseline  Examples include  (daily doses): atorvastatin 10-20 mg, rosuvastatin 5-10 mg,  simvastatin 20-40 mg, pravastatin 40-80 mg, lovastatin 40  mg  HIGH intensity statin therapy generally results in an  average LDL-C reduction of 50% or more from the untreated  baseline  Examples include (daily doses): atorvastatin 40-80  mg and rosuvastatin 20 mg   -------------------------------  LOW RISK ASSESSMENT AND TREATMENT SUGGESTIONS  -------------------------------  LDL-C is optimal, 62 mg/dL  Non-HDL Cholesterol is optimal,  121 mg/dL  -  Please refer to assessment and treatment suggestions under  high risk category    -------------------------------  INTERMEDIATE RISK ASSESSMENT AND TREATMENT SUGGESTIONS  -------------------------------  LDL-C is optimal, 62 mg/dL  Non-HDL Cholesterol is optimal,  121 mg/dL  -  Please refer to assessment and treatment suggestions under  high risk category  -------------------------------  HIGH RISK ASSESSMENT AND TREATMENT SUGGESTIONS  -------------------------------  LDL-C is optimal, 62 mg/dL  Non-HDL Cholesterol is normal,  121 mg/dL  -  Continue statin if in use  Consider measurement of LDL  particle number or Apo B to adjudicate need for further LDL  lowering therapy  If statin cannot be tolerated or  increased, alternatives include use of an intestinal agent  (ezetimibe or bile acid sequestrant), niacin, and/or fish  oil   -------------------------------  CHRONIC KIDNEY DISEASE:  EGFR, BLOOD PRESSURE, AND PROTEINURIA ASSESSMENT  We presume eGFR has been less than 60 mL/min/1 73mE2 on at  least two occasions spaced at least 3 months apart  Current  eGFR is 54 mL/min/1 73mE2 corresponding to CKD stage 3a  Multiply eGFR by 1 159 if patient is   Potassium is within goal, 4 0 mmol/L  Glycemic control (HB  A1c: 8 6 %) is not within goal and additional action is  indicated  EGFR, BLOOD PRESSURE, AND PROTEINURIA TREATMENT SUGGESTIONS  -  Guidelines recommend a target blood pressure of 140/90 mmHg  or less in CKD patients to reduce cardiovascular risk and  CKD progression  Assessment of albuminuria (urine  albumin:creatinine ratio or urine protein:creatinine ratio  preferred) is recommended at least annually in CKD patients  for staging and disease prognosis  EGFR, BLOOD PRESSURE, AND PROTEINURIA FOLLOW-UP  -  fasting Renal Panel within 12 months; Spot Urine Panel is  recommended by KDOQI guidelines, at least yearly; Hemoglobin  A1C within 3 months;  -  BONE and MINERAL ASSESSMENT  Calcium is within goal, 9 5 mg/dL  Carbon Dioxide is within  goal, 25 mmol/L  KDOQI guidelines recommend the measurement  of 25-hydroxy vitamin D in patients with CKD    BONE and MINERAL TREATMENT SUGGESTIONS  -  Interpretations require simultaneous measurements of serum  calcium and phosphorus  BONE and MINERAL FOLLOW-UP  -  fasting PTH with Renal Panel and 25-Hydroxy Vitamin D are  recommended by KDOQI guidelines, at least yearly;  -  LIPIDS ASSESSMENT  LDL-C is optimal, 62 mg/dL  Triglyceride is high, 293 mg/dL  Non-HDL Cholesterol is normal, 121 mg/dL  HDL-C is normal,  54 mg/dL  LIPIDS TREATMENT SUGGESTIONS  -  Therapeutic lifestyle changes are always valuable to  maintain optimal blood lipid status (diet, exercise, weight  management)  Continue statin if in use  Consider measurement  of LDL particle number or Apo B to adjudicate need for  further LDL lowering therapy  If statin cannot be tolerated  or increased, alternatives include use of an intestinal  agent (ezetimibe or bile acid sequestrant), niacin, and/or  fish oil  LIPIDS FOLLOW-UP  -  fasting Lipid Panel within 12 months;  -  ANEMIA ASSESSMENT  Hemoglobin is normal, 13 5 g/dL  Hemoglobin target assumes  DEB is not in use  ANEMIA TREATMENT SUGGESTIONS  -  No specific change of treatment is indicated at this time  ANEMIA FOLLOW-UP  -  CBC within 12 months;  -------------------------------  DISCLAIMER  These assessments and treatment suggestions are provided as  a convenience in support of the physician-patient  relationship and are not intended to replace the physician's  clinical judgment  They are derived from the national  guidelines in addition to other evidence and expert opinion  The clinician should consider this information within the  context of clinical opinion and the individual patient  SEE GUIDANCE FOR CARDIOVASCULAR REPORT: National Heart,  Lung, and Blood [place]ondo's Third Report of the NCEP Expert  Panel on Detection, Evaluation and Treatment of High Blood  Cholesterol in Adults (ATP III) (2002  NIH publication  );  Mark Anthony et al  Diabetes Care 2008; 31(4):811-82;  Iman et al  Clin Chem 2009; 69(5):355-549; Hanh Murillo [place] et  al  2013 ACC/AHA guideline on the treatment of blood  cholesterol to reduce atherosclerotic cardiovascular risk in  adults: a report of the [employer] of  Cardiology/American Heart Association Task Force on Practice  Guidelines  Circulation 9526;293(CLEOCELESTE 2):S1? S45  SEE GUIDANCE FOR CHRONIC KIDNEY DISEASE PROGRAM: National  Kidney Foundation Kidney Disease Outcomes Quality Initiative  (KDOQI (TM)), with its limitations and disclaimers, are at  www kidney  org/professionals/KDOQI  Kidney Disease Improving  Global Outcomes (KDIGO) clinical practice guidelines are at  http://kdigo  org/home/guidelines/  The members of  Jeannie Griffin national advisory panel are listed at  www  [employer] com  This program is intended for patients who  have been diagnosed with stages 3, 4, or pre-dialysis 5 CKD  It is not intended for children, pregnant patients, or  transplant patients  PDF Image          ([facility]) LINDSAY w/Reflex if Positive [id number] 07:45AM Shavon Slim     Test Name Result Flag Reference   LINDSAY Direct Negative  Negative

## 2019-07-31 ENCOUNTER — DOCUMENTATION ONLY (OUTPATIENT)
Dept: NEUROLOGY | Facility: CLINIC | Age: 44
End: 2019-07-31

## 2019-08-01 ENCOUNTER — CLINICAL SUPPORT (OUTPATIENT)
Dept: REHABILITATION | Facility: HOSPITAL | Age: 44
End: 2019-08-01
Payer: OTHER GOVERNMENT

## 2019-08-01 DIAGNOSIS — M25.522 ELBOW PAIN, LEFT: ICD-10-CM

## 2019-08-01 DIAGNOSIS — M25.60 RANGE OF MOTION DEFICIT: ICD-10-CM

## 2019-08-01 DIAGNOSIS — L90.5 SCAR ADHERENT: ICD-10-CM

## 2019-08-01 DIAGNOSIS — M79.642 HAND PAIN, LEFT: Primary | ICD-10-CM

## 2019-08-01 PROCEDURE — 97110 THERAPEUTIC EXERCISES: CPT

## 2019-08-01 PROCEDURE — 97140 MANUAL THERAPY 1/> REGIONS: CPT

## 2019-08-01 PROCEDURE — 97035 APP MDLTY 1+ULTRASOUND EA 15: CPT

## 2019-08-01 NOTE — PROGRESS NOTES
"  Occupational Therapy Daily Treatment Note     Date: 8/1/2019  Name: Tiffany Cruz  Clinic Number: [id number]    Medical Diagnosis: Left CT and ulnar nerve decompression 3/18/2019   Therapy Diagnosis:        Encounter Diagnoses   Name Primary?    Hand pain, left      Decreased  strength of left hand      Range of motion deficit        Physician: Billie Daniel PA; Dr. LILLIAM Vaughan      Physician Orders: status post Ulnar nerve decompression at the elbow left and Carpal tunnel release left  Eval and Treat, modalities as needed  1-2 times/week for 6+ weeks     Surgical Procedure and Date: 3/18/2019  Evaluation Date: 4/16/2019     Plan of Care Certification Period: 6/16/2019  Date of Return to MD: as needed     Visit # / Visits authorized: 11/ 16  Insurance Authorization Period Expiration: 8/8/2019  FOTO (visit 2, 9)     Time In: 9:30 am   Time Out: 10:10 am   Total Billable Time: 50 minutes    Precautions:  Standard      Subjective     Pt reports:  "My elbow is still sensitive to touch, and the scar on my hand is still a little hard.  My hand is still weaker than my right"   she was compliant with home exercise program given last session.   Response to previous treatment: Improved ROM   Functional change: Increase functional use for typing    Pain: 0/10  Location: left wrist    Objective     Tiffany received the following supervised modalities after being cleared for contradictions for 10 minutes:   -Paraffin bath  x 10 min to left  Hand and elbow  to increase blood flow, circulation and tissue elasticity prior to therex.     Tiffany received the following direct contact modalities after being cleared for contraindications for 10 min (5 each)  minutes:  -Ultrasound 3 mhz 0.5 w/cm2 x 8 min @ 100% continuous to left volar wrist (5 min) and elbow scar adhesion (5 min)  to increase blood flow, circulation, tissue elasticity, for pain management and to promote scar/wound healing     Tiffany received the " "following manual therapy techniques for 10 minutes:   -Scar massage with massage stick and mini vibrator to decrease adhesions and improve tensile glide.   - DTM to dorsal forearm/wrist region due to muscle guarding and tightness to increase mobility and relaxation of forearm/wrist extensors.     Tiffany received therapeutic exercises for 20 minutes including:  -  Tendon glides including hook, wave, flat and composite fist, ab/ad and digit extension, thumb IP flexion, thumb flexion, opposition , ab/ad and wrist flex, ext, RD, UD x 10 reps.      -  Instructed and performed red theraputty for gross , key, 2 and 3 pt pinch  And weightbearing thru palm x 10 reps each.   -yellow digi extender x 10 reps   - Elbow flex/ext (3 ways), 2#, x 10 reps  - Wrist PRE, 2#, x 20 reps each way, 3 ways.   - 35 lbs. PHG x 15 reps for  strengthening   - Performed Prayer stretch 2 x 30"   -  Reviewed modality use, mederma and scar pad use.        Edema. Measured in centimeters.   MP's 18 cm   PPC 19.2 cm (-1.3)    PWC 15.5 cm (-1)     Hand ROM. Measured in degrees.      Wrist ext/flex 50 (+30) / 50      R) 40 lbs.  L) 28 lbs.   Wilson pinch r) 9  L) 3   3pt pinch r) 9   L) 2   2pt pinch r) 6   L) 2.5       Focus on Therapeutic Outcomes (FOTO) Symptom Scale: Patient score indicates self perception of 40% impairment, limitation or restriction of function upon followup assessment. (+23%)    Home Exercises and Education Provided     Education provided:   - Cont HEP, scar management, and desensitization   - Progress towards goals     Written Home Exercises Provided: yes.  Exercises were reviewed and Tiffany was able to demonstrate them prior to the end of the session.  Tiffany demonstrated good  understanding of the HEP provided.   .   See EMR under Patient Instructions for exercises provided 4/29/2019. And prior visit        Assessment     Tiffany is progressing well towards her goals and there are no updates to goals at this time. " Pt prognosis is Good.     Pt tolerated tx well this date. Good participation and motivation. Denied pain with progression of ex this date.   Mild keloid formation remains on elbow.  Cont to encourage scar tape. Progressing with therex.  Good tolerance of light strengthening. Cont to c/o stiffness and tight PIP on IF/LF. Good ROM noted however.  Pt will continue to benefit from skilled outpatient occupational therapy to address the deficits listed in the problem list on initial evaluation to improve ROM, strength, pain management, /pinch strength, functional use, scar and edema management and to maximize pt's level of independence with ADLs in the home, work  and community environment.     Anticipated barriers to occupational therapy: None     Pt's spiritual, cultural and educational needs considered and pt agreeable to plan of care and goals.    The following goals were discussed with the patient and patient is in agreement with them as to be addressed in the treatment plan.       Goals:   Short Term Goals (4 weeks)   1)  Patient to be IND with HEP and modalities for pain management  2)  Increase ROM 3-5 degrees to increase functional hand use for ADLs/work/leisure activities--met  3)   Decrease edema .2-.3 mm to increase joint mobility /flexibility for functional hand use. --met  4)  Assess  and pinch and set goals accordingly --met  5)  Patient to score at _30-40%____%  or less on FOTO to demonstrate improved perception of functional Left hand  Use.--met        Long Term Goals (8 weeks)   1)  Increase  strength 2-8 lbs. For holding files, driving.   2)  Increase pinch strength 1-4 psi's for typing, buttons, zippers         Plan   Recommend continued Outpatient Occupataional Therapy  1x week for 8 weeks to include the following interventions Paraffin, Manual therapy/joint mobilizations, Modalities for pain management, US 3 mhz, Therapeutic exercises/activities., Strengthening, Edema Control, Scar  Management, Wound Care and Electrical Modalities.     Within the Certification Period/Plan of care expiration: 6/19/2019 to 8/19/2019

## 2019-08-06 ENCOUNTER — CLINICAL SUPPORT (OUTPATIENT)
Dept: REHABILITATION | Facility: HOSPITAL | Age: 44
End: 2019-08-06
Payer: OTHER GOVERNMENT

## 2019-08-06 DIAGNOSIS — M25.522 ELBOW PAIN, LEFT: ICD-10-CM

## 2019-08-06 DIAGNOSIS — L90.5 SCAR ADHERENT: ICD-10-CM

## 2019-08-06 PROCEDURE — 97140 MANUAL THERAPY 1/> REGIONS: CPT

## 2019-08-06 PROCEDURE — 97035 APP MDLTY 1+ULTRASOUND EA 15: CPT

## 2019-08-06 PROCEDURE — 97110 THERAPEUTIC EXERCISES: CPT

## 2019-08-06 NOTE — PROGRESS NOTES
"  Occupational Therapy Daily Treatment Note     Date: 8/6/2019  Name: Tiffany Cruz  Clinic Number: [id number]    Medical Diagnosis: Left CT and ulnar nerve decompression 3/18/2019   Therapy Diagnosis:        Encounter Diagnoses   Name Primary?    Hand pain, left      Decreased  strength of left hand      Range of motion deficit        Physician: Billie Daniel PA; Dr. LILLIAM Vaughan      Physician Orders: status post Ulnar nerve decompression at the elbow left and Carpal tunnel release left  Eval and Treat, modalities as needed  1-2 times/week for 6+ weeks     Surgical Procedure and Date: 3/18/2019  Evaluation Date: 4/16/2019     Plan of Care Certification Period: 6/16/2019  Date of Return to MD: as needed     Visit # / Visits authorized: 12/ 16  Insurance Authorization Period Expiration: 8/8/2019  FOTO (visit 2, 9)     Time In: 8:30 am   Time Out: 9:15 am   Total Billable Time: 45 minutes    Precautions:  Standard    Awaiting check on insurance for extension of benefits for 4 remaining visits.   Subjective     Pt reports:  "The scar is still sensitive on the palm, I still have trouble weight bearing and the elbow is just sensitive if I lean on it."   she was compliant with home exercise program given last session.   Response to previous treatment: Improved ROM   Functional change: Increase functional use for typing    Pain: 0/10  Location: left wrist    Objective     Tiffany received the following supervised modalities after being cleared for contradictions for 10 minutes:   -Paraffin bath  x 10 min to left  Hand and elbow  to increase blood flow, circulation and tissue elasticity prior to therex.     Tiffany received the following direct contact modalities after being cleared for contraindications for 8 min  minutes:  -Ultrasound 3 mhz 0.5 w/cm2 x 8 min @ 100% continuous to left volar wrist (8min)  to increase blood flow, circulation, tissue elasticity, for pain management and to promote " "scar/wound healing     Tiffany received the following manual therapy techniques for 12 minutes:   -Scar massage with hawks  tool  to decrease adhesions and improve tensile glide.   - Passive wrist /digit extension to improve joint mobility/flexibility     Tiffany received therapeutic exercises for 20 minutes including:  -  Tendon glides including hook, wave, flat and composite fist, ab/ad and digit extension, thumb IP flexion, thumb flexion, opposition , ab/ad and wrist flex, ext, RD, UD x 10 reps.      -  Instructed and performed red theraputty for gross , key, 2 and 3 pt pinch  And weightbearing thru palm x 10 reps each.   - Elbow flex/ext (3 ways), 2#, x 10 reps  - Wrist PRE, 2#, x 20 reps each way, 3 ways.   - Performed Prayer stretch 2 x 30"  Reviewed modality use, mederma and scar pad use.    - 35 lbs. Gross  x 10 reps.       Edema. Measured in centimeters.   MP's 18 cm   PPC 19.2 cm (-1.3)    PWC 15.5 cm (-1)     Hand ROM. Measured in degrees.      Wrist ext/flex 50 (+30) / 50      R) 40 lbs.  L) 28 lbs.   Wilson pinch r) 9  L) 3   3pt pinch r) 9   L) 2   2pt pinch r) 6   L) 2.5       Focus on Therapeutic Outcomes (FOTO) Symptom Scale: Patient score indicates self perception of 40% impairment, limitation or restriction of function upon followup assessment. (+23%)    Home Exercises and Education Provided     Education provided:   - Cont HEP, scar management, and desensitization   - Progress towards goals     Written Home Exercises Provided: yes.  Exercises were reviewed and Tiffany was able to demonstrate them prior to the end of the session.  Tiffany demonstrated good  understanding of the HEP provided.   .   See EMR under Patient Instructions for exercises provided 4/29/2019. And prior visit        Assessment       Tiffany is progressing well towards her goals and there are no updates to goals at this time. Pt prognosis is Good.     Pt tolerated tx well this date. Good participation and " motivation. Denied pain with progression of ex this date.   Mild keloid formation remains on elbow.  Progressing with therex.  Good tolerance of light strengthening. Cont to c/o stiffness and tight PIP on IF/LF. Good ROM noted however.  Pt will continue to benefit from skilled outpatient occupational therapy to address the deficits listed in the problem list on initial evaluation to improve ROM, strength, pain management, /pinch strength, functional use, scar and edema management and to maximize pt's level of independence with ADLs in the home, work  and community environment.     Anticipated barriers to occupational therapy: None     Pt's spiritual, cultural and educational needs considered and pt agreeable to plan of care and goals.    The following goals were discussed with the patient and patient is in agreement with them as to be addressed in the treatment plan.       Goals:   Short Term Goals (4 weeks)   1)  Patient to be IND with HEP and modalities for pain management  2)  Increase ROM 3-5 degrees to increase functional hand use for ADLs/work/leisure activities--met  3)   Decrease edema .2-.3 mm to increase joint mobility /flexibility for functional hand use. --met  4)  Assess  and pinch and set goals accordingly --met  5)  Patient to score at _30-40%____%  or less on FOTO to demonstrate improved perception of functional Left hand  Use.--met        Long Term Goals (8 weeks)   1)  Increase  strength 2-8 lbs. For holding files, driving.   2)  Increase pinch strength 1-4 psi's for typing, buttons, zippers         Plan   Recommend continued Outpatient Occupataional Therapy  1x week for 8 weeks to include the following interventions Paraffin, Manual therapy/joint mobilizations, Modalities for pain management, US 3 mhz, Therapeutic exercises/activities., Strengthening, Edema Control, Scar Management, Wound Care and Electrical Modalities.     Within the Certification Period/Plan of care expiration:  6/19/2019 to 8/19/2019

## 2019-08-14 ENCOUNTER — CLINICAL SUPPORT (OUTPATIENT)
Dept: REHABILITATION | Facility: HOSPITAL | Age: 44
End: 2019-08-14
Payer: OTHER GOVERNMENT

## 2019-08-14 DIAGNOSIS — M25.522 ELBOW PAIN, LEFT: ICD-10-CM

## 2019-08-14 DIAGNOSIS — L90.5 SCAR ADHERENT: ICD-10-CM

## 2019-08-14 PROCEDURE — 97140 MANUAL THERAPY 1/> REGIONS: CPT

## 2019-08-14 PROCEDURE — 97035 APP MDLTY 1+ULTRASOUND EA 15: CPT

## 2019-08-14 PROCEDURE — 97110 THERAPEUTIC EXERCISES: CPT

## 2019-08-14 NOTE — PROGRESS NOTES
"  Occupational Therapy Daily Treatment Note     Date: 8/14/2019  Name: Tiffany Cruz  Clinic Number: [id number]    Medical Diagnosis: Left CT and ulnar nerve decompression 3/18/2019   Therapy Diagnosis:        Encounter Diagnoses   Name Primary?    Hand pain, left      Decreased  strength of left hand      Range of motion deficit        Physician: Billie Daniel PA; Dr. LILLIAM Vaughan      Physician Orders: status post Ulnar nerve decompression at the elbow left and Carpal tunnel release left  Eval and Treat, modalities as needed  1-2 times/week for 6+ weeks     Surgical Procedure and Date: 3/18/2019  Evaluation Date: 4/16/2019     Plan of Care Certification Period: 6/16/2019  Date of Return to MD: as needed     Visit # / Visits authorized: 13/ 16  Insurance Authorization Period Expiration: 10/10/2019  FOTO (visit 2, 9)     Time In: 8:30 am   Time Out: 9:15 am   Total Billable Time: 45 minutes    Precautions:  Standard    UPDATE POC NEXT VISIT  Subjective     Pt reports:  "It's doing ok, I still can't put a lot of weight thru my palm. The elbow is just sensitive if I lean on it"   she was compliant with home exercise program given last session.   Response to previous treatment: Improved ROM   Functional change: Increase functional use for typing    Pain: 0/10  Location: left wrist    Objective     Tiffany received the following supervised modalities after being cleared for contradictions for 10 minutes:   -Paraffin bath  x 10 min to left  Hand and elbow  to increase blood flow, circulation and tissue elasticity prior to therex.     Tiffany received the following direct contact modalities after being cleared for contraindications for 8 min  minutes:  -Ultrasound 3 mhz 0.5 w/cm2 x 8 min @ 100% continuous to left volar wrist (8min)  to increase blood flow, circulation, tissue elasticity, for pain management and to promote scar/wound healing     Tiffany received the following manual therapy techniques " "for 12 minutes:   -Scar massage with hawks  tool  to decrease adhesions and improve tensile glide.   - Passive wrist /digit extension to improve joint mobility/flexibility     Tiffany received therapeutic exercises for 20 minutes including:  -  Tendon glides including hook, wave, flat and composite fist, ab/ad and digit extension, thumb IP flexion, thumb flexion, opposition , ab/ad and wrist flex, ext, RD, UD x 10 reps.      -  Performed red theraputty for gross , key, 2 and 3 pt pinch  And weightbearing thru palm x 10 reps each.   - Elbow flex/ext (3 ways), 2#, x 10 reps  - Wrist PRE, 2#, x 20 reps each way, 3 ways.   - Performed Prayer stretch 2 x 30"  Reviewed modality use, mederma and scar pad use.    - 35 lbs. Gross  x 20 reps.   - green digiflex for composite IP flexion x 40 reps.       Edema. Measured in centimeters.   MP's 18 cm   PPC 19.2 cm (-1.3)    PWC 15.5 cm (-1)     Hand ROM. Measured in degrees.      Wrist ext/flex 50 (+30) / 50      R) 40 lbs.  L) 28 lbs.   Wilson pinch r) 9  L) 3   3pt pinch r) 9   L) 2   2pt pinch r) 6   L) 2.5       Focus on Therapeutic Outcomes (FOTO) Symptom Scale: Patient score indicates self perception of 40% impairment, limitation or restriction of function upon followup assessment. (+23%)    Home Exercises and Education Provided     Education provided:   - Cont HEP, scar management, and desensitization   - Progress towards goals     Written Home Exercises Provided: yes.  Exercises were reviewed and Tiffany was able to demonstrate them prior to the end of the session.  Tiffany demonstrated good  understanding of the HEP provided.   .   See EMR under Patient Instructions for exercises provided 4/29/2019. And prior visit      Assessment     Tiffany is progressing well towards her goals and there are no updates to goals at this time. Pt prognosis is Good.     Pt tolerated tx well this date. Good participation and motivation. Denied pain with progression of ex this " date.   Mild keloid formation remains on elbow.  Progressing with therex.  Good tolerance of light strengthening. Cont to c/o stiffness and tight PIP on IF/LF. Good ROM noted however.  Pt will continue to benefit from skilled outpatient occupational therapy to address the deficits listed in the problem list on initial evaluation to improve ROM, strength, pain management, /pinch strength, functional use, scar and edema management and to maximize pt's level of independence with ADLs in the home, work  and community environment.     Anticipated barriers to occupational therapy: None     Pt's spiritual, cultural and educational needs considered and pt agreeable to plan of care and goals.    The following goals were discussed with the patient and patient is in agreement with them as to be addressed in the treatment plan.       Goals:   Short Term Goals (4 weeks)   1)  Patient to be IND with HEP and modalities for pain management  2)  Increase ROM 3-5 degrees to increase functional hand use for ADLs/work/leisure activities--met  3)   Decrease edema .2-.3 mm to increase joint mobility /flexibility for functional hand use. --met  4)  Assess  and pinch and set goals accordingly --met  5)  Patient to score at _30-40%____%  or less on FOTO to demonstrate improved perception of functional Left hand  Use.--met        Long Term Goals (8 weeks)   1)  Increase  strength 2-8 lbs. For holding files, driving.   2)  Increase pinch strength 1-4 psi's for typing, buttons, zippers         Plan   Recommend continued Outpatient Occupataional Therapy  1x week for 8 weeks to include the following interventions Paraffin, Manual therapy/joint mobilizations, Modalities for pain management, US 3 mhz, Therapeutic exercises/activities., Strengthening, Edema Control, Scar Management, Wound Care and Electrical Modalities.     Within the Certification Period/Plan of care expiration: 6/19/2019 to 8/19/2019     AVERY Christianson, MICAT

## 2019-09-03 ENCOUNTER — OFFICE VISIT (OUTPATIENT)
Dept: INFECTIOUS DISEASES | Facility: CLINIC | Age: 44
End: 2019-09-03
Payer: OTHER GOVERNMENT

## 2019-09-03 VITALS
HEART RATE: 82 BPM | TEMPERATURE: 99 F | WEIGHT: 182.13 LBS | HEIGHT: 64 IN | SYSTOLIC BLOOD PRESSURE: 151 MMHG | BODY MASS INDEX: 31.09 KG/M2 | DIASTOLIC BLOOD PRESSURE: 86 MMHG

## 2019-09-03 DIAGNOSIS — D84.9 IMMUNOCOMPROMISED: ICD-10-CM

## 2019-09-03 DIAGNOSIS — G35 MULTIPLE SCLEROSIS: Primary | ICD-10-CM

## 2019-09-03 DIAGNOSIS — Z23 IMMUNIZATION DUE: ICD-10-CM

## 2019-09-03 PROCEDURE — 99999 PR PBB SHADOW E&M-EST. PATIENT-LVL III: ICD-10-PCS | Mod: PBBFAC,,, | Performed by: INTERNAL MEDICINE

## 2019-09-03 PROCEDURE — 99204 OFFICE O/P NEW MOD 45 MIN: CPT | Mod: S$PBB,,, | Performed by: INTERNAL MEDICINE

## 2019-09-03 PROCEDURE — 99204 PR OFFICE/OUTPT VISIT, NEW, LEVL IV, 45-59 MIN: ICD-10-PCS | Mod: S$PBB,,, | Performed by: INTERNAL MEDICINE

## 2019-09-03 PROCEDURE — 99213 OFFICE O/P EST LOW 20 MIN: CPT | Mod: PBBFAC | Performed by: INTERNAL MEDICINE

## 2019-09-03 PROCEDURE — 99999 PR PBB SHADOW E&M-EST. PATIENT-LVL III: CPT | Mod: PBBFAC,,, | Performed by: INTERNAL MEDICINE

## 2019-09-03 RX ORDER — BACLOFEN 10 MG/1
TABLET ORAL
COMMUNITY
Start: 2018-12-07 | End: 2019-09-13 | Stop reason: SDUPTHER

## 2019-09-03 RX ORDER — TELMISARTAN AND HYDROCHLORTHIAZIDE 80; 12.5 MG/1; MG/1
1 TABLET ORAL
COMMUNITY
Start: 2019-01-11 | End: 2022-10-18

## 2019-09-03 NOTE — PROGRESS NOTES
Subjective:      Patient ID: Tiffany Cruz is a 43 y.o. female.    Chief Complaint:NP multiplr scerosis    History of Present Illness    44 y/o with a history of MS.  She was previously taking Aubigio.  She was having difficulty with oral ulcers on Aubigio.  So this was discontinued.  Her last dose was 45 days ago.  There are plans to consider treatment with ocrevus.    Review of Systems   Constitution: Negative for chills, decreased appetite, fever, malaise/fatigue, night sweats, weight gain and weight loss.   HENT: Negative for congestion, ear pain, hearing loss, hoarse voice, sore throat and tinnitus.    Eyes: Negative for blurred vision, redness and visual disturbance.   Cardiovascular: Negative for chest pain, leg swelling and palpitations.   Respiratory: Negative for cough, hemoptysis, shortness of breath and sputum production.    Hematologic/Lymphatic: Negative for adenopathy. Does not bruise/bleed easily.   Skin: Negative for dry skin, itching, rash and suspicious lesions.   Musculoskeletal: Negative for back pain, joint pain, myalgias and neck pain.   Gastrointestinal: Negative for abdominal pain, constipation, diarrhea, heartburn, nausea and vomiting.   Genitourinary: Negative for dysuria, flank pain, frequency, hematuria, hesitancy and urgency.   Neurological: Negative for dizziness, headaches, numbness, paresthesias and weakness.   Psychiatric/Behavioral: Negative for depression and memory loss. The patient does not have insomnia and is not nervous/anxious.      Objective:   Physical Exam   Constitutional: She is oriented to person, place, and time. She appears well-developed and well-nourished. No distress.   HENT:   Head: Normocephalic and atraumatic.   Right Ear: External ear normal.   Left Ear: External ear normal.   Nose: Nose normal.   Mouth/Throat: Oropharynx is clear and moist. No oropharyngeal exudate.   Eyes: Pupils are equal, round, and reactive to light. Conjunctivae and EOM are  normal. Right eye exhibits no discharge. Left eye exhibits no discharge. No scleral icterus.   Neck: Normal range of motion. Neck supple. No JVD present. No tracheal deviation present. No thyromegaly present.   Cardiovascular: Normal rate, regular rhythm, normal heart sounds and intact distal pulses. Exam reveals no gallop and no friction rub.   No murmur heard.  Pulmonary/Chest: Effort normal and breath sounds normal. No stridor. No respiratory distress. She has no wheezes. She has no rales. She exhibits no tenderness.   Abdominal: Soft. Bowel sounds are normal. She exhibits no distension and no mass. There is no tenderness. There is no rebound and no guarding.   Musculoskeletal: Normal range of motion. She exhibits no edema or tenderness.   Lymphadenopathy:     She has no cervical adenopathy.   Neurological: She is alert and oriented to person, place, and time. She displays normal reflexes. No cranial nerve deficit. She exhibits normal muscle tone. Coordination normal.   Skin: Skin is warm. No rash noted. She is not diaphoretic. No erythema. No pallor.   Psychiatric: She has a normal mood and affect. Her behavior is normal. Judgment and thought content normal.   Nursing note and vitals reviewed.    Assessment:       1. Multiple sclerosis    2. Immunization due    3. Immunocompromised        44 y/o patient with a history of MS who was previously on aubigio.  This has been discontinued and there are plans to start the patient on ocrevus.  Her labs were reviewed and discussed with the patient.  I will initiate hepatitis a and hepatitis b vaccination series.  I will give the patient prevnar today and pneumovax in 2 months.  I will order influenza and shingles vaccination series.  Counseled the patient on strategies to reduce exposure to infections.  Plan:       Multiple sclerosis  -     Hepatitis A Vaccine (Adult) (IM); Standing  -     Pneumococcal Conjugate Vaccine (13 Valent) (IM); Future; Expected date:  09/03/2019  -     Pneumococcal Polysaccharide Vaccine (23 Valent) (SQ/IM); Future  -     Tdap Vaccine; Future; Expected date: 09/03/2019  -     Hepatitis B (Recombinant) Adjuvanted, 2 dose; Standing  -     Zoster Recombinant Vaccine; Standing  -     Influenza - Quadrivalent (3 years & older) (PF); Future; Expected date: 09/03/2019    Immunization due  -     Hepatitis A Vaccine (Adult) (IM); Standing  -     Pneumococcal Conjugate Vaccine (13 Valent) (IM); Future; Expected date: 09/03/2019  -     Pneumococcal Polysaccharide Vaccine (23 Valent) (SQ/IM); Future  -     Tdap Vaccine; Future; Expected date: 09/03/2019  -     Hepatitis B (Recombinant) Adjuvanted, 2 dose; Standing  -     Zoster Recombinant Vaccine; Standing  -     Influenza - Quadrivalent (3 years & older) (PF); Future; Expected date: 09/03/2019    Immunocompromised  -     Hepatitis A Vaccine (Adult) (IM); Standing  -     Pneumococcal Conjugate Vaccine (13 Valent) (IM); Future; Expected date: 09/03/2019  -     Pneumococcal Polysaccharide Vaccine (23 Valent) (SQ/IM); Future  -     Tdap Vaccine; Future; Expected date: 09/03/2019  -     Hepatitis B (Recombinant) Adjuvanted, 2 dose; Standing  -     Zoster Recombinant Vaccine; Standing  -     Influenza - Quadrivalent (3 years & older) (PF); Future; Expected date: 09/03/2019           Pneumococcal vaccine.  Tdap.  Hepatitis A  Hepatitis B  Shingrix

## 2019-09-03 NOTE — LETTER
September 4, 2019      Roseanna Riddle, APRN, CNS  [address]  [facility] [address]           [facility]  [address]  [facility] [address]  Phone: [phone]  Fax: [phone]          Patient: Tiffany Cruz   MR Number: [id number]   YOB: 1975   Date of Visit: 9/3/2019       Dear Roseanna Riddle:    Thank you for referring Tiffany Cruz to me for evaluation. Attached you will find relevant portions of my assessment and plan of care.    If you have questions, please do not hesitate to call me. I look forward to following Tiffany Cruz along with you.    Sincerely,    Evelio Razo MD    Enclosure  CC:  No Recipients    If you would like to receive this communication electronically, please contact [email or web address] or [phone] to request more information on [employer] access.    For providers and/or their staff who would like to refer a patient to [facility], please contact us through our one-stop-shop provider referral line, [facility], at [phone].    If you feel you have received this communication in error or would no longer like to receive these types of communications, please e-mail [email or web address]

## 2019-09-06 ENCOUNTER — CLINICAL SUPPORT (OUTPATIENT)
Dept: INFECTIOUS DISEASES | Facility: CLINIC | Age: 44
End: 2019-09-06
Payer: OTHER GOVERNMENT

## 2019-09-06 DIAGNOSIS — D84.9 IMMUNOCOMPROMISED: ICD-10-CM

## 2019-09-06 DIAGNOSIS — Z23 IMMUNIZATION DUE: ICD-10-CM

## 2019-09-06 DIAGNOSIS — G35 MULTIPLE SCLEROSIS: ICD-10-CM

## 2019-09-06 PROCEDURE — 90471 IMMUNIZATION ADMIN: CPT | Mod: PBBFAC

## 2019-09-06 PROCEDURE — 90750 HZV VACC RECOMBINANT IM: CPT | Mod: PBBFAC

## 2019-09-06 PROCEDURE — 90670 PCV13 VACCINE IM: CPT | Mod: PBBFAC

## 2019-09-06 PROCEDURE — 90715 TDAP VACCINE 7 YRS/> IM: CPT | Mod: PBBFAC

## 2019-09-06 PROCEDURE — 90632 HEPA VACCINE ADULT IM: CPT | Mod: PBBFAC

## 2019-09-06 PROCEDURE — 90472 IMMUNIZATION ADMIN EACH ADD: CPT | Mod: PBBFAC

## 2019-09-06 PROCEDURE — 90686 IIV4 VACC NO PRSV 0.5 ML IM: CPT | Mod: PBBFAC

## 2019-09-06 NOTE — PROGRESS NOTES
Ms. Cruz received Hepatitis A, Prevnar 13, Tdap, Heplisav B, Shingles and Flu vaccine   Tolerated well  Left unit in NAD

## 2019-09-09 ENCOUNTER — TELEPHONE (OUTPATIENT)
Dept: NEUROLOGY | Facility: CLINIC | Age: 44
End: 2019-09-09

## 2019-09-13 ENCOUNTER — DOCUMENTATION ONLY (OUTPATIENT)
Dept: NEUROLOGY | Facility: CLINIC | Age: 44
End: 2019-09-13

## 2019-09-13 ENCOUNTER — OFFICE VISIT (OUTPATIENT)
Dept: NEUROLOGY | Facility: CLINIC | Age: 44
End: 2019-09-13
Payer: OTHER GOVERNMENT

## 2019-09-13 VITALS
WEIGHT: 177.38 LBS | SYSTOLIC BLOOD PRESSURE: 143 MMHG | HEIGHT: 64 IN | BODY MASS INDEX: 30.28 KG/M2 | HEART RATE: 82 BPM | DIASTOLIC BLOOD PRESSURE: 87 MMHG

## 2019-09-13 DIAGNOSIS — M79.10 MUSCLE PAIN: ICD-10-CM

## 2019-09-13 DIAGNOSIS — Z79.899 HIGH RISK MEDICATION USE: ICD-10-CM

## 2019-09-13 DIAGNOSIS — G35 MULTIPLE SCLEROSIS: Primary | ICD-10-CM

## 2019-09-13 DIAGNOSIS — Z71.89 COUNSELING REGARDING GOALS OF CARE: ICD-10-CM

## 2019-09-13 DIAGNOSIS — Z29.89 PROPHYLACTIC IMMUNOTHERAPY: ICD-10-CM

## 2019-09-13 PROCEDURE — 99212 OFFICE O/P EST SF 10 MIN: CPT | Mod: PBBFAC | Performed by: CLINICAL NURSE SPECIALIST

## 2019-09-13 PROCEDURE — 99215 OFFICE O/P EST HI 40 MIN: CPT | Mod: S$PBB,,, | Performed by: CLINICAL NURSE SPECIALIST

## 2019-09-13 PROCEDURE — 99999 PR PBB SHADOW E&M-EST. PATIENT-LVL II: CPT | Mod: PBBFAC,,, | Performed by: CLINICAL NURSE SPECIALIST

## 2019-09-13 PROCEDURE — 99215 PR OFFICE/OUTPT VISIT, EST, LEVL V, 40-54 MIN: ICD-10-PCS | Mod: S$PBB,,, | Performed by: CLINICAL NURSE SPECIALIST

## 2019-09-13 PROCEDURE — 99999 PR PBB SHADOW E&M-EST. PATIENT-LVL II: ICD-10-PCS | Mod: PBBFAC,,, | Performed by: CLINICAL NURSE SPECIALIST

## 2019-09-13 RX ORDER — CHOLECALCIFEROL (VITAMIN D3) 1250 MCG
1 TABLET ORAL WEEKLY
Qty: 12 TABLET | Refills: 1 | Status: SHIPPED | OUTPATIENT
Start: 2019-09-13 | End: 2020-04-05

## 2019-09-13 RX ORDER — IBUPROFEN 800 MG/1
800 TABLET ORAL
Qty: 20 TABLET | Refills: 0 | Status: SHIPPED | OUTPATIENT
Start: 2019-09-13 | End: 2019-12-29 | Stop reason: ALTCHOICE

## 2019-09-13 NOTE — PROGRESS NOTES
Faxed f/u Ocrevus infusion date to [facility] [employer]     [phone]

## 2019-09-13 NOTE — Clinical Note
RALEIGH, she is taking the 4 last packets of cholestyramine that she found at her house. She is scheduled for 10/4 and 10/21 (a few days past 2 weeks), but asked if 9/20 would be ok, too. Is that too soon?

## 2019-09-13 NOTE — PROGRESS NOTES
Subjective:       Patient ID: Tiffany Cruz is a 43 y.o. female who presents today for a routine clinic visit for MS.  She was last seen in June 2019. The history has been provided by the patient.     MS HPI:  · DMT: N/A; transitioning to Ocrevus soon   · Taking vitamin D3 as recommended? Yes -  Dose: 50,000 units once per week.   · She continues to have occasional right-sided muscle pain. She takes ibuprofen sparingly.     SOCIAL HISTORY  Social History     Tobacco Use    Smoking status: Never Smoker    Smokeless tobacco: Never Used   Substance Use Topics    Alcohol use: No     Alcohol/week: 0.0 oz    Drug use: No     Living arrangements - the patient lives with her family.  Employment:      MS ROS:  · Fatigue: No  · Sleep Disturbance: No  · Bladder Dysfunction: No; no UTIs   · Bowel Dysfunction: No  · Spasticity: Yes - takes baclofen as needed   · Visual Symptoms: No--wears reading glasses   · Cognitive: No  · Mood Disorder: No  · Gait Disturbance: No  · Falls: No  · Hand Dysfunction: Some weakness in the left hand; plans to return to PT before the end of the year.  She has carpal tunnel in the right hand.   · Pain: Yes - Some occasional muscle pain along her right side; ibuprofen helps   · Sexual Dysfunction: Not Assessed  · Skin Breakdown: No  · Tremors: No  · Dysphagia:  No  · Dysarthria:  No  · Heat sensitivity:  No  · Any un-met adaptive needs? No  · Copay Assist?  N/A          Objective:        1. 25 foot timed walk: 3.97 seconds today without assist; was 3.9 seconds in April without assist   Neurologic Exam     Mental Status   Oriented to person, place, and time.   Attention: normal. Concentration: normal.   Speech: speech is normal   Level of consciousness: alert  Knowledge: good.   Normal comprehension.     Cranial Nerves     CN III, IV, VI   Pupils are equal, round, and reactive to light.  Extraocular motions are normal.   Right pupil: Shape: regular. Reactivity: brisk.   Left pupil:  Shape: regular. Reactivity: brisk.   Nystagmus: none     CN V   Right facial sensation deficit: none  Left facial sensation deficit: none    CN VII   Right facial weakness: none  Left facial weakness: none    CN VIII   Hearing: intact    CN IX, X   Palate: symmetric    CN XI   Right sternocleidomastoid strength: normal  Left sternocleidomastoid strength: normal  Right trapezius strength: normal  Left trapezius strength: normal    CN XII   Tongue deviation: none    Motor Exam   Muscle bulk: normal  Overall muscle tone: normal  Right arm tone: normal  Left arm tone: normal  Right leg tone: normal  Left leg tone: normal    Strength   Right neck flexion: 5/5  Left neck flexion: 5/5  Right neck extension: 5/5  Left neck extension: 5/5  Right deltoid: 5/5  Left deltoid: 5/5  Right biceps: 5/5  Left biceps: 5/5  Right triceps: 5/5  Left triceps: 5/5  Right wrist flexion: 5/5  Left wrist flexion: 5/5  Right wrist extension: 5/5  Left wrist extension: 5/5  Right interossei: 5/5  Left interossei: 5/5  Right iliopsoas: 5/5  Left iliopsoas: 5/5  Right quadriceps: 5/5  Left quadriceps: 5/5  Right hamstrin/5  Left hamstrin/5  Right anterior tibial: 5/5  Left anterior tibial: 5/5  Right gastroc: 5/5  Left gastroc: 5/5    Sensory Exam   Right arm vibration: normal  Left arm vibration: normal  Right leg vibration: normal  Left leg vibration: normal    Gait, Coordination, and Reflexes     Gait  Gait: normal    Coordination   Romberg: negative  Finger to nose coordination: normal  Heel to shin coordination: normal  Tandem walking coordination: normal    Tremor   Resting tremor: absent    Reflexes   Right brachioradialis: 1+  Left brachioradialis: 1+  Right biceps: 1+  Left biceps: 1+  Right triceps: 1+  Left triceps: 1+  Right patellar: 0  Left patellar: 1+  Right achilles: 0  Left achilles: 1+  Right plantar: equivocal  Left plantar: equivocal    Normal rapid sequential movements in upper and lower extremities.            Imaging:       Results for orders placed during the hospital encounter of 04/26/19   MRI Brain Demyelinating W W/O Contrast    Impression Findings in the cerebral white matter which are typical for multiple sclerosis with moderate plaque burden.  No new or enhancing lesions to suggest active disease.    Decrease in signal intensity of previous lesion in the left corona radiata.      Electronically signed by: Deangelo Hart MD  Date:    04/28/2019  Time:    15:01       Labs:     Lab Results   Component Value Date    PVYKMSJZ84OS 31 02/19/2019    LLIMDFRI66IC 33 01/29/2018    QGWTJIYO88LA 50 03/18/2016     Lab Results   Component Value Date    ZH7OHMNA 80.9 07/16/2019    ABSOLUTECD3 1869 07/16/2019    BS7ZTNLN 23.9 07/16/2019    ABSOLUTECD8 551 07/16/2019    QU0SRTWG 55.6 07/16/2019    ABSOLUTECD4 1284 07/16/2019    LABCD48 2.33 07/16/2019     Lab Results   Component Value Date    WBC 8.05 07/16/2019    RBC 5.50 (H) 07/16/2019    HGB 11.5 (L) 07/16/2019    HCT 38.4 07/16/2019    MCV 70 (L) 07/16/2019    MCH 20.9 (L) 07/16/2019    MCHC 29.9 (L) 07/16/2019    RDW 14.2 07/16/2019     07/16/2019    MPV 11.6 07/16/2019    GRAN 5.0 07/16/2019    GRAN 61.6 07/16/2019    LYMPH 2.0 07/16/2019    LYMPH 25.0 07/16/2019    MONO 0.9 07/16/2019    MONO 10.6 07/16/2019    EOS 0.1 07/16/2019    BASO 0.08 07/16/2019    EOSINOPHIL 1.6 07/16/2019    BASOPHIL 1.0 07/16/2019     Sodium   Date Value Ref Range Status   07/16/2019 138 136 - 145 mmol/L Final     Potassium   Date Value Ref Range Status   07/16/2019 4.0 3.5 - 5.1 mmol/L Final     Chloride   Date Value Ref Range Status   07/16/2019 103 95 - 110 mmol/L Final     CO2   Date Value Ref Range Status   07/16/2019 25 23 - 29 mmol/L Final     Glucose   Date Value Ref Range Status   07/16/2019 78 70 - 110 mg/dL Final     BUN, Bld   Date Value Ref Range Status   07/16/2019 11 6 - 20 mg/dL Final     Creatinine   Date Value Ref Range Status   07/16/2019 0.8 0.5 - 1.4 mg/dL Final      Calcium   Date Value Ref Range Status   07/16/2019 9.4 8.7 - 10.5 mg/dL Final     Total Protein   Date Value Ref Range Status   07/16/2019 7.4 6.0 - 8.4 g/dL Final     Albumin   Date Value Ref Range Status   07/16/2019 3.7 3.5 - 5.2 g/dL Final     Total Bilirubin   Date Value Ref Range Status   07/16/2019 0.3 0.1 - 1.0 mg/dL Final     Comment:     For infants and newborns, interpretation of results should be based  on gestational age, weight and in agreement with clinical  observations.  Premature Infant recommended reference ranges:  Up to 24 hours.............<8.0 mg/dL  Up to 48 hours............<12.0 mg/dL  3-5 days..................<15.0 mg/dL  6-29 days.................<15.0 mg/dL       Alkaline Phosphatase   Date Value Ref Range Status   07/16/2019 61 55 - 135 U/L Final     AST   Date Value Ref Range Status   07/16/2019 20 10 - 40 U/L Final     ALT   Date Value Ref Range Status   07/16/2019 24 10 - 44 U/L Final     Anion Gap   Date Value Ref Range Status   07/16/2019 10 8 - 16 mmol/L Final     eGFR if    Date Value Ref Range Status   07/16/2019 >60 >60 mL/min/1.73 m^2 Final     eGFR if non    Date Value Ref Range Status   07/16/2019 >60 >60 mL/min/1.73 m^2 Final     Comment:     Calculation used to obtain the estimated glomerular filtration  rate (eGFR) is the CKD-EPI equation.          Diagnosis/Assessment/Plan:    1. Multiple Sclerosis  · Assessment: Tiffany is clinically stable today.   · Imaging: MRI brain 6 months after Ocrevus start   · Disease Modifying Therapies: As planned, we will start Ocrevus. She will complete her 4 remaining doses of cholestyramine. She is scheduled for 10/4 and 10/21 for Ocrevus. She will have labs done in March for CBC, Cd20, hepatitis B, and Vitamin D. Continue Vitamin D. Vit D refilled today.     2. MS Symptom Assessment / Management  · Spasticity: Continue Baclofen as needed.   · Pain: Ibuprofen refilled.     Our visit today lasted 40  minutes, and 100% of this time was spent face to face with the patient. Over 50% of this visit included discussion of the treatment plan/symptom management/exam findings/coordination of care. The patient agrees with the plan of care. She will follow up with Dr. Tompkins in January.     Roseanna Riddle, [facility], MSCN      Problem List Items Addressed This Visit        Neurologic Problems    Multiple sclerosis - Primary    Overview     On [place]  Clinically stable         Relevant Medications    cholecalciferol, vitamin D3, 50,000 unit Tab    ibuprofen (ADVIL,MOTRIN) 800 MG tablet       Other    Prophylactic immunotherapy      Other Visit Diagnoses     Muscle pain        Relevant Medications    ibuprofen (ADVIL,MOTRIN) 800 MG tablet    Counseling regarding goals of care        High risk medication use

## 2019-09-14 RX ORDER — FAMOTIDINE 10 MG/ML
20 INJECTION INTRAVENOUS
Status: CANCELLED | OUTPATIENT
Start: 2019-09-14

## 2019-09-14 RX ORDER — SODIUM CHLORIDE 0.9 % (FLUSH) 0.9 %
10 SYRINGE (ML) INJECTION
Status: CANCELLED | OUTPATIENT
Start: 2019-09-14

## 2019-09-14 RX ORDER — HEPARIN 100 UNIT/ML
500 SYRINGE INTRAVENOUS
Status: CANCELLED | OUTPATIENT
Start: 2019-09-14

## 2019-09-14 RX ORDER — ACETAMINOPHEN 325 MG/1
1000 TABLET ORAL
Status: CANCELLED | OUTPATIENT
Start: 2019-09-14

## 2019-09-14 RX ORDER — EPINEPHRINE 0.3 MG/.3ML
0.3 INJECTION SUBCUTANEOUS
Status: CANCELLED | OUTPATIENT
Start: 2019-09-14

## 2019-09-14 RX ORDER — DIPHENHYDRAMINE HYDROCHLORIDE 50 MG/ML
50 INJECTION INTRAMUSCULAR; INTRAVENOUS
Status: CANCELLED | OUTPATIENT
Start: 2019-09-14

## 2019-10-04 ENCOUNTER — INFUSION (OUTPATIENT)
Dept: INFUSION THERAPY | Facility: OTHER | Age: 44
End: 2019-10-04
Attending: OBSTETRICS & GYNECOLOGY
Payer: OTHER GOVERNMENT

## 2019-10-04 VITALS
HEART RATE: 80 BPM | TEMPERATURE: 98 F | BODY MASS INDEX: 30.68 KG/M2 | HEIGHT: 64 IN | WEIGHT: 179.69 LBS | RESPIRATION RATE: 16 BRPM | SYSTOLIC BLOOD PRESSURE: 138 MMHG | DIASTOLIC BLOOD PRESSURE: 77 MMHG | OXYGEN SATURATION: 97 %

## 2019-10-04 DIAGNOSIS — G35 MS (MULTIPLE SCLEROSIS): Primary | ICD-10-CM

## 2019-10-04 PROCEDURE — 96365 THER/PROPH/DIAG IV INF INIT: CPT

## 2019-10-04 PROCEDURE — 63600175 PHARM REV CODE 636 W HCPCS: Performed by: PSYCHIATRY & NEUROLOGY

## 2019-10-04 PROCEDURE — S0028 INJECTION, FAMOTIDINE, 20 MG: HCPCS | Performed by: PSYCHIATRY & NEUROLOGY

## 2019-10-04 PROCEDURE — 96375 TX/PRO/DX INJ NEW DRUG ADDON: CPT

## 2019-10-04 PROCEDURE — 96367 TX/PROPH/DG ADDL SEQ IV INF: CPT

## 2019-10-04 PROCEDURE — 96366 THER/PROPH/DIAG IV INF ADDON: CPT

## 2019-10-04 PROCEDURE — 25000003 PHARM REV CODE 250: Performed by: PSYCHIATRY & NEUROLOGY

## 2019-10-04 RX ORDER — EPINEPHRINE 0.3 MG/.3ML
0.3 INJECTION SUBCUTANEOUS
Status: CANCELLED | OUTPATIENT
Start: 2019-10-18

## 2019-10-04 RX ORDER — EPINEPHRINE 0.3 MG/.3ML
0.3 INJECTION SUBCUTANEOUS
Status: DISCONTINUED | OUTPATIENT
Start: 2019-10-04 | End: 2019-10-04 | Stop reason: HOSPADM

## 2019-10-04 RX ORDER — FAMOTIDINE 10 MG/ML
20 INJECTION INTRAVENOUS
Status: CANCELLED | OUTPATIENT
Start: 2019-10-18

## 2019-10-04 RX ORDER — HEPARIN 100 UNIT/ML
500 SYRINGE INTRAVENOUS
Status: CANCELLED | OUTPATIENT
Start: 2019-10-18

## 2019-10-04 RX ORDER — FAMOTIDINE 10 MG/ML
20 INJECTION INTRAVENOUS
Status: COMPLETED | OUTPATIENT
Start: 2019-10-04 | End: 2019-10-04

## 2019-10-04 RX ORDER — ACETAMINOPHEN 500 MG
1000 TABLET ORAL
Status: CANCELLED | OUTPATIENT
Start: 2019-10-18

## 2019-10-04 RX ORDER — ACETAMINOPHEN 500 MG
1000 TABLET ORAL
Status: COMPLETED | OUTPATIENT
Start: 2019-10-04 | End: 2019-10-04

## 2019-10-04 RX ORDER — DIPHENHYDRAMINE HYDROCHLORIDE 50 MG/ML
50 INJECTION INTRAMUSCULAR; INTRAVENOUS
Status: DISCONTINUED | OUTPATIENT
Start: 2019-10-04 | End: 2019-10-04 | Stop reason: HOSPADM

## 2019-10-04 RX ORDER — HEPARIN 100 UNIT/ML
500 SYRINGE INTRAVENOUS
Status: DISCONTINUED | OUTPATIENT
Start: 2019-10-04 | End: 2019-10-04 | Stop reason: HOSPADM

## 2019-10-04 RX ORDER — SODIUM CHLORIDE 0.9 % (FLUSH) 0.9 %
10 SYRINGE (ML) INJECTION
Status: DISCONTINUED | OUTPATIENT
Start: 2019-10-04 | End: 2019-10-04 | Stop reason: HOSPADM

## 2019-10-04 RX ORDER — SODIUM CHLORIDE 0.9 % (FLUSH) 0.9 %
10 SYRINGE (ML) INJECTION
Status: CANCELLED | OUTPATIENT
Start: 2019-10-18

## 2019-10-04 RX ORDER — DIPHENHYDRAMINE HYDROCHLORIDE 50 MG/ML
50 INJECTION INTRAMUSCULAR; INTRAVENOUS
Status: CANCELLED | OUTPATIENT
Start: 2019-10-18

## 2019-10-04 RX ADMIN — SODIUM CHLORIDE: 9 INJECTION, SOLUTION INTRAVENOUS at 09:10

## 2019-10-04 RX ADMIN — FAMOTIDINE 20 MG: 10 INJECTION, SOLUTION INTRAVENOUS at 09:10

## 2019-10-04 RX ADMIN — OCRELIZUMAB 300 MG: 300 INJECTION INTRAVENOUS at 09:10

## 2019-10-04 RX ADMIN — ACETAMINOPHEN 1000 MG: 500 TABLET ORAL at 09:10

## 2019-10-04 RX ADMIN — Medication 50 MG: at 09:10

## 2019-10-04 RX ADMIN — DEXTROSE: 50 INJECTION, SOLUTION INTRAVENOUS at 09:10

## 2019-10-04 NOTE — PLAN OF CARE
Ocrevus infusion complete. Pt tolerated well. VSS. NAD. IV to left hand.  AVS provided. Pt verbalized understanding of discharge instructions before leaving with .

## 2019-10-04 NOTE — PLAN OF CARE
New start Ocrevus - administered, patient tolerated well. VSS, NAD. Education provided. D/C'd home with .

## 2019-10-08 ENCOUNTER — DOCUMENTATION ONLY (OUTPATIENT)
Dept: NEUROLOGY | Facility: CLINIC | Age: 44
End: 2019-10-08

## 2019-10-17 NOTE — Clinical Note
- 6-8 weeks, labs prior Humira Counseling:  I discussed with the patient the risks of adalimumab including but not limited to myelosuppression, immunosuppression, autoimmune hepatitis, demyelinating diseases, lymphoma, and serious infections.  The patient understands that monitoring is required including a PPD at baseline and must alert us or the primary physician if symptoms of infection or other concerning signs are noted.

## 2019-10-21 ENCOUNTER — INFUSION (OUTPATIENT)
Dept: INFUSION THERAPY | Facility: OTHER | Age: 44
End: 2019-10-21
Attending: OBSTETRICS & GYNECOLOGY
Payer: OTHER GOVERNMENT

## 2019-10-21 VITALS
SYSTOLIC BLOOD PRESSURE: 128 MMHG | DIASTOLIC BLOOD PRESSURE: 70 MMHG | OXYGEN SATURATION: 100 % | RESPIRATION RATE: 16 BRPM | HEART RATE: 75 BPM | TEMPERATURE: 98 F

## 2019-10-21 DIAGNOSIS — G35 MS (MULTIPLE SCLEROSIS): Primary | ICD-10-CM

## 2019-10-21 PROCEDURE — 96366 THER/PROPH/DIAG IV INF ADDON: CPT

## 2019-10-21 PROCEDURE — 25000003 PHARM REV CODE 250: Performed by: PSYCHIATRY & NEUROLOGY

## 2019-10-21 PROCEDURE — 96375 TX/PRO/DX INJ NEW DRUG ADDON: CPT

## 2019-10-21 PROCEDURE — 96365 THER/PROPH/DIAG IV INF INIT: CPT

## 2019-10-21 PROCEDURE — 63600175 PHARM REV CODE 636 W HCPCS: Performed by: PSYCHIATRY & NEUROLOGY

## 2019-10-21 PROCEDURE — S0028 INJECTION, FAMOTIDINE, 20 MG: HCPCS | Performed by: PSYCHIATRY & NEUROLOGY

## 2019-10-21 PROCEDURE — 96367 TX/PROPH/DG ADDL SEQ IV INF: CPT

## 2019-10-21 RX ORDER — FAMOTIDINE 10 MG/ML
20 INJECTION INTRAVENOUS
Status: CANCELLED | OUTPATIENT
Start: 2019-10-28

## 2019-10-21 RX ORDER — EPINEPHRINE 0.3 MG/.3ML
0.3 INJECTION SUBCUTANEOUS
Status: CANCELLED | OUTPATIENT
Start: 2019-10-28

## 2019-10-21 RX ORDER — DIPHENHYDRAMINE HYDROCHLORIDE 50 MG/ML
50 INJECTION INTRAMUSCULAR; INTRAVENOUS
Status: CANCELLED | OUTPATIENT
Start: 2019-10-28

## 2019-10-21 RX ORDER — ACETAMINOPHEN 500 MG
1000 TABLET ORAL
Status: CANCELLED | OUTPATIENT
Start: 2019-10-28

## 2019-10-21 RX ORDER — FAMOTIDINE 10 MG/ML
20 INJECTION INTRAVENOUS
Status: COMPLETED | OUTPATIENT
Start: 2019-10-21 | End: 2019-10-21

## 2019-10-21 RX ORDER — ACETAMINOPHEN 500 MG
1000 TABLET ORAL
Status: COMPLETED | OUTPATIENT
Start: 2019-10-21 | End: 2019-10-21

## 2019-10-21 RX ORDER — HEPARIN 100 UNIT/ML
500 SYRINGE INTRAVENOUS
Status: CANCELLED | OUTPATIENT
Start: 2019-10-28

## 2019-10-21 RX ORDER — DIPHENHYDRAMINE HYDROCHLORIDE 50 MG/ML
50 INJECTION INTRAMUSCULAR; INTRAVENOUS
Status: DISCONTINUED | OUTPATIENT
Start: 2019-10-21 | End: 2019-10-21 | Stop reason: HOSPADM

## 2019-10-21 RX ORDER — SODIUM CHLORIDE 0.9 % (FLUSH) 0.9 %
10 SYRINGE (ML) INJECTION
Status: DISCONTINUED | OUTPATIENT
Start: 2019-10-21 | End: 2019-10-21 | Stop reason: HOSPADM

## 2019-10-21 RX ORDER — HEPARIN 100 UNIT/ML
500 SYRINGE INTRAVENOUS
Status: DISCONTINUED | OUTPATIENT
Start: 2019-10-21 | End: 2019-10-21 | Stop reason: HOSPADM

## 2019-10-21 RX ORDER — EPINEPHRINE 0.3 MG/.3ML
0.3 INJECTION SUBCUTANEOUS
Status: DISCONTINUED | OUTPATIENT
Start: 2019-10-21 | End: 2019-10-21 | Stop reason: HOSPADM

## 2019-10-21 RX ORDER — SODIUM CHLORIDE 0.9 % (FLUSH) 0.9 %
10 SYRINGE (ML) INJECTION
Status: CANCELLED | OUTPATIENT
Start: 2019-10-28

## 2019-10-21 RX ADMIN — SODIUM CHLORIDE: 0.9 INJECTION, SOLUTION INTRAVENOUS at 09:10

## 2019-10-21 RX ADMIN — ACETAMINOPHEN 1000 MG: 500 TABLET ORAL at 08:10

## 2019-10-21 RX ADMIN — FAMOTIDINE 20 MG: 10 INJECTION, SOLUTION INTRAVENOUS at 08:10

## 2019-10-21 RX ADMIN — OCRELIZUMAB 300 MG: 300 INJECTION INTRAVENOUS at 09:10

## 2019-10-21 RX ADMIN — DEXTROSE: 50 INJECTION, SOLUTION INTRAVENOUS at 09:10

## 2019-10-21 RX ADMIN — Medication 50 MG: at 08:10

## 2019-10-31 ENCOUNTER — DOCUMENTATION ONLY (OUTPATIENT)
Dept: NEUROLOGY | Facility: CLINIC | Age: 44
End: 2019-10-31

## 2019-12-29 ENCOUNTER — HOSPITAL ENCOUNTER (EMERGENCY)
Facility: HOSPITAL | Age: 44
Discharge: HOME | End: 2019-12-29
Attending: EMERGENCY MEDICINE
Payer: OTHER GOVERNMENT

## 2019-12-29 VITALS
DIASTOLIC BLOOD PRESSURE: 67 MMHG | TEMPERATURE: 98 F | HEART RATE: 67 BPM | WEIGHT: 173 LBS | RESPIRATION RATE: 14 BRPM | BODY MASS INDEX: 29.53 KG/M2 | OXYGEN SATURATION: 100 % | SYSTOLIC BLOOD PRESSURE: 144 MMHG | HEIGHT: 64 IN

## 2019-12-29 DIAGNOSIS — R07.9 ACUTE CHEST PAIN: ICD-10-CM

## 2019-12-29 DIAGNOSIS — R07.89 CHEST PAIN, NON-CARDIAC: Primary | ICD-10-CM

## 2019-12-29 PROCEDURE — 63600175 PHARM REV CODE 636 W HCPCS: Performed by: EMERGENCY MEDICINE

## 2019-12-29 PROCEDURE — 99284 EMERGENCY DEPT VISIT MOD MDM: CPT | Mod: 25

## 2019-12-29 PROCEDURE — 93005 ELECTROCARDIOGRAM TRACING: CPT

## 2019-12-29 PROCEDURE — 96374 THER/PROPH/DIAG INJ IV PUSH: CPT

## 2019-12-29 PROCEDURE — 93010 ELECTROCARDIOGRAM REPORT: CPT | Mod: ,,, | Performed by: INTERNAL MEDICINE

## 2019-12-29 PROCEDURE — 93010 EKG 12-LEAD: ICD-10-PCS | Mod: ,,, | Performed by: INTERNAL MEDICINE

## 2019-12-29 RX ORDER — PROMETHAZINE HYDROCHLORIDE 25 MG/1
25 TABLET ORAL EVERY 6 HOURS PRN
Qty: 15 TABLET | Refills: 0 | Status: SHIPPED | OUTPATIENT
Start: 2019-12-29 | End: 2020-07-10

## 2019-12-29 RX ORDER — PREDNISONE 20 MG/1
60 TABLET ORAL DAILY
Qty: 15 TABLET | Refills: 0 | Status: SHIPPED | OUTPATIENT
Start: 2019-12-29 | End: 2020-01-03

## 2019-12-29 RX ORDER — HYDROCODONE BITARTRATE AND ACETAMINOPHEN 5; 325 MG/1; MG/1
1 TABLET ORAL EVERY 4 HOURS PRN
Qty: 15 TABLET | Refills: 0 | Status: SHIPPED | OUTPATIENT
Start: 2019-12-29 | End: 2020-01-08

## 2019-12-29 RX ORDER — KETOROLAC TROMETHAMINE 30 MG/ML
30 INJECTION, SOLUTION INTRAMUSCULAR; INTRAVENOUS
Status: COMPLETED | OUTPATIENT
Start: 2019-12-29 | End: 2019-12-29

## 2019-12-29 RX ORDER — PREDNISONE 20 MG/1
60 TABLET ORAL
Status: COMPLETED | OUTPATIENT
Start: 2019-12-29 | End: 2019-12-29

## 2019-12-29 RX ADMIN — PREDNISONE 60 MG: 20 TABLET ORAL at 01:12

## 2019-12-29 RX ADMIN — KETOROLAC TROMETHAMINE 30 MG: 30 INJECTION, SOLUTION INTRAMUSCULAR; INTRAVENOUS at 12:12
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
## 2019-12-29 NOTE — ED TRIAGE NOTES
"The patient presents to the ED via personal transportation with family. The patient reports a constant, 9/10, right anterior chest pain that radiates to right upper back and down right arm. Patient states, "It feels like someone is punching me". Patient also reports pain when taking breathes, lightheadedness, and nausea. Denies dizziness, weakness. Denies cardiac, hx. Denies hx of blood clots.  "

## 2019-12-29 NOTE — DISCHARGE INSTRUCTIONS
Prednisone asDirected.  Hydrocodone as directed.  Use a heating pad on her shoulder.  Return immediately if you get worse or if new problems develop.  Please follow-up with the cardiologist above.  You may also follow up with her primary care doctor.  Rest.

## 2020-01-14 ENCOUNTER — PATIENT MESSAGE (OUTPATIENT)
Dept: INFECTIOUS DISEASES | Facility: CLINIC | Age: 45
End: 2020-01-14

## 2020-01-27 ENCOUNTER — OFFICE VISIT (OUTPATIENT)
Dept: NEUROLOGY | Facility: CLINIC | Age: 45
End: 2020-01-27
Payer: OTHER GOVERNMENT

## 2020-01-27 VITALS
SYSTOLIC BLOOD PRESSURE: 135 MMHG | WEIGHT: 184.75 LBS | DIASTOLIC BLOOD PRESSURE: 87 MMHG | HEART RATE: 72 BPM | BODY MASS INDEX: 31.54 KG/M2 | HEIGHT: 64 IN

## 2020-01-27 DIAGNOSIS — D84.9 IMMUNOSUPPRESSION: ICD-10-CM

## 2020-01-27 DIAGNOSIS — G35 MS (MULTIPLE SCLEROSIS): ICD-10-CM

## 2020-01-27 DIAGNOSIS — M79.672 FOOT PAIN, BILATERAL: Primary | ICD-10-CM

## 2020-01-27 DIAGNOSIS — M79.671 FOOT PAIN, BILATERAL: Primary | ICD-10-CM

## 2020-01-27 DIAGNOSIS — M21.41 FLAT FEET: ICD-10-CM

## 2020-01-27 DIAGNOSIS — M21.42 FLAT FEET: ICD-10-CM

## 2020-01-27 DIAGNOSIS — Z71.89 COUNSELING REGARDING GOALS OF CARE: ICD-10-CM

## 2020-01-27 PROCEDURE — 99214 PR OFFICE/OUTPT VISIT, EST, LEVL IV, 30-39 MIN: ICD-10-PCS | Mod: S$PBB,,, | Performed by: PSYCHIATRY & NEUROLOGY

## 2020-01-27 PROCEDURE — 99213 OFFICE O/P EST LOW 20 MIN: CPT | Mod: PBBFAC | Performed by: PSYCHIATRY & NEUROLOGY

## 2020-01-27 PROCEDURE — 99999 PR PBB SHADOW E&M-EST. PATIENT-LVL III: ICD-10-PCS | Mod: PBBFAC,,, | Performed by: PSYCHIATRY & NEUROLOGY

## 2020-01-27 PROCEDURE — 99214 OFFICE O/P EST MOD 30 MIN: CPT | Mod: S$PBB,,, | Performed by: PSYCHIATRY & NEUROLOGY

## 2020-01-27 PROCEDURE — 99999 PR PBB SHADOW E&M-EST. PATIENT-LVL III: CPT | Mod: PBBFAC,,, | Performed by: PSYCHIATRY & NEUROLOGY

## 2020-01-27 RX ORDER — BUTALBITAL, ACETAMINOPHEN, CAFFEINE AND CODEINE PHOSPHATE 50; 325; 40; 30 MG/1; MG/1; MG/1; MG/1
CAPSULE ORAL
COMMUNITY
Start: 2020-01-17 | End: 2020-07-10

## 2020-01-27 RX ORDER — TELMISARTAN 20 MG/1
TABLET ORAL
COMMUNITY
End: 2020-07-10

## 2020-01-27 RX ORDER — METHYLPREDNISOLONE 4 MG/1
TABLET ORAL
COMMUNITY
End: 2020-02-07

## 2020-01-27 RX ORDER — METOPROLOL SUCCINATE 25 MG/1
TABLET, EXTENDED RELEASE ORAL
COMMUNITY
Start: 2020-01-07 | End: 2023-05-10

## 2020-01-27 NOTE — PROGRESS NOTES
Subjective:        Patient ID: Tiffany Cruz is a 44 y.o. female who presents today for a routine clinic visit for MS.      MS HPI:  · DMT: ocrelizumab --initial doses were in October;   · Side effects from DMT? No  · Taking vitamin D3 as recommended? Yes -  · She's having trouble with her feet when she walks;  She has pain in her feet--going to see a podiatrist; she has flat feet;    · She's having right sided pain--much worse when she walks; better when she rests;   · Had CTS surgery left hand; feels hand has not improved since then;   · Tolerating Ocrevus;     Medications:  Current Outpatient Medications   Medication Sig    baclofen (LIORESAL) 10 MG tablet Take 1/2 to 1 tab by mouth at bedtime.    cholecalciferol, vitamin D3, 50,000 unit Tab Take 1 tablet by mouth once a week.    gabapentin (NEURONTIN) 300 MG capsule Take 1 capsule (300 mg total) by mouth every evening.    promethazine (PHENERGAN) 25 MG tablet Take 1 tablet (25 mg total) by mouth every 6 (six) hours as needed for Nausea.    telmisartan-hydrochlorothiazide (MICARDIS HCT) 80-12.5 mg per tablet Take 1 tablet by mouth.    tiZANidine (ZANAFLEX) 6 mg capsule Take 1 capsule (6 mg total) by mouth 3 (three) times daily.    zolpidem (AMBIEN) 5 MG Tab Take 1 tablet (5 mg total) by mouth nightly as needed.     No current facility-administered medications for this visit.      Facility-Administered Medications Ordered in Other Visits   Medication Frequency    0.9%  NaCl infusion Continuous    alteplase injection 2 mg PRN    ferric carboxymaltose (INJECTAFER) 750 mg in sodium chloride 0.9% 250 mL IVPB (ready to mix system) Once    heparin, porcine (PF) 100 unit/mL injection flush 500 Units PRN    mupirocin 2 % ointment On Call Procedure    sodium chloride 0.9% 100 mL flush bag 1 time in Clinic/HOD    sodium chloride 0.9% flush 10 mL PRN       SOCIAL HISTORY  Social History     Tobacco Use    Smoking status: Never Smoker    Smokeless  tobacco: Never Used   Substance Use Topics    Alcohol use: No     Alcohol/week: 0.0 standard drinks    Drug use: No       Living arrangements - the patient lives with their spouse.             Objective:      25 foot timed walk: 4.06 s without assist  Neurologic Exam    MENTAL STATUS: grossly intact  CRANIAL NERVE EXAM: There is no internuclear ophthalmoplegia. Extraocular   muscles are intact.  No facial   asymmetry.There is no dysarthria.   MOTOR EXAM: Normal bulk and tone throughout UE and LE bilaterally. Rapid sequential movements are normal. Strength is 5/5 in all groups   in the lower extremities and upper extremities.   REFLEXES: Symmetric and 1+ throughout in all 4 extremities.   SENSORY EXAM: Normal to vibration t/o  COORDINATION: Normal finger-to-nose exam.   GAIT: Narrow based and stable.    Imaging:       Results for orders placed during the hospital encounter of 04/26/19   MRI Brain Demyelinating W W/O Contrast    Impression Findings in the cerebral white matter which are typical for multiple sclerosis with moderate plaque burden.  No new or enhancing lesions to suggest active disease.    Decrease in signal intensity of previous lesion in the left corona radiata.      Electronically signed by: Deangelo Hart MD  Date:    04/28/2019  Time:    15:01     Results for orders placed during the hospital encounter of 07/19/18   MRI Cervical Spine Demyelinating W W/O Contrast    Impression New tiny focus of T2 hyperintense signal in the left lateral cord at the level of C2, in keeping with demyelinating plaque given clinical history of multiple sclerosis.  No associated enhancement.  This lesion is distinct from the right dorsal lateral cord lesion at the level of C2 present on examination of 01/15/2017.    Unchanged diffusely decreased T1 marrow signal suggestive of diffuse marrow replacement process such as red marrow reconversion or myelodysplastic process.    Mild cervical spondylosis most notable at  C5-6.    Electronically signed by resident: Juan Matias  Date:    07/20/2018  Time:    08:27    Electronically signed by: Mt Good MD  Date:    07/20/2018  Time:    12:41     Results for orders placed during the hospital encounter of 07/19/18   MRI Thoracic Spine Demyelinating W W/O Contrast    Impression New tiny focus of T2 hyperintense signal in the left lateral cord at the level of C2, in keeping with demyelinating plaque given clinical history of multiple sclerosis.  No associated enhancement.  This lesion is distinct from the right dorsal lateral cord lesion at the level of C2 present on examination of 01/15/2017.    Unchanged diffusely decreased T1 marrow signal suggestive of diffuse marrow replacement process such as red marrow reconversion or myelodysplastic process.    Mild cervical spondylosis most notable at C5-6.    Electronically signed by resident: Juan Matias  Date:    07/20/2018  Time:    08:27    Electronically signed by: Mt Good MD  Date:    07/20/2018  Time:    12:41         Labs:     Lab Results   Component Value Date    AQCINXCP47IS 31 02/19/2019    LEQZZUIX14KO 33 01/29/2018    AKHJKXOO73CU 50 03/18/2016     No results found for: JCVINDEX, JCVANTIBODY  Lab Results   Component Value Date    KO4XOPRR 80.9 07/16/2019    ABSOLUTECD3 1869 07/16/2019    DC1YZCNV 23.9 07/16/2019    ABSOLUTECD8 551 07/16/2019    DJ7HODNI 55.6 07/16/2019    ABSOLUTECD4 1284 07/16/2019    LABCD48 2.33 07/16/2019     Lab Results   Component Value Date    WBC 8.05 07/16/2019    RBC 5.50 (H) 07/16/2019    HGB 11.5 (L) 07/16/2019    HCT 38.4 07/16/2019    MCV 70 (L) 07/16/2019    MCH 20.9 (L) 07/16/2019    MCHC 29.9 (L) 07/16/2019    RDW 14.2 07/16/2019     07/16/2019    MPV 11.6 07/16/2019    GRAN 5.0 07/16/2019    GRAN 61.6 07/16/2019    LYMPH 2.0 07/16/2019    LYMPH 25.0 07/16/2019    MONO 0.9 07/16/2019    MONO 10.6 07/16/2019    EOS 0.1 07/16/2019    BASO 0.08 07/16/2019    EOSINOPHIL 1.6 07/16/2019     BASOPHIL 1.0 07/16/2019     Sodium   Date Value Ref Range Status   07/16/2019 138 136 - 145 mmol/L Final     Potassium   Date Value Ref Range Status   07/16/2019 4.0 3.5 - 5.1 mmol/L Final     Chloride   Date Value Ref Range Status   07/16/2019 103 95 - 110 mmol/L Final     CO2   Date Value Ref Range Status   07/16/2019 25 23 - 29 mmol/L Final     Glucose   Date Value Ref Range Status   07/16/2019 78 70 - 110 mg/dL Final     BUN, Bld   Date Value Ref Range Status   07/16/2019 11 6 - 20 mg/dL Final     Creatinine   Date Value Ref Range Status   07/16/2019 0.8 0.5 - 1.4 mg/dL Final     Calcium   Date Value Ref Range Status   07/16/2019 9.4 8.7 - 10.5 mg/dL Final     Total Protein   Date Value Ref Range Status   07/16/2019 7.4 6.0 - 8.4 g/dL Final     Albumin   Date Value Ref Range Status   07/16/2019 3.7 3.5 - 5.2 g/dL Final     Total Bilirubin   Date Value Ref Range Status   07/16/2019 0.3 0.1 - 1.0 mg/dL Final     Comment:     For infants and newborns, interpretation of results should be based  on gestational age, weight and in agreement with clinical  observations.  Premature Infant recommended reference ranges:  Up to 24 hours.............<8.0 mg/dL  Up to 48 hours............<12.0 mg/dL  3-5 days..................<15.0 mg/dL  6-29 days.................<15.0 mg/dL       Alkaline Phosphatase   Date Value Ref Range Status   07/16/2019 61 55 - 135 U/L Final     AST   Date Value Ref Range Status   07/16/2019 20 10 - 40 U/L Final     ALT   Date Value Ref Range Status   07/16/2019 24 10 - 44 U/L Final     Anion Gap   Date Value Ref Range Status   07/16/2019 10 8 - 16 mmol/L Final     eGFR if    Date Value Ref Range Status   07/16/2019 >60 >60 mL/min/1.73 m^2 Final     eGFR if non    Date Value Ref Range Status   07/16/2019 >60 >60 mL/min/1.73 m^2 Final     Comment:     Calculation used to obtain the estimated glomerular filtration  rate (eGFR) is the CKD-EPI equation.           Lab  Results   Component Value Date    HEPBSAG Negative 07/16/2019    HEPBSAB Negative 07/16/2019    HEPBCAB Negative 07/16/2019         Impression:         Diagnosis of MS: Yes      MS Diagnosis based on:    Progression:  Number of relapses in the past year:  0  Clinical Progression:  Clinically Stable  MRI Progression:  Stable    Plan:   Monitoring labs ordered?:  Yes   Imaging labs ordered?:  Yes    Additional Impression:            Pt is clinically stable  Continue Ocrevus--next dose March  6 mo interval MRI planned in FEb  Ocrevus labs planned in Feb  F/u 6 mo Roseanna Peskin CNS        Over 50% of this 25 minute visit was spent in direct face to face counseling of the patient about MS, DMT considerations, and MS symptom management.     Problem List Items Addressed This Visit        Unprioritized    MS (multiple sclerosis)    Relevant Orders    CBC auto differential    Rituxan Sensitivity    Hepatitis B core antibody, total    Hepatitis B surface antibody    Hepatitis B surface antigen    MRI Brain Demyelinating W W/O Contrast    Vitamin D    Immunosuppression      Other Visit Diagnoses     Foot pain, bilateral    -  Primary    Relevant Orders    Ambulatory Referral to Podiatry    Flat feet        Relevant Orders    Ambulatory Referral to Podiatry    Counseling regarding goals of care              Marika Tompkins MD

## 2020-02-03 PROBLEM — D84.9 IMMUNOSUPPRESSION: Status: ACTIVE | Noted: 2020-02-03

## 2020-02-07 ENCOUNTER — TELEPHONE (OUTPATIENT)
Dept: PODIATRY | Facility: CLINIC | Age: 45
End: 2020-02-07

## 2020-02-07 ENCOUNTER — OFFICE VISIT (OUTPATIENT)
Dept: PODIATRY | Facility: CLINIC | Age: 45
End: 2020-02-07
Payer: OTHER GOVERNMENT

## 2020-02-07 VITALS
SYSTOLIC BLOOD PRESSURE: 132 MMHG | BODY MASS INDEX: 31.41 KG/M2 | HEIGHT: 64 IN | HEART RATE: 87 BPM | DIASTOLIC BLOOD PRESSURE: 71 MMHG | WEIGHT: 184 LBS

## 2020-02-07 DIAGNOSIS — G89.29 CHRONIC PAIN OF RIGHT ANKLE: ICD-10-CM

## 2020-02-07 DIAGNOSIS — M79.672 BILATERAL FOOT PAIN: Primary | ICD-10-CM

## 2020-02-07 DIAGNOSIS — M25.571 CHRONIC PAIN OF RIGHT ANKLE: ICD-10-CM

## 2020-02-07 DIAGNOSIS — M62.469 GASTROCNEMIUS EQUINUS, UNSPECIFIED LATERALITY: ICD-10-CM

## 2020-02-07 DIAGNOSIS — M25.571 SINUS TARSI SYNDROME, RIGHT: ICD-10-CM

## 2020-02-07 DIAGNOSIS — M79.671 BILATERAL FOOT PAIN: Primary | ICD-10-CM

## 2020-02-07 DIAGNOSIS — M12.571 TRAUMATIC ARTHRITIS OF ANKLE, RIGHT: ICD-10-CM

## 2020-02-07 PROCEDURE — 99203 OFFICE O/P NEW LOW 30 MIN: CPT | Mod: 25,S$PBB,, | Performed by: PODIATRIST

## 2020-02-07 PROCEDURE — 20605 PR DRAIN/INJECT INTERMEDIATE JOINT/BURSA: ICD-10-PCS | Mod: S$PBB,RT,, | Performed by: PODIATRIST

## 2020-02-07 PROCEDURE — 20605 DRAIN/INJ JOINT/BURSA W/O US: CPT | Mod: S$PBB,RT,, | Performed by: PODIATRIST

## 2020-02-07 PROCEDURE — 99213 OFFICE O/P EST LOW 20 MIN: CPT | Mod: PBBFAC,PN | Performed by: PODIATRIST

## 2020-02-07 PROCEDURE — 20605 DRAIN/INJ JOINT/BURSA W/O US: CPT | Mod: PBBFAC,PN | Performed by: PODIATRIST

## 2020-02-07 PROCEDURE — 99999 PR PBB SHADOW E&M-EST. PATIENT-LVL III: CPT | Mod: PBBFAC,,, | Performed by: PODIATRIST

## 2020-02-07 PROCEDURE — 99999 PR PBB SHADOW E&M-EST. PATIENT-LVL III: ICD-10-PCS | Mod: PBBFAC,,, | Performed by: PODIATRIST

## 2020-02-07 PROCEDURE — 99203 PR OFFICE/OUTPT VISIT, NEW, LEVL III, 30-44 MIN: ICD-10-PCS | Mod: 25,S$PBB,, | Performed by: PODIATRIST

## 2020-02-07 RX ORDER — DEXAMETHASONE SODIUM PHOSPHATE 4 MG/ML
4 INJECTION, SOLUTION INTRA-ARTICULAR; INTRALESIONAL; INTRAMUSCULAR; INTRAVENOUS; SOFT TISSUE
Status: COMPLETED | OUTPATIENT
Start: 2020-02-07 | End: 2020-02-07

## 2020-02-07 RX ADMIN — DEXAMETHASONE SODIUM PHOSPHATE 4 MG: 4 INJECTION INTRA-ARTICULAR; INTRALESIONAL; INTRAMUSCULAR; INTRAVENOUS; SOFT TISSUE at 11:02

## 2020-02-07 NOTE — TELEPHONE ENCOUNTER
----- Message from Alexus Salinas sent at 2/7/2020  8:25 AM CST -----  Contact: [phone]/self  Patient states she is returning your call. Please advise.

## 2020-02-07 NOTE — TELEPHONE ENCOUNTER
----- Message from Kirstie Kc sent at 2/7/2020  8:38 AM CST -----  Contact: Self [phone]  Patient Returning Your Phone Call
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
## 2020-02-07 NOTE — LETTER
February 7, 2020      Marika Tompkins MD  [address]  [facility] [address]           [facility]  [address]  [place] [address]  Phone: [phone]  Fax: [phone]          Patient: Tiffany Cruz   MR Number: [id number]   YOB: 1975   Date of Visit: 2/7/2020       Dear Dr. Marika Tompkins:    Thank you for referring Tiffany Cruz to me for evaluation. Attached you will find relevant portions of my assessment and plan of care.    If you have questions, please do not hesitate to call me. I look forward to following Tiffany Cruz along with you.    Sincerely,    Paxton Sinclair, DPM    Enclosure  CC:  No Recipients    If you would like to receive this communication electronically, please contact [email or web address] or [phone] to request more information on [email or web address] Link access.    For providers and/or their staff who would like to refer a patient to [facility], please contact us through our one-stop-shop provider referral line, [facility], at [phone].    If you feel you have received this communication in error or would no longer like to receive these types of communications, please e-mail [email or web address]

## 2020-02-13 ENCOUNTER — HOSPITAL ENCOUNTER (OUTPATIENT)
Dept: RADIOLOGY | Facility: HOSPITAL | Age: 45
Discharge: HOME | End: 2020-02-13
Attending: PSYCHIATRY & NEUROLOGY
Payer: OTHER GOVERNMENT

## 2020-02-13 ENCOUNTER — HOSPITAL ENCOUNTER (OUTPATIENT)
Dept: RADIOLOGY | Facility: HOSPITAL | Age: 45
Discharge: HOME | End: 2020-02-13
Attending: PODIATRIST
Payer: OTHER GOVERNMENT

## 2020-02-13 DIAGNOSIS — G35 MS (MULTIPLE SCLEROSIS): ICD-10-CM

## 2020-02-13 DIAGNOSIS — G89.29 CHRONIC PAIN OF RIGHT ANKLE: ICD-10-CM

## 2020-02-13 DIAGNOSIS — M12.571 TRAUMATIC ARTHRITIS OF ANKLE, RIGHT: ICD-10-CM

## 2020-02-13 DIAGNOSIS — F41.9 ANXIETY: ICD-10-CM

## 2020-02-13 DIAGNOSIS — M25.571 CHRONIC PAIN OF RIGHT ANKLE: ICD-10-CM

## 2020-02-13 PROCEDURE — 73721 MRI JNT OF LWR EXTRE W/O DYE: CPT | Mod: 26,RT,, | Performed by: RADIOLOGY

## 2020-02-13 PROCEDURE — A9585 GADOBUTROL INJECTION: HCPCS | Performed by: PSYCHIATRY & NEUROLOGY

## 2020-02-13 PROCEDURE — 73721 MRI ANKLE WITHOUT CONTRAST RIGHT: ICD-10-PCS | Mod: 26,RT,, | Performed by: RADIOLOGY

## 2020-02-13 PROCEDURE — 73721 MRI JNT OF LWR EXTRE W/O DYE: CPT | Mod: TC,RT

## 2020-02-13 PROCEDURE — 25500020 PHARM REV CODE 255: Performed by: PSYCHIATRY & NEUROLOGY

## 2020-02-13 PROCEDURE — 70553 MRI BRAIN DEMYELINATING W/ WO CONTRAST: ICD-10-PCS | Mod: 26,,, | Performed by: RADIOLOGY

## 2020-02-13 PROCEDURE — 70553 MRI BRAIN STEM W/O & W/DYE: CPT | Mod: TC

## 2020-02-13 PROCEDURE — 70553 MRI BRAIN STEM W/O & W/DYE: CPT | Mod: 26,,, | Performed by: RADIOLOGY

## 2020-02-13 RX ORDER — GADOBUTROL 604.72 MG/ML
10 INJECTION INTRAVENOUS
Status: COMPLETED | OUTPATIENT
Start: 2020-02-13 | End: 2020-02-13

## 2020-02-13 RX ORDER — DIAZEPAM 5 MG/1
TABLET ORAL
Qty: 2 TABLET | Refills: 0 | Status: SHIPPED | OUTPATIENT
Start: 2020-02-13 | End: 2020-07-10

## 2020-02-13 RX ADMIN — GADOBUTROL 10 ML: 604.72 INJECTION INTRAVENOUS at 07:02

## 2020-02-13 NOTE — TELEPHONE ENCOUNTER
----- Message from Adelina Segovia, Patient Care Assistant sent at 2/13/2020  3:28 PM CST -----  Contact: BK COLLINS [[id number]]  Name of Who is Calling: BK COLLINS [[id number]]    What is the request in detail: Requesting medication be sent to pharmacy before getting MRI. Please contact to further discuss and advise      Can the clinic reply by [facility]: No      What [facility] to [facility]:   [phone]

## 2020-02-14 ENCOUNTER — OFFICE VISIT (OUTPATIENT)
Dept: PODIATRY | Facility: CLINIC | Age: 45
End: 2020-02-14
Payer: OTHER GOVERNMENT

## 2020-02-14 VITALS
BODY MASS INDEX: 31.41 KG/M2 | DIASTOLIC BLOOD PRESSURE: 82 MMHG | WEIGHT: 184 LBS | SYSTOLIC BLOOD PRESSURE: 128 MMHG | HEART RATE: 85 BPM | HEIGHT: 64 IN

## 2020-02-14 DIAGNOSIS — M25.871 MASS OF JOINT OF RIGHT FOOT: ICD-10-CM

## 2020-02-14 DIAGNOSIS — M25.571 SINUS TARSI SYNDROME, RIGHT: ICD-10-CM

## 2020-02-14 DIAGNOSIS — G57.51 TARSAL TUNNEL SYNDROME, RIGHT: Primary | ICD-10-CM

## 2020-02-14 DIAGNOSIS — Z01.818 PREOP TESTING: ICD-10-CM

## 2020-02-14 PROCEDURE — 99215 OFFICE O/P EST HI 40 MIN: CPT | Mod: PBBFAC,PN | Performed by: PODIATRIST

## 2020-02-14 PROCEDURE — 99999 PR PBB SHADOW E&M-EST. PATIENT-LVL V: CPT | Mod: PBBFAC,,, | Performed by: PODIATRIST

## 2020-02-14 PROCEDURE — 99999 PR PBB SHADOW E&M-EST. PATIENT-LVL V: ICD-10-PCS | Mod: PBBFAC,,, | Performed by: PODIATRIST

## 2020-02-14 PROCEDURE — 99214 PR OFFICE/OUTPT VISIT, EST, LEVL IV, 30-39 MIN: ICD-10-PCS | Mod: S$PBB,,, | Performed by: PODIATRIST

## 2020-02-14 PROCEDURE — 99214 OFFICE O/P EST MOD 30 MIN: CPT | Mod: S$PBB,,, | Performed by: PODIATRIST

## 2020-02-14 RX ORDER — LIDOCAINE HYDROCHLORIDE 10 MG/ML
1 INJECTION, SOLUTION EPIDURAL; INFILTRATION; INTRACAUDAL; PERINEURAL ONCE
Status: CANCELLED | OUTPATIENT
Start: 2020-02-14 | End: 2020-02-14

## 2020-02-14 RX ORDER — SODIUM CHLORIDE 0.9 % (FLUSH) 0.9 %
10 SYRINGE (ML) INJECTION
Status: DISCONTINUED | OUTPATIENT
Start: 2020-02-14 | End: 2022-09-22
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
## 2020-02-14 NOTE — LETTER
February 14, 2020      Marika Tompkins MD  [address]  [facility] [address]           [facility]  [address]  [place] [address]  Phone: [phone]  Fax: [phone]          Patient: Tiffany Cruz   MR Number: [id number]   YOB: 1975   Date of Visit: 2/14/2020       Dear Dr. Marika Tompkins:    Thank you for referring Tiffany Cruz to me for evaluation. Attached you will find relevant portions of my assessment and plan of care.    If you have questions, please do not hesitate to call me. I look forward to following Tiffany Cruz along with you.    Sincerely,    Paxton Sinclair, DPM    Enclosure  CC:  No Recipients    If you would like to receive this communication electronically, please contact [email or web address] or [phone] to request more information on [facility] Link access.    For providers and/or their staff who would like to refer a patient to [facility], please contact us through our one-stop-shop provider referral line, [facility], at [phone].    If you feel you have received this communication in error or would no longer like to receive these types of communications, please e-mail [email or web address]

## 2020-02-14 NOTE — PATIENT INSTRUCTIONS
3/4/2020 as general anesthesia for tarsal tunnel release and arthroscopic debridement sinus tarsi/subtalar joint

## 2020-02-24 ENCOUNTER — PATIENT MESSAGE (OUTPATIENT)
Dept: SURGERY | Facility: HOSPITAL | Age: 45
End: 2020-02-24

## 2020-02-26 ENCOUNTER — PATIENT MESSAGE (OUTPATIENT)
Dept: PODIATRY | Facility: CLINIC | Age: 45
End: 2020-02-26

## 2020-02-26 ENCOUNTER — TELEPHONE (OUTPATIENT)
Dept: PODIATRY | Facility: CLINIC | Age: 45
End: 2020-02-26

## 2020-02-26 NOTE — TELEPHONE ENCOUNTER
----- Message from Jossy York sent at 2/26/2020 12:26 PM CST -----  Contact: patient  Type:  Needs Medical Advice    Who Called: Tiffany  Would the patient rather a call back or a response via [facility]?  Call back  Best Call Back Number: [phone]  Additional Information:  She would like to speak with your office in regard to cancelling her upcoming surgery

## 2020-02-27 ENCOUNTER — TELEPHONE (OUTPATIENT)
Dept: PODIATRY | Facility: CLINIC | Age: 45
End: 2020-02-27

## 2020-02-27 NOTE — TELEPHONE ENCOUNTER
----- Message from Gilberto Garcia sent at 2/26/2020  5:01 PM CST -----  Contact: self [phone]  Patient has called several times and she cannot make the surgery on April 4, 2020 and would like to reschedule the surgery. Please call and advise.

## 2020-03-03 ENCOUNTER — CLINICAL SUPPORT (OUTPATIENT)
Dept: INFECTIOUS DISEASES | Facility: CLINIC | Age: 45
End: 2020-03-03
Payer: OTHER GOVERNMENT

## 2020-03-03 ENCOUNTER — LAB VISIT (OUTPATIENT)
Dept: LAB | Facility: HOSPITAL | Age: 45
End: 2020-03-03
Attending: PSYCHIATRY & NEUROLOGY
Payer: OTHER GOVERNMENT

## 2020-03-03 DIAGNOSIS — G35 MULTIPLE SCLEROSIS: ICD-10-CM

## 2020-03-03 DIAGNOSIS — Z23 IMMUNIZATION DUE: ICD-10-CM

## 2020-03-03 DIAGNOSIS — D84.9 IMMUNOCOMPROMISED: ICD-10-CM

## 2020-03-03 DIAGNOSIS — G35 MS (MULTIPLE SCLEROSIS): ICD-10-CM

## 2020-03-03 LAB
25(OH)D3+25(OH)D2 SERPL-MCNC: 62 NG/ML (ref 30–96)
BASOPHILS # BLD AUTO: 0.07 K/UL (ref 0–0.2)
BASOPHILS NFR BLD: 0.8 % (ref 0–1.9)
DIFFERENTIAL METHOD: ABNORMAL
EOSINOPHIL # BLD AUTO: 0.1 K/UL (ref 0–0.5)
EOSINOPHIL NFR BLD: 1.2 % (ref 0–8)
ERYTHROCYTE [DISTWIDTH] IN BLOOD BY AUTOMATED COUNT: 13.9 % (ref 11.5–14.5)
HCT VFR BLD AUTO: 36.4 % (ref 37–48.5)
HGB BLD-MCNC: 10.7 G/DL (ref 12–16)
IMM GRANULOCYTES # BLD AUTO: 0.02 K/UL (ref 0–0.04)
IMM GRANULOCYTES NFR BLD AUTO: 0.2 % (ref 0–0.5)
LYMPHOCYTES # BLD AUTO: 2.5 K/UL (ref 1–4.8)
LYMPHOCYTES NFR BLD: 28.8 % (ref 18–48)
MCH RBC QN AUTO: 21.6 PG (ref 27–31)
MCHC RBC AUTO-ENTMCNC: 29.4 G/DL (ref 32–36)
MCV RBC AUTO: 74 FL (ref 82–98)
MONOCYTES # BLD AUTO: 0.8 K/UL (ref 0.3–1)
MONOCYTES NFR BLD: 9.2 % (ref 4–15)
NEUTROPHILS # BLD AUTO: 5.1 K/UL (ref 1.8–7.7)
NEUTROPHILS NFR BLD: 59.8 % (ref 38–73)
NRBC BLD-RTO: 0 /100 WBC
PLATELET # BLD AUTO: 405 K/UL (ref 150–350)
PMV BLD AUTO: 11.3 FL (ref 9.2–12.9)
RBC # BLD AUTO: 4.95 M/UL (ref 4–5.4)
WBC # BLD AUTO: 8.6 K/UL (ref 3.9–12.7)

## 2020-03-03 PROCEDURE — 86704 HEP B CORE ANTIBODY TOTAL: CPT

## 2020-03-03 PROCEDURE — 36415 COLL VENOUS BLD VENIPUNCTURE: CPT

## 2020-03-03 PROCEDURE — 87340 HEPATITIS B SURFACE AG IA: CPT

## 2020-03-03 PROCEDURE — 86706 HEP B SURFACE ANTIBODY: CPT

## 2020-03-03 PROCEDURE — 86356 MONONUCLEAR CELL ANTIGEN: CPT

## 2020-03-03 PROCEDURE — 82306 VITAMIN D 25 HYDROXY: CPT

## 2020-03-03 PROCEDURE — 85025 COMPLETE CBC W/AUTO DIFF WBC: CPT

## 2020-03-03 PROCEDURE — 90472 IMMUNIZATION ADMIN EACH ADD: CPT | Mod: PBBFAC

## 2020-03-03 PROCEDURE — 90471 IMMUNIZATION ADMIN: CPT | Mod: PBBFAC

## 2020-03-03 NOTE — PROGRESS NOTES
Pt received the second dose of her Hepatitis A vaccination. She also received her Pneumovax 23 vaccination. Return appointment provided. Pt tolerated the injections well. Pt left the unit in NAD.

## 2020-03-04 LAB
HBV CORE AB SERPL QL IA: NEGATIVE
HBV SURFACE AB SER-ACNC: POSITIVE M[IU]/ML
HBV SURFACE AG SERPL QL IA: NEGATIVE

## 2020-03-06 ENCOUNTER — CLINICAL SUPPORT (OUTPATIENT)
Dept: INFECTIOUS DISEASES | Facility: CLINIC | Age: 45
End: 2020-03-06
Payer: OTHER GOVERNMENT

## 2020-03-06 DIAGNOSIS — Z23 IMMUNIZATION DUE: ICD-10-CM

## 2020-03-06 DIAGNOSIS — D84.9 IMMUNOCOMPROMISED: ICD-10-CM

## 2020-03-06 DIAGNOSIS — G35 MULTIPLE SCLEROSIS: ICD-10-CM

## 2020-03-06 PROCEDURE — 90750 HZV VACC RECOMBINANT IM: CPT | Mod: PBBFAC

## 2020-03-08 ENCOUNTER — TELEPHONE (OUTPATIENT)
Dept: NEUROLOGY | Facility: CLINIC | Age: 45
End: 2020-03-08

## 2020-03-08 DIAGNOSIS — G35 MULTIPLE SCLEROSIS: Primary | ICD-10-CM

## 2020-03-09 LAB — CD20 CELLS NFR SPEC: NORMAL %

## 2020-03-16 ENCOUNTER — PATIENT MESSAGE (OUTPATIENT)
Dept: NEUROLOGY | Facility: CLINIC | Age: 45
End: 2020-03-16

## 2020-03-16 NOTE — TELEPHONE ENCOUNTER
Spoke with Evelyn.   She has had fever a few times in the past few days (axillary) up to 99.9, although fevers have not been constant.   She has nasal congestion and sore throat. She has a dry, non-productive cough for 3 days. She was in bed for 3 days last week. She denies any shortness of breath (but asked if she should get CXR when she sees PCP later).   She has been taking Theraflu.   She is not sure if she has had an exposure to COVID, but she has been out and about in [place] and in [place] [facility] [place] [facility] at a conference.   She will go to PCP later and be tested for FLU. I will provide her with number for COVID hotline here at [facility].

## 2020-03-20 ENCOUNTER — TELEPHONE (OUTPATIENT)
Dept: PODIATRY | Facility: CLINIC | Age: 45
End: 2020-03-20

## 2020-03-20 NOTE — TELEPHONE ENCOUNTER
Spoke with patient to let her know that Dr. Sinclair is not doing any elective surgeries as of right now due to the COVID-19 and we will contact her once he starts back.

## 2020-03-30 ENCOUNTER — DOCUMENTATION ONLY (OUTPATIENT)
Dept: REHABILITATION | Facility: HOSPITAL | Age: 45
End: 2020-03-30

## 2020-03-30 PROBLEM — M25.522 ELBOW PAIN, LEFT: Status: RESOLVED | Noted: 2019-06-19 | Resolved: 2020-03-30

## 2020-03-30 PROBLEM — L90.5 SCAR ADHERENT: Status: RESOLVED | Noted: 2019-06-19 | Resolved: 2020-03-30

## 2020-03-30 NOTE — PROGRESS NOTES
Outpatient Therapy Discharge Summary     Name: Tiffany Cruz  Clinic Number: [id number]    Medical Diagnosis: Left CT and ulnar nerve decompression 3/18/2019   Therapy Diagnosis:           Encounter Diagnoses   Name Primary?    Hand pain, left      Decreased  strength of left hand      Range of motion deficit        Physician: Billie Daniel PA; Dr. LILLIAM Vaughan      Physician Orders: status post Ulnar nerve decompression at the elbow left and Carpal tunnel release left  Eval and Treat, modalities as needed  1-2 times/week for 6+ weeks     Surgical Procedure and Date: 3/18/2019  Evaluation Date: 4/16/2019    Date of Last visit: 8/14/2019  Total Visits Received: 13  Cancelled Visits: 5  No Show Visits: 3    Assessment      Pt did not return to therapy for discharge. Goals partially met.     Goals:   Short Term Goals (4 weeks)   1)  Patient to be IND with HEP and modalities for pain management--not met  2)  Increase ROM 3-5 degrees to increase functional hand use for ADLs/work/leisure activities--met  3)   Decrease edema .2-.3 mm to increase joint mobility /flexibility for functional hand use. --met  4)  Assess  and pinch and set goals accordingly --met  5)  Patient to score at _30-40%____%  or less on FOTO to demonstrate improved perception of functional Left hand  Use.--met        Long Term Goals (8 weeks)   1)  Increase  strength 2-8 lbs. For holding files, driving. ---not met  2)  Increase pinch strength 1-4 psi's for typing, buttons, zippers --not met    Discharge reason: Patient has not attended therapy since 8/14/2019    Plan   This patient is discharged from Occupational Therapy    AVERY Welch

## 2020-04-04 DIAGNOSIS — G35 MULTIPLE SCLEROSIS: ICD-10-CM

## 2020-04-05 RX ORDER — CHOLECALCIFEROL (VITAMIN D3) 1250 MCG
TABLET ORAL
Qty: 12 TABLET | Refills: 1 | Status: SHIPPED | OUTPATIENT
Start: 2020-04-05 | End: 2021-05-10 | Stop reason: SDUPTHER

## 2020-05-07 NOTE — TELEPHONE ENCOUNTER
OK to proceed with scheduling infusion.   Hep B Surface Ag negative  Hep B Surface Ab positive (past vaccination)  Hep B Core Ab negative  CD19, 20=0 on 3/3/20  WBC=8.60  MVC=6802    Would like to repeat CBC before infusion. Order is in.

## 2020-07-10 ENCOUNTER — OFFICE VISIT (OUTPATIENT)
Dept: NEUROLOGY | Facility: CLINIC | Age: 45
End: 2020-07-10
Payer: OTHER GOVERNMENT

## 2020-07-10 DIAGNOSIS — Z13.9 SCREENING FOR CONDITION: ICD-10-CM

## 2020-07-10 DIAGNOSIS — Z29.89 PROPHYLACTIC IMMUNOTHERAPY: ICD-10-CM

## 2020-07-10 DIAGNOSIS — G35 MULTIPLE SCLEROSIS: ICD-10-CM

## 2020-07-10 DIAGNOSIS — M54.9 UPPER BACK PAIN: ICD-10-CM

## 2020-07-10 DIAGNOSIS — Z71.89 COUNSELING REGARDING GOALS OF CARE: ICD-10-CM

## 2020-07-10 DIAGNOSIS — M54.2 NECK PAIN ON LEFT SIDE: Primary | ICD-10-CM

## 2020-07-10 DIAGNOSIS — Z79.899 HIGH RISK MEDICATION USE: ICD-10-CM

## 2020-07-10 DIAGNOSIS — M79.602 LEFT ARM PAIN: ICD-10-CM

## 2020-07-10 PROCEDURE — 99215 OFFICE O/P EST HI 40 MIN: CPT | Mod: 95,,, | Performed by: CLINICAL NURSE SPECIALIST

## 2020-07-10 PROCEDURE — 99215 PR OFFICE/OUTPT VISIT, EST, LEVL V, 40-54 MIN: ICD-10-PCS | Mod: 95,,, | Performed by: CLINICAL NURSE SPECIALIST
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
## 2020-07-10 NOTE — Clinical Note
SP, she will likely want to move forward with Ocrevus in August (was due in April), but she will let me know for sure. She is having some GI concerns (nausea after eating anything), so she will go to gastro this month. Maybe we can wait until their eval (just to make sure nothing serious is going on) before proceeding.

## 2020-07-13 ENCOUNTER — TELEPHONE (OUTPATIENT)
Dept: NEUROLOGY | Facility: CLINIC | Age: 45
End: 2020-07-13

## 2020-07-13 NOTE — TELEPHONE ENCOUNTER
----- Message from DIEUDONNE Hernandez, CNS sent at 7/10/2020  6:39 PM CDT -----  F/U with me in September

## 2020-07-15 ENCOUNTER — HOSPITAL ENCOUNTER (OUTPATIENT)
Dept: RADIOLOGY | Facility: HOSPITAL | Age: 45
Discharge: HOME | End: 2020-07-15
Attending: CLINICAL NURSE SPECIALIST
Payer: OTHER GOVERNMENT

## 2020-07-15 DIAGNOSIS — M54.2 NECK PAIN ON LEFT SIDE: ICD-10-CM

## 2020-07-15 DIAGNOSIS — M54.9 UPPER BACK PAIN: ICD-10-CM

## 2020-07-15 DIAGNOSIS — M79.602 LEFT ARM PAIN: ICD-10-CM

## 2020-07-15 PROCEDURE — 72156 MRI NECK SPINE W/O & W/DYE: CPT | Mod: TC

## 2020-07-15 PROCEDURE — A9585 GADOBUTROL INJECTION: HCPCS | Performed by: CLINICAL NURSE SPECIALIST

## 2020-07-15 PROCEDURE — 72156 MRI NECK SPINE W/O & W/DYE: CPT | Mod: 26,,, | Performed by: RADIOLOGY

## 2020-07-15 PROCEDURE — 72156 MRI CERVICAL SPINE DEMYELINATING W W/O CONTRAST: ICD-10-PCS | Mod: 26,,, | Performed by: RADIOLOGY

## 2020-07-15 PROCEDURE — 25500020 PHARM REV CODE 255: Performed by: CLINICAL NURSE SPECIALIST

## 2020-07-15 RX ORDER — GADOBUTROL 604.72 MG/ML
9 INJECTION INTRAVENOUS
Status: COMPLETED | OUTPATIENT
Start: 2020-07-15 | End: 2020-07-15

## 2020-07-15 RX ADMIN — GADOBUTROL 9 ML: 604.72 INJECTION INTRAVENOUS at 05:07

## 2020-08-05 ENCOUNTER — LAB VISIT (OUTPATIENT)
Dept: LAB | Facility: OTHER | Age: 45
End: 2020-08-05
Attending: INTERNAL MEDICINE
Payer: OTHER GOVERNMENT

## 2020-08-05 DIAGNOSIS — R10.33 UMBILICAL PAIN: Primary | ICD-10-CM

## 2020-08-05 LAB
ALBUMIN SERPL BCP-MCNC: 4 G/DL (ref 3.5–5.2)
ALP SERPL-CCNC: 63 U/L (ref 55–135)
ALT SERPL W/O P-5'-P-CCNC: 17 U/L (ref 10–44)
ANION GAP SERPL CALC-SCNC: 11 MMOL/L (ref 8–16)
AST SERPL-CCNC: 14 U/L (ref 10–40)
BASOPHILS # BLD AUTO: 0.08 K/UL (ref 0–0.2)
BASOPHILS NFR BLD: 0.8 % (ref 0–1.9)
BILIRUB SERPL-MCNC: 0.3 MG/DL (ref 0.1–1)
BUN SERPL-MCNC: 11 MG/DL (ref 6–20)
CALCIUM SERPL-MCNC: 9.4 MG/DL (ref 8.7–10.5)
CHLORIDE SERPL-SCNC: 104 MMOL/L (ref 95–110)
CO2 SERPL-SCNC: 24 MMOL/L (ref 23–29)
CREAT SERPL-MCNC: 0.8 MG/DL (ref 0.5–1.4)
CRP SERPL-MCNC: 6.5 MG/L (ref 0–8.2)
DIFFERENTIAL METHOD: ABNORMAL
EOSINOPHIL # BLD AUTO: 0.1 K/UL (ref 0–0.5)
EOSINOPHIL NFR BLD: 0.7 % (ref 0–8)
ERYTHROCYTE [DISTWIDTH] IN BLOOD BY AUTOMATED COUNT: 13.9 % (ref 11.5–14.5)
ERYTHROCYTE [SEDIMENTATION RATE] IN BLOOD: 14 MM/HR (ref 0–20)
EST. GFR  (AFRICAN AMERICAN): >60 ML/MIN/1.73 M^2
EST. GFR  (NON AFRICAN AMERICAN): >60 ML/MIN/1.73 M^2
GLUCOSE SERPL-MCNC: 77 MG/DL (ref 70–110)
HCT VFR BLD AUTO: 36.6 % (ref 37–48.5)
HGB BLD-MCNC: 11.3 G/DL (ref 12–16)
IMM GRANULOCYTES # BLD AUTO: 0.03 K/UL (ref 0–0.04)
IMM GRANULOCYTES NFR BLD AUTO: 0.3 % (ref 0–0.5)
LIPASE SERPL-CCNC: 21 U/L (ref 4–60)
LYMPHOCYTES # BLD AUTO: 2.7 K/UL (ref 1–4.8)
LYMPHOCYTES NFR BLD: 25.7 % (ref 18–48)
MCH RBC QN AUTO: 21.7 PG (ref 27–31)
MCHC RBC AUTO-ENTMCNC: 30.9 G/DL (ref 32–36)
MCV RBC AUTO: 70 FL (ref 82–98)
MONOCYTES # BLD AUTO: 1.1 K/UL (ref 0.3–1)
MONOCYTES NFR BLD: 10.2 % (ref 4–15)
NEUTROPHILS # BLD AUTO: 6.5 K/UL (ref 1.8–7.7)
NEUTROPHILS NFR BLD: 62.3 % (ref 38–73)
NRBC BLD-RTO: 0 /100 WBC
PLATELET # BLD AUTO: 366 K/UL (ref 150–350)
PMV BLD AUTO: 11.3 FL (ref 9.2–12.9)
POTASSIUM SERPL-SCNC: 4 MMOL/L (ref 3.5–5.1)
PROT SERPL-MCNC: 7.6 G/DL (ref 6–8.4)
RBC # BLD AUTO: 5.21 M/UL (ref 4–5.4)
SODIUM SERPL-SCNC: 139 MMOL/L (ref 136–145)
WBC # BLD AUTO: 10.49 K/UL (ref 3.9–12.7)

## 2020-08-05 PROCEDURE — 85651 RBC SED RATE NONAUTOMATED: CPT

## 2020-08-05 PROCEDURE — 80053 COMPREHEN METABOLIC PANEL: CPT

## 2020-08-05 PROCEDURE — 86140 C-REACTIVE PROTEIN: CPT

## 2020-08-05 PROCEDURE — 36415 COLL VENOUS BLD VENIPUNCTURE: CPT

## 2020-08-05 PROCEDURE — 82746 ASSAY OF FOLIC ACID SERUM: CPT

## 2020-08-05 PROCEDURE — 83690 ASSAY OF LIPASE: CPT

## 2020-08-05 PROCEDURE — 82306 VITAMIN D 25 HYDROXY: CPT

## 2020-08-05 PROCEDURE — 85025 COMPLETE CBC W/AUTO DIFF WBC: CPT

## 2020-08-05 PROCEDURE — 82607 VITAMIN B-12: CPT

## 2020-08-06 ENCOUNTER — HOSPITAL ENCOUNTER (OUTPATIENT)
Dept: RADIOLOGY | Facility: OTHER | Age: 45
Discharge: HOME | End: 2020-08-06
Attending: INTERNAL MEDICINE
Payer: OTHER GOVERNMENT

## 2020-08-06 DIAGNOSIS — R10.9 UNSPECIFIED ABDOMINAL PAIN: ICD-10-CM

## 2020-08-06 LAB
25(OH)D3+25(OH)D2 SERPL-MCNC: 58 NG/ML (ref 30–96)
FOLATE SERPL-MCNC: 11.3 NG/ML (ref 4–24)
VIT B12 SERPL-MCNC: 473 PG/ML (ref 210–950)

## 2020-08-06 PROCEDURE — 74177 CT ABD & PELVIS W/CONTRAST: CPT | Mod: TC

## 2020-08-06 PROCEDURE — 25500020 PHARM REV CODE 255: Performed by: INTERNAL MEDICINE

## 2020-08-06 PROCEDURE — 74177 CT ABDOMEN PELVIS WITH CONTRAST: ICD-10-PCS | Mod: 26,,, | Performed by: RADIOLOGY

## 2020-08-06 PROCEDURE — 74177 CT ABD & PELVIS W/CONTRAST: CPT | Mod: 26,,, | Performed by: RADIOLOGY

## 2020-08-06 PROCEDURE — A9698 NON-RAD CONTRAST MATERIALNOC: HCPCS | Performed by: INTERNAL MEDICINE

## 2020-08-06 RX ADMIN — IOHEXOL 100 ML: 350 INJECTION, SOLUTION INTRAVENOUS at 12:08

## 2020-08-06 RX ADMIN — IOHEXOL 1000 ML: 9 SOLUTION ORAL at 11:08

## 2020-11-02 ENCOUNTER — PATIENT MESSAGE (OUTPATIENT)
Dept: PSYCHIATRY | Facility: CLINIC | Age: 45
End: 2020-11-02

## 2020-11-16 ENCOUNTER — PATIENT MESSAGE (OUTPATIENT)
Dept: NEUROLOGY | Facility: CLINIC | Age: 45
End: 2020-11-16

## 2020-11-17 ENCOUNTER — PATIENT MESSAGE (OUTPATIENT)
Dept: NEUROLOGY | Facility: CLINIC | Age: 45
End: 2020-11-17

## 2021-02-05 ENCOUNTER — PATIENT MESSAGE (OUTPATIENT)
Dept: NEUROLOGY | Facility: CLINIC | Age: 46
End: 2021-02-05

## 2021-03-25 ENCOUNTER — PATIENT MESSAGE (OUTPATIENT)
Dept: OPHTHALMOLOGY | Facility: CLINIC | Age: 46
End: 2021-03-25

## 2021-04-08 ENCOUNTER — OFFICE VISIT (OUTPATIENT)
Dept: OPHTHALMOLOGY | Facility: CLINIC | Age: 46
End: 2021-04-08
Payer: OTHER GOVERNMENT

## 2021-04-08 DIAGNOSIS — R25.3 BENIGN FASCICULATIONS: Primary | ICD-10-CM

## 2021-04-08 DIAGNOSIS — H04.129 TEAR FILM INSUFFICIENCY, UNSPECIFIED LATERALITY: ICD-10-CM

## 2021-04-08 PROCEDURE — 92014 COMPRE OPH EXAM EST PT 1/>: CPT | Mod: S$PBB,,, | Performed by: OPHTHALMOLOGY

## 2021-04-08 PROCEDURE — 99999 PR PBB SHADOW E&M-EST. PATIENT-LVL III: CPT | Mod: PBBFAC,,, | Performed by: OPHTHALMOLOGY

## 2021-04-08 PROCEDURE — 99999 PR PBB SHADOW E&M-EST. PATIENT-LVL III: ICD-10-PCS | Mod: PBBFAC,,, | Performed by: OPHTHALMOLOGY

## 2021-04-08 PROCEDURE — 92014 PR EYE EXAM, EST PATIENT,COMPREHESV: ICD-10-PCS | Mod: S$PBB,,, | Performed by: OPHTHALMOLOGY

## 2021-04-08 PROCEDURE — 99213 OFFICE O/P EST LOW 20 MIN: CPT | Mod: PBBFAC | Performed by: OPHTHALMOLOGY

## 2021-04-08 RX ORDER — TELMISARTAN 20 MG/1
TABLET ORAL
COMMUNITY
End: 2021-05-26

## 2021-04-08 RX ORDER — IBUPROFEN 800 MG/1
TABLET ORAL
COMMUNITY
End: 2022-03-30

## 2021-05-26 ENCOUNTER — OFFICE VISIT (OUTPATIENT)
Dept: NEUROLOGY | Facility: CLINIC | Age: 46
End: 2021-05-26
Payer: OTHER GOVERNMENT

## 2021-05-26 VITALS
DIASTOLIC BLOOD PRESSURE: 91 MMHG | BODY MASS INDEX: 30.53 KG/M2 | SYSTOLIC BLOOD PRESSURE: 139 MMHG | HEART RATE: 76 BPM | HEIGHT: 64 IN | WEIGHT: 178.81 LBS

## 2021-05-26 DIAGNOSIS — G35 MULTIPLE SCLEROSIS: ICD-10-CM

## 2021-05-26 DIAGNOSIS — Z71.89 COUNSELING REGARDING GOALS OF CARE: Primary | ICD-10-CM

## 2021-05-26 PROCEDURE — 99999 PR PBB SHADOW E&M-EST. PATIENT-LVL IV: ICD-10-PCS | Mod: PBBFAC,,, | Performed by: CLINICAL NURSE SPECIALIST

## 2021-05-26 PROCEDURE — 99215 OFFICE O/P EST HI 40 MIN: CPT | Mod: S$PBB,,, | Performed by: CLINICAL NURSE SPECIALIST

## 2021-05-26 PROCEDURE — 99999 PR PBB SHADOW E&M-EST. PATIENT-LVL IV: CPT | Mod: PBBFAC,,, | Performed by: CLINICAL NURSE SPECIALIST

## 2021-05-26 PROCEDURE — 99215 PR OFFICE/OUTPT VISIT, EST, LEVL V, 40-54 MIN: ICD-10-PCS | Mod: S$PBB,,, | Performed by: CLINICAL NURSE SPECIALIST

## 2021-05-26 PROCEDURE — 99214 OFFICE O/P EST MOD 30 MIN: CPT | Mod: PBBFAC | Performed by: CLINICAL NURSE SPECIALIST

## 2021-05-26 RX ORDER — GABAPENTIN 100 MG/1
100 CAPSULE ORAL 3 TIMES DAILY
COMMUNITY
Start: 2021-05-12 | End: 2021-08-04 | Stop reason: SDUPTHER

## 2021-05-26 RX ORDER — ERGOCALCIFEROL 1.25 MG/1
CAPSULE ORAL
COMMUNITY
Start: 2021-05-12 | End: 2023-05-10

## 2021-05-31 ENCOUNTER — PATIENT MESSAGE (OUTPATIENT)
Dept: PSYCHIATRY | Facility: CLINIC | Age: 46
End: 2021-05-31

## 2021-07-27 ENCOUNTER — HOSPITAL ENCOUNTER (EMERGENCY)
Facility: OTHER | Age: 46
Discharge: HOME | End: 2021-07-28
Attending: EMERGENCY MEDICINE
Payer: OTHER GOVERNMENT

## 2021-07-27 DIAGNOSIS — R07.89 CHEST TIGHTNESS: ICD-10-CM

## 2021-07-27 DIAGNOSIS — R51.9 ACUTE NONINTRACTABLE HEADACHE, UNSPECIFIED HEADACHE TYPE: Primary | ICD-10-CM

## 2021-07-27 LAB
ALBUMIN SERPL BCP-MCNC: 3.9 G/DL (ref 3.5–5.2)
ALP SERPL-CCNC: 69 U/L (ref 55–135)
ALT SERPL W/O P-5'-P-CCNC: 26 U/L (ref 10–44)
ANION GAP SERPL CALC-SCNC: 13 MMOL/L (ref 8–16)
AST SERPL-CCNC: 24 U/L (ref 10–40)
BILIRUB SERPL-MCNC: 0.2 MG/DL (ref 0.1–1)
BUN SERPL-MCNC: 12 MG/DL (ref 6–20)
CALCIUM SERPL-MCNC: 9.2 MG/DL (ref 8.7–10.5)
CHLORIDE SERPL-SCNC: 105 MMOL/L (ref 95–110)
CO2 SERPL-SCNC: 21 MMOL/L (ref 23–29)
CREAT SERPL-MCNC: 0.8 MG/DL (ref 0.5–1.4)
EST. GFR  (AFRICAN AMERICAN): >60 ML/MIN/1.73 M^2
EST. GFR  (NON AFRICAN AMERICAN): >60 ML/MIN/1.73 M^2
GLUCOSE SERPL-MCNC: 93 MG/DL (ref 70–110)
POTASSIUM SERPL-SCNC: 4.1 MMOL/L (ref 3.5–5.1)
PROT SERPL-MCNC: 8.1 G/DL (ref 6–8.4)
SODIUM SERPL-SCNC: 139 MMOL/L (ref 136–145)
TROPONIN I SERPL DL<=0.01 NG/ML-MCNC: <0.006 NG/ML (ref 0–0.03)

## 2021-07-27 PROCEDURE — 80053 COMPREHEN METABOLIC PANEL: CPT | Performed by: EMERGENCY MEDICINE

## 2021-07-27 PROCEDURE — 93005 ELECTROCARDIOGRAM TRACING: CPT

## 2021-07-27 PROCEDURE — 84484 ASSAY OF TROPONIN QUANT: CPT | Performed by: EMERGENCY MEDICINE

## 2021-07-27 PROCEDURE — 25000003 PHARM REV CODE 250: Performed by: EMERGENCY MEDICINE

## 2021-07-27 PROCEDURE — 63600175 PHARM REV CODE 636 W HCPCS: Performed by: EMERGENCY MEDICINE

## 2021-07-27 PROCEDURE — 96374 THER/PROPH/DIAG INJ IV PUSH: CPT

## 2021-07-27 PROCEDURE — 93010 EKG 12-LEAD: ICD-10-PCS | Mod: ,,, | Performed by: INTERNAL MEDICINE

## 2021-07-27 PROCEDURE — 85025 COMPLETE CBC W/AUTO DIFF WBC: CPT | Performed by: EMERGENCY MEDICINE

## 2021-07-27 PROCEDURE — 93010 ELECTROCARDIOGRAM REPORT: CPT | Mod: ,,, | Performed by: INTERNAL MEDICINE

## 2021-07-27 PROCEDURE — 99284 EMERGENCY DEPT VISIT MOD MDM: CPT | Mod: 25

## 2021-07-27 PROCEDURE — 96361 HYDRATE IV INFUSION ADD-ON: CPT | Mod: 59

## 2021-07-27 PROCEDURE — 96375 TX/PRO/DX INJ NEW DRUG ADDON: CPT

## 2021-07-27 RX ORDER — KETOROLAC TROMETHAMINE 30 MG/ML
15 INJECTION, SOLUTION INTRAMUSCULAR; INTRAVENOUS
Status: COMPLETED | OUTPATIENT
Start: 2021-07-27 | End: 2021-07-27

## 2021-07-27 RX ORDER — PROCHLORPERAZINE EDISYLATE 5 MG/ML
2.5 INJECTION INTRAMUSCULAR; INTRAVENOUS
Status: COMPLETED | OUTPATIENT
Start: 2021-07-27 | End: 2021-07-27

## 2021-07-27 RX ADMIN — KETOROLAC TROMETHAMINE 15 MG: 30 INJECTION, SOLUTION INTRAMUSCULAR; INTRAVENOUS at 11:07

## 2021-07-27 RX ADMIN — SODIUM CHLORIDE 500 ML: 0.9 INJECTION, SOLUTION INTRAVENOUS at 11:07

## 2021-07-27 RX ADMIN — PROCHLORPERAZINE EDISYLATE 2.5 MG: 5 INJECTION INTRAMUSCULAR; INTRAVENOUS at 11:07

## 2021-07-28 VITALS
HEART RATE: 82 BPM | DIASTOLIC BLOOD PRESSURE: 86 MMHG | WEIGHT: 179 LBS | TEMPERATURE: 98 F | BODY MASS INDEX: 30.56 KG/M2 | HEIGHT: 64 IN | SYSTOLIC BLOOD PRESSURE: 150 MMHG | OXYGEN SATURATION: 100 % | RESPIRATION RATE: 16 BRPM

## 2021-07-28 LAB
ANISOCYTOSIS BLD QL SMEAR: SLIGHT
BASOPHILS # BLD AUTO: 0.09 K/UL (ref 0–0.2)
BASOPHILS NFR BLD: 0.9 % (ref 0–1.9)
DIFFERENTIAL METHOD: ABNORMAL
EOSINOPHIL # BLD AUTO: 0.2 K/UL (ref 0–0.5)
EOSINOPHIL NFR BLD: 1.5 % (ref 0–8)
ERYTHROCYTE [DISTWIDTH] IN BLOOD BY AUTOMATED COUNT: 15.3 % (ref 11.5–14.5)
GIANT PLATELETS BLD QL SMEAR: PRESENT
HCT VFR BLD AUTO: 34.9 % (ref 37–48.5)
HGB BLD-MCNC: 11.1 G/DL (ref 12–16)
IMM GRANULOCYTES # BLD AUTO: 0.02 K/UL (ref 0–0.04)
IMM GRANULOCYTES NFR BLD AUTO: 0.2 % (ref 0–0.5)
LYMPHOCYTES # BLD AUTO: 3.8 K/UL (ref 1–4.8)
LYMPHOCYTES NFR BLD: 36.4 % (ref 18–48)
MCH RBC QN AUTO: 22 PG (ref 27–31)
MCHC RBC AUTO-ENTMCNC: 31.8 G/DL (ref 32–36)
MCV RBC AUTO: 69 FL (ref 82–98)
MONOCYTES # BLD AUTO: 1 K/UL (ref 0.3–1)
MONOCYTES NFR BLD: 9.9 % (ref 4–15)
NEUTROPHILS # BLD AUTO: 5.3 K/UL (ref 1.8–7.7)
NEUTROPHILS NFR BLD: 51.1 % (ref 38–73)
NRBC BLD-RTO: 0 /100 WBC
OVALOCYTES BLD QL SMEAR: ABNORMAL
PLATELET # BLD AUTO: 359 K/UL (ref 150–450)
PLATELET BLD QL SMEAR: ABNORMAL
PMV BLD AUTO: 10.5 FL (ref 9.2–12.9)
POIKILOCYTOSIS BLD QL SMEAR: SLIGHT
RBC # BLD AUTO: 5.04 M/UL (ref 4–5.4)
WBC # BLD AUTO: 10.36 K/UL (ref 3.9–12.7)

## 2021-07-28 PROCEDURE — 25000003 PHARM REV CODE 250: Performed by: EMERGENCY MEDICINE

## 2021-07-28 PROCEDURE — 63600175 PHARM REV CODE 636 W HCPCS: Performed by: EMERGENCY MEDICINE

## 2021-07-28 RX ORDER — SODIUM CHLORIDE 9 MG/ML
INJECTION, SOLUTION INTRAVENOUS
Status: COMPLETED | OUTPATIENT
Start: 2021-07-28 | End: 2021-07-28

## 2021-07-28 RX ORDER — ORPHENADRINE CITRATE 30 MG/ML
30 INJECTION INTRAMUSCULAR; INTRAVENOUS
Status: COMPLETED | OUTPATIENT
Start: 2021-07-28 | End: 2021-07-28

## 2021-07-28 RX ORDER — ONDANSETRON 4 MG/1
4 TABLET, ORALLY DISINTEGRATING ORAL
Status: COMPLETED | OUTPATIENT
Start: 2021-07-28 | End: 2021-07-28

## 2021-07-28 RX ORDER — ONDANSETRON 4 MG/1
4 TABLET, ORALLY DISINTEGRATING ORAL EVERY 8 HOURS PRN
Qty: 15 TABLET | Refills: 0 | Status: SHIPPED | OUTPATIENT
Start: 2021-07-28 | End: 2021-08-04 | Stop reason: ALTCHOICE

## 2021-07-28 RX ORDER — DEXAMETHASONE SODIUM PHOSPHATE 4 MG/ML
4 INJECTION, SOLUTION INTRA-ARTICULAR; INTRALESIONAL; INTRAMUSCULAR; INTRAVENOUS; SOFT TISSUE
Status: COMPLETED | OUTPATIENT
Start: 2021-07-28 | End: 2021-07-28

## 2021-07-28 RX ADMIN — DEXAMETHASONE SODIUM PHOSPHATE 4 MG: 4 INJECTION INTRA-ARTICULAR; INTRALESIONAL; INTRAMUSCULAR; INTRAVENOUS; SOFT TISSUE at 12:07

## 2021-07-28 RX ADMIN — ORPHENADRINE CITRATE 30 MG: 30 INJECTION INTRAMUSCULAR; INTRAVENOUS at 12:07

## 2021-07-28 RX ADMIN — SODIUM CHLORIDE: 0.9 INJECTION, SOLUTION INTRAVENOUS at 12:07

## 2021-07-28 RX ADMIN — ONDANSETRON 4 MG: 4 TABLET, ORALLY DISINTEGRATING ORAL at 12:07

## 2021-08-04 ENCOUNTER — OFFICE VISIT (OUTPATIENT)
Dept: NEUROLOGY | Facility: CLINIC | Age: 46
End: 2021-08-04
Payer: OTHER GOVERNMENT

## 2021-08-04 VITALS
BODY MASS INDEX: 30.19 KG/M2 | HEART RATE: 82 BPM | WEIGHT: 176.81 LBS | HEIGHT: 64 IN | DIASTOLIC BLOOD PRESSURE: 75 MMHG | SYSTOLIC BLOOD PRESSURE: 128 MMHG

## 2021-08-04 DIAGNOSIS — M62.838 MUSCLE SPASM: ICD-10-CM

## 2021-08-04 DIAGNOSIS — G35 MS (MULTIPLE SCLEROSIS): ICD-10-CM

## 2021-08-04 DIAGNOSIS — Z71.89 COUNSELING REGARDING GOALS OF CARE: ICD-10-CM

## 2021-08-04 DIAGNOSIS — G35 MULTIPLE SCLEROSIS: Primary | ICD-10-CM

## 2021-08-04 DIAGNOSIS — G47.00 INSOMNIA, UNSPECIFIED TYPE: ICD-10-CM

## 2021-08-04 PROCEDURE — 99999 PR PBB SHADOW E&M-EST. PATIENT-LVL III: ICD-10-PCS | Mod: PBBFAC,,, | Performed by: CLINICAL NURSE SPECIALIST

## 2021-08-04 PROCEDURE — 99215 PR OFFICE/OUTPT VISIT, EST, LEVL V, 40-54 MIN: ICD-10-PCS | Mod: S$PBB,,, | Performed by: CLINICAL NURSE SPECIALIST

## 2021-08-04 PROCEDURE — 99215 OFFICE O/P EST HI 40 MIN: CPT | Mod: S$PBB,,, | Performed by: CLINICAL NURSE SPECIALIST

## 2021-08-04 PROCEDURE — 99999 PR PBB SHADOW E&M-EST. PATIENT-LVL III: CPT | Mod: PBBFAC,,, | Performed by: CLINICAL NURSE SPECIALIST

## 2021-08-04 PROCEDURE — 99213 OFFICE O/P EST LOW 20 MIN: CPT | Mod: PBBFAC | Performed by: CLINICAL NURSE SPECIALIST

## 2021-08-04 RX ORDER — DIAZEPAM 5 MG/1
TABLET ORAL
Qty: 2 TABLET | Refills: 0 | Status: SHIPPED | OUTPATIENT
Start: 2021-08-04 | End: 2022-10-18

## 2021-08-04 RX ORDER — BACLOFEN 10 MG/1
TABLET ORAL
Qty: 90 TABLET | Refills: 0 | Status: SHIPPED | OUTPATIENT
Start: 2021-08-04 | End: 2023-03-20

## 2021-08-04 RX ORDER — TIZANIDINE HYDROCHLORIDE 6 MG/1
6 CAPSULE, GELATIN COATED ORAL NIGHTLY
Qty: 90 CAPSULE | Refills: 0 | Status: SHIPPED | OUTPATIENT
Start: 2021-08-04 | End: 2022-02-21 | Stop reason: SDUPTHER

## 2021-08-04 RX ORDER — GABAPENTIN 100 MG/1
100 CAPSULE ORAL 3 TIMES DAILY
Qty: 270 CAPSULE | Refills: 0 | Status: SHIPPED | OUTPATIENT
Start: 2021-08-04 | End: 2022-02-21 | Stop reason: SDUPTHER

## 2021-08-11 ENCOUNTER — HOSPITAL ENCOUNTER (OUTPATIENT)
Dept: RADIOLOGY | Facility: HOSPITAL | Age: 46
Discharge: HOME | End: 2021-08-11
Attending: CLINICAL NURSE SPECIALIST
Payer: OTHER GOVERNMENT

## 2021-08-11 DIAGNOSIS — G35 MULTIPLE SCLEROSIS: ICD-10-CM

## 2021-08-11 PROCEDURE — 25500020 PHARM REV CODE 255: Performed by: CLINICAL NURSE SPECIALIST

## 2021-08-11 PROCEDURE — 70553 MRI BRAIN DEMYELINATING W/ WO CONTRAST: ICD-10-PCS | Mod: 26,,, | Performed by: RADIOLOGY

## 2021-08-11 PROCEDURE — 72156 MRI CERVICAL SPINE DEMYELINATING W W/O CONTRAST: ICD-10-PCS | Mod: 26,,, | Performed by: RADIOLOGY

## 2021-08-11 PROCEDURE — 70553 MRI BRAIN STEM W/O & W/DYE: CPT | Mod: TC

## 2021-08-11 PROCEDURE — 70553 MRI BRAIN STEM W/O & W/DYE: CPT | Mod: 26,,, | Performed by: RADIOLOGY

## 2021-08-11 PROCEDURE — 72156 MRI NECK SPINE W/O & W/DYE: CPT | Mod: 26,,, | Performed by: RADIOLOGY

## 2021-08-11 PROCEDURE — A9585 GADOBUTROL INJECTION: HCPCS | Performed by: CLINICAL NURSE SPECIALIST

## 2021-08-11 PROCEDURE — 72156 MRI NECK SPINE W/O & W/DYE: CPT | Mod: TC

## 2021-08-11 RX ORDER — GADOBUTROL 604.72 MG/ML
8 INJECTION INTRAVENOUS
Status: COMPLETED | OUTPATIENT
Start: 2021-08-11 | End: 2021-08-11

## 2021-08-11 RX ADMIN — GADOBUTROL 8 ML: 604.72 INJECTION INTRAVENOUS at 08:08

## 2021-08-14 ENCOUNTER — PATIENT MESSAGE (OUTPATIENT)
Dept: NEUROLOGY | Facility: CLINIC | Age: 46
End: 2021-08-14

## 2021-09-01 ENCOUNTER — PATIENT MESSAGE (OUTPATIENT)
Dept: PSYCHIATRY | Facility: CLINIC | Age: 46
End: 2021-09-01

## 2021-09-02 ENCOUNTER — PATIENT MESSAGE (OUTPATIENT)
Dept: PSYCHIATRY | Facility: CLINIC | Age: 46
End: 2021-09-02

## 2021-09-04 ENCOUNTER — PATIENT MESSAGE (OUTPATIENT)
Dept: NEUROLOGY | Facility: CLINIC | Age: 46
End: 2021-09-04

## 2021-09-09 ENCOUNTER — TELEPHONE (OUTPATIENT)
Dept: NEUROLOGY | Facility: CLINIC | Age: 46
End: 2021-09-09

## 2021-12-21 ENCOUNTER — PATIENT MESSAGE (OUTPATIENT)
Dept: NEUROLOGY | Facility: CLINIC | Age: 46
End: 2021-12-21
Payer: OTHER GOVERNMENT

## 2022-02-15 ENCOUNTER — PATIENT MESSAGE (OUTPATIENT)
Dept: NEUROLOGY | Facility: CLINIC | Age: 47
End: 2022-02-15
Payer: OTHER GOVERNMENT

## 2022-02-15 DIAGNOSIS — G35 MULTIPLE SCLEROSIS: Primary | ICD-10-CM

## 2022-02-21 ENCOUNTER — OFFICE VISIT (OUTPATIENT)
Dept: NEUROLOGY | Facility: CLINIC | Age: 47
End: 2022-02-21
Payer: OTHER GOVERNMENT

## 2022-02-21 ENCOUNTER — LAB VISIT (OUTPATIENT)
Dept: LAB | Facility: HOSPITAL | Age: 47
End: 2022-02-21
Payer: OTHER GOVERNMENT

## 2022-02-21 ENCOUNTER — PATIENT MESSAGE (OUTPATIENT)
Dept: NEUROLOGY | Facility: CLINIC | Age: 47
End: 2022-02-21

## 2022-02-21 VITALS
WEIGHT: 178.88 LBS | HEIGHT: 64 IN | SYSTOLIC BLOOD PRESSURE: 129 MMHG | HEART RATE: 82 BPM | DIASTOLIC BLOOD PRESSURE: 82 MMHG | BODY MASS INDEX: 30.54 KG/M2

## 2022-02-21 DIAGNOSIS — M62.838 MUSCLE SPASM: ICD-10-CM

## 2022-02-21 DIAGNOSIS — G35 MULTIPLE SCLEROSIS: Primary | ICD-10-CM

## 2022-02-21 DIAGNOSIS — M79.604 RIGHT LEG PAIN: ICD-10-CM

## 2022-02-21 DIAGNOSIS — G35 MULTIPLE SCLEROSIS: ICD-10-CM

## 2022-02-21 LAB
ALBUMIN SERPL BCP-MCNC: 3.7 G/DL (ref 3.5–5.2)
ALP SERPL-CCNC: 57 U/L (ref 55–135)
ALT SERPL W/O P-5'-P-CCNC: 24 U/L (ref 10–44)
ANION GAP SERPL CALC-SCNC: 7 MMOL/L (ref 8–16)
AST SERPL-CCNC: 17 U/L (ref 10–40)
BASOPHILS # BLD AUTO: 0.08 K/UL (ref 0–0.2)
BASOPHILS NFR BLD: 1 % (ref 0–1.9)
BILIRUB SERPL-MCNC: 0.3 MG/DL (ref 0.1–1)
BUN SERPL-MCNC: 10 MG/DL (ref 6–20)
CALCIUM SERPL-MCNC: 8.7 MG/DL (ref 8.7–10.5)
CHLORIDE SERPL-SCNC: 103 MMOL/L (ref 95–110)
CO2 SERPL-SCNC: 29 MMOL/L (ref 23–29)
CREAT SERPL-MCNC: 0.8 MG/DL (ref 0.5–1.4)
DIFFERENTIAL METHOD: ABNORMAL
EOSINOPHIL # BLD AUTO: 0.2 K/UL (ref 0–0.5)
EOSINOPHIL NFR BLD: 2 % (ref 0–8)
ERYTHROCYTE [DISTWIDTH] IN BLOOD BY AUTOMATED COUNT: 13.8 % (ref 11.5–14.5)
EST. GFR  (AFRICAN AMERICAN): >60 ML/MIN/1.73 M^2
EST. GFR  (NON AFRICAN AMERICAN): >60 ML/MIN/1.73 M^2
GLUCOSE SERPL-MCNC: 86 MG/DL (ref 70–110)
HCT VFR BLD AUTO: 37.9 % (ref 37–48.5)
HGB BLD-MCNC: 11.4 G/DL (ref 12–16)
IMM GRANULOCYTES # BLD AUTO: 0.02 K/UL (ref 0–0.04)
IMM GRANULOCYTES NFR BLD AUTO: 0.2 % (ref 0–0.5)
LYMPHOCYTES # BLD AUTO: 3 K/UL (ref 1–4.8)
LYMPHOCYTES NFR BLD: 37.4 % (ref 18–48)
MCH RBC QN AUTO: 21.2 PG (ref 27–31)
MCHC RBC AUTO-ENTMCNC: 30.1 G/DL (ref 32–36)
MCV RBC AUTO: 71 FL (ref 82–98)
MONOCYTES # BLD AUTO: 1 K/UL (ref 0.3–1)
MONOCYTES NFR BLD: 11.9 % (ref 4–15)
NEUTROPHILS # BLD AUTO: 3.8 K/UL (ref 1.8–7.7)
NEUTROPHILS NFR BLD: 47.5 % (ref 38–73)
NRBC BLD-RTO: 0 /100 WBC
PLATELET # BLD AUTO: 385 K/UL (ref 150–450)
PMV BLD AUTO: 10.3 FL (ref 9.2–12.9)
POTASSIUM SERPL-SCNC: 3.7 MMOL/L (ref 3.5–5.1)
PROT SERPL-MCNC: 7.1 G/DL (ref 6–8.4)
RBC # BLD AUTO: 5.37 M/UL (ref 4–5.4)
SODIUM SERPL-SCNC: 139 MMOL/L (ref 136–145)
WBC # BLD AUTO: 8.05 K/UL (ref 3.9–12.7)

## 2022-02-21 PROCEDURE — 99214 OFFICE O/P EST MOD 30 MIN: CPT | Mod: PBBFAC | Performed by: CLINICAL NURSE SPECIALIST

## 2022-02-21 PROCEDURE — 85025 COMPLETE CBC W/AUTO DIFF WBC: CPT | Performed by: CLINICAL NURSE SPECIALIST

## 2022-02-21 PROCEDURE — 99999 PR PBB SHADOW E&M-EST. PATIENT-LVL IV: ICD-10-PCS | Mod: PBBFAC,,, | Performed by: CLINICAL NURSE SPECIALIST

## 2022-02-21 PROCEDURE — 99215 PR OFFICE/OUTPT VISIT, EST, LEVL V, 40-54 MIN: ICD-10-PCS | Mod: S$PBB,,, | Performed by: CLINICAL NURSE SPECIALIST

## 2022-02-21 PROCEDURE — 99999 PR PBB SHADOW E&M-EST. PATIENT-LVL IV: CPT | Mod: PBBFAC,,, | Performed by: CLINICAL NURSE SPECIALIST

## 2022-02-21 PROCEDURE — 80053 COMPREHEN METABOLIC PANEL: CPT | Performed by: CLINICAL NURSE SPECIALIST

## 2022-02-21 PROCEDURE — 36415 COLL VENOUS BLD VENIPUNCTURE: CPT | Performed by: CLINICAL NURSE SPECIALIST

## 2022-02-21 PROCEDURE — 99215 OFFICE O/P EST HI 40 MIN: CPT | Mod: S$PBB,,, | Performed by: CLINICAL NURSE SPECIALIST

## 2022-02-21 RX ORDER — METHYLPREDNISOLONE 4 MG/1
TABLET ORAL
Qty: 21 EACH | Refills: 0 | Status: SHIPPED | OUTPATIENT
Start: 2022-02-21 | End: 2022-03-14

## 2022-02-21 RX ORDER — TIZANIDINE HYDROCHLORIDE 6 MG/1
6 CAPSULE, GELATIN COATED ORAL NIGHTLY
Qty: 90 CAPSULE | Refills: 0 | Status: SHIPPED | OUTPATIENT
Start: 2022-02-21 | End: 2022-06-13 | Stop reason: SDUPTHER

## 2022-02-21 RX ORDER — GABAPENTIN 100 MG/1
100 CAPSULE ORAL 3 TIMES DAILY
Qty: 270 CAPSULE | Refills: 0 | Status: SHIPPED | OUTPATIENT
Start: 2022-02-21 | End: 2022-05-27

## 2022-02-21 NOTE — PROGRESS NOTES
"Subjective:          Patient ID: Tiffany Cruz is a 46 y.o. female who presents today for a routine clinic visit for MS.  She was last seen in August 2021. The history has been provided by the patient.     MS HPI:  · DMT: None  · Side effects from DMT? No  · Taking vitamin D3 as recommended? Yes -  Dose: not daily   MRIs were stable in August 2021.   She has more pain in the right foot for the past 3 weeks. She states "it feels like my ankle is weaker." Gabapentin in lower doses is not helpful, and the higher doses make her too sleepy. Baclofen helps a little bit. She has taken baclofen 3 times in the past 3 weeks. She has some tingling, aching, and numbness in the right foot. The entire leg aches from the hip to the foot. She sometimes feels like her leg is unstable at the ankle. Right leg pain is not new for her.   She had been taking gabapentin and tizanidine almost nightly, but has not done this in about a week and a half. She has a sensation of shooting pain going down her right leg stemming from the right lower back/hip. Her leg does not feel sensitive to touch.   She has not been on DMT for the last 2 years.   She has not had COVID. She is vaccinated x2 for COVID.   She is still undecided about Ocrevus vs Kesimpta.     Medications:  Current Outpatient Medications   Medication Sig    baclofen (LIORESAL) 10 MG tablet Take 1/2 to 1 tab by mouth at bedtime.    diazePAM (VALIUM) 5 MG tablet Take 1 tablet by mouth 1 hour prior to MRI and 1 tab at the time of the MRI if needed.    ergocalciferol (ERGOCALCIFEROL) 50,000 unit Cap     gabapentin (NEURONTIN) 100 MG capsule Take 1 capsule (100 mg total) by mouth 3 (three) times daily.    ibuprofen (ADVIL,MOTRIN) 800 MG tablet ibuprofen 800 mg tablet    metoprolol succinate (TOPROL-XL) 25 MG 24 hr tablet     telmisartan-hydrochlorothiazide (MICARDIS HCT) 80-12.5 mg per tablet Take 1 tablet by mouth.    tiZANidine (ZANAFLEX) 6 mg capsule Take 1 capsule (6 " mg total) by mouth every evening.    zolpidem (AMBIEN) 5 MG Tab Take 1 tablet (5 mg total) by mouth nightly as needed.       SOCIAL HISTORY  Social History     Tobacco Use    Smoking status: Never Smoker    Smokeless tobacco: Never Used   Substance Use Topics    Alcohol use: No     Alcohol/week: 0.0 standard drinks    Drug use: No       Living arrangements - the patient lives with their family.         Objective:        1. 25 foot timed walk: 4.1 seconds today without assist   Timed 25 Foot Walk: 1/27/2020 2/21/2022   Did patient wear an AFO? - No   Was assistive device used? No No   Time for 25 Foot Walk (seconds) 4.06 4.6   Time for 25 Foot Walk (seconds) - 4.1       Neurologic Exam     Mental Status   Oriented to person, place, and time.   Attention: normal. Concentration: normal.   Speech: speech is normal   Level of consciousness: alert  Knowledge: good.   Normal comprehension.     Cranial Nerves     CN III, IV, VI   Extraocular motions are normal.   Nystagmus: none     CN V   Right facial sensation deficit: none  Left facial sensation deficit: none    CN VII   Right facial weakness: none  Left facial weakness: none    CN VIII   Hearing: intact    CN IX, X   Palate: symmetric    CN XI   Right sternocleidomastoid strength: normal  Left sternocleidomastoid strength: normal  Right trapezius strength: normal  Left trapezius strength: normal    CN XII   Tongue deviation: none    Motor Exam   Muscle bulk: normal  Overall muscle tone: normal  Right arm tone: normal  Left arm tone: normal  Right leg tone: normal  Left leg tone: normal    Strength   Right neck flexion: 5/5  Left neck flexion: 5/5  Right neck extension: 5/5  Left neck extension: 5/5  Right deltoid: 5/5  Left deltoid: 5/5  Right biceps: 5/5  Left biceps: 5/5  Right triceps: 5/5  Left triceps: 5/5  Right wrist flexion: 5/5  Left wrist flexion: 5/5  Right wrist extension: 5/5  Left wrist extension: 5/5  Right interossei: 5/5  Left interossei:  5/5  Right iliopsoas: 5/5  Left iliopsoas: 5/5  Right quadriceps: 5/5  Left quadriceps: 5/5  Right hamstrin/5  Left hamstrin/5  Right anterior tibial: 5/5  Left anterior tibial: 5/5  Right gastroc: 5/5  Left gastroc: 5/5    Sensory Exam   Right arm vibration: normal  Left arm vibration: normal  Right leg vibration: normal  Left leg vibration: normal    Gait, Coordination, and Reflexes     Gait  Gait: normal    Coordination   Romberg: negative  Finger to nose coordination: normal  Heel to shin coordination: normal  Tandem walking coordination: normal    Tremor   Resting tremor: absent    Reflexes   Right brachioradialis: 1+  Left brachioradialis: 1+  Right biceps: 1+  Left biceps: 1+  Right triceps: 1+  Left triceps: 1+  Right patellar: 0  Left patellar: 1+  Right achilles: 0  Left achilles: 1+  Right plantar: equivocal  Left plantar: equivocal    Normal rapid sequential movements in upper and lower extremities.            Imaging:         Results for orders placed during the hospital encounter of 21    MRI Brain Demyelinating W W/O Contrast    Impression  The brain and cervical spinal cord appears stable from prior exam, again demonstrating findings compatible with the reported history of multiple sclerosis.  No new or enhancing lesions identified to suggest ongoing or active demyelination.    Stable mild cervical spondylosis.    Electronically signed by resident: Manish Caputo MD  Date:    2021  Time:    08:35    Electronically signed by: Luis Bridges MD  Date:    2021  Time:    10:18    Results for orders placed during the hospital encounter of 21    MRI Cervical Spine Demyelinating W W/O Contrast    Impression  The brain and cervical spinal cord appears stable from prior exam, again demonstrating findings compatible with the reported history of multiple sclerosis.  No new or enhancing lesions identified to suggest ongoing or active demyelination.    Stable mild cervical  spondylosis.    Electronically signed by resident: Manish Caputo MD  Date:    08/12/2021  Time:    08:35    Electronically signed by: Luis Bridges MD  Date:    08/12/2021  Time:    10:18      Labs:     Lab Results   Component Value Date    VUCYAQZY11FV 34 07/28/2021    MBWKOUCD67DT 58 08/05/2020    MBCFHJBE75TA 62 03/03/2020     Lab Results   Component Value Date    OH2DQBTU 80.9 07/16/2019    ABSOLUTECD3 1869 07/16/2019    HC2DPWXQ 23.9 07/16/2019    ABSOLUTECD8 551 07/16/2019    AT8BMGXE 55.6 07/16/2019    ABSOLUTECD4 1284 07/16/2019    LABCD48 2.33 07/16/2019     Lab Results   Component Value Date    WBC 8.05 02/21/2022    RBC 5.37 02/21/2022    HGB 11.4 (L) 02/21/2022    HCT 37.9 02/21/2022    MCV 71 (L) 02/21/2022    MCH 21.2 (L) 02/21/2022    MCHC 30.1 (L) 02/21/2022    RDW 13.8 02/21/2022     02/21/2022    MPV 10.3 02/21/2022    GRAN 3.8 02/21/2022    GRAN 47.5 02/21/2022    LYMPH 3.0 02/21/2022    LYMPH 37.4 02/21/2022    MONO 1.0 02/21/2022    MONO 11.9 02/21/2022    EOS 0.2 02/21/2022    BASO 0.08 02/21/2022    EOSINOPHIL 2.0 02/21/2022    BASOPHIL 1.0 02/21/2022     Sodium   Date Value Ref Range Status   02/21/2022 139 136 - 145 mmol/L Final     Potassium   Date Value Ref Range Status   02/21/2022 3.7 3.5 - 5.1 mmol/L Final     Chloride   Date Value Ref Range Status   02/21/2022 103 95 - 110 mmol/L Final     CO2   Date Value Ref Range Status   02/21/2022 29 23 - 29 mmol/L Final     Glucose   Date Value Ref Range Status   02/21/2022 86 70 - 110 mg/dL Final     BUN   Date Value Ref Range Status   02/21/2022 10 6 - 20 mg/dL Final     Creatinine   Date Value Ref Range Status   02/21/2022 0.8 0.5 - 1.4 mg/dL Final     Calcium   Date Value Ref Range Status   02/21/2022 8.7 8.7 - 10.5 mg/dL Final     Total Protein   Date Value Ref Range Status   02/21/2022 7.1 6.0 - 8.4 g/dL Final     Albumin   Date Value Ref Range Status   02/21/2022 3.7 3.5 - 5.2 g/dL Final     Total Bilirubin   Date Value Ref Range  Status   02/21/2022 0.3 0.1 - 1.0 mg/dL Final     Comment:     For infants and newborns, interpretation of results should be based  on gestational age, weight and in agreement with clinical  observations.    Premature Infant recommended reference ranges:  Up to 24 hours.............<8.0 mg/dL  Up to 48 hours............<12.0 mg/dL  3-5 days..................<15.0 mg/dL  6-29 days.................<15.0 mg/dL       Alkaline Phosphatase   Date Value Ref Range Status   02/21/2022 57 55 - 135 U/L Final     AST   Date Value Ref Range Status   02/21/2022 17 10 - 40 U/L Final     ALT   Date Value Ref Range Status   02/21/2022 24 10 - 44 U/L Final     Anion Gap   Date Value Ref Range Status   02/21/2022 7 (L) 8 - 16 mmol/L Final     eGFR if    Date Value Ref Range Status   02/21/2022 >60 >60 mL/min/1.73 m^2 Final     eGFR if non    Date Value Ref Range Status   02/21/2022 >60 >60 mL/min/1.73 m^2 Final     Comment:     Calculation used to obtain the estimated glomerular filtration  rate (eGFR) is the CKD-EPI equation.        Lab Results   Component Value Date    HEPBSAG Negative 07/28/2021    HEPBSAB Negative 07/28/2021    HEPBCAB Negative 07/28/2021       MS Impression and Plan:     NEURO MULTIPLE SCLEROSIS IMPRESSION:   MS Status:     Number of relapses in the past year?:  0    Clinical Progression comment:  She has ongoing right leg and foot pain. This is not a new symptom, but seems to have flared up in the past 3 weeks. We will treat with a Medrol pack to see if this improves. This might be a pinch in her lower back. We will plan for MRIs and possible podiatry referral if steroids are not helpful.     MRI Progression:  Stable  Plan:     DMT comment:  We discussed restarting DMT with either Ocrevus or Kesimpta. We will get new pre-immunosuppression labs. She has already seen ID before previous Ocrevus and has received recommended immunizations.  Once labs are complete, I will follow up with  her to make a final decision. Kesimpta form was signed today. Side effects of site reactions, vomiting, and suppressed immune system were discussed.      Gabapentin and tizanidine were refilled today.     Total time spent with patient: 40 minutes   Total time spent on encounter: 53 minutes       Problem List Items Addressed This Visit        Neurologic Problems    Multiple sclerosis - Primary    Relevant Orders    Hepatitis B Surface Antigen    Hepatitis B Surface Ab, Qualitative    Hepatitis B Core Antibody, Total    [place]-Suppressor Ratio    Hepatitis C Antibody    HIV 1/2 Ag/Ab (4th Gen)    Immunoglobulins (IgG, IgA, IgM) Quantitative    Quantiferon Gold TB    RPR          Roseanna Riddle, APRN, CNS

## 2022-02-28 ENCOUNTER — PATIENT MESSAGE (OUTPATIENT)
Dept: PSYCHIATRY | Facility: CLINIC | Age: 47
End: 2022-02-28
Payer: OTHER GOVERNMENT

## 2022-03-03 ENCOUNTER — PATIENT MESSAGE (OUTPATIENT)
Dept: NEUROLOGY | Facility: CLINIC | Age: 47
End: 2022-03-03
Payer: OTHER GOVERNMENT

## 2022-03-03 DIAGNOSIS — M25.571 ACUTE RIGHT ANKLE PAIN: Primary | ICD-10-CM

## 2022-03-08 ENCOUNTER — PATIENT MESSAGE (OUTPATIENT)
Dept: NEUROLOGY | Facility: CLINIC | Age: 47
End: 2022-03-08
Payer: OTHER GOVERNMENT

## 2022-03-08 DIAGNOSIS — G35 MULTIPLE SCLEROSIS: Primary | ICD-10-CM

## 2022-03-09 NOTE — TELEPHONE ENCOUNTER
We will proceed with Kesimpta. Labs reviewed.     TB Gold negative  RPR negative   Low IgM  HIV negative   Hep C negative   XB1=864  Hep B labs negative   WBC=8.05; JXX=4678    She saw ID prior to starting Ocrevus. She has been vaccinated x2.

## 2022-03-15 ENCOUNTER — PATIENT MESSAGE (OUTPATIENT)
Dept: NEUROLOGY | Facility: CLINIC | Age: 47
End: 2022-03-15
Payer: OTHER GOVERNMENT

## 2022-03-15 RX ORDER — OFATUMUMAB 20 MG/.4ML
20 INJECTION, SOLUTION SUBCUTANEOUS
Qty: 0.4 ML | Refills: 4 | Status: SHIPPED | OUTPATIENT
Start: 2022-03-15 | End: 2023-03-20 | Stop reason: SDUPTHER

## 2022-03-15 RX ORDER — OFATUMUMAB 20 MG/.4ML
INJECTION, SOLUTION SUBCUTANEOUS
Qty: 1.2 ML | Refills: 0 | Status: SHIPPED | OUTPATIENT
Start: 2022-03-15 | End: 2022-09-22

## 2022-03-16 ENCOUNTER — PATIENT MESSAGE (OUTPATIENT)
Dept: NEUROLOGY | Facility: CLINIC | Age: 47
End: 2022-03-16
Payer: OTHER GOVERNMENT

## 2022-03-23 ENCOUNTER — TELEPHONE (OUTPATIENT)
Dept: PODIATRY | Facility: CLINIC | Age: 47
End: 2022-03-23
Payer: OTHER GOVERNMENT

## 2022-03-23 ENCOUNTER — PATIENT MESSAGE (OUTPATIENT)
Dept: PODIATRY | Facility: CLINIC | Age: 47
End: 2022-03-23
Payer: OTHER GOVERNMENT

## 2022-03-23 ENCOUNTER — HOSPITAL ENCOUNTER (OUTPATIENT)
Dept: RADIOLOGY | Facility: HOSPITAL | Age: 47
Discharge: HOME | End: 2022-03-23
Attending: CLINICAL NURSE SPECIALIST
Payer: OTHER GOVERNMENT

## 2022-03-23 DIAGNOSIS — M25.571 ACUTE RIGHT ANKLE PAIN: ICD-10-CM

## 2022-03-23 PROCEDURE — 73610 X-RAY EXAM OF ANKLE: CPT | Mod: 26,RT,, | Performed by: RADIOLOGY

## 2022-03-23 PROCEDURE — 73610 X-RAY EXAM OF ANKLE: CPT | Mod: TC,FY,RT

## 2022-03-23 PROCEDURE — 73610 XR ANKLE COMPLETE 3 VIEW RIGHT: ICD-10-PCS | Mod: 26,RT,, | Performed by: RADIOLOGY

## 2022-03-23 NOTE — TELEPHONE ENCOUNTER
Completed Kesimpta PA via [employer].    CaseId:[id number];  Status:Approved;  Review Type:Prior Auth;  Coverage Start Date:02/21/2022;  Coverage End Date:12/31/2099;

## 2022-03-24 ENCOUNTER — OFFICE VISIT (OUTPATIENT)
Dept: PODIATRY | Facility: CLINIC | Age: 47
End: 2022-03-24
Payer: OTHER GOVERNMENT

## 2022-03-24 ENCOUNTER — HOSPITAL ENCOUNTER (OUTPATIENT)
Dept: RADIOLOGY | Facility: HOSPITAL | Age: 47
Discharge: HOME | End: 2022-03-24
Attending: PODIATRIST
Payer: OTHER GOVERNMENT

## 2022-03-24 VITALS
WEIGHT: 178 LBS | BODY MASS INDEX: 30.39 KG/M2 | HEIGHT: 64 IN | HEART RATE: 77 BPM | DIASTOLIC BLOOD PRESSURE: 91 MMHG | SYSTOLIC BLOOD PRESSURE: 142 MMHG

## 2022-03-24 DIAGNOSIS — G57.51 TARSAL TUNNEL SYNDROME OF RIGHT SIDE: ICD-10-CM

## 2022-03-24 DIAGNOSIS — M67.471 GANGLION CYST OF RIGHT FOOT: Primary | ICD-10-CM

## 2022-03-24 DIAGNOSIS — M67.471 GANGLION CYST OF RIGHT FOOT: ICD-10-CM

## 2022-03-24 DIAGNOSIS — M79.671 CHRONIC PAIN IN RIGHT FOOT: ICD-10-CM

## 2022-03-24 DIAGNOSIS — G89.29 CHRONIC PAIN IN RIGHT FOOT: ICD-10-CM

## 2022-03-24 DIAGNOSIS — M25.571 SINUS TARSI SYNDROME OF RIGHT FOOT: ICD-10-CM

## 2022-03-24 PROCEDURE — 99214 OFFICE O/P EST MOD 30 MIN: CPT | Mod: PBBFAC,PN,25 | Performed by: PODIATRIST

## 2022-03-24 PROCEDURE — 99214 OFFICE O/P EST MOD 30 MIN: CPT | Mod: 25,S$PBB,, | Performed by: PODIATRIST

## 2022-03-24 PROCEDURE — 20605 DRAIN/INJ JOINT/BURSA W/O US: CPT | Mod: RT,PBBFAC,PN | Performed by: PODIATRIST

## 2022-03-24 PROCEDURE — 20605 PR DRAIN/INJECT INTERMEDIATE JOINT/BURSA: ICD-10-PCS | Mod: S$PBB,RT,, | Performed by: PODIATRIST

## 2022-03-24 PROCEDURE — 73630 X-RAY EXAM OF FOOT: CPT | Mod: 26,RT,, | Performed by: RADIOLOGY

## 2022-03-24 PROCEDURE — 73630 XR FOOT COMPLETE 3 VIEW RIGHT: ICD-10-PCS | Mod: 26,RT,, | Performed by: RADIOLOGY

## 2022-03-24 PROCEDURE — 73630 X-RAY EXAM OF FOOT: CPT | Mod: TC,PN,RT

## 2022-03-24 PROCEDURE — 99999 PR PBB SHADOW E&M-EST. PATIENT-LVL IV: ICD-10-PCS | Mod: PBBFAC,,, | Performed by: PODIATRIST

## 2022-03-24 PROCEDURE — 20605 DRAIN/INJ JOINT/BURSA W/O US: CPT | Mod: S$PBB,RT,, | Performed by: PODIATRIST

## 2022-03-24 PROCEDURE — 99214 PR OFFICE/OUTPT VISIT, EST, LEVL IV, 30-39 MIN: ICD-10-PCS | Mod: 25,S$PBB,, | Performed by: PODIATRIST

## 2022-03-24 PROCEDURE — 99999 PR PBB SHADOW E&M-EST. PATIENT-LVL IV: CPT | Mod: PBBFAC,,, | Performed by: PODIATRIST

## 2022-03-24 RX ORDER — METHYLPREDNISOLONE ACETATE 40 MG/ML
40 INJECTION, SUSPENSION INTRA-ARTICULAR; INTRALESIONAL; INTRAMUSCULAR; SOFT TISSUE
Status: COMPLETED | OUTPATIENT
Start: 2022-03-24 | End: 2022-03-24

## 2022-03-24 RX ADMIN — METHYLPREDNISOLONE ACETATE 40 MG: 40 INJECTION, SUSPENSION INTRA-ARTICULAR; INTRALESIONAL; INTRAMUSCULAR; INTRASYNOVIAL; SOFT TISSUE at 08:03
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
## 2022-03-25 ENCOUNTER — PATIENT MESSAGE (OUTPATIENT)
Dept: PODIATRY | Facility: CLINIC | Age: 47
End: 2022-03-25
Payer: OTHER GOVERNMENT

## 2022-03-27 ENCOUNTER — PATIENT MESSAGE (OUTPATIENT)
Dept: NEUROLOGY | Facility: CLINIC | Age: 47
End: 2022-03-27
Payer: OTHER GOVERNMENT

## 2022-03-27 ENCOUNTER — PATIENT MESSAGE (OUTPATIENT)
Dept: PODIATRY | Facility: CLINIC | Age: 47
End: 2022-03-27
Payer: OTHER GOVERNMENT

## 2022-03-30 ENCOUNTER — PATIENT MESSAGE (OUTPATIENT)
Dept: PODIATRY | Facility: CLINIC | Age: 47
End: 2022-03-30
Payer: OTHER GOVERNMENT

## 2022-03-30 RX ORDER — IBUPROFEN 800 MG/1
800 TABLET ORAL EVERY 6 HOURS PRN
Qty: 60 TABLET | Refills: 1 | Status: SHIPPED | OUTPATIENT
Start: 2022-03-30 | End: 2023-09-05

## 2022-04-03 ENCOUNTER — SPECIALTY PHARMACY (OUTPATIENT)
Dept: PHARMACY | Facility: CLINIC | Age: 47
End: 2022-04-03
Payer: OTHER GOVERNMENT

## 2022-04-03 NOTE — TELEPHONE ENCOUNTER
[facility] SILVA approved 2/21/22 to 13/31/2099  Case ID :[id number]    Test claim shows cost exceeds maximum reject - Forwarding to BI/FA

## 2022-04-04 NOTE — TELEPHONE ENCOUNTER
Benefit Investigation    Kesimpta   [facility] per Maria  Deductible: $300 (for [facility] pharmacy and medical)  Max OOP: $3000 (family)  Estimated copay:    LD: $24   MD: $24  [facility] is [facility]: [facility] home delivery    DOMINGA PA per Kalpana: [id number] Approved thru 4/3/2023

## 2022-04-04 NOTE — TELEPHONE ENCOUNTER
Routed Kesimpta Rx to [facility]     Spoke with patient and provided phone number to schedule delivery    Closing referral at [facility]

## 2022-04-07 ENCOUNTER — HOSPITAL ENCOUNTER (OUTPATIENT)
Dept: RADIOLOGY | Facility: HOSPITAL | Age: 47
Discharge: HOME | End: 2022-04-07
Attending: PODIATRIST
Payer: OTHER GOVERNMENT

## 2022-04-07 DIAGNOSIS — M67.471 GANGLION CYST OF RIGHT FOOT: ICD-10-CM

## 2022-04-07 DIAGNOSIS — G57.51 TARSAL TUNNEL SYNDROME OF RIGHT SIDE: ICD-10-CM

## 2022-04-07 DIAGNOSIS — G89.29 CHRONIC PAIN IN RIGHT FOOT: ICD-10-CM

## 2022-04-07 DIAGNOSIS — M79.671 CHRONIC PAIN IN RIGHT FOOT: ICD-10-CM

## 2022-04-07 PROCEDURE — 73721 MRI JNT OF LWR EXTRE W/O DYE: CPT | Mod: 26,RT,, | Performed by: RADIOLOGY

## 2022-04-07 PROCEDURE — 73721 MRI ANKLE WITHOUT CONTRAST RIGHT: ICD-10-PCS | Mod: 26,RT,, | Performed by: RADIOLOGY

## 2022-04-07 PROCEDURE — 73721 MRI JNT OF LWR EXTRE W/O DYE: CPT | Mod: TC,RT

## 2022-04-08 ENCOUNTER — PATIENT MESSAGE (OUTPATIENT)
Dept: PODIATRY | Facility: CLINIC | Age: 47
End: 2022-04-08
Payer: OTHER GOVERNMENT

## 2022-04-08 ENCOUNTER — TELEPHONE (OUTPATIENT)
Dept: PODIATRY | Facility: CLINIC | Age: 47
End: 2022-04-08
Payer: OTHER GOVERNMENT

## 2022-04-08 NOTE — TELEPHONE ENCOUNTER
Spoke with Ms. Anthony who reports that she is feeling better and that she would be fine with picking up an ankle brace as directed per Dr. Sinclair. As of now she is fine with cancelling her Monday, April 11 appt and will notify us if she needs another appt or assistance.

## 2022-05-30 ENCOUNTER — DOCUMENTATION ONLY (OUTPATIENT)
Dept: NEUROLOGY | Facility: CLINIC | Age: 47
End: 2022-05-30
Payer: OTHER GOVERNMENT

## 2022-05-30 ENCOUNTER — TELEPHONE (OUTPATIENT)
Dept: NEUROLOGY | Facility: CLINIC | Age: 47
End: 2022-05-30
Payer: OTHER GOVERNMENT

## 2022-06-01 ENCOUNTER — PATIENT MESSAGE (OUTPATIENT)
Dept: SPORTS MEDICINE | Facility: CLINIC | Age: 47
End: 2022-06-01
Payer: OTHER GOVERNMENT

## 2022-06-13 ENCOUNTER — OFFICE VISIT (OUTPATIENT)
Dept: NEUROLOGY | Facility: CLINIC | Age: 47
End: 2022-06-13
Payer: OTHER GOVERNMENT

## 2022-06-13 VITALS
WEIGHT: 178 LBS | SYSTOLIC BLOOD PRESSURE: 175 MMHG | HEIGHT: 64 IN | DIASTOLIC BLOOD PRESSURE: 90 MMHG | BODY MASS INDEX: 30.39 KG/M2 | HEART RATE: 74 BPM

## 2022-06-13 DIAGNOSIS — G35 MULTIPLE SCLEROSIS: ICD-10-CM

## 2022-06-13 DIAGNOSIS — M62.838 MUSCLE SPASM: ICD-10-CM

## 2022-06-13 DIAGNOSIS — Z71.89 COUNSELING REGARDING GOALS OF CARE: ICD-10-CM

## 2022-06-13 DIAGNOSIS — D84.9 IMMUNOSUPPRESSION: Primary | ICD-10-CM

## 2022-06-13 PROCEDURE — 99999 PR PBB SHADOW E&M-EST. PATIENT-LVL III: ICD-10-PCS | Mod: PBBFAC,,, | Performed by: PSYCHIATRY & NEUROLOGY

## 2022-06-13 PROCEDURE — 99215 OFFICE O/P EST HI 40 MIN: CPT | Mod: S$PBB,,, | Performed by: PSYCHIATRY & NEUROLOGY

## 2022-06-13 PROCEDURE — 99999 PR PBB SHADOW E&M-EST. PATIENT-LVL III: CPT | Mod: PBBFAC,,, | Performed by: PSYCHIATRY & NEUROLOGY

## 2022-06-13 PROCEDURE — 99215 PR OFFICE/OUTPT VISIT, EST, LEVL V, 40-54 MIN: ICD-10-PCS | Mod: S$PBB,,, | Performed by: PSYCHIATRY & NEUROLOGY

## 2022-06-13 PROCEDURE — 99213 OFFICE O/P EST LOW 20 MIN: CPT | Mod: PBBFAC | Performed by: PSYCHIATRY & NEUROLOGY

## 2022-06-13 RX ORDER — TIZANIDINE HYDROCHLORIDE 6 MG/1
6 CAPSULE, GELATIN COATED ORAL NIGHTLY
Qty: 90 CAPSULE | Refills: 0 | Status: SHIPPED | OUTPATIENT
Start: 2022-06-13 | End: 2022-10-18 | Stop reason: SDUPTHER

## 2022-06-13 NOTE — ASSESSMENT & PLAN NOTE
Continue Kisempta--> she did not start it yet, thus instructions of loading dose and monthly dose afterwards explained

## 2022-06-14 ENCOUNTER — HOSPITAL ENCOUNTER (OUTPATIENT)
Dept: RADIOLOGY | Facility: HOSPITAL | Age: 47
Discharge: HOME | End: 2022-06-14
Attending: PHYSICIAN ASSISTANT
Payer: OTHER GOVERNMENT

## 2022-06-14 ENCOUNTER — TELEPHONE (OUTPATIENT)
Dept: ORTHOPEDICS | Facility: CLINIC | Age: 47
End: 2022-06-14
Payer: OTHER GOVERNMENT

## 2022-06-14 ENCOUNTER — OFFICE VISIT (OUTPATIENT)
Dept: ORTHOPEDICS | Facility: CLINIC | Age: 47
End: 2022-06-14
Payer: OTHER GOVERNMENT

## 2022-06-14 ENCOUNTER — TELEPHONE (OUTPATIENT)
Dept: NEUROLOGY | Facility: CLINIC | Age: 47
End: 2022-06-14
Payer: OTHER GOVERNMENT

## 2022-06-14 VITALS — WEIGHT: 177.94 LBS | HEIGHT: 64 IN | BODY MASS INDEX: 30.38 KG/M2

## 2022-06-14 DIAGNOSIS — M25.562 ACUTE PAIN OF LEFT KNEE: ICD-10-CM

## 2022-06-14 DIAGNOSIS — M25.562 ACUTE PAIN OF LEFT KNEE: Primary | ICD-10-CM

## 2022-06-14 DIAGNOSIS — M22.2X2 PATELLOFEMORAL PAIN SYNDROME OF LEFT KNEE: ICD-10-CM

## 2022-06-14 PROCEDURE — 73564 XR KNEE ORTHO LEFT WITH FLEXION: ICD-10-PCS | Mod: 26,LT,, | Performed by: RADIOLOGY

## 2022-06-14 PROCEDURE — 99203 OFFICE O/P NEW LOW 30 MIN: CPT | Mod: S$PBB,,, | Performed by: PHYSICIAN ASSISTANT

## 2022-06-14 PROCEDURE — 73562 X-RAY EXAM OF KNEE 3: CPT | Mod: TC,RT

## 2022-06-14 PROCEDURE — 99999 PR PBB SHADOW E&M-EST. PATIENT-LVL III: CPT | Mod: PBBFAC,,, | Performed by: PHYSICIAN ASSISTANT

## 2022-06-14 PROCEDURE — 73562 X-RAY EXAM OF KNEE 3: CPT | Mod: 26,RT,, | Performed by: RADIOLOGY

## 2022-06-14 PROCEDURE — 73562 XR KNEE ORTHO LEFT WITH FLEXION: ICD-10-PCS | Mod: 26,RT,, | Performed by: RADIOLOGY

## 2022-06-14 PROCEDURE — 99999 PR PBB SHADOW E&M-EST. PATIENT-LVL III: ICD-10-PCS | Mod: PBBFAC,,, | Performed by: PHYSICIAN ASSISTANT

## 2022-06-14 PROCEDURE — 73564 X-RAY EXAM KNEE 4 OR MORE: CPT | Mod: 26,LT,, | Performed by: RADIOLOGY

## 2022-06-14 PROCEDURE — 99213 OFFICE O/P EST LOW 20 MIN: CPT | Mod: PBBFAC | Performed by: PHYSICIAN ASSISTANT

## 2022-06-14 PROCEDURE — 99203 PR OFFICE/OUTPT VISIT, NEW, LEVL III, 30-44 MIN: ICD-10-PCS | Mod: S$PBB,,, | Performed by: PHYSICIAN ASSISTANT

## 2022-06-14 RX ORDER — MELOXICAM 15 MG/1
15 TABLET ORAL DAILY
Qty: 30 TABLET | Refills: 0 | Status: SHIPPED | OUTPATIENT
Start: 2022-06-14 | End: 2022-07-14

## 2022-06-14 NOTE — TELEPHONE ENCOUNTER
----- Message from Marika Tompkins MD sent at 6/13/2022 11:51 AM CDT -----  /niurka Condon in 3months

## 2022-06-14 NOTE — PROGRESS NOTES
"Subjective:      Patient ID: Tiffany Cruz is a 46 y.o. female.    Chief Complaint: Pain of the Left Knee    HPI     Patient is a 46 year old female who presents to clinic with chief complaint of left anterior knee pain. Pain is increased with walking. She has tried rest ice without relief. Reports popping and cracking. Mo lockig or catching.     Estimated body mass index is 30.54 kg/m² as calculated from the following:    Height as of this encounter: 5' 4" (1.626 m).    Weight as of this encounter: 80.7 kg (177 lb 14.6 oz).        Review of Systems   Constitutional: Negative for chills and fever.   Cardiovascular: Negative for chest pain.   Respiratory: Negative for cough and shortness of breath.    Skin: Negative for color change, dry skin, itching, nail changes, poor wound healing and rash.   Musculoskeletal:        Left knee pain    Neurological: Negative for dizziness.   Psychiatric/Behavioral: Negative for altered mental status. The patient is not nervous/anxious.    All other systems reviewed and are negative.        Objective:      General    Constitutional: She is oriented to person, place, and time. She appears well-developed and well-nourished. No distress.   HENT:   Head: Atraumatic.   Eyes: Conjunctivae are normal.   Cardiovascular: Normal rate.    Pulmonary/Chest: Effort normal.   Neurological: She is alert and oriented to person, place, and time.   Psychiatric: She has a normal mood and affect. Her behavior is normal.             Left Knee Exam     Tenderness   The patient tender to palpation of the patellar tendon.    Range of Motion   The patient has normal left knee ROM.    Tests   Stability Lachman: normal (-1 to 2mm) PCL-Posterior Drawer: normal (0 to 2mm)  MCL - Valgus: normal (0 to 2mm)  LCL - Varus: normal (0 to 2mm)    Muscle Strength   Left Lower Extremity   Quadriceps:  4/5   Hamstrin/5     RADS: reviewed by myself:     No fracture or dislocation.  No bone destruction " identified.  Mild narrowing of the medial tibiofemoral joint spaces.          Assessment:       Encounter Diagnosis   Name Primary?    Acute pain of left knee Yes          Plan:       Discussed options/ plan with the patient. At this time will attend PT. Patient is to make appointment herself. She is to return to clinic as needed.

## 2022-06-15 ENCOUNTER — TELEPHONE (OUTPATIENT)
Dept: ORTHOPEDICS | Facility: CLINIC | Age: 47
End: 2022-06-15
Payer: OTHER GOVERNMENT

## 2022-06-15 NOTE — TELEPHONE ENCOUNTER
----- Message from Ronak Young sent at 6/15/2022  2:27 PM CDT -----  Contact: Abida Gayle PT @ [phone]  Caller needs patient demographic sheet faxed to: [phone]

## 2022-06-24 ENCOUNTER — PATIENT MESSAGE (OUTPATIENT)
Dept: PSYCHIATRY | Facility: CLINIC | Age: 47
End: 2022-06-24
Payer: OTHER GOVERNMENT

## 2022-09-19 ENCOUNTER — TELEPHONE (OUTPATIENT)
Dept: NEUROLOGY | Facility: CLINIC | Age: 47
End: 2022-09-19

## 2022-09-19 DIAGNOSIS — M54.9 BACK PAIN, UNSPECIFIED BACK LOCATION, UNSPECIFIED BACK PAIN LATERALITY, UNSPECIFIED CHRONICITY: Primary | ICD-10-CM

## 2022-09-19 RX ORDER — METHYLPREDNISOLONE 4 MG/1
TABLET ORAL
Qty: 21 EACH | Refills: 0 | Status: SHIPPED | OUTPATIENT
Start: 2022-09-19 | End: 2022-10-10

## 2022-09-19 NOTE — TELEPHONE ENCOUNTER
Pt reports she lifted something heavy, then started having pain in her left neck/shoulder. Her PCP was hesitant to send in a medrol glen because she is on Kesimpta. Reviewed with Roseanna. Pt aware Roseanna has sent in the Medrol Glen to her local [facility]. Pt will f/u with us tomorrow to determine if VV on Wednesday is necessary.

## 2022-09-20 ENCOUNTER — PATIENT MESSAGE (OUTPATIENT)
Dept: NEUROLOGY | Facility: CLINIC | Age: 47
End: 2022-09-20
Payer: OTHER GOVERNMENT

## 2022-09-22 ENCOUNTER — PATIENT MESSAGE (OUTPATIENT)
Dept: NEUROLOGY | Facility: CLINIC | Age: 47
End: 2022-09-22

## 2022-09-22 ENCOUNTER — HOSPITAL ENCOUNTER (OUTPATIENT)
Dept: RADIOLOGY | Facility: HOSPITAL | Age: 47
Discharge: HOME | End: 2022-09-22
Attending: CLINICAL NURSE SPECIALIST
Payer: OTHER GOVERNMENT

## 2022-09-22 ENCOUNTER — OFFICE VISIT (OUTPATIENT)
Dept: NEUROLOGY | Facility: CLINIC | Age: 47
End: 2022-09-22
Payer: OTHER GOVERNMENT

## 2022-09-22 DIAGNOSIS — Z71.89 COUNSELING REGARDING GOALS OF CARE: ICD-10-CM

## 2022-09-22 DIAGNOSIS — M79.602 LEFT ARM PAIN: Primary | ICD-10-CM

## 2022-09-22 DIAGNOSIS — Z29.89 PROPHYLACTIC IMMUNOTHERAPY: ICD-10-CM

## 2022-09-22 DIAGNOSIS — G35 MULTIPLE SCLEROSIS: ICD-10-CM

## 2022-09-22 DIAGNOSIS — G35 MULTIPLE SCLEROSIS: Primary | ICD-10-CM

## 2022-09-22 DIAGNOSIS — Z79.899 HIGH RISK MEDICATION USE: ICD-10-CM

## 2022-09-22 DIAGNOSIS — T14.8XXA MUSCLE STRAIN: ICD-10-CM

## 2022-09-22 DIAGNOSIS — M79.602 LEFT ARM PAIN: ICD-10-CM

## 2022-09-22 PROCEDURE — 99213 OFFICE O/P EST LOW 20 MIN: CPT | Mod: 95,,, | Performed by: CLINICAL NURSE SPECIALIST

## 2022-09-22 PROCEDURE — 72156 MRI NECK SPINE W/O & W/DYE: CPT | Mod: TC

## 2022-09-22 PROCEDURE — 99213 PR OFFICE/OUTPT VISIT, EST, LEVL III, 20-29 MIN: ICD-10-PCS | Mod: 95,,, | Performed by: CLINICAL NURSE SPECIALIST

## 2022-09-22 PROCEDURE — 25500020 PHARM REV CODE 255: Performed by: CLINICAL NURSE SPECIALIST

## 2022-09-22 PROCEDURE — 72156 MRI CERVICAL SPINE DEMYELINATING W W/O CONTRAST: ICD-10-PCS | Mod: 26,,, | Performed by: RADIOLOGY

## 2022-09-22 PROCEDURE — 72156 MRI NECK SPINE W/O & W/DYE: CPT | Mod: 26,,, | Performed by: RADIOLOGY

## 2022-09-22 PROCEDURE — A9585 GADOBUTROL INJECTION: HCPCS | Performed by: CLINICAL NURSE SPECIALIST

## 2022-09-22 RX ORDER — GADOBUTROL 604.72 MG/ML
8 INJECTION INTRAVENOUS
Status: COMPLETED | OUTPATIENT
Start: 2022-09-22 | End: 2022-09-22

## 2022-09-22 RX ORDER — CYCLOBENZAPRINE HCL 5 MG
5 TABLET ORAL 3 TIMES DAILY PRN
Qty: 30 TABLET | Refills: 0 | Status: SHIPPED | OUTPATIENT
Start: 2022-09-22 | End: 2022-10-02

## 2022-09-22 RX ADMIN — GADOBUTROL 8 ML: 604.72 INJECTION INTRAVENOUS at 06:09

## 2022-09-22 NOTE — PROGRESS NOTES
Subjective:          Patient ID: Tiffany Cruz is a 46 y.o. female who presents today for a routine virtual visit for MS.  She was last seen in February 2022. The history has been provided by the patient.       The patient location is: her home   The chief complaint leading to consultation is: MS/arm pain     Visit type: audiovisual    Face to Face time with patient: 13 minutes   25 minutes of total time spent on the encounter, which includes face to face time and non-face to face time preparing to see the patient (eg, review of tests), Obtaining and/or reviewing separately obtained history, Documenting clinical information in the electronic or other health record, Independently interpreting results (not separately reported) and communicating results to the patient/family/caregiver, or Care coordination (not separately reported).     Each patient to whom he or she provides medical services by telemedicine is:  (1) informed of the relationship between the physician and patient and the respective role of any other health care provider with respect to management of the patient; and (2) notified that he or she may decline to receive medical services by telemedicine and may withdraw from such care at any time.    MS HPI:  DMT: Kesimpta   Side effects from DMT? No  Taking vitamin D3 as recommended? No--hasn't taken it much in 2 weeks   On Friday night (almost a week ago), Evelyn lifted a storage bin and started having pain the next day in her left arm down into her fingertips, her left ribs, and the left upper back and shoulder. She had tingling in her fingertips. She saw her PCP who felt this was neurological. She was prescribed a Medrol pack by me.  She only has relief if she takes Medrol, gabapentin, and naproxen or ibuprofen at the same time. She tried lidocaine patch last night, which was somewhat helpful. She has pain when lying on the left side. She feels slight weakness in the left hand. She does not  feel like her coordination is affected. She denies any crushing chest pain, jaw pain, or shortness of breath. She has reported similar pain in the past and was found to have small disc bulges in the cervical spine.   She tried baclofen (2) and tizanidine, and these were not helpful.   She had left carpal tunnel and cubital tunnel repair about a year and a half ago.   She is taking Kesimpta and does not have any side effects.     Medications:  Current Outpatient Medications   Medication Sig    baclofen (LIORESAL) 10 MG tablet Take 1/2 to 1 tab by mouth at bedtime.    diazePAM (VALIUM) 5 MG tablet Take 1 tablet by mouth 1 hour prior to MRI and 1 tab at the time of the MRI if needed.    ergocalciferol (ERGOCALCIFEROL) 50,000 unit Cap     gabapentin (NEURONTIN) 100 MG capsule TAKE 1 CAPSULE(100 MG) BY MOUTH THREE TIMES DAILY    ibuprofen (ADVIL,MOTRIN) 800 MG tablet Take 1 tablet (800 mg total) by mouth every 6 (six) hours as needed for Pain.    methylPREDNISolone (MEDROL DOSEPACK) 4 mg tablet use as directed    metoprolol succinate (TOPROL-XL) 25 MG 24 hr tablet     ofatumumab (KESIMPTA PEN) 20 mg/0.4 mL PnIj Loading Dose: Inject 20 mg (0.4 ml) SQ at week 0, 1 and 2.    ofatumumab (KESIMPTA PEN) 20 mg/0.4 mL PnIj Inject 20 mg into the skin every 28 days.    telmisartan-hydrochlorothiazide (MICARDIS HCT) 80-12.5 mg per tablet Take 1 tablet by mouth.    tiZANidine (ZANAFLEX) 6 mg capsule Take 1 capsule (6 mg total) by mouth every evening.     SOCIAL HISTORY  Social History     Tobacco Use    Smoking status: Never    Smokeless tobacco: Never   Substance Use Topics    Alcohol use: No     Alcohol/week: 0.0 standard drinks    Drug use: No       Living arrangements - the patient lives with their family.    ROS:  REVIEW OF SYMPTOMS 6/13/2022   Do you feel abnormally tired on most days? No   Do you feel you generally sleep well? No   Do you have difficulty controlling your bladder?  No   Do you have difficulty  controlling your bowels?  No   Do you have frequent muscle cramps, tightness or spasms in your limbs?  No   Do you have new visual symptoms?  No   Do you have worsening difficulty with your memory or thinking? No   Do you have worsening symptoms of anxiety or depression?  No   For patients who walk, Do you have more difficulty walking?  No   Have you fallen since your last visit?  No   For patients who use wheelchairs: Do you have any skin wounds or breakdown? Not Applicable   Do you have difficulty using your hands?  No   Do you have shooting or burning pain? No   Do you have difficulty with sexual function?  -   If you are sexually active, are you using birth control? Y/N  N/A Not Applicable   Do you often choke when swallowing liquids or solid food?  No   Do you experience worsening symptoms when overheated? No   Do you need any new equipment such as a wheelchair, walker or shower chair? No   Do you receive co-pay financial assistance for your principal MS medicine? Not Applicable   Would you be interested in participating in an MS research trial in the future? Not Applicable   For patients on Gilenya, Tecfidera, Aubagio, Rituxan, Ocrevus, Tysabri, Lemtrada or Methotrexate, are you aware that you should NOT receive live virus vaccines?  Not Applicable   Do you feel you have adequate family/friend support?  Yes   Do you have health insurance?   Yes   Are you currently employed? Yes   Do you receive SSDI/SSI?  Not Applicable   Do you use marijuana or cannabis products? No   Have you been diagnosed with a urinary tract infection since your last visit here? No   Have you been diagnosed with a respiratory tract infection since your last visit here? No   Have you been to the emergency room since your last visit here? No   Have you been hospitalized since your last visit here?  No                Objective:        1. 25 foot timed walk:  Timed 25 Foot Walk: 2/21/2022 6/13/2022   Did patient wear an AFO? No No   Was  assistive device used? No No   Time for 25 Foot Walk (seconds) 4.6 5   Time for 25 Foot Walk (seconds) 4.1 -       Neurologic Exam    Her range of motion in the left arm is not affected. She reports pain to the left elbow when arm is held straight out in front of her. When arm is lifted overhead, she has pain in the left ribcage. When arm is straight out to her side, she has pain in the left shoulder and upper back.   Imaging:       Results for orders placed during the hospital encounter of 08/11/21    MRI Brain Demyelinating W W/O Contrast    Impression  The brain and cervical spinal cord appears stable from prior exam, again demonstrating findings compatible with the reported history of multiple sclerosis.  No new or enhancing lesions identified to suggest ongoing or active demyelination.    Stable mild cervical spondylosis.    Electronically signed by resident: Manish Caputo MD  Date:    08/12/2021  Time:    08:35    Electronically signed by: Luis rBidges MD  Date:    08/12/2021  Time:    10:18    Results for orders placed during the hospital encounter of 08/11/21    MRI Cervical Spine Demyelinating W W/O Contrast    Impression  The brain and cervical spinal cord appears stable from prior exam, again demonstrating findings compatible with the reported history of multiple sclerosis.  No new or enhancing lesions identified to suggest ongoing or active demyelination.    Stable mild cervical spondylosis.    Electronically signed by resident: Manish Caputo MD  Date:    08/12/2021  Time:    08:35    Electronically signed by: Luis Bridges MD  Date:    08/12/2021  Time:    10:18    Results for orders placed during the hospital encounter of 07/19/18    MRI Thoracic Spine Demyelinating W W/O Contrast    Impression  New tiny focus of T2 hyperintense signal in the left lateral cord at the level of C2, in keeping with demyelinating plaque given clinical history of multiple sclerosis.  No associated enhancement.  This lesion  is distinct from the right dorsal lateral cord lesion at the level of C2 present on examination of 01/15/2017.    Unchanged diffusely decreased T1 marrow signal suggestive of diffuse marrow replacement process such as red marrow reconversion or myelodysplastic process.    Mild cervical spondylosis most notable at C5-6.    Electronically signed by resident: Juan Matias  Date:    07/20/2018  Time:    08:27    Electronically signed by: Mt Good MD  Date:    07/20/2018  Time:    12:41    Labs:     Lab Results   Component Value Date    KKLVDRQD65MA 34 07/28/2021    HDHSORBC09HH 58 08/05/2020    SRTQNDGW72ZF 62 03/03/2020     Lab Results   Component Value Date    LO4ZCVYD 81.5 02/21/2022    ABSOLUTECD3 2960 (H) 02/21/2022    XO5CAPAO 20.2 02/21/2022    ABSOLUTECD8 735 02/21/2022    JL8KVRWE 61.3 (H) 02/21/2022    ABSOLUTECD4 2227 (H) 02/21/2022    LABCD48 3.03 02/21/2022     Lab Results   Component Value Date    WBC 8.05 02/21/2022    RBC 5.37 02/21/2022    HGB 11.4 (L) 02/21/2022    HCT 37.9 02/21/2022    MCV 71 (L) 02/21/2022    MCH 21.2 (L) 02/21/2022    MCHC 30.1 (L) 02/21/2022    RDW 13.8 02/21/2022     02/21/2022    MPV 10.3 02/21/2022    GRAN 3.8 02/21/2022    GRAN 47.5 02/21/2022    LYMPH 3.0 02/21/2022    LYMPH 37.4 02/21/2022    MONO 1.0 02/21/2022    MONO 11.9 02/21/2022    EOS 0.2 02/21/2022    BASO 0.08 02/21/2022    EOSINOPHIL 2.0 02/21/2022    BASOPHIL 1.0 02/21/2022     Sodium   Date Value Ref Range Status   02/21/2022 139 136 - 145 mmol/L Final     Potassium   Date Value Ref Range Status   02/21/2022 3.7 3.5 - 5.1 mmol/L Final     Chloride   Date Value Ref Range Status   02/21/2022 103 95 - 110 mmol/L Final     CO2   Date Value Ref Range Status   02/21/2022 29 23 - 29 mmol/L Final     Glucose   Date Value Ref Range Status   02/21/2022 86 70 - 110 mg/dL Final     BUN   Date Value Ref Range Status   02/21/2022 10 6 - 20 mg/dL Final     Creatinine   Date Value Ref Range Status   02/21/2022 0.8 0.5  - 1.4 mg/dL Final     Calcium   Date Value Ref Range Status   02/21/2022 8.7 8.7 - 10.5 mg/dL Final     Total Protein   Date Value Ref Range Status   02/21/2022 7.1 6.0 - 8.4 g/dL Final     Albumin   Date Value Ref Range Status   02/21/2022 3.7 3.5 - 5.2 g/dL Final     Total Bilirubin   Date Value Ref Range Status   02/21/2022 0.3 0.1 - 1.0 mg/dL Final     Comment:     For infants and newborns, interpretation of results should be based  on gestational age, weight and in agreement with clinical  observations.    Premature Infant recommended reference ranges:  Up to 24 hours.............<8.0 mg/dL  Up to 48 hours............<12.0 mg/dL  3-5 days..................<15.0 mg/dL  6-29 days.................<15.0 mg/dL       Alkaline Phosphatase   Date Value Ref Range Status   02/21/2022 57 55 - 135 U/L Final     AST   Date Value Ref Range Status   02/21/2022 17 10 - 40 U/L Final     ALT   Date Value Ref Range Status   02/21/2022 24 10 - 44 U/L Final     Anion Gap   Date Value Ref Range Status   02/21/2022 7 (L) 8 - 16 mmol/L Final     eGFR if    Date Value Ref Range Status   02/21/2022 >60 >60 mL/min/1.73 m^2 Final     eGFR if non    Date Value Ref Range Status   02/21/2022 >60 >60 mL/min/1.73 m^2 Final     Comment:     Calculation used to obtain the estimated glomerular filtration  rate (eGFR) is the CKD-EPI equation.        Lab Results   Component Value Date    HEPBSAG Negative 02/21/2022    HEPBSAB Negative 02/21/2022    HEPBCAB Negative 02/21/2022     MS Impression and Plan:     NEURO MULTIPLE SCLEROSIS IMPRESSION:   MS Status:     Number of relapses in the past year?:  0  Plan:     DMT:  No change in management    DMT comment:  Continue Kesimpta. She will need safety labs at next visit or before end of year. She has taken the first 3 doses of Kesimpta and will start monthly injections soon.        Evelyn has had a similar left arm pain in the past. We will get STAT new c-spine MRI. I  suspect that she has pinched nerve/cervical radiculopathy perhaps from lifting something heavy. I will send in Flexeril to pharmacy to take three times daily as needed, but she will likely only take it at night. She was advised not to take Flexeril with baclofen or tizanidine. We may refer for physical therapy based on MRI results.         Our visit today lasted 40 minutes, and 100% of this time was spent face to face with the patient. Over 50% of this visit included discussion of the treatment plan/medication changes/symptom management/exam findings/imaging results/coordination of care. The patient agrees with the plan of care.    Problem List Items Addressed This Visit    None      DIEUDONNE Shane, CNS

## 2022-09-27 ENCOUNTER — PATIENT MESSAGE (OUTPATIENT)
Dept: NEUROLOGY | Facility: CLINIC | Age: 47
End: 2022-09-27
Payer: OTHER GOVERNMENT

## 2022-10-07 NOTE — TELEPHONE ENCOUNTER
----- Message from Maru Castano RN sent at 3/21/2019  9:57 AM CDT -----  Contact: Vishal LÓPEZ  Patient complaining of pain of a 5-6/10 to left wrist, including swelling and numbness.    
[facility].   
denies pain/discomfort

## 2022-10-18 ENCOUNTER — OFFICE VISIT (OUTPATIENT)
Dept: NEUROLOGY | Facility: CLINIC | Age: 47
End: 2022-10-18
Payer: OTHER GOVERNMENT

## 2022-10-18 VITALS
HEIGHT: 64 IN | HEART RATE: 69 BPM | SYSTOLIC BLOOD PRESSURE: 151 MMHG | DIASTOLIC BLOOD PRESSURE: 92 MMHG | WEIGHT: 176.56 LBS | BODY MASS INDEX: 30.14 KG/M2

## 2022-10-18 DIAGNOSIS — Z79.899 HIGH RISK MEDICATION USE: ICD-10-CM

## 2022-10-18 DIAGNOSIS — M79.2 NERVE PAIN: ICD-10-CM

## 2022-10-18 DIAGNOSIS — Z71.89 COUNSELING REGARDING GOALS OF CARE: ICD-10-CM

## 2022-10-18 DIAGNOSIS — G35 MULTIPLE SCLEROSIS: Primary | ICD-10-CM

## 2022-10-18 DIAGNOSIS — M62.838 MUSCLE SPASM: ICD-10-CM

## 2022-10-18 DIAGNOSIS — Z29.89 PROPHYLACTIC IMMUNOTHERAPY: ICD-10-CM

## 2022-10-18 PROCEDURE — 99215 OFFICE O/P EST HI 40 MIN: CPT | Mod: S$PBB,,, | Performed by: CLINICAL NURSE SPECIALIST

## 2022-10-18 PROCEDURE — 99214 OFFICE O/P EST MOD 30 MIN: CPT | Mod: PBBFAC | Performed by: CLINICAL NURSE SPECIALIST

## 2022-10-18 PROCEDURE — 99215 PR OFFICE/OUTPT VISIT, EST, LEVL V, 40-54 MIN: ICD-10-PCS | Mod: S$PBB,,, | Performed by: CLINICAL NURSE SPECIALIST

## 2022-10-18 PROCEDURE — 99999 PR PBB SHADOW E&M-EST. PATIENT-LVL IV: CPT | Mod: PBBFAC,,, | Performed by: CLINICAL NURSE SPECIALIST

## 2022-10-18 PROCEDURE — 99999 PR PBB SHADOW E&M-EST. PATIENT-LVL IV: ICD-10-PCS | Mod: PBBFAC,,, | Performed by: CLINICAL NURSE SPECIALIST

## 2022-10-18 RX ORDER — GABAPENTIN 300 MG/1
300 CAPSULE ORAL NIGHTLY
Qty: 90 CAPSULE | Refills: 0 | Status: SHIPPED | OUTPATIENT
Start: 2022-10-18 | End: 2023-03-04

## 2022-10-18 RX ORDER — TIZANIDINE HYDROCHLORIDE 6 MG/1
6 CAPSULE, GELATIN COATED ORAL NIGHTLY
Qty: 90 CAPSULE | Refills: 0 | Status: SHIPPED | OUTPATIENT
Start: 2022-10-18 | End: 2023-03-04

## 2022-10-18 NOTE — PROGRESS NOTES
Subjective:          Patient ID: Tiffany Cruz is a 46 y.o. female who presents today for a routine clinic visit for MS.  She was last seen on 9/22/22. The history has been provided by the patient.     MS HPI:  DMT: Kesimpta--had 1st 3 injections; tolerated them well; she has not taken the monthly injection. Last weekly was in August.   Side effects from DMT? No  Taking vitamin D3 as recommended? Yes - not consistently  Left arm and neck pain is better. She continues to have some pain at times and takes gabapentin, baclofen, and tizanidine as needed.   She works full-time as a . She is not exercising.   She continues to have some nerve pain in her right leg. Gabapentin helps. This is not new.   She does not get thirsty often and also does not have a big appetite during the day. She has had dumping syndrome in the past, but has not had this in the past 3 months or so. She usually feels tired after eating a meal during the day.   She is wearing a Holter monitor now for ongoing heart palpitations. She will be having a stress test soon. She also has some intermittent chest pain.     Medications:  Current Outpatient Medications   Medication Sig    baclofen (LIORESAL) 10 MG tablet Take 1/2 to 1 tab by mouth at bedtime. (Patient not taking: Reported on 9/22/2022)    diazePAM (VALIUM) 5 MG tablet Take 1 tablet by mouth 1 hour prior to MRI and 1 tab at the time of the MRI if needed. (Patient not taking: Reported on 9/22/2022)    ergocalciferol (ERGOCALCIFEROL) 50,000 unit Cap     gabapentin (NEURONTIN) 100 MG capsule TAKE 1 CAPSULE(100 MG) BY MOUTH THREE TIMES DAILY    ibuprofen (ADVIL,MOTRIN) 800 MG tablet Take 1 tablet (800 mg total) by mouth every 6 (six) hours as needed for Pain.    metoprolol succinate (TOPROL-XL) 25 MG 24 hr tablet     ofatumumab (KESIMPTA PEN) 20 mg/0.4 mL PnIj Inject 20 mg into the skin every 28 days.    telmisartan-hydrochlorothiazide (MICARDIS HCT) 80-12.5 mg per tablet Take  1 tablet by mouth.    tiZANidine (ZANAFLEX) 6 mg capsule Take 1 capsule (6 mg total) by mouth every evening. (Patient not taking: Reported on 9/22/2022)         SOCIAL HISTORY  Social History     Tobacco Use    Smoking status: Never    Smokeless tobacco: Never   Substance Use Topics    Alcohol use: No     Alcohol/week: 0.0 standard drinks    Drug use: No       Living arrangements - the patient lives with their family.             Objective:        1. 25 foot timed walk:  Timed 25 Foot Walk: 6/13/2022 10/18/2022   Did patient wear an AFO? No No   Was assistive device used? No No   Time for 25 Foot Walk (seconds) 5 3.98   Time for 25 Foot Walk (seconds) - 3.75       Neurologic Exam     Mental Status   Oriented to person, place, and time.   Attention: normal. Concentration: normal.   Speech: speech is normal   Level of consciousness: alert  Knowledge: good.   Normal comprehension.     Cranial Nerves     CN III, IV, VI   Extraocular motions are normal.   Nystagmus: none     CN V   Right facial sensation deficit: none  Left facial sensation deficit: none    CN VII   Right facial weakness: none  Left facial weakness: none    CN VIII   Hearing: intact    CN IX, X   Palate: symmetric    CN XI   Right sternocleidomastoid strength: normal  Left sternocleidomastoid strength: normal  Right trapezius strength: normal  Left trapezius strength: normal    CN XII   Tongue deviation: none    Motor Exam   Muscle bulk: normal  Overall muscle tone: normal  Right arm tone: normal  Left arm tone: normal  Right leg tone: normal  Left leg tone: normal    Strength   Right neck flexion: 5/5  Left neck flexion: 5/5  Right neck extension: 5/5  Left neck extension: 5/5  Right deltoid: 5/5  Left deltoid: 5/5  Right biceps: 5/5  Left biceps: 5/5  Right triceps: 5/5  Left triceps: 5/5  Right wrist flexion: 5/5  Left wrist flexion: 5/5  Right wrist extension: 5/5  Left wrist extension: 5/5  Right interossei: 5/5  Left interossei: 5/5  Right  iliopsoas: 5/5  Left iliopsoas: 5/5  Right quadriceps: 5/5  Left quadriceps: 5/5  Right hamstrin/5  Left hamstrin/5  Right anterior tibial: 5/5  Left anterior tibial: 5/5  Right gastroc: 5/5  Left gastroc: 5/5    Sensory Exam   Right arm vibration: normal  Left arm vibration: normal  Right leg vibration: normal  Left leg vibration: normal    Gait, Coordination, and Reflexes     Gait  Gait: normal    Coordination   Romberg: negative  Finger to nose coordination: normal  Heel to shin coordination: normal  Tandem walking coordination: normal    Tremor   Resting tremor: absent    Reflexes   Right brachioradialis: 1+  Left brachioradialis: 1+  Right biceps: 1+  Left biceps: 1+  Right triceps: 1+  Left triceps: 1+  Right patellar: 1+  Left patellar: 1+  Right achilles: 0  Left achilles: 1+  Right plantar: equivocal  Left plantar: equivocal    Normal rapid sequential movements in upper and lower extremities.          Imaging:       Results for orders placed during the hospital encounter of 21    MRI Brain Demyelinating W W/O Contrast    Impression  The brain and cervical spinal cord appears stable from prior exam, again demonstrating findings compatible with the reported history of multiple sclerosis.  No new or enhancing lesions identified to suggest ongoing or active demyelination.    Stable mild cervical spondylosis.    Electronically signed by resident: Manish Caputo MD  Date:    2021  Time:    08:35    Electronically signed by: Luis Bridges MD  Date:    2021  Time:    10:18    Results for orders placed during the hospital encounter of 22    MRI Cervical Spine Demyelinating W W/O Contrast    Impression  1. Compared to prior MRI cervical spine 2021, similar nonenhancing focus of nonenhancing T2 weighted signal abnormality within the left hemicord at C2.  2. Additionally, there is linear increased T2 weighted signal on the axial GRE sequence not confirmed on additional T2  weighted/fluid sensitive sequences, potentially artifactual in etiology.  An equivocal focus of T2 weighted signal abnormality at the inferior margins of the examination within the cord at the T3 level is additionally noted; dedicated thoracic MRI could be performed, as clinically warranted.  3. No definite evidence of cord atrophy.  No pathologic enhancement.  4. Relatively similar degenerative findings, as discussed.      Electronically signed by: Huber Mistry  Date:    09/23/2022  Time:    07:39      Labs:     Lab Results   Component Value Date    [id number] 34 07/28/2021    NAUAJHZP24IF 58 08/05/2020    SHSHVBHK23JW 62 03/03/2020     Lab Results   Component Value Date    EJ1GHZET 81.5 02/21/2022    ABSOLUTECD3 2960 (H) 02/21/2022    MK7VQYGG 20.2 02/21/2022    ABSOLUTECD8 735 02/21/2022    FV4HJMFX 61.3 (H) 02/21/2022    ABSOLUTECD4 2227 (H) 02/21/2022    LABCD48 3.03 02/21/2022     Lab Results   Component Value Date    WBC 8.05 02/21/2022    RBC 5.37 02/21/2022    HGB 11.4 (L) 02/21/2022    HCT 37.9 02/21/2022    MCV 71 (L) 02/21/2022    MCH 21.2 (L) 02/21/2022    MCHC 30.1 (L) 02/21/2022    RDW 13.8 02/21/2022     02/21/2022    MPV 10.3 02/21/2022    GRAN 3.8 02/21/2022    GRAN 47.5 02/21/2022    LYMPH 3.0 02/21/2022    LYMPH 37.4 02/21/2022    MONO 1.0 02/21/2022    MONO 11.9 02/21/2022    EOS 0.2 02/21/2022    BASO 0.08 02/21/2022    EOSINOPHIL 2.0 02/21/2022    BASOPHIL 1.0 02/21/2022     Sodium   Date Value Ref Range Status   02/21/2022 139 136 - 145 mmol/L Final     Potassium   Date Value Ref Range Status   02/21/2022 3.7 3.5 - 5.1 mmol/L Final     Chloride   Date Value Ref Range Status   02/21/2022 103 95 - 110 mmol/L Final     CO2   Date Value Ref Range Status   02/21/2022 29 23 - 29 mmol/L Final     Glucose   Date Value Ref Range Status   02/21/2022 86 70 - 110 mg/dL Final     BUN   Date Value Ref Range Status   02/21/2022 10 6 - 20 mg/dL Final     Creatinine   Date Value Ref Range Status    09/22/2022 0.8 0.5 - 1.4 mg/dL Final     Calcium   Date Value Ref Range Status   02/21/2022 8.7 8.7 - 10.5 mg/dL Final     Total Protein   Date Value Ref Range Status   02/21/2022 7.1 6.0 - 8.4 g/dL Final     Albumin   Date Value Ref Range Status   02/21/2022 3.7 3.5 - 5.2 g/dL Final     Total Bilirubin   Date Value Ref Range Status   02/21/2022 0.3 0.1 - 1.0 mg/dL Final     Comment:     For infants and newborns, interpretation of results should be based  on gestational age, weight and in agreement with clinical  observations.    Premature Infant recommended reference ranges:  Up to 24 hours.............<8.0 mg/dL  Up to 48 hours............<12.0 mg/dL  3-5 days..................<15.0 mg/dL  6-29 days.................<15.0 mg/dL       Alkaline Phosphatase   Date Value Ref Range Status   02/21/2022 57 55 - 135 U/L Final     AST   Date Value Ref Range Status   02/21/2022 17 10 - 40 U/L Final     ALT   Date Value Ref Range Status   02/21/2022 24 10 - 44 U/L Final     Anion Gap   Date Value Ref Range Status   02/21/2022 7 (L) 8 - 16 mmol/L Final     eGFR if    Date Value Ref Range Status   02/21/2022 >60 >60 mL/min/1.73 m^2 Final     eGFR if non    Date Value Ref Range Status   02/21/2022 >60 >60 mL/min/1.73 m^2 Final     Comment:     Calculation used to obtain the estimated glomerular filtration  rate (eGFR) is the CKD-EPI equation.        Lab Results   Component Value Date    HEPBSAG Negative 02/21/2022    HEPBSAB Negative 02/21/2022    HEPBCAB Negative 02/21/2022           MS Impression and Plan:     NEURO MULTIPLE SCLEROSIS IMPRESSION:   MS Status:     Number of relapses in the past year?:  0    Clinical Progression:  Clinically Stable  Plan:     DMT:  No change in management    DMT comment:  Continue Kesimpta and Vitamin D. She will call the pharmacy and request shipment of maintenance monthly dosing. We will plan for safety labs in December.       Symptom Management:  Implement  change in symptom management    Implement Change in Symptom Management:  Pain and Spasticity (Discussed referral to pain management inf left arm pain flares up again. Refills on gabapentin and tizanidine were sent to the pharmacy.)       MRI brain in February 2023; may plan for repeat C and T-spine MRI later in 2023 or with annual brain in 2024  Follow up with Dr. Tompkins after MRI      Total time spent with patient: 41 minutes   Total time spent on encounter: 55 minutes         DIEUDONNE Shane, CNS    Problem List Items Addressed This Visit          Neurologic Problems    Multiple sclerosis - Primary    Overview     On Aubagio  Clinically stable         Relevant Medications    gabapentin (NEURONTIN) 300 MG capsule    Other Relevant Orders    MRI Brain Demyelinating W W/O Contrast    Vitamin D     Other Visit Diagnoses       Muscle spasm        Relevant Medications    tiZANidine (ZANAFLEX) 6 mg capsule    Nerve pain        Relevant Medications    gabapentin (NEURONTIN) 300 MG capsule

## 2022-12-01 ENCOUNTER — PATIENT MESSAGE (OUTPATIENT)
Dept: PSYCHIATRY | Facility: CLINIC | Age: 47
End: 2022-12-01
Payer: OTHER GOVERNMENT

## 2022-12-02 ENCOUNTER — PATIENT MESSAGE (OUTPATIENT)
Dept: NEUROLOGY | Facility: CLINIC | Age: 47
End: 2022-12-02
Payer: OTHER GOVERNMENT

## 2022-12-02 ENCOUNTER — TELEPHONE (OUTPATIENT)
Dept: NEUROLOGY | Facility: CLINIC | Age: 47
End: 2022-12-02
Payer: OTHER GOVERNMENT

## 2023-01-31 ENCOUNTER — PATIENT MESSAGE (OUTPATIENT)
Dept: NEUROLOGY | Facility: CLINIC | Age: 48
End: 2023-01-31
Payer: OTHER GOVERNMENT

## 2023-01-31 DIAGNOSIS — G35 MULTIPLE SCLEROSIS: Primary | ICD-10-CM

## 2023-02-03 ENCOUNTER — HOSPITAL ENCOUNTER (OUTPATIENT)
Dept: RADIOLOGY | Facility: HOSPITAL | Age: 48
Discharge: HOME | End: 2023-02-03
Attending: CLINICAL NURSE SPECIALIST
Payer: OTHER GOVERNMENT

## 2023-02-03 DIAGNOSIS — G35 MULTIPLE SCLEROSIS: ICD-10-CM

## 2023-02-03 PROCEDURE — 70553 MRI BRAIN STEM W/O & W/DYE: CPT | Mod: TC

## 2023-02-03 PROCEDURE — 70553 MRI BRAIN STEM W/O & W/DYE: CPT | Mod: 26,,, | Performed by: RADIOLOGY

## 2023-02-03 PROCEDURE — 25500020 PHARM REV CODE 255: Performed by: CLINICAL NURSE SPECIALIST

## 2023-02-03 PROCEDURE — 70553 MRI BRAIN DEMYELINATING W/ WO CONTRAST: ICD-10-PCS | Mod: 26,,, | Performed by: RADIOLOGY

## 2023-02-03 PROCEDURE — A9585 GADOBUTROL INJECTION: HCPCS | Performed by: CLINICAL NURSE SPECIALIST

## 2023-02-03 RX ORDER — GADOBUTROL 604.72 MG/ML
8 INJECTION INTRAVENOUS
Status: COMPLETED | OUTPATIENT
Start: 2023-02-03 | End: 2023-02-03

## 2023-02-03 RX ORDER — DIAZEPAM 5 MG/1
TABLET ORAL
Qty: 2 TABLET | Refills: 0 | Status: SHIPPED | OUTPATIENT
Start: 2023-02-03 | End: 2023-11-07

## 2023-02-03 RX ADMIN — GADOBUTROL 8 ML: 604.72 INJECTION INTRAVENOUS at 08:02

## 2023-02-20 ENCOUNTER — PATIENT MESSAGE (OUTPATIENT)
Dept: PSYCHIATRY | Facility: CLINIC | Age: 48
End: 2023-02-20
Payer: OTHER GOVERNMENT

## 2023-03-20 ENCOUNTER — LAB VISIT (OUTPATIENT)
Dept: LAB | Facility: HOSPITAL | Age: 48
End: 2023-03-20
Payer: OTHER GOVERNMENT

## 2023-03-20 ENCOUNTER — OFFICE VISIT (OUTPATIENT)
Dept: NEUROLOGY | Facility: CLINIC | Age: 48
End: 2023-03-20
Payer: OTHER GOVERNMENT

## 2023-03-20 VITALS — HEART RATE: 74 BPM | SYSTOLIC BLOOD PRESSURE: 142 MMHG | DIASTOLIC BLOOD PRESSURE: 81 MMHG

## 2023-03-20 DIAGNOSIS — Z71.89 COUNSELING REGARDING GOALS OF CARE: Primary | ICD-10-CM

## 2023-03-20 DIAGNOSIS — G35 MULTIPLE SCLEROSIS: ICD-10-CM

## 2023-03-20 DIAGNOSIS — R07.9 CHEST PAIN, UNSPECIFIED TYPE: ICD-10-CM

## 2023-03-20 DIAGNOSIS — Z29.89 PROPHYLACTIC IMMUNOTHERAPY: ICD-10-CM

## 2023-03-20 DIAGNOSIS — Z79.899 HIGH RISK MEDICATION USE: ICD-10-CM

## 2023-03-20 DIAGNOSIS — R53.83 FATIGUE, UNSPECIFIED TYPE: ICD-10-CM

## 2023-03-20 LAB
ALBUMIN SERPL BCP-MCNC: 4.2 G/DL (ref 3.5–5.2)
ALP SERPL-CCNC: 79 U/L (ref 55–135)
ALT SERPL W/O P-5'-P-CCNC: 38 U/L (ref 10–44)
ANION GAP SERPL CALC-SCNC: 12 MMOL/L (ref 8–16)
AST SERPL-CCNC: 27 U/L (ref 10–40)
BASOPHILS # BLD AUTO: 0.07 K/UL (ref 0–0.2)
BASOPHILS NFR BLD: 0.9 % (ref 0–1.9)
BILIRUB SERPL-MCNC: 0.3 MG/DL (ref 0.1–1)
BUN SERPL-MCNC: 13 MG/DL (ref 6–20)
CALCIUM SERPL-MCNC: 9.4 MG/DL (ref 8.7–10.5)
CHLORIDE SERPL-SCNC: 98 MMOL/L (ref 95–110)
CO2 SERPL-SCNC: 28 MMOL/L (ref 23–29)
CREAT SERPL-MCNC: 0.8 MG/DL (ref 0.5–1.4)
DIFFERENTIAL METHOD: ABNORMAL
EOSINOPHIL # BLD AUTO: 0.1 K/UL (ref 0–0.5)
EOSINOPHIL NFR BLD: 1.7 % (ref 0–8)
ERYTHROCYTE [DISTWIDTH] IN BLOOD BY AUTOMATED COUNT: 14.3 % (ref 11.5–14.5)
EST. GFR  (NO RACE VARIABLE): >60 ML/MIN/1.73 M^2
GLUCOSE SERPL-MCNC: 89 MG/DL (ref 70–110)
HBV CORE AB SERPL QL IA: NORMAL
HBV SURFACE AB SER-ACNC: <3 MIU/ML
HBV SURFACE AB SER-ACNC: NORMAL M[IU]/ML
HBV SURFACE AG SERPL QL IA: NORMAL
HCT VFR BLD AUTO: 40.3 % (ref 37–48.5)
HGB BLD-MCNC: 12.1 G/DL (ref 12–16)
IGA SERPL-MCNC: 495 MG/DL (ref 40–350)
IGG SERPL-MCNC: 1311 MG/DL (ref 650–1600)
IGM SERPL-MCNC: 38 MG/DL (ref 50–300)
IMM GRANULOCYTES # BLD AUTO: 0.02 K/UL (ref 0–0.04)
IMM GRANULOCYTES NFR BLD AUTO: 0.3 % (ref 0–0.5)
LYMPHOCYTES # BLD AUTO: 3.2 K/UL (ref 1–4.8)
LYMPHOCYTES NFR BLD: 40.6 % (ref 18–48)
MCH RBC QN AUTO: 21.3 PG (ref 27–31)
MCHC RBC AUTO-ENTMCNC: 30 G/DL (ref 32–36)
MCV RBC AUTO: 71 FL (ref 82–98)
MONOCYTES # BLD AUTO: 0.9 K/UL (ref 0.3–1)
MONOCYTES NFR BLD: 12 % (ref 4–15)
NEUTROPHILS # BLD AUTO: 3.5 K/UL (ref 1.8–7.7)
NEUTROPHILS NFR BLD: 44.5 % (ref 38–73)
NRBC BLD-RTO: 0 /100 WBC
PLATELET # BLD AUTO: 443 K/UL (ref 150–450)
PMV BLD AUTO: 11.2 FL (ref 9.2–12.9)
POTASSIUM SERPL-SCNC: 3 MMOL/L (ref 3.5–5.1)
PROT SERPL-MCNC: 7.7 G/DL (ref 6–8.4)
RBC # BLD AUTO: 5.68 M/UL (ref 4–5.4)
SODIUM SERPL-SCNC: 138 MMOL/L (ref 136–145)
WBC # BLD AUTO: 7.76 K/UL (ref 3.9–12.7)

## 2023-03-20 PROCEDURE — 99999 PR PBB SHADOW E&M-EST. PATIENT-LVL IV: ICD-10-PCS | Mod: PBBFAC,,, | Performed by: CLINICAL NURSE SPECIALIST

## 2023-03-20 PROCEDURE — 87340 HEPATITIS B SURFACE AG IA: CPT | Performed by: CLINICAL NURSE SPECIALIST

## 2023-03-20 PROCEDURE — 84425 ASSAY OF VITAMIN B-1: CPT | Performed by: CLINICAL NURSE SPECIALIST

## 2023-03-20 PROCEDURE — 82784 ASSAY IGA/IGD/IGG/IGM EACH: CPT | Mod: 59 | Performed by: CLINICAL NURSE SPECIALIST

## 2023-03-20 PROCEDURE — 99215 OFFICE O/P EST HI 40 MIN: CPT | Mod: S$PBB,,, | Performed by: CLINICAL NURSE SPECIALIST

## 2023-03-20 PROCEDURE — 82525 ASSAY OF COPPER: CPT | Performed by: CLINICAL NURSE SPECIALIST

## 2023-03-20 PROCEDURE — 99999 PR PBB SHADOW E&M-EST. PATIENT-LVL IV: CPT | Mod: PBBFAC,,, | Performed by: CLINICAL NURSE SPECIALIST

## 2023-03-20 PROCEDURE — 86706 HEP B SURFACE ANTIBODY: CPT | Performed by: CLINICAL NURSE SPECIALIST

## 2023-03-20 PROCEDURE — 86704 HEP B CORE ANTIBODY TOTAL: CPT | Performed by: CLINICAL NURSE SPECIALIST

## 2023-03-20 PROCEDURE — 99215 PR OFFICE/OUTPT VISIT, EST, LEVL V, 40-54 MIN: ICD-10-PCS | Mod: S$PBB,,, | Performed by: CLINICAL NURSE SPECIALIST

## 2023-03-20 PROCEDURE — 99214 OFFICE O/P EST MOD 30 MIN: CPT | Mod: PBBFAC | Performed by: CLINICAL NURSE SPECIALIST

## 2023-03-20 PROCEDURE — 36415 COLL VENOUS BLD VENIPUNCTURE: CPT | Performed by: CLINICAL NURSE SPECIALIST

## 2023-03-20 PROCEDURE — 80053 COMPREHEN METABOLIC PANEL: CPT | Performed by: CLINICAL NURSE SPECIALIST

## 2023-03-20 PROCEDURE — 85025 COMPLETE CBC W/AUTO DIFF WBC: CPT | Performed by: CLINICAL NURSE SPECIALIST

## 2023-03-20 RX ORDER — BACLOFEN 10 MG/1
10 TABLET ORAL
COMMUNITY
Start: 2023-03-10 | End: 2023-08-23 | Stop reason: SDUPTHER

## 2023-03-20 RX ORDER — FERROUS GLUCONATE 324(38)MG
324 TABLET ORAL
COMMUNITY
Start: 2023-03-10 | End: 2023-11-07

## 2023-03-20 RX ORDER — METOPROLOL SUCCINATE 25 MG/1
25 TABLET, EXTENDED RELEASE ORAL
COMMUNITY
Start: 2023-03-15 | End: 2023-11-07

## 2023-03-20 RX ORDER — OFATUMUMAB 20 MG/.4ML
20 INJECTION, SOLUTION SUBCUTANEOUS
Qty: 1.2 ML | Refills: 0 | Status: SHIPPED | OUTPATIENT
Start: 2023-03-20 | End: 2023-05-10 | Stop reason: SDUPTHER

## 2023-03-20 RX ORDER — PANTOPRAZOLE SODIUM 40 MG/1
40 TABLET, DELAYED RELEASE ORAL
COMMUNITY
Start: 2023-03-15 | End: 2023-05-10

## 2023-03-20 RX ORDER — TELMISARTAN AND HYDROCHLORTHIAZIDE 80; 12.5 MG/1; MG/1
1 TABLET ORAL
COMMUNITY
Start: 2023-03-10 | End: 2023-11-07

## 2023-03-20 NOTE — PROGRESS NOTES
Subjective:          Patient ID: Tiffany Cruz is a 47 y.o. female who presents today for a routine clinic visit for MS.  She was last seen in October 2022. The history has been provided by the patient.       MS HPI:  DMT: Kesimpta--requests a 3 month Rx today  Side effects from DMT? No  Taking vitamin D3 as recommended? Yes -  Dose: trying to take it more consistently   She recently saw Dr. Darby in cardiology at --chest pain, irregular heartbeat, pain down left arm. EKG was abnormal. Dr. Darby referred her to ER, and she was admitted to the hospital for 1 day. She saw Dr. Harper post discharge as an outpatient. She was told that she might need an angiogram, but the impression is that these issues are not cardiac in nature. She will be seeing her GI doctor tomorrow. She continues to have pain and pressure in the left side of the chest. This is present for most of the day.   She has taken TUMS for this chest pain and has a RX for GERD treatment now. She has not had any regular symptoms of reflux.   Dr. Fleming prescribed baclofen, and she thinks that this has helped about 25% for the pain in her chest. She ended up taking 10mg in the morning and 20mg at bedtime to help with the pain.    After she sees GI, she will follow up with Dr. Harper.   She wanted to see her provider in [facility] today because of concern for blood work. She has generally not felt well since late February after a work trip.   She received IV iron while in the hospital and has oral iron daily--ferrous gluconate 324mg daily. She is also taking 50,000 units of Vitamin D weekly for the past 2 weeks.   Her fatigue has been much more pronounced lately. She got back in bed after Confucianism yesterday, which is unusual for her.   Her mobility has been ok, but she feels unstable sometimes.   She takes gabapentin 300mg and tizanidine at bedtime. She has some nausea when she wakes up in the morning.   She had a headache when she went into  the hospital. She has been taking Tylenol 650mg and Motrin as needed.   She denies any metallic taste in her mouth.   Her mood has been ok except for one reported mood swing.   She denies significant weakness besides some mild chronic right leg weakness.   She denies any obvious infections.   She has felt a little more cold lately.     Medications:  Current Outpatient Medications   Medication Sig    baclofen (LIORESAL) 10 MG tablet Take 1/2 to 1 tab by mouth at bedtime.    diazePAM (VALIUM) 5 MG tablet Take 1 tablet by mouth 1 hour prior to MRI and 1 tablet at the time of the MRI if needed.    ergocalciferol (ERGOCALCIFEROL) 50,000 unit Cap     gabapentin (NEURONTIN) 100 MG capsule TAKE 1 CAPSULE(100 MG) BY MOUTH THREE TIMES DAILY    gabapentin (NEURONTIN) 300 MG capsule TAKE 1 CAPSULE EVERY EVENING    ibuprofen (ADVIL,MOTRIN) 800 MG tablet Take 1 tablet (800 mg total) by mouth every 6 (six) hours as needed for Pain.    metoprolol succinate (TOPROL-XL) 25 MG 24 hr tablet     ofatumumab (KESIMPTA PEN) 20 mg/0.4 mL PnIj Inject 20 mg into the skin every 28 days.    tiZANidine (ZANAFLEX) 6 mg capsule TAKE 1 CAPSULE EVERY EVENING     SOCIAL HISTORY  Social History     Tobacco Use    Smoking status: Never    Smokeless tobacco: Never   Substance Use Topics    Alcohol use: No     Alcohol/week: 0.0 standard drinks    Drug use: No       Living arrangements - the patient lives with their family.    ROS:    REVIEW OF SYMPTOMS 6/13/2022   Do you feel abnormally tired on most days? No   Do you feel you generally sleep well? No   Do you have difficulty controlling your bladder?  No   Do you have difficulty controlling your bowels?  No   Do you have frequent muscle cramps, tightness or spasms in your limbs?  No   Do you have new visual symptoms?  No   Do you have worsening difficulty with your memory or thinking? No   Do you have worsening symptoms of anxiety or depression?  No   For patients who walk, Do you have more difficulty  walking?  No   Have you fallen since your last visit?  No   For patients who use wheelchairs: Do you have any skin wounds or breakdown? Not Applicable   Do you have difficulty using your hands?  No   Do you have shooting or burning pain? No   Do you have difficulty with sexual function?  -   If you are sexually active, are you using birth control? Y/N  N/A Not Applicable   Do you often choke when swallowing liquids or solid food?  No   Do you experience worsening symptoms when overheated? No   Do you need any new equipment such as a wheelchair, walker or shower chair? No   Do you receive co-pay financial assistance for your principal MS medicine? Not Applicable   Would you be interested in participating in an MS research trial in the future? Not Applicable   For patients on Gilenya, Tecfidera, Aubagio, Rituxan, Ocrevus, Tysabri, Lemtrada or Methotrexate, are you aware that you should NOT receive live virus vaccines?  Not Applicable   Do you feel you have adequate family/friend support?  Yes   Do you have health insurance?   Yes   Are you currently employed? Yes   Do you receive SSDI/SSI?  Not Applicable   Do you use marijuana or cannabis products? No   Have you been diagnosed with a urinary tract infection since your last visit here? No   Have you been diagnosed with a respiratory tract infection since your last visit here? No   Have you been to the emergency room since your last visit here? No   Have you been hospitalized since your last visit here?  No                Objective:        1. 25 foot timed walk:   Timed 25 Foot Walk: 6/13/2022 10/18/2022   Did patient wear an AFO? No No   Was assistive device used? No No   Time for 25 Foot Walk (seconds) 5 3.98   Time for 25 Foot Walk (seconds) - 3.75       Neurologic Exam    Deferred   Imaging:       Results for orders placed during the hospital encounter of 02/03/23    MRI Brain Demyelinating W W/O Contrast    Impression  Stable white matter lesions intracranially  with no new or enhancing lesion.      Electronically signed by: Richard Cool  Date:    02/04/2023  Time:    12:53    Results for orders placed during the hospital encounter of 09/22/22    MRI Cervical Spine Demyelinating W W/O Contrast    Impression  1. Compared to prior MRI cervical spine 08/11/2021, similar nonenhancing focus of nonenhancing T2 weighted signal abnormality within the left hemicord at C2.  2. Additionally, there is linear increased T2 weighted signal on the axial GRE sequence not confirmed on additional T2 weighted/fluid sensitive sequences, potentially artifactual in etiology.  An equivocal focus of T2 weighted signal abnormality at the inferior margins of the examination within the cord at the T3 level is additionally noted; dedicated thoracic MRI could be performed, as clinically warranted.  3. No definite evidence of cord atrophy.  No pathologic enhancement.  4. Relatively similar degenerative findings, as discussed.      Electronically signed by: Huber Mistry  Date:    09/23/2022  Time:    07:39          Labs:     Lab Results   Component Value Date    DPOOPHKP29RG 34 07/28/2021    UFQEMDJO34RI 58 08/05/2020    JGNAEDUU36DN 62 03/03/2020     Lab Results   Component Value Date    TH6WGEGX 81.5 02/21/2022    ABSOLUTECD3 2960 (H) 02/21/2022    XA5AMNZB 20.2 02/21/2022    ABSOLUTECD8 735 02/21/2022    NL0ZGXME 61.3 (H) 02/21/2022    ABSOLUTECD4 2227 (H) 02/21/2022    LABCD48 3.03 02/21/2022     Lab Results   Component Value Date    WBC 8.05 02/21/2022    RBC 5.37 02/21/2022    HGB 11.4 (L) 02/21/2022    HCT 37.9 02/21/2022    MCV 71 (L) 02/21/2022    MCH 21.2 (L) 02/21/2022    MCHC 30.1 (L) 02/21/2022    RDW 13.8 02/21/2022     02/21/2022    MPV 10.3 02/21/2022    GRAN 3.8 02/21/2022    GRAN 47.5 02/21/2022    LYMPH 3.0 02/21/2022    LYMPH 37.4 02/21/2022    MONO 1.0 02/21/2022    MONO 11.9 02/21/2022    EOS 0.2 02/21/2022    BASO 0.08 02/21/2022    EOSINOPHIL 2.0 02/21/2022    BASOPHIL  1.0 02/21/2022     Sodium   Date Value Ref Range Status   02/21/2022 139 136 - 145 mmol/L Final     Potassium   Date Value Ref Range Status   02/21/2022 3.7 3.5 - 5.1 mmol/L Final     Chloride   Date Value Ref Range Status   02/21/2022 103 95 - 110 mmol/L Final     CO2   Date Value Ref Range Status   02/21/2022 29 23 - 29 mmol/L Final     Glucose   Date Value Ref Range Status   02/21/2022 86 70 - 110 mg/dL Final     BUN   Date Value Ref Range Status   02/21/2022 10 6 - 20 mg/dL Final     Creatinine   Date Value Ref Range Status   09/22/2022 0.8 0.5 - 1.4 mg/dL Final     Calcium   Date Value Ref Range Status   02/21/2022 8.7 8.7 - 10.5 mg/dL Final     Total Protein   Date Value Ref Range Status   02/21/2022 7.1 6.0 - 8.4 g/dL Final     Albumin   Date Value Ref Range Status   02/21/2022 3.7 3.5 - 5.2 g/dL Final     Total Bilirubin   Date Value Ref Range Status   02/21/2022 0.3 0.1 - 1.0 mg/dL Final     Comment:     For infants and newborns, interpretation of results should be based  on gestational age, weight and in agreement with clinical  observations.    Premature Infant recommended reference ranges:  Up to 24 hours.............<8.0 mg/dL  Up to 48 hours............<12.0 mg/dL  3-5 days..................<15.0 mg/dL  6-29 days.................<15.0 mg/dL       Alkaline Phosphatase   Date Value Ref Range Status   02/21/2022 57 55 - 135 U/L Final     AST   Date Value Ref Range Status   02/21/2022 17 10 - 40 U/L Final     ALT   Date Value Ref Range Status   02/21/2022 24 10 - 44 U/L Final     Anion Gap   Date Value Ref Range Status   02/21/2022 7 (L) 8 - 16 mmol/L Final     eGFR if    Date Value Ref Range Status   02/21/2022 >60 >60 mL/min/1.73 m^2 Final     eGFR if non    Date Value Ref Range Status   02/21/2022 >60 >60 mL/min/1.73 m^2 Final     Comment:     Calculation used to obtain the estimated glomerular filtration  rate (eGFR) is the CKD-EPI equation.        Lab Results    Component Value Date    HEPBSAG Negative 02/21/2022    HEPBSAB Negative 02/21/2022    HEPBCAB Negative 02/21/2022           MS Impression and Plan:        1.) Multiple Sclerosis   2.) Fatigue  3.) Chest pain/palpitations   The source of Evelyn's fatigue and chest pain may be related to recent diagnosis of iron deficiency, but she is also being worked up for any GI and cardiac involvement. She would like to rule out any other deficiencies/toxicities, which I think is reasonable based on her various symptoms, so we will check CMP, B1, and copper today. We will also check her routine Kesimpta labs. I do not think her current symptoms are MS related. If our workup is unrevealing, I encourage her to continue following up with cardiology and GI for their recommended testing.     Total time spent with patient: 42 minutes   Total time spent on encounter: 55 minutes           DIEUDONNE Shane, CNS    Problem List Items Addressed This Visit          Neurologic Problems    Multiple sclerosis    Relevant Medications    ofatumumab (KESIMPTA PEN) 20 mg/0.4 mL PnIj    Other Relevant Orders    Hepatitis B Core Antibody, Total (Completed)    Hepatitis B Surface Antigen (Completed)    Hepatitis B Surface Ab, Qualitative (Completed)    IMMUNOGLOBULINS (IGG, IGA, IGM) QUANTITATIVE (Completed)    COPPER, SERUM    VITAMIN B1    CBC auto differential (Completed)    Comprehensive Metabolic Panel (Completed)

## 2023-03-23 LAB — COPPER SERPL-MCNC: 939 UG/L (ref 810–1990)

## 2023-03-24 LAB — VIT B1 BLD-MCNC: 45 UG/L (ref 38–122)

## 2023-03-31 ENCOUNTER — PATIENT MESSAGE (OUTPATIENT)
Dept: NEUROLOGY | Facility: CLINIC | Age: 48
End: 2023-03-31
Payer: OTHER GOVERNMENT

## 2023-04-14 ENCOUNTER — TELEPHONE (OUTPATIENT)
Dept: NEUROLOGY | Facility: CLINIC | Age: 48
End: 2023-04-14
Payer: OTHER GOVERNMENT

## 2023-04-14 NOTE — TELEPHONE ENCOUNTER
"Called pt to change 4/27 appt to a VV or r/s to a different date, per AP's request. Pt said "she didn't have time to talk so make this quick." I explained the situation to the pt and she said "do whatever you need to do." I wanted to confirm pt was ok with a virtual and she said "yeah whatever you need to do." Will get Maryjane to switch appt to a virtual.   "

## 2023-04-23 ENCOUNTER — PATIENT MESSAGE (OUTPATIENT)
Dept: PODIATRY | Facility: CLINIC | Age: 48
End: 2023-04-23
Payer: OTHER GOVERNMENT

## 2023-04-24 ENCOUNTER — TELEPHONE (OUTPATIENT)
Dept: PODIATRY | Facility: CLINIC | Age: 48
End: 2023-04-24
Payer: OTHER GOVERNMENT

## 2023-04-24 NOTE — TELEPHONE ENCOUNTER
Spoke with Ms Cruz to assist with scheduling an appt with Dr Sinclair. Accepted appt date and time of 4/27/23 at 2:15 pm. No other needs voiced. Encouraged to call if further assistance is needed.

## 2023-04-24 NOTE — TELEPHONE ENCOUNTER
----- Message from Yamile Oswald sent at 4/24/2023  8:16 AM CDT -----  .Type:  Injection Request     Who Called: pt  Would the patient rather a call back or a response via [facility]? call  Best Call Back Number: [phone]  Additional Information:     Stabbing pain in pt's right foot has returned and she is requesting to receive an injection

## 2023-04-25 ENCOUNTER — TELEPHONE (OUTPATIENT)
Dept: NEUROLOGY | Facility: CLINIC | Age: 48
End: 2023-04-25
Payer: OTHER GOVERNMENT

## 2023-04-25 NOTE — TELEPHONE ENCOUNTER
Called pt to confirm her 4/27 8:30 AM VV with AP. Pt asked me how long the appt was going to be. I told pt that AP has her next pt at 9:00 AM so the appt should only be 30 mins. Pt also asked how to get onto the virtual appt. I told pt she would login to [facility] and it will take her through the prompts that would lead her to the appt. Pt said she wanted to just reschedule to an in person appt. I offered to reschedule appt for her, but she said she would just do it herself through the portal.

## 2023-04-27 ENCOUNTER — PATIENT MESSAGE (OUTPATIENT)
Dept: PODIATRY | Facility: CLINIC | Age: 48
End: 2023-04-27

## 2023-04-27 ENCOUNTER — HOSPITAL ENCOUNTER (OUTPATIENT)
Dept: RADIOLOGY | Facility: HOSPITAL | Age: 48
Discharge: HOME | End: 2023-04-27
Attending: PODIATRIST
Payer: OTHER GOVERNMENT

## 2023-04-27 ENCOUNTER — OFFICE VISIT (OUTPATIENT)
Dept: PODIATRY | Facility: CLINIC | Age: 48
End: 2023-04-27
Payer: OTHER GOVERNMENT

## 2023-04-27 VITALS
BODY MASS INDEX: 30.05 KG/M2 | DIASTOLIC BLOOD PRESSURE: 74 MMHG | SYSTOLIC BLOOD PRESSURE: 132 MMHG | HEART RATE: 82 BPM | WEIGHT: 176 LBS | HEIGHT: 64 IN

## 2023-04-27 DIAGNOSIS — M79.671 CHRONIC PAIN IN RIGHT FOOT: ICD-10-CM

## 2023-04-27 DIAGNOSIS — G89.29 CHRONIC PAIN IN RIGHT FOOT: ICD-10-CM

## 2023-04-27 DIAGNOSIS — M25.571 SINUS TARSI SYNDROME OF RIGHT FOOT: ICD-10-CM

## 2023-04-27 DIAGNOSIS — M67.471 GANGLION CYST OF RIGHT FOOT: Primary | ICD-10-CM

## 2023-04-27 DIAGNOSIS — M67.471 GANGLION CYST OF RIGHT FOOT: ICD-10-CM

## 2023-04-27 PROCEDURE — 99213 OFFICE O/P EST LOW 20 MIN: CPT | Mod: 25,S$PBB,, | Performed by: PODIATRIST

## 2023-04-27 PROCEDURE — 99213 PR OFFICE/OUTPT VISIT, EST, LEVL III, 20-29 MIN: ICD-10-PCS | Mod: 25,S$PBB,, | Performed by: PODIATRIST

## 2023-04-27 PROCEDURE — 99214 OFFICE O/P EST MOD 30 MIN: CPT | Mod: PBBFAC,PN | Performed by: PODIATRIST

## 2023-04-27 PROCEDURE — 99999 PR PBB SHADOW E&M-EST. PATIENT-LVL IV: CPT | Mod: PBBFAC,,, | Performed by: PODIATRIST

## 2023-04-27 PROCEDURE — 99999 PR PBB SHADOW E&M-EST. PATIENT-LVL IV: ICD-10-PCS | Mod: PBBFAC,,, | Performed by: PODIATRIST

## 2023-04-27 PROCEDURE — 73630 XR FOOT COMPLETE 3 VIEW RIGHT: ICD-10-PCS | Mod: 26,RT,, | Performed by: RADIOLOGY

## 2023-04-27 PROCEDURE — 20605 PR DRAIN/INJECT INTERMEDIATE JOINT/BURSA: ICD-10-PCS | Mod: S$PBB,RT,, | Performed by: PODIATRIST

## 2023-04-27 PROCEDURE — 20605 DRAIN/INJ JOINT/BURSA W/O US: CPT | Mod: PBBFAC,PN | Performed by: PODIATRIST

## 2023-04-27 PROCEDURE — 73630 X-RAY EXAM OF FOOT: CPT | Mod: TC,PN,RT

## 2023-04-27 PROCEDURE — 20605 DRAIN/INJ JOINT/BURSA W/O US: CPT | Mod: S$PBB,RT,, | Performed by: PODIATRIST

## 2023-04-27 PROCEDURE — 73630 X-RAY EXAM OF FOOT: CPT | Mod: 26,RT,, | Performed by: RADIOLOGY

## 2023-04-27 RX ORDER — TRIAMCINOLONE ACETONIDE 40 MG/ML
40 INJECTION, SUSPENSION INTRA-ARTICULAR; INTRAMUSCULAR
Status: COMPLETED | OUTPATIENT
Start: 2023-04-27 | End: 2023-04-27

## 2023-04-27 RX ADMIN — TRIAMCINOLONE ACETONIDE 40 MG: 40 INJECTION, SUSPENSION INTRA-ARTICULAR; INTRAMUSCULAR at 02:04
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
## 2023-04-28 ENCOUNTER — TELEPHONE (OUTPATIENT)
Dept: PODIATRY | Facility: CLINIC | Age: 48
End: 2023-04-28
Payer: OTHER GOVERNMENT

## 2023-04-28 ENCOUNTER — PATIENT MESSAGE (OUTPATIENT)
Dept: NEUROLOGY | Facility: CLINIC | Age: 48
End: 2023-04-28
Payer: OTHER GOVERNMENT

## 2023-04-28 NOTE — TELEPHONE ENCOUNTER
Called patient because Penelope is gone for the day.  She was just inquiring regarding her last message with regards to setting up a bone density test and scheduling surgery sooner.  Will call patient back when Dr. Sinclair answer's the questions she has.  Patient understands.     Chelsy Negrete,     Please call me at [phone].  I just need to speak with you for 7 minutes.       Tiffany Matta Staff (supporting Paxton Sinclair DPM) 17 hours ago (9:16 PM)       Thanks Penelope! My foot is swollen and very painful.  I can't walk on it.  Also, can we do a bone density scan or other test to evaluate my bone health as we continue to discussion the bone graph? I think we can discuss moving the surgery to May.

## 2023-05-01 ENCOUNTER — TELEPHONE (OUTPATIENT)
Dept: PODIATRY | Facility: CLINIC | Age: 48
End: 2023-05-01
Payer: OTHER GOVERNMENT

## 2023-05-01 DIAGNOSIS — G89.29 CHRONIC FOOT PAIN, RIGHT: ICD-10-CM

## 2023-05-01 DIAGNOSIS — M25.571 SINUS TARSI SYNDROME OF RIGHT FOOT: ICD-10-CM

## 2023-05-01 DIAGNOSIS — D17.79 INTRAOSSEOUS LIPOMA: Primary | ICD-10-CM

## 2023-05-01 DIAGNOSIS — M79.671 CHRONIC FOOT PAIN, RIGHT: ICD-10-CM

## 2023-05-01 RX ORDER — SODIUM CHLORIDE 0.9 % (FLUSH) 0.9 %
10 SYRINGE (ML) INJECTION
Status: SHIPPED | OUTPATIENT
Start: 2023-05-01

## 2023-05-01 RX ORDER — CEFAZOLIN SODIUM 2 G/50ML
2 SOLUTION INTRAVENOUS
Status: CANCELLED | OUTPATIENT
Start: 2023-05-01

## 2023-05-01 NOTE — TELEPHONE ENCOUNTER
Spoke with Ms Anthony to inform her that her virtual appt had to be moved to 2:30 pm with Dr Sinclair and that he may be a little late getting on to the call but he will be present. Verbalized understanding with no other needs voiced at this time.

## 2023-05-02 ENCOUNTER — PATIENT MESSAGE (OUTPATIENT)
Dept: PODIATRY | Facility: CLINIC | Age: 48
End: 2023-05-02

## 2023-05-02 ENCOUNTER — TELEPHONE (OUTPATIENT)
Dept: PREADMISSION TESTING | Facility: HOSPITAL | Age: 48
End: 2023-05-02
Payer: OTHER GOVERNMENT

## 2023-05-02 ENCOUNTER — TELEPHONE (OUTPATIENT)
Dept: PODIATRY | Facility: CLINIC | Age: 48
End: 2023-05-02
Payer: OTHER GOVERNMENT

## 2023-05-02 ENCOUNTER — OFFICE VISIT (OUTPATIENT)
Dept: PODIATRY | Facility: CLINIC | Age: 48
End: 2023-05-02
Payer: OTHER GOVERNMENT

## 2023-05-02 DIAGNOSIS — M25.571 SINUS TARSI SYNDROME OF RIGHT FOOT: ICD-10-CM

## 2023-05-02 DIAGNOSIS — I10 HYPERTENSION, UNSPECIFIED TYPE: ICD-10-CM

## 2023-05-02 DIAGNOSIS — M67.40 INTRAOSSEOUS GANGLION: Primary | ICD-10-CM

## 2023-05-02 DIAGNOSIS — G89.29 CHRONIC FOOT PAIN, RIGHT: ICD-10-CM

## 2023-05-02 DIAGNOSIS — Z01.818 PRE-OP EVALUATION: Primary | ICD-10-CM

## 2023-05-02 DIAGNOSIS — M79.671 CHRONIC FOOT PAIN, RIGHT: ICD-10-CM

## 2023-05-02 PROCEDURE — 99214 PR OFFICE/OUTPT VISIT, EST, LEVL IV, 30-39 MIN: ICD-10-PCS | Mod: 95,,, | Performed by: PODIATRIST

## 2023-05-02 PROCEDURE — 99214 OFFICE O/P EST MOD 30 MIN: CPT | Mod: 95,,, | Performed by: PODIATRIST
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
## 2023-05-02 NOTE — TELEPHONE ENCOUNTER
----- Message from Paxton Sinclair, RUDY sent at 5/2/2023  3:43 PM CDT -----  Rivera Beckham can we please move her surgery from 05/17 to 05/24? Penelope can you call Ms. Dooley scheduled on 05/24 and see if she wants to go a week earlier? Penelope please assist Ms. Cruz with scheduling the Ct.    Thanks,  Tk

## 2023-05-02 NOTE — TELEPHONE ENCOUNTER
Spoke with Ms Cruz to assist her with scheduling a CT per Dr Sinclair's request. Accepted appt date and time of 5/5/23 at 3pm at [facility]. Aware of surgery date for 5/24/23 and that she will need an appt with her PCP for medical clearance prior. Will send pre op info over the patient portal and mail it as well. Post op appts in place. No other needs voiced at this time. Encouraged her to call if further assistance is needed.

## 2023-05-02 NOTE — TELEPHONE ENCOUNTER
Attempted to reach out to Ms Cruz to inquire if she needed any assistance with logging onto her virtual visit. Unable to leave a message due to voice mail box being full. Message sent over patient portal to offer assistance
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
## 2023-05-04 ENCOUNTER — TELEPHONE (OUTPATIENT)
Dept: PODIATRY | Facility: CLINIC | Age: 48
End: 2023-05-04
Payer: OTHER GOVERNMENT

## 2023-05-04 DIAGNOSIS — D17.79 INTRAOSSEOUS LIPOMA: Primary | ICD-10-CM

## 2023-05-05 NOTE — TELEPHONE ENCOUNTER
Spoke with Evelyn. She reported that she will be having surgery on 5/24 to remove a cyst in her foot. Prior to that, she will have a bone density test, cardiology visit, and blood work for clearance. She also reports that she has been diagnosed with GI ulcers and has been started on medication for this. She will proceed with Kesimpta injection tonight.

## 2023-05-05 NOTE — TELEPHONE ENCOUNTER
----- Message from Chelsy Avila MA sent at 5/3/2023  1:32 PM CDT -----  Regarding: FW: Labs for CT  Hi Dr. Sinclair,     See message below.     Thank you,     Chelsy  ----- Message -----  From: Jaz Vanegas  Sent: 5/3/2023   1:21 PM CDT  To: [facility] Staff  Subject: Labs for CT                                      Good Day.   Patient will need a lab66 for the CT she is having on Friday,. Please have provider add to system      Thank you

## 2023-05-08 ENCOUNTER — HOSPITAL ENCOUNTER (OUTPATIENT)
Dept: PREADMISSION TESTING | Facility: HOSPITAL | Age: 48
Discharge: HOME | End: 2023-05-08
Attending: PODIATRIST
Payer: OTHER GOVERNMENT

## 2023-05-08 ENCOUNTER — ANESTHESIA EVENT (OUTPATIENT)
Dept: SURGERY | Facility: HOSPITAL | Age: 48
End: 2023-05-08
Payer: OTHER GOVERNMENT

## 2023-05-08 PROBLEM — G47.00 INSOMNIA: Status: ACTIVE | Noted: 2023-05-08

## 2023-05-08 PROBLEM — G62.9 NEUROPATHY: Status: ACTIVE | Noted: 2019-01-11

## 2023-05-08 PROBLEM — R53.83 FATIGUE: Status: ACTIVE | Noted: 2023-05-08

## 2023-05-08 PROBLEM — R51.9 GENERALIZED HEADACHES: Status: ACTIVE | Noted: 2021-05-14

## 2023-05-08 PROBLEM — Z80.3 FAMILY HISTORY OF MALIGNANT NEOPLASM OF BREAST: Status: ACTIVE | Noted: 2019-04-24

## 2023-05-08 RX ORDER — LIDOCAINE HYDROCHLORIDE 10 MG/ML
1 INJECTION, SOLUTION EPIDURAL; INFILTRATION; INTRACAUDAL; PERINEURAL ONCE
Status: CANCELLED | OUTPATIENT
Start: 2023-05-08 | End: 2023-05-08

## 2023-05-08 RX ORDER — SODIUM CHLORIDE, SODIUM LACTATE, POTASSIUM CHLORIDE, CALCIUM CHLORIDE 600; 310; 30; 20 MG/100ML; MG/100ML; MG/100ML; MG/100ML
INJECTION, SOLUTION INTRAVENOUS CONTINUOUS
Status: CANCELLED | OUTPATIENT
Start: 2023-05-08
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
## 2023-05-10 ENCOUNTER — LAB VISIT (OUTPATIENT)
Dept: LAB | Facility: HOSPITAL | Age: 48
End: 2023-05-10
Attending: PODIATRIST
Payer: OTHER GOVERNMENT

## 2023-05-10 ENCOUNTER — OFFICE VISIT (OUTPATIENT)
Dept: NEUROLOGY | Facility: CLINIC | Age: 48
End: 2023-05-10
Payer: OTHER GOVERNMENT

## 2023-05-10 VITALS
DIASTOLIC BLOOD PRESSURE: 83 MMHG | BODY MASS INDEX: 29.74 KG/M2 | HEIGHT: 64 IN | HEART RATE: 73 BPM | WEIGHT: 174.19 LBS | SYSTOLIC BLOOD PRESSURE: 143 MMHG

## 2023-05-10 DIAGNOSIS — Z79.899 HIGH RISK MEDICATION USE: ICD-10-CM

## 2023-05-10 DIAGNOSIS — M62.838 MUSCLE SPASM: ICD-10-CM

## 2023-05-10 DIAGNOSIS — G35 MULTIPLE SCLEROSIS: Primary | ICD-10-CM

## 2023-05-10 DIAGNOSIS — Z29.89 PROPHYLACTIC IMMUNOTHERAPY: ICD-10-CM

## 2023-05-10 DIAGNOSIS — D84.9 IMMUNOSUPPRESSION: ICD-10-CM

## 2023-05-10 DIAGNOSIS — Z71.89 COUNSELING REGARDING GOALS OF CARE: ICD-10-CM

## 2023-05-10 DIAGNOSIS — D17.79 INTRAOSSEOUS LIPOMA: ICD-10-CM

## 2023-05-10 LAB
CREAT SERPL-MCNC: 0.8 MG/DL (ref 0.5–1.4)
EST. GFR  (NO RACE VARIABLE): >60 ML/MIN/1.73 M^2

## 2023-05-10 PROCEDURE — 99999 PR PBB SHADOW E&M-EST. PATIENT-LVL IV: CPT | Mod: PBBFAC,,, | Performed by: CLINICAL NURSE SPECIALIST

## 2023-05-10 PROCEDURE — 99214 OFFICE O/P EST MOD 30 MIN: CPT | Mod: PBBFAC | Performed by: CLINICAL NURSE SPECIALIST

## 2023-05-10 PROCEDURE — 82565 ASSAY OF CREATININE: CPT | Performed by: PODIATRIST

## 2023-05-10 PROCEDURE — 99215 PR OFFICE/OUTPT VISIT, EST, LEVL V, 40-54 MIN: ICD-10-PCS | Mod: S$PBB,,, | Performed by: CLINICAL NURSE SPECIALIST

## 2023-05-10 PROCEDURE — 36415 COLL VENOUS BLD VENIPUNCTURE: CPT | Performed by: PODIATRIST

## 2023-05-10 PROCEDURE — 99999 PR PBB SHADOW E&M-EST. PATIENT-LVL IV: ICD-10-PCS | Mod: PBBFAC,,, | Performed by: CLINICAL NURSE SPECIALIST

## 2023-05-10 PROCEDURE — 99215 OFFICE O/P EST HI 40 MIN: CPT | Mod: S$PBB,,, | Performed by: CLINICAL NURSE SPECIALIST

## 2023-05-10 RX ORDER — GABAPENTIN 100 MG/1
100 CAPSULE ORAL 3 TIMES DAILY
Qty: 90 CAPSULE | Refills: 0 | Status: SHIPPED | OUTPATIENT
Start: 2023-05-10 | End: 2023-09-05 | Stop reason: SDUPTHER

## 2023-05-10 RX ORDER — OFATUMUMAB 20 MG/.4ML
20 INJECTION, SOLUTION SUBCUTANEOUS
Qty: 1.2 ML | Refills: 0 | Status: ACTIVE | OUTPATIENT
Start: 2023-05-10 | End: 2024-02-29 | Stop reason: SDUPTHER

## 2023-05-10 RX ORDER — CHLORHEXIDINE GLUCONATE ORAL RINSE 1.2 MG/ML
15 SOLUTION DENTAL 2 TIMES DAILY
COMMUNITY
Start: 2023-02-27 | End: 2023-09-05

## 2023-05-10 RX ORDER — ASPIRIN 325 MG
50000 TABLET, DELAYED RELEASE (ENTERIC COATED) ORAL
Qty: 12 CAPSULE | Refills: 3 | Status: SHIPPED | OUTPATIENT
Start: 2023-05-10 | End: 2023-09-05 | Stop reason: SDUPTHER

## 2023-05-10 NOTE — PROGRESS NOTES
Subjective:          Patient ID: Tiffany Cruz is a 47 y.o. female who presents today for a fit-in clinic visit for MS.  She was last seen on 3/20/23. The history has been provided by the patient.       MS HPI:  DMT: Kesimpta  Side effects from DMT? No  Taking vitamin D3 as recommended? Yes -  Dose: 50,000 units weekly   She is planning to have foot surgery soon for ganglion cyst. She would likely be non weight-bearing for 30-60 days with ultimate length of recovery for 6 months.   She has plans to go to [place] ([facility]) in 2024 and [place] twice this year. She is thinking about going to [place] in October.   She does not endorse any new or worsening MS symptoms.     Medications:  Current Outpatient Medications   Medication Sig    baclofen (LIORESAL) 10 MG tablet Take 10 mg by mouth.    diazePAM (VALIUM) 5 MG tablet Take 1 tablet by mouth 1 hour prior to MRI and 1 tablet at the time of the MRI if needed.    ergocalciferol (ERGOCALCIFEROL) 50,000 unit Cap     ferrous gluconate (FERGON) 324 MG tablet Take 324 mg by mouth.    gabapentin (NEURONTIN) 100 MG capsule TAKE 1 CAPSULE(100 MG) BY MOUTH THREE TIMES DAILY    gabapentin (NEURONTIN) 300 MG capsule TAKE 1 CAPSULE EVERY EVENING    ibuprofen (ADVIL,MOTRIN) 800 MG tablet Take 1 tablet (800 mg total) by mouth every 6 (six) hours as needed for Pain.    metoprolol succinate (TOPROL-XL) 25 MG 24 hr tablet     metoprolol succinate (TOPROL-XL) 25 MG 24 hr tablet Take 25 mg by mouth.    ofatumumab (KESIMPTA PEN) 20 mg/0.4 mL PnIj Inject 20 mg into the skin every 28 days.    pantoprazole (PROTONIX) 40 MG tablet Take 40 mg by mouth.    telmisartan-hydrochlorothiazide (MICARDIS HCT) 80-12.5 mg per tablet Take 1 tablet by mouth.    tiZANidine (ZANAFLEX) 6 mg capsule TAKE 1 CAPSULE EVERY EVENING       SOCIAL HISTORY  Social History     Tobacco Use    Smoking status: Never    Smokeless tobacco: Never   Substance Use Topics    Alcohol use: No     Alcohol/week: 0.0 standard  drinks    Drug use: No       Living arrangements - the patient lives with her family            Objective:        1. 25 foot timed walk:   Timed 25 Foot Walk: 10/18/2022 5/10/2023   Did patient wear an AFO? No No   Was assistive device used? No No   Time for 25 Foot Walk (seconds) 3.98 3.7   Time for 25 Foot Walk (seconds) 3.75 3.6     Neurologic Exam     Mental Status   Oriented to person, place, and time.   Attention: normal. Concentration: normal.   Speech: speech is normal   Level of consciousness: alert  Knowledge: good.   Normal comprehension.     Cranial Nerves     CN III, IV, VI   Extraocular motions are normal.   Nystagmus: none     CN V   Right facial sensation deficit: none  Left facial sensation deficit: none    CN VII   Right facial weakness: none  Left facial weakness: none    CN VIII   Hearing: intact    CN IX, X   Palate: symmetric    CN XI   Right sternocleidomastoid strength: normal  Left sternocleidomastoid strength: normal  Right trapezius strength: normal  Left trapezius strength: normal    CN XII   Tongue deviation: none    Motor Exam   Muscle bulk: normal  Overall muscle tone: normal  Right arm tone: normal  Left arm tone: normal  Right leg tone: normal  Left leg tone: normal    Strength   Right neck flexion: 5/5  Left neck flexion: 5/5  Right neck extension: 5/5  Left neck extension: 5/5  Right deltoid: 5/5  Left deltoid: 5/5  Right biceps: 5/5  Left biceps: 5/5  Right triceps: 5/5  Left triceps: 5/5  Right wrist flexion: 5/5  Left wrist flexion: 5/5  Right wrist extension: 5/5  Left wrist extension: 5/5  Right interossei: 5/5  Left interossei: 5/5  Right iliopsoas: 5/5  Left iliopsoas: 5/5  Right quadriceps: 5/5  Left quadriceps: 5/5  Right hamstrin/5  Left hamstrin/5    Sensory Exam   Right arm vibration: normal  Left arm vibration: normal  Right leg vibration: normal  Left leg vibration: normal    Gait, Coordination, and Reflexes     Gait  Gait: normal    Coordination   Romberg:  negative  Finger to nose coordination: normal  Heel to shin coordination: normal  Tandem walking coordination: normal    Tremor   Resting tremor: absent    Reflexes   Right brachioradialis: 1+  Left brachioradialis: 1+  Right biceps: 1+  Left biceps: 1+  Right triceps: 1+  Left triceps: 1+  Right patellar: 0  Left patellar: 1+  Right achilles: 0  Left achilles: 1+    Normal rapid sequential movements in upper and lower extremities.          Imaging:       Results for orders placed during the hospital encounter of 02/03/23    MRI Brain Demyelinating W W/O Contrast    Impression  Stable white matter lesions intracranially with no new or enhancing lesion.      Electronically signed by: Richard Cool  Date:    02/04/2023  Time:    12:53      Labs:     Lab Results   Component Value Date    PNRQIRXI43SN 34 07/28/2021    IRLRTDJB00DH 58 08/05/2020    KIWGOLUM38PM 62 03/03/2020     Lab Results   Component Value Date    HY1QUJGT 81.5 02/21/2022    ABSOLUTECD3 2960 (H) 02/21/2022    BR7JOTAJ 20.2 02/21/2022    ABSOLUTECD8 735 02/21/2022    OS1SFWVS 61.3 (H) 02/21/2022    ABSOLUTECD4 2227 (H) 02/21/2022    LABCD48 3.03 02/21/2022     Lab Results   Component Value Date    WBC 7.76 03/20/2023    RBC 5.68 (H) 03/20/2023    HGB 12.1 03/20/2023    HCT 40.3 03/20/2023    MCV 71 (L) 03/20/2023    MCH 21.3 (L) 03/20/2023    MCHC 30.0 (L) 03/20/2023    RDW 14.3 03/20/2023     03/20/2023    MPV 11.2 03/20/2023    GRAN 3.5 03/20/2023    GRAN 44.5 03/20/2023    LYMPH 3.2 03/20/2023    LYMPH 40.6 03/20/2023    MONO 0.9 03/20/2023    MONO 12.0 03/20/2023    EOS 0.1 03/20/2023    BASO 0.07 03/20/2023    EOSINOPHIL 1.7 03/20/2023    BASOPHIL 0.9 03/20/2023     Sodium   Date Value Ref Range Status   03/20/2023 138 136 - 145 mmol/L Final     Potassium   Date Value Ref Range Status   03/20/2023 3.0 (L) 3.5 - 5.1 mmol/L Final     Chloride   Date Value Ref Range Status   03/20/2023 98 95 - 110 mmol/L Final     CO2   Date Value Ref Range  Status   03/20/2023 28 23 - 29 mmol/L Final     Glucose   Date Value Ref Range Status   03/20/2023 89 70 - 110 mg/dL Final     BUN   Date Value Ref Range Status   03/20/2023 13 6 - 20 mg/dL Final     Creatinine   Date Value Ref Range Status   03/20/2023 0.8 0.5 - 1.4 mg/dL Final     Calcium   Date Value Ref Range Status   03/20/2023 9.4 8.7 - 10.5 mg/dL Final     Total Protein   Date Value Ref Range Status   03/20/2023 7.7 6.0 - 8.4 g/dL Final     Albumin   Date Value Ref Range Status   03/20/2023 4.2 3.5 - 5.2 g/dL Final     Total Bilirubin   Date Value Ref Range Status   03/20/2023 0.3 0.1 - 1.0 mg/dL Final     Comment:     For infants and newborns, interpretation of results should be based  on gestational age, weight and in agreement with clinical  observations.    Premature Infant recommended reference ranges:  Up to 24 hours.............<8.0 mg/dL  Up to 48 hours............<12.0 mg/dL  3-5 days..................<15.0 mg/dL  6-29 days.................<15.0 mg/dL       Alkaline Phosphatase   Date Value Ref Range Status   03/20/2023 79 55 - 135 U/L Final     AST   Date Value Ref Range Status   03/20/2023 27 10 - 40 U/L Final     ALT   Date Value Ref Range Status   03/20/2023 38 10 - 44 U/L Final     Anion Gap   Date Value Ref Range Status   03/20/2023 12 8 - 16 mmol/L Final     eGFR if    Date Value Ref Range Status   02/21/2022 >60 >60 mL/min/1.73 m^2 Final     eGFR if non    Date Value Ref Range Status   02/21/2022 >60 >60 mL/min/1.73 m^2 Final     Comment:     Calculation used to obtain the estimated glomerular filtration  rate (eGFR) is the CKD-EPI equation.        Lab Results   Component Value Date    HEPBSAG Non-reactive 03/20/2023    HEPBSAB <3.00 03/20/2023    HEPBSAB Non-reactive 03/20/2023    HEPBCAB Non-reactive 03/20/2023           MS Impression and Plan:     NEURO MULTIPLE SCLEROSIS IMPRESSION:   MS Status:     Number of relapses in the past year?:  0    Clinical  Progression:  Clinically Stable    MRI Progression:  Stable  Plan:     DMT:  No change in management    DMT comment:  Continue Kesimpta. Safety labs are due in September. She is aware of the risks associated with immunosuppressant therapy, including increased risk of infection. Referral to ID has been placed ahead of trips to [place] for advice re: immunosuppression and travel/prophylactic meds, etc.       Symptom Management:  Implement change in symptom management    : Refills for tizanidine and gabapentin sent to pharmacy.  She will proceed with foot surgery as scheduled in 2 weeks.   She will follow up with Dr. Tompkins in August as scheduled.     Total time spent with patient: 39 minutes   Total time spent on encounter: 48 minutes       GIGI Parks-BC, MSCN    Problem List Items Addressed This Visit          Neurologic Problems    Multiple sclerosis       Other    Immunosuppression - Primary    Relevant Orders    Ambulatory referral/consult to Infectious Disease

## 2023-05-11 ENCOUNTER — HOSPITAL ENCOUNTER (OUTPATIENT)
Dept: RADIOLOGY | Facility: HOSPITAL | Age: 48
Discharge: HOME | End: 2023-05-11
Attending: PODIATRIST
Payer: OTHER GOVERNMENT

## 2023-05-11 DIAGNOSIS — M79.671 CHRONIC FOOT PAIN, RIGHT: ICD-10-CM

## 2023-05-11 DIAGNOSIS — M67.40 INTRAOSSEOUS GANGLION: ICD-10-CM

## 2023-05-11 DIAGNOSIS — G89.29 CHRONIC FOOT PAIN, RIGHT: ICD-10-CM

## 2023-05-11 DIAGNOSIS — M25.571 SINUS TARSI SYNDROME OF RIGHT FOOT: ICD-10-CM

## 2023-05-11 PROCEDURE — 73701 CT FOOT WITH CONTRAST RIGHT: ICD-10-PCS | Mod: 26,RT,, | Performed by: RADIOLOGY

## 2023-05-11 PROCEDURE — 25500020 PHARM REV CODE 255: Performed by: PODIATRIST

## 2023-05-11 PROCEDURE — 73701 CT LOWER EXTREMITY W/DYE: CPT | Mod: TC,RT

## 2023-05-11 PROCEDURE — 73701 CT LOWER EXTREMITY W/DYE: CPT | Mod: 26,RT,, | Performed by: RADIOLOGY

## 2023-05-11 RX ADMIN — IOHEXOL 100 ML: 350 INJECTION, SOLUTION INTRAVENOUS at 08:05

## 2023-05-12 ENCOUNTER — PATIENT MESSAGE (OUTPATIENT)
Dept: SURGERY | Facility: HOSPITAL | Age: 48
End: 2023-05-12
Payer: OTHER GOVERNMENT

## 2023-05-15 ENCOUNTER — PATIENT MESSAGE (OUTPATIENT)
Dept: PODIATRY | Facility: HOSPITAL | Age: 48
End: 2023-05-15
Payer: OTHER GOVERNMENT

## 2023-05-15 ENCOUNTER — OFFICE VISIT (OUTPATIENT)
Dept: PODIATRY | Facility: CLINIC | Age: 48
End: 2023-05-15
Payer: OTHER GOVERNMENT

## 2023-05-15 VITALS
DIASTOLIC BLOOD PRESSURE: 85 MMHG | WEIGHT: 174 LBS | BODY MASS INDEX: 29.71 KG/M2 | HEIGHT: 64 IN | HEART RATE: 70 BPM | SYSTOLIC BLOOD PRESSURE: 147 MMHG

## 2023-05-15 DIAGNOSIS — I73.9 PVD (PERIPHERAL VASCULAR DISEASE): Primary | ICD-10-CM

## 2023-05-15 DIAGNOSIS — G89.29 CHRONIC FOOT PAIN, RIGHT: ICD-10-CM

## 2023-05-15 DIAGNOSIS — M67.40 INTRAOSSEOUS GANGLION: ICD-10-CM

## 2023-05-15 DIAGNOSIS — M79.671 CHRONIC FOOT PAIN, RIGHT: ICD-10-CM

## 2023-05-15 PROCEDURE — 99214 OFFICE O/P EST MOD 30 MIN: CPT | Mod: S$PBB,,, | Performed by: PODIATRIST

## 2023-05-15 PROCEDURE — 99999 PR PBB SHADOW E&M-EST. PATIENT-LVL IV: ICD-10-PCS | Mod: PBBFAC,,, | Performed by: PODIATRIST

## 2023-05-15 PROCEDURE — 99999 PR PBB SHADOW E&M-EST. PATIENT-LVL IV: CPT | Mod: PBBFAC,,, | Performed by: PODIATRIST

## 2023-05-15 PROCEDURE — 99214 PR OFFICE/OUTPT VISIT, EST, LEVL IV, 30-39 MIN: ICD-10-PCS | Mod: S$PBB,,, | Performed by: PODIATRIST

## 2023-05-15 PROCEDURE — 99214 OFFICE O/P EST MOD 30 MIN: CPT | Mod: PBBFAC,PN | Performed by: PODIATRIST
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
## 2023-05-17 ENCOUNTER — HOSPITAL ENCOUNTER (OUTPATIENT)
Dept: WOUND CARE | Facility: HOSPITAL | Age: 48
Discharge: HOME | End: 2023-05-17
Attending: SURGERY
Payer: OTHER GOVERNMENT

## 2023-05-17 DIAGNOSIS — I73.9 PVD (PERIPHERAL VASCULAR DISEASE): ICD-10-CM

## 2023-05-17 DIAGNOSIS — M79.671 CHRONIC FOOT PAIN, RIGHT: ICD-10-CM

## 2023-05-17 DIAGNOSIS — M67.40 INTRAOSSEOUS GANGLION: ICD-10-CM

## 2023-05-17 DIAGNOSIS — G89.29 CHRONIC FOOT PAIN, RIGHT: ICD-10-CM

## 2023-05-17 PROCEDURE — 93923 UPR/LXTR ART STDY 3+ LVLS: CPT | Mod: CCAT

## 2023-05-17 NOTE — PROGRESS NOTES
[facility]          Hyperbaric and Wound Medicine Department    TCOMS:     Transcutaneous oximetry was performed in the supine position with the head of the bed elevated to 30 degrees.   Measurements are in mmHg. Good lead placements, patient tolerated well.           Location   1 BERNABE Air 1 BERNABE 100% O2 1.5 BERNABE 100% O2    Lead 1  Left anterior Chest  86   431        Lead 2  Right medial leg  54   190                  Lead 3  Right lateral leg  66   140    Lead 4  Right dorsal foot  51   93                        Impression:good response , needs vascular w/u.

## 2023-05-18 ENCOUNTER — TELEPHONE (OUTPATIENT)
Dept: PODIATRY | Facility: CLINIC | Age: 48
End: 2023-05-18
Payer: OTHER GOVERNMENT

## 2023-05-18 NOTE — TELEPHONE ENCOUNTER
Faxed and securely sent via email prescription for [employer] Pro Device and Certificate of Medical Necessity  to [email or web address] Fax:[phone]/email:[email or web address]

## 2023-05-19 ENCOUNTER — PATIENT MESSAGE (OUTPATIENT)
Dept: PODIATRY | Facility: CLINIC | Age: 48
End: 2023-05-19
Payer: OTHER GOVERNMENT

## 2023-05-19 ENCOUNTER — TELEPHONE (OUTPATIENT)
Dept: PODIATRY | Facility: CLINIC | Age: 48
End: 2023-05-19
Payer: OTHER GOVERNMENT

## 2023-05-19 DIAGNOSIS — M67.40 INTRAOSSEOUS GANGLION: ICD-10-CM

## 2023-05-19 DIAGNOSIS — M79.671 CHRONIC PAIN IN RIGHT FOOT: ICD-10-CM

## 2023-05-19 DIAGNOSIS — Z01.818 PREOP TESTING: Primary | ICD-10-CM

## 2023-05-19 DIAGNOSIS — G89.29 CHRONIC PAIN IN RIGHT FOOT: ICD-10-CM

## 2023-05-19 NOTE — TELEPHONE ENCOUNTER
----- Message from Khadijah Sharp sent at 5/19/2023 11:12 AM CDT -----  Type:  Needs Medical Advice    Who Called: [employer]  Symptoms (please be specific):  calling to get additional information     Would the patient rather a call back or a response via [facility]?  Call  Best Call Back Number:  [phone] -Kearra   Additional Information:

## 2023-05-19 NOTE — TELEPHONE ENCOUNTER
----- Message from Penelope Jackman RN sent at 5/17/2023  2:14 PM CDT -----  Regarding: Walker/Knee Scooter/[employer]  Dr Sinlcair:    Ms Cruz came by the office today and requested prescriptions for a knee walker, a walker (rollator), and a prescription for a [employer] (which I placed on your desk).    Also she voiced concern in regard to her issue with her flat foot/fallen and wanted to know if this was going to be addressed during her procedure.     This is mostly an FYI.    Thank you    Maru

## 2023-05-19 NOTE — TELEPHONE ENCOUNTER
Spoke with Olivia with [employer] who reports that they cannot cover the device, but they can offer her an ice bucket device for $200 out of pocket. Verbalized understanding. Will let Dr Sinclair know and find out what he suggests.

## 2023-05-22 ENCOUNTER — PATIENT MESSAGE (OUTPATIENT)
Dept: PODIATRY | Facility: CLINIC | Age: 48
End: 2023-05-22
Payer: OTHER GOVERNMENT

## 2023-05-22 DIAGNOSIS — M67.40 INTRAOSSEOUS GANGLION: ICD-10-CM

## 2023-05-22 DIAGNOSIS — Z98.890 POSTOPERATIVE STATE: Primary | ICD-10-CM

## 2023-05-23 ENCOUNTER — TELEPHONE (OUTPATIENT)
Dept: PODIATRY | Facility: CLINIC | Age: 48
End: 2023-05-23
Payer: OTHER GOVERNMENT

## 2023-05-23 NOTE — TELEPHONE ENCOUNTER
Called patient back and she has not heard from anyone in surgery.  I gave her the phone # [phone].  Penelope send a secure message to Kalpana Peres she works in case management.     Chelsy    ----- Message from Korin York sent at 5/23/2023  3:48 PM CDT -----  Pt Requesting Call Back    Who called: pt   Who called for pt:  Best call back #: [phone]  Add notes: pt requests call back from Nurse Roula; pt wants to know the instructions for her procedure and the time of the procedure plus what time she needs to be there     with patient

## 2023-05-24 ENCOUNTER — HOSPITAL ENCOUNTER (OUTPATIENT)
Facility: HOSPITAL | Age: 48
Discharge: HOME | End: 2023-05-24
Attending: PODIATRIST | Admitting: PODIATRIST
Payer: OTHER GOVERNMENT

## 2023-05-24 ENCOUNTER — TELEPHONE (OUTPATIENT)
Dept: PODIATRY | Facility: CLINIC | Age: 48
End: 2023-05-24
Payer: OTHER GOVERNMENT

## 2023-05-24 ENCOUNTER — ANESTHESIA (OUTPATIENT)
Dept: SURGERY | Facility: HOSPITAL | Age: 48
End: 2023-05-24
Payer: OTHER GOVERNMENT

## 2023-05-24 ENCOUNTER — NURSE TRIAGE (OUTPATIENT)
Dept: ADMINISTRATIVE | Facility: CLINIC | Age: 48
End: 2023-05-24
Payer: OTHER GOVERNMENT

## 2023-05-24 VITALS
RESPIRATION RATE: 16 BRPM | SYSTOLIC BLOOD PRESSURE: 148 MMHG | TEMPERATURE: 98 F | OXYGEN SATURATION: 98 % | HEART RATE: 59 BPM | DIASTOLIC BLOOD PRESSURE: 66 MMHG

## 2023-05-24 DIAGNOSIS — M25.571 SINUS TARSI SYNDROME OF RIGHT FOOT: ICD-10-CM

## 2023-05-24 DIAGNOSIS — Z98.890 POSTOPERATIVE STATE: ICD-10-CM

## 2023-05-24 DIAGNOSIS — M79.671 CHRONIC FOOT PAIN, RIGHT: ICD-10-CM

## 2023-05-24 DIAGNOSIS — D17.79 INTRAOSSEOUS LIPOMA: ICD-10-CM

## 2023-05-24 DIAGNOSIS — G89.29 CHRONIC FOOT PAIN, RIGHT: ICD-10-CM

## 2023-05-24 DIAGNOSIS — M67.40 INTRAOSSEOUS GANGLION: Primary | ICD-10-CM

## 2023-05-24 LAB
ANION GAP SERPL CALC-SCNC: 10 MMOL/L (ref 8–16)
BUN SERPL-MCNC: 12 MG/DL (ref 6–20)
CALCIUM SERPL-MCNC: 8.9 MG/DL (ref 8.7–10.5)
CHLORIDE SERPL-SCNC: 103 MMOL/L (ref 95–110)
CO2 SERPL-SCNC: 24 MMOL/L (ref 23–29)
CREAT SERPL-MCNC: 0.8 MG/DL (ref 0.5–1.4)
EST. GFR  (NO RACE VARIABLE): >60 ML/MIN/1.73 M^2
GLUCOSE SERPL-MCNC: 86 MG/DL (ref 70–110)
POTASSIUM SERPL-SCNC: 3.5 MMOL/L (ref 3.5–5.1)
SODIUM SERPL-SCNC: 137 MMOL/L (ref 136–145)

## 2023-05-24 PROCEDURE — 63600175 PHARM REV CODE 636 W HCPCS: Performed by: ANESTHESIOLOGY

## 2023-05-24 PROCEDURE — 01470 ANES PX NRV MSC LW L/A/F NOS: CPT | Performed by: PODIATRIST

## 2023-05-24 PROCEDURE — 64447 PERIPHERAL BLOCK: ICD-10-PCS | Mod: 59,RT,, | Performed by: ANESTHESIOLOGY

## 2023-05-24 PROCEDURE — 80048 BASIC METABOLIC PNL TOTAL CA: CPT | Performed by: PODIATRIST

## 2023-05-24 PROCEDURE — 88305 TISSUE EXAM BY PATHOLOGIST: ICD-10-PCS | Mod: 26,,, | Performed by: PATHOLOGY

## 2023-05-24 PROCEDURE — 28041 EXC FOOT/TOE TUM DEP 1.5CM/>: CPT | Mod: RT,,, | Performed by: PODIATRIST

## 2023-05-24 PROCEDURE — D9220A PRA ANESTHESIA: Mod: CRNA,,, | Performed by: NURSE ANESTHETIST, CERTIFIED REGISTERED

## 2023-05-24 PROCEDURE — 25000003 PHARM REV CODE 250: Performed by: PODIATRIST

## 2023-05-24 PROCEDURE — 97530 THERAPEUTIC ACTIVITIES: CPT

## 2023-05-24 PROCEDURE — 97161 PT EVAL LOW COMPLEX 20 MIN: CPT

## 2023-05-24 PROCEDURE — 88305 TISSUE EXAM BY PATHOLOGIST: CPT | Performed by: PATHOLOGY

## 2023-05-24 PROCEDURE — 36000706: Performed by: PODIATRIST

## 2023-05-24 PROCEDURE — 37000009 HC ANESTHESIA EA ADD 15 MINS: Performed by: PODIATRIST

## 2023-05-24 PROCEDURE — C1769 GUIDE WIRE: HCPCS | Performed by: PODIATRIST

## 2023-05-24 PROCEDURE — 63600175 PHARM REV CODE 636 W HCPCS: Performed by: NURSE ANESTHETIST, CERTIFIED REGISTERED

## 2023-05-24 PROCEDURE — 64447 NJX AA&/STRD FEMORAL NRV IMG: CPT | Mod: 59,RT,, | Performed by: ANESTHESIOLOGY

## 2023-05-24 PROCEDURE — D9220A PRA ANESTHESIA: ICD-10-PCS | Mod: ANES,,, | Performed by: ANESTHESIOLOGY

## 2023-05-24 PROCEDURE — 64445 NJX AA&/STRD SCIATIC NRV IMG: CPT | Mod: 59,RT | Performed by: ANESTHESIOLOGY

## 2023-05-24 PROCEDURE — 88305 TISSUE EXAM BY PATHOLOGIST: CPT | Mod: 26,,, | Performed by: PATHOLOGY

## 2023-05-24 PROCEDURE — D9220A PRA ANESTHESIA: Mod: ANES,,, | Performed by: ANESTHESIOLOGY

## 2023-05-24 PROCEDURE — D9220A PRA ANESTHESIA: ICD-10-PCS | Mod: CRNA,,, | Performed by: NURSE ANESTHETIST, CERTIFIED REGISTERED

## 2023-05-24 PROCEDURE — 25000003 PHARM REV CODE 250: Performed by: NURSE ANESTHETIST, CERTIFIED REGISTERED

## 2023-05-24 PROCEDURE — 63600175 PHARM REV CODE 636 W HCPCS: Performed by: PODIATRIST

## 2023-05-24 PROCEDURE — 71000016 HC POSTOP RECOV ADDL HR: Performed by: PODIATRIST

## 2023-05-24 PROCEDURE — 27800903 OPTIME MED/SURG SUP & DEVICES OTHER IMPLANTS: Performed by: PODIATRIST

## 2023-05-24 PROCEDURE — 71000015 HC POSTOP RECOV 1ST HR: Performed by: PODIATRIST

## 2023-05-24 PROCEDURE — 36000707: Performed by: PODIATRIST

## 2023-05-24 PROCEDURE — 37000008 HC ANESTHESIA 1ST 15 MINUTES: Performed by: PODIATRIST

## 2023-05-24 PROCEDURE — 27201423 OPTIME MED/SURG SUP & DEVICES STERILE SUPPLY: Performed by: PODIATRIST

## 2023-05-24 PROCEDURE — 36415 COLL VENOUS BLD VENIPUNCTURE: CPT | Performed by: PODIATRIST

## 2023-05-24 PROCEDURE — 97116 GAIT TRAINING THERAPY: CPT

## 2023-05-24 PROCEDURE — 28041 PR EXC TUMOR SOFT TISSUE FOOT/TOE SUBFASC 1.5+CM: ICD-10-PCS | Mod: RT,,, | Performed by: PODIATRIST

## 2023-05-24 PROCEDURE — 64450 NJX AA&/STRD OTHER PN/BRANCH: CPT | Mod: 59,RT,, | Performed by: ANESTHESIOLOGY

## 2023-05-24 PROCEDURE — 64450 PERIPHERAL BLOCK: ICD-10-PCS | Mod: 59,RT,, | Performed by: ANESTHESIOLOGY

## 2023-05-24 DEVICE — IMPLANTABLE DEVICE: Type: IMPLANTABLE DEVICE | Site: FOOT | Status: FUNCTIONAL

## 2023-05-24 RX ORDER — MIDAZOLAM HYDROCHLORIDE 1 MG/ML
INJECTION, SOLUTION INTRAMUSCULAR; INTRAVENOUS
Status: DISCONTINUED | OUTPATIENT
Start: 2023-05-24 | End: 2023-05-24

## 2023-05-24 RX ORDER — SODIUM CHLORIDE, SODIUM LACTATE, POTASSIUM CHLORIDE, CALCIUM CHLORIDE 600; 310; 30; 20 MG/100ML; MG/100ML; MG/100ML; MG/100ML
INJECTION, SOLUTION INTRAVENOUS CONTINUOUS
Status: DISCONTINUED | OUTPATIENT
Start: 2023-05-24 | End: 2023-05-24 | Stop reason: HOSPADM

## 2023-05-24 RX ORDER — LIDOCAINE HYDROCHLORIDE 10 MG/ML
1 INJECTION, SOLUTION EPIDURAL; INFILTRATION; INTRACAUDAL; PERINEURAL ONCE
Status: DISCONTINUED | OUTPATIENT
Start: 2023-05-24 | End: 2023-05-24 | Stop reason: HOSPADM

## 2023-05-24 RX ORDER — HYDROCODONE BITARTRATE AND ACETAMINOPHEN 5; 325 MG/1; MG/1
1 TABLET ORAL EVERY 4 HOURS PRN
Status: DISCONTINUED | OUTPATIENT
Start: 2023-05-24 | End: 2023-05-24 | Stop reason: HOSPADM

## 2023-05-24 RX ORDER — CEPHALEXIN 500 MG/1
500 CAPSULE ORAL 4 TIMES DAILY
Qty: 28 CAPSULE | Refills: 0 | Status: SHIPPED | OUTPATIENT
Start: 2023-05-24 | End: 2023-09-05

## 2023-05-24 RX ORDER — OXYCODONE AND ACETAMINOPHEN 10; 325 MG/1; MG/1
1 TABLET ORAL EVERY 4 HOURS PRN
Qty: 30 TABLET | Refills: 0 | Status: SHIPPED | OUTPATIENT
Start: 2023-05-24 | End: 2023-05-26

## 2023-05-24 RX ORDER — CEFAZOLIN SODIUM 2 G/50ML
2 SOLUTION INTRAVENOUS
Status: COMPLETED | OUTPATIENT
Start: 2023-05-24 | End: 2023-05-24

## 2023-05-24 RX ORDER — ONDANSETRON 4 MG/1
8 TABLET, ORALLY DISINTEGRATING ORAL EVERY 8 HOURS PRN
Qty: 30 TABLET | Refills: 0 | Status: SHIPPED | OUTPATIENT
Start: 2023-05-24 | End: 2023-06-16 | Stop reason: SDUPTHER

## 2023-05-24 RX ORDER — KETAMINE HCL IN 0.9 % NACL 50 MG/5 ML
SYRINGE (ML) INTRAVENOUS
Status: DISCONTINUED | OUTPATIENT
Start: 2023-05-24 | End: 2023-05-24

## 2023-05-24 RX ORDER — ONDANSETRON 2 MG/ML
INJECTION INTRAMUSCULAR; INTRAVENOUS
Status: DISCONTINUED | OUTPATIENT
Start: 2023-05-24 | End: 2023-05-24

## 2023-05-24 RX ORDER — PROPOFOL 10 MG/ML
VIAL (ML) INTRAVENOUS CONTINUOUS PRN
Status: DISCONTINUED | OUTPATIENT
Start: 2023-05-24 | End: 2023-05-24

## 2023-05-24 RX ORDER — DOCUSATE SODIUM 100 MG/1
100 CAPSULE, LIQUID FILLED ORAL 2 TIMES DAILY PRN
Qty: 10 CAPSULE | Refills: 0 | Status: SHIPPED | OUTPATIENT
Start: 2023-05-24 | End: 2023-09-05

## 2023-05-24 RX ORDER — FENTANYL CITRATE 50 UG/ML
INJECTION, SOLUTION INTRAMUSCULAR; INTRAVENOUS
Status: DISCONTINUED | OUTPATIENT
Start: 2023-05-24 | End: 2023-05-24

## 2023-05-24 RX ORDER — PROCHLORPERAZINE EDISYLATE 5 MG/ML
5 INJECTION INTRAMUSCULAR; INTRAVENOUS ONCE
Status: COMPLETED | OUTPATIENT
Start: 2023-05-24 | End: 2023-05-24

## 2023-05-24 RX ORDER — KETOROLAC TROMETHAMINE 30 MG/ML
30 INJECTION, SOLUTION INTRAMUSCULAR; INTRAVENOUS ONCE
Status: COMPLETED | OUTPATIENT
Start: 2023-05-24 | End: 2023-05-24

## 2023-05-24 RX ORDER — PROPOFOL 10 MG/ML
VIAL (ML) INTRAVENOUS
Status: DISCONTINUED | OUTPATIENT
Start: 2023-05-24 | End: 2023-05-24

## 2023-05-24 RX ADMIN — FENTANYL CITRATE 50 MCG: 50 INJECTION, SOLUTION INTRAMUSCULAR; INTRAVENOUS at 09:05

## 2023-05-24 RX ADMIN — KETOROLAC TROMETHAMINE 30 MG: 30 INJECTION, SOLUTION INTRAMUSCULAR; INTRAVENOUS at 10:05

## 2023-05-24 RX ADMIN — Medication 20 MG: at 08:05

## 2023-05-24 RX ADMIN — PROPOFOL 100 MG: 10 INJECTION, EMULSION INTRAVENOUS at 08:05

## 2023-05-24 RX ADMIN — PROPOFOL 160 MCG/KG/MIN: 10 INJECTION, EMULSION INTRAVENOUS at 08:05

## 2023-05-24 RX ADMIN — HYDROCODONE BITARTRATE AND ACETAMINOPHEN 1 TABLET: 5; 325 TABLET ORAL at 01:05

## 2023-05-24 RX ADMIN — Medication 30 MG: at 08:05

## 2023-05-24 RX ADMIN — ONDANSETRON 4 MG: 2 INJECTION, SOLUTION INTRAMUSCULAR; INTRAVENOUS at 09:05

## 2023-05-24 RX ADMIN — HYDROCODONE BITARTRATE AND ACETAMINOPHEN 1 TABLET: 5; 325 TABLET ORAL at 10:05

## 2023-05-24 RX ADMIN — MIDAZOLAM 2 MG: 1 INJECTION INTRAMUSCULAR; INTRAVENOUS at 08:05

## 2023-05-24 RX ADMIN — CEFAZOLIN SODIUM 2 G: 2 SOLUTION INTRAVENOUS at 08:05

## 2023-05-24 RX ADMIN — PROCHLORPERAZINE EDISYLATE 5 MG: 5 INJECTION INTRAMUSCULAR; INTRAVENOUS at 11:05

## 2023-05-24 RX ADMIN — SODIUM CHLORIDE, SODIUM LACTATE, POTASSIUM CHLORIDE, AND CALCIUM CHLORIDE: .6; .31; .03; .02 INJECTION, SOLUTION INTRAVENOUS at 08:05

## 2023-05-24 NOTE — OP NOTE
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 Fibrin glue used to seal the outer cortical window which measured 1.0 x 1.0 cm.    Description of Technical Procedures: The patient was brought to the operating room on a stretcher and was transferred to the OR table in lateral position with right side up, she was sedated. A tourniquet was applied to the right thigh. The right lower limb was prepped and draped in a sterile manner. Tourniquet(s) inflated to 300 mmHg.     Using a 15 blade a longitudinal incision was made at the lateral heel, sharp and blunt dissection carried down through deep fascia. Bleeding controlled with electrocautery. Dissection was carried down between EDB muscle belly and peroneal tendons, peroneal tendons were retracted inferiorly and EDB muscle belly was elevated and retracted superiorly. Periosteum was incised and elevated inferiorly and superiorly. The intraosseous ganglion was localized under fluoroscopy. Using a sagittal saw a 1 cm square cortical window was cut into the lateral wall of the calcaneus overlying the ganglion, the cortical window was elevated with an  osteotome and set on the back table. The cystic area was explored and found to extend from just proximal to the subchondral bone at the CCJ to 3 cm proximal to the window site, in total measuring 1.5 x 4.0 cm. Combination of ganglion sack and fatty cystic tissue was excised using a curette, the sclerotic rim was also excised using a curette down to healthy bleeding bone. Irrigated with saline via bulb syringe. The rim of the cyst was fenestrated using a K 1.2 mm wire. Arthrocell plus graft was packed into the cyst. The cortical window was placed back onto the calcaneus and secured using fibrin glue. Layered primary closure performed using 3-0 vicryl, 3-0 monocryl, 4-0 nylon suture. Dressed with xeroform, 4x4 gauze, cast padding, ACE wrap. Tourniquet(s) deflated.       The patient was transferred to the recovery room with vital signs stable. Following a period of post op monitoring, the patient will be discharged home with the following postop instructions:   1. Keep dressing clean, dry, and intact until next seen by podiatry.   2. Weightbearing status: nonweightbearing.   3. All necessary prescriptions were ordered and medical management will continue.   4. Contact podiatry with any postop questions or concerns.       Estimated Blood Loss (EBL): 1 mL           Implants:   Implant Name Type Inv. Item Serial No.  Lot No. LRB No. 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 Intraosseous ganglion right calcaneous - perm     References:    Click here for ordering Quick Tip   Question Answer Comment   Procedure  Type: General Surgery    Specimen Class: Routine/Screening    Which provider would you like to cc? PAXTON PRIETO    Release to patient Immediate        05/24/23 0927                            Condition: Good    Disposition: PACU - hemodynamically stable.    Attestation: I was present and scrubbed for the entire procedure.    Discharge Note    OUTCOME: Patient tolerated treatment/procedure well without complication and is now ready for discharge.    DISPOSITION: Home or Self Care    FINAL DIAGNOSIS:  Intraosseous ganglion    FOLLOWUP: In clinic    DISCHARGE INSTRUCTIONS:    Discharge Procedure Orders   Diet general     Keep surgical extremity elevated     Ice to affected area   Order Comments: As needed throughout the day x 2-3 days.     Call MD for:  temperature >100.4     Call MD for:  persistent nausea and vomiting     Call MD for:  severe uncontrolled pain     Call MD for:  difficulty breathing, headache or visual disturbances     Leave dressing on - Keep it clean, dry, and intact until clinic visit     Weight bearing restrictions (specify)   Order Comments: Non-weightbearing right foot.     I directly supervised the above resident and was scrubbed for the entire surgical case.  Paxton Prieto DPM, FACFAS

## 2023-05-24 NOTE — PLAN OF CARE
Tolerating po fluids well.  See MAR for pain medication administration.  Right foot elevated on pillows with surgical boot on over dressing.  Dressing CDI.  Ice pack to right foot.  Postop prescriptions will be filled and [place] [facility] [place].  No distress.  Family member at bedside.

## 2023-05-24 NOTE — ANESTHESIA PROCEDURE NOTES
Peripheral Block    Patient location during procedure: pre-op   Block not for primary anesthetic.  Reason for block: at surgeon's request and post-op pain management   Post-op Pain Location: right foot pain   Start time: 5/24/2023 7:52 AM  Timeout: 5/24/2023 7:51 AM   End time: 5/24/2023 7:55 AM    Staffing  Authorizing Provider: Guero Chicas MD  Performing Provider: Guero Chicas MD    Preanesthetic Checklist  Completed: patient identified, IV checked, site marked, risks and benefits discussed, surgical consent, monitors and equipment checked, pre-op evaluation and timeout performed  Peripheral Block  Patient position: supine  Prep: ChloraPrep  Patient monitoring: heart rate, cardiac monitor, continuous pulse ox, continuous capnometry and frequent blood pressure checks  Block type: popliteal  Laterality: right  Injection technique: single shot  Needle  Needle type: Stimuplex   Needle gauge: 20 G  Needle length: 4 in  Needle localization: anatomical landmarks and ultrasound guidance   -ultrasound image captured on disc.  Assessment  Injection assessment: negative aspiration, negative parasthesia and local visualized surrounding nerve  Paresthesia pain: none  Heart rate change: no  Slow fractionated injection: yes        Additional Notes  25cc 0.375% bupivicaine VSS.  [facility] RN monitoring vitals throughout procedure.  Patient tolerated procedure well.

## 2023-05-24 NOTE — ANESTHESIA PROCEDURE NOTES
Peripheral Block    Patient location during procedure: pre-op   Block not for primary anesthetic.  Reason for block: at surgeon's request and post-op pain management   Post-op Pain Location: right foot pain   Start time: 5/24/2023 7:52 AM  Timeout: 5/24/2023 7:51 AM   End time: 5/24/2023 7:59 AM    Staffing  Authorizing Provider: Guero Chicas MD  Performing Provider: Guero Chicas MD    Preanesthetic Checklist  Completed: patient identified, IV checked, site marked, risks and benefits discussed, surgical consent, monitors and equipment checked, pre-op evaluation and timeout performed  Peripheral Block  Patient position: supine  Prep: ChloraPrep  Patient monitoring: heart rate, cardiac monitor, continuous pulse ox, continuous capnometry and frequent blood pressure checks  Block type: adductor canal  Laterality: right  Injection technique: single shot  Needle  Needle type: Stimuplex   Needle gauge: 20 G  Needle length: 4 in  Needle localization: anatomical landmarks and ultrasound guidance   -ultrasound image captured on disc.  Assessment  Injection assessment: negative aspiration, negative parasthesia and local visualized surrounding nerve  Paresthesia pain: none  Heart rate change: no  Slow fractionated injection: yes        Additional Notes  15cc 0.375% bupivicaine. VSS.  [facility] RN monitoring vitals throughout procedure.  Patient tolerated procedure well.

## 2023-05-24 NOTE — INTERVAL H&P NOTE
The patient has been examined and the H&P has been reviewed:    I concur with the findings and no changes have occurred since H&P was written.    Surgery risks, benefits and alternative options discussed and understood by patient/family. Reviewed procedure in detail. If corticocancellous bone graft is required then may obtain from the posterior calcaneus and transfer anteriorly. In addition in ganglion cyst noted at posterior lateral subtalar joint then will remove.           There are no hospital problems to display for this patient.

## 2023-05-24 NOTE — BRIEF OP NOTE
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 Fibrin glue used to seal the outer cortical window which measured 1.0 x 1.0 cm.    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 Intraosseous ganglion right calcaneous - perm     References:    Click here for ordering Quick Tip   Question Answer Comment   Procedure Type: General Surgery    Specimen Class: Routine/Screening    Which provider would you like to cc? PAXTON PRIETO    Release to patient Immediate        05/24/23 0976                      Discharge Note    OUTCOME: Patient tolerated treatment/procedure well without complication and is now ready for discharge.    DISPOSITION: Home or Self Care    FINAL DIAGNOSIS:  Intraosseous ganglion    FOLLOWUP: In clinic    DISCHARGE INSTRUCTIONS:    Discharge Procedure Orders    Diet general     Keep surgical extremity elevated     Ice to affected area   Order Comments: As needed throughout the day x 2-3 days.     Call MD for:  temperature >100.4     Call MD for:  persistent nausea and vomiting     Call MD for:  severe uncontrolled pain     Call MD for:  difficulty breathing, headache or visual disturbances     Leave dressing on - Keep it clean, dry, and intact until clinic visit     Weight bearing restrictions (specify)   Order Comments: Non-weightbearing right foot.

## 2023-05-24 NOTE — TELEPHONE ENCOUNTER
[facility] for [facility] use

## 2023-05-24 NOTE — DISCHARGE INSTRUCTIONS
Discharge Instructions for Foot Surgery  Arrange to have an adult drive you home after surgery. If you had general anesthesia, it may take a day or more to fully recover. So, for at least the next 24 hours: Do not drive or use machinery or power tools; do not drink alcohol; and do not make any major decisions.  Diet  Here are some dietary suggestions following surgery:   Start with liquids and light foods (like dry toast, bananas, and applesauce). As you feel up to it, slowly return to your normal diet.  Drink at least 6 to 8 glasses of water or other nonalcoholic fluids a day.  To avoid nausea, eat before taking narcotic pain medicines.  Medicines  It is important to follow these directions:   Take all medicines as instructed.  Take pain medicines on time. Do not wait until the pain is bad before taking your medicines.  Avoid alcohol while on pain medicines.  Activity  These instructions are to help with your recovery:   Sit or lie down when possible. Put a pillow under your heel to raise your foot above the level of your heart.  Wrap an ice pack or bag of frozen peas in a thin cloth. Place it over your bandaged foot for no longer than 20 minutes. Do this three times a day.  You can drive again as instructed by your healthcare provider.  Wear your surgical shoe at all times unless told otherwise by your healthcare provider.  Use crutches or scooter as directed.  No weight bearing to right foot  Bandage and cast care  Here are tips to follow:   Keep dressing dry and intact.  When you can shower again, cover the bandage or cast with a plastic bag to keep it dry.  Dont remove your bandage until your healthcare provider tells you to. If your bandage gets wet or dirty, check with your healthcare provider.   What to expect  It is normal to have the following:  Bruising and slight swelling of the foot and toes  A small amount of blood on the dressing  Call your healthcare provider   Contact your healthcare provider right  away if you have any of the following:   Continuous bleeding through the bandage  Excessive swelling, increased bleeding, or redness  Fever over 100.4°F (38°C) or chills  Pain unrelieved by pain medicines  Foot feels cold to the touch or numb  Increased ache in your leg or foot  Chest pain or shortness of breath  Anything unusual that concerns you   Date Last Reviewed: 9/26/2015  © 8208-1814 [employer]. [address] [place] [address], [place] [address]. All rights reserved. This information is not intended as a substitute for professional medical care. Always follow your healthcare professional's instructions.              ANESTHESIA  -For the first 24 hours after surgery:  Do not drive, use heavy equipment, make important decisions, or drink alcohol  -It is normal to feel sleepy for several hours.  Rest until you are more awake.  -Have someone stay with you, if needed.  They can watch for problems and help keep you safe.  -Some possible post anesthesia side effects include: nausea and vomiting, sore throat and hoarseness, sleepiness, and dizziness.    PAIN  -If you have pain after surgery, pain medicine will help you feel better.  Take it as directed, before pain becomes severe.  Most pain relievers taken by mouth need at least 20-30 minutes to start working.  -Do not drive or drink alcohol while taking pain medicine.  -Pain medication can upset your stomach.  Taking them with a little food may help.  -Other ways to help control pain: elevation, ice, and relaxation  -Call your surgeon if still having unmanageable pain an hour after taking pain medicine.  -Pain medicine can cause constipation.  Taking an over-the counter stool softener while on prescription pain medicine and drinking plenty of fluids can prevent this side effect.  -Call your surgeon if you have severe side effects like: breathing problems, trouble waking up, dizziness, confusion, or severe constipation.    NAUSEA  -Some people have nausea  after surgery.  This is often because of anesthesia, pain, pain medicine, or the stress of surgery.  -Do not push yourself to eat.  Start off with clear liquids and soup.  Slowly move to solid foods.  Don't eat fatty, rich, spicy foods at first.  Eat smaller amounts.  -If you develop persistent nausea and vomiting please notify your surgeon immediately.    BLEEDING  -Different types of surgery require different types of care and dressing changes.  It is important to follow all instructions and advice from your surgeon.  Change dressing as directed.  Call your surgeon for any concerns regarding postop bleeding.    SIGNS OF INFECTION  -Signs of infection include: fever, swelling, drainage, and redness  -Notify your surgeon if you have a fever of 100.4 F (38.0 C) or higher.  -Notify your surgeon if you notice redness, swelling, increased pain, pus, or a foul smell at the incision site.

## 2023-05-24 NOTE — TRANSFER OF CARE
Anesthesia Transfer of Care Note    Patient: Tiffany Cruz    Procedure(s) Performed: Procedure(s) (LRB):  EXCISION, LESION, TARSAL BONE (Right)    Patient location: OPS    Anesthesia Type: general    Transport from OR: Transported from OR on 6-10 L/min O2 by face mask with adequate spontaneous ventilation    Post pain: adequate analgesia    Post assessment: no apparent anesthetic complications    Post vital signs: stable    Level of consciousness: awake    Nausea/Vomiting: no nausea/vomiting    Complications: none    Transfer of care protocol was followed      Last vitals:   Visit Vitals  BP (!) 149/78 (BP Location: Left arm)   Pulse 78   Temp 36.8 °C (98.2 °F) (Skin)   Resp 16   LMP 12/05/2016 (Approximate)   SpO2 98%   Breastfeeding No

## 2023-05-24 NOTE — PLAN OF CARE
Discharge teaching done.  Has rolling walker.  Will bring to car via w/c when ready. Family member to drive home.
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
## 2023-05-24 NOTE — PLAN OF CARE
Ambulatory with PT using walker, no weight bearing to RLE.  Urinated without difficulty. Will obtain rolling walker for patient.  Also has a scooter being delivered to home.

## 2023-05-24 NOTE — ANESTHESIA POSTPROCEDURE EVALUATION
Anesthesia Post Evaluation    Patient: Tiffany Cruz    Procedure(s) Performed: Procedure(s) (LRB):  EXCISION, LESION, TARSAL BONE (Right)    Final Anesthesia Type: general      Patient location during evaluation: PACU  Patient participation: Yes- Able to Participate  Level of consciousness: awake and alert  Post-procedure vital signs: reviewed and stable  Pain management: adequate  Airway patency: patent    PONV status at discharge: No PONV  Anesthetic complications: no      Cardiovascular status: stable  Respiratory status: spontaneous ventilation  Hydration status: euvolemic  Follow-up not needed.          Vitals Value Taken Time   /80 05/24/23 1025   Temp 36.4 °C (97.5 °F) 05/24/23 0955   Pulse 60 05/24/23 1025   Resp 17 05/24/23 1025   SpO2 98 % 05/24/23 1025         No case tracking events are documented in the log.      Pain/Nina Score: Pain Rating Prior to Med Admin: 8 (5/24/2023 10:51 AM)  Nina Score: 10 (5/24/2023 10:25 AM)

## 2023-05-25 ENCOUNTER — TELEPHONE (OUTPATIENT)
Dept: PODIATRY | Facility: CLINIC | Age: 48
End: 2023-05-25
Payer: OTHER GOVERNMENT

## 2023-05-25 ENCOUNTER — PATIENT MESSAGE (OUTPATIENT)
Dept: PODIATRY | Facility: CLINIC | Age: 48
End: 2023-05-25
Payer: OTHER GOVERNMENT

## 2023-05-25 DIAGNOSIS — Z98.890 POSTOPERATIVE STATE: Primary | ICD-10-CM

## 2023-05-25 DIAGNOSIS — M67.40 INTRAOSSEOUS GANGLION: ICD-10-CM

## 2023-05-25 NOTE — TELEPHONE ENCOUNTER
----- Message from Penelope Jackman RN sent at 5/25/2023  8:18 AM CDT -----  Dr Sinclair:    Per the rep from company we're trying to get the ice machine from for Ms Cruz post op we need to reword the prescription.    Sorry....please see below message    Thank you so much    Maru Negrete,         So sorry for late response. This prescription is written out for an [employer] Pro. Can we get an update RX stating ice machine?

## 2023-05-25 NOTE — TELEPHONE ENCOUNTER
Patient had podiatry surgery today. States that she fell asleep with an ice pack on her leg for 90 minutes. Patient concerned about damage to the skin. All questions and concerns were addressed. Patient denies changes to the skin. Advised to continue to monitor. Has a f/u appointment tomorrow morning. Advised the patient to call back with any further questions or if symptoms worsen.        Reason for Disposition   Health Information question, no triage required and triager able to answer question    Protocols used: Information Only Call - No Triage-A-

## 2023-05-25 NOTE — TELEPHONE ENCOUNTER
Spoke with Ms Cruz to let her know that there is no need for her to come for her appt today with Dr Sinclair since she just had her surgery yesterday. Encouraged her to come to her follow up appt on Monday June 5. Ms Cruz reports she is experiencing nausea despite taking zofran. Encouraged her to continue to rest, ice and elevate with ice not being directly on skin. Let her know I am still trying to get her ice machine and that I will let Dr Sinclair know she is experiencing nausea. No other needs voiced. Encouraged call back if needed

## 2023-05-25 NOTE — TELEPHONE ENCOUNTER
Spoke with Ms Cruz to inform her that Dr Sinclair advises that she discontinue taking the antibiotics at this time, and if her nausea does not subside, discontinue taking the percocet and start taking over the counter ibuprofen (800 mg every 6 hours) or take extra strength tylenol. Ms Cruz verbalized understanding. No other needs voiced. Encouraged call back if further assistance is needed

## 2023-05-26 RX ORDER — TRAMADOL HYDROCHLORIDE 50 MG/1
50 TABLET ORAL EVERY 4 HOURS PRN
Qty: 30 TABLET | Refills: 0 | Status: SHIPPED | OUTPATIENT
Start: 2023-05-26 | End: 2023-06-05

## 2023-05-30 LAB
FINAL PATHOLOGIC DIAGNOSIS: NORMAL
GROSS: NORMAL
Lab: NORMAL

## 2023-06-01 ENCOUNTER — TELEPHONE (OUTPATIENT)
Dept: PODIATRY | Facility: CLINIC | Age: 48
End: 2023-06-01
Payer: OTHER GOVERNMENT

## 2023-06-01 NOTE — TELEPHONE ENCOUNTER
----- Message from Keila Ann sent at 6/1/2023  3:56 PM CDT -----  Type:  Needs Medical Advice    Who Called: pt  Symptoms (please be specific): pt needs a call back    Would the patient rather a call back or a response via [facility]? call  Best Call Back Number: [phone]   Additional Information:

## 2023-06-01 NOTE — TELEPHONE ENCOUNTER
Called pt back and she ask if she still has to keep the walking boot on. I can't see any advice in the chart, and I'm not able to talk to Dr. Sinclair or Penelope with the both being out of clinic.   Told pt to be on the save side I would keep the boot on. She ask than if she still has to elevate her leg, told her I think it would be better, because her ft. Probably will start to swell and that it will start with the pain. She agreed and told me that she experienced that , so she will go ahead and elevate

## 2023-06-04 NOTE — TELEPHONE ENCOUNTER
----- Message from Ena Cotter sent at 3/23/2022 12:29 PM CDT -----  Regarding: Pt Advice  Type:  Needs Medical Advice    Who Called: pt  Symptoms (please be specific): severe pain    Would the patient rather a call back or a response via [facility]? Call    Best Call Back Number: [phone]         
Spoke with MsEsha Anthony in regards to her foot pain. She reports that she had an xray done in the ED and that her neurologist gave her a anti inflammatory which helped for a certain length of time. Per Ms. Anthony she thinks she may need an injection to help with her pain, which has helped in the past. Offered her an appointment tomorrow at 8 am due to a cancellation. Per Ms. Anthony will be there with no other needs voiced.  
[place]up[place]

## 2023-06-05 ENCOUNTER — OFFICE VISIT (OUTPATIENT)
Dept: PODIATRY | Facility: CLINIC | Age: 48
End: 2023-06-05
Payer: OTHER GOVERNMENT

## 2023-06-05 VITALS
SYSTOLIC BLOOD PRESSURE: 151 MMHG | HEART RATE: 97 BPM | DIASTOLIC BLOOD PRESSURE: 87 MMHG | BODY MASS INDEX: 29.7 KG/M2 | WEIGHT: 173.94 LBS | HEIGHT: 64 IN

## 2023-06-05 DIAGNOSIS — Z98.890 POSTOPERATIVE STATE: Primary | ICD-10-CM

## 2023-06-05 PROCEDURE — 99024 PR POST-OP FOLLOW-UP VISIT: ICD-10-PCS | Mod: ,,, | Performed by: PODIATRIST

## 2023-06-05 PROCEDURE — 99024 POSTOP FOLLOW-UP VISIT: CPT | Mod: ,,, | Performed by: PODIATRIST

## 2023-06-05 PROCEDURE — 99999 PR PBB SHADOW E&M-EST. PATIENT-LVL IV: CPT | Mod: PBBFAC,,, | Performed by: PODIATRIST

## 2023-06-05 PROCEDURE — 99214 OFFICE O/P EST MOD 30 MIN: CPT | Mod: PBBFAC,PN | Performed by: PODIATRIST

## 2023-06-05 PROCEDURE — 99999 PR PBB SHADOW E&M-EST. PATIENT-LVL IV: ICD-10-PCS | Mod: PBBFAC,,, | Performed by: PODIATRIST

## 2023-06-05 RX ORDER — PANTOPRAZOLE SODIUM 40 MG/1
40 TABLET, DELAYED RELEASE ORAL EVERY MORNING
COMMUNITY
Start: 2023-05-11 | End: 2024-02-29

## 2023-06-05 RX ORDER — ROSUVASTATIN CALCIUM 10 MG/1
TABLET, COATED ORAL
COMMUNITY
Start: 2023-05-16
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
## 2023-06-12 ENCOUNTER — TELEPHONE (OUTPATIENT)
Dept: PODIATRY | Facility: CLINIC | Age: 48
End: 2023-06-12
Payer: OTHER GOVERNMENT

## 2023-06-12 NOTE — TELEPHONE ENCOUNTER
Spoke with Ms Cruz to assist her with scheduling her appt with Dr Sinclair today due to running late. Accepted new appt date and time o f6/16/23 at 9 am

## 2023-06-12 NOTE — TELEPHONE ENCOUNTER
----- Message from Ilana Ivan sent at 6/12/2023  1:34 PM CDT -----  Contact: running behind  Type:  Needs Medical Advice    Who Called: pt  Symptoms (please be specific):    How long has patient had these symptoms:    Pharmacy name and phone #:    Would the patient rather a call back or a response via [facility]?   Best Call Back Number: [phone]  Additional Information: running 15 mins behind

## 2023-06-16 ENCOUNTER — PATIENT MESSAGE (OUTPATIENT)
Dept: PODIATRY | Facility: CLINIC | Age: 48
End: 2023-06-16

## 2023-06-16 ENCOUNTER — HOSPITAL ENCOUNTER (OUTPATIENT)
Dept: RADIOLOGY | Facility: HOSPITAL | Age: 48
Discharge: HOME | End: 2023-06-16
Attending: PODIATRIST
Payer: OTHER GOVERNMENT

## 2023-06-16 ENCOUNTER — OFFICE VISIT (OUTPATIENT)
Dept: PODIATRY | Facility: CLINIC | Age: 48
End: 2023-06-16
Payer: OTHER GOVERNMENT

## 2023-06-16 VITALS
HEART RATE: 74 BPM | HEIGHT: 64 IN | BODY MASS INDEX: 29.53 KG/M2 | SYSTOLIC BLOOD PRESSURE: 128 MMHG | WEIGHT: 173 LBS | DIASTOLIC BLOOD PRESSURE: 85 MMHG

## 2023-06-16 DIAGNOSIS — M62.81 MUSCLE WEAKNESS OF LOWER EXTREMITY: ICD-10-CM

## 2023-06-16 DIAGNOSIS — Z98.890 POSTOPERATIVE STATE: Primary | ICD-10-CM

## 2023-06-16 DIAGNOSIS — Z98.890 POSTOPERATIVE STATE: ICD-10-CM

## 2023-06-16 DIAGNOSIS — Z74.09 LIMITED MOBILITY: ICD-10-CM

## 2023-06-16 PROCEDURE — 73630 XR FOOT COMPLETE 3 VIEW RIGHT: ICD-10-PCS | Mod: 26,RT,, | Performed by: RADIOLOGY

## 2023-06-16 PROCEDURE — 99215 OFFICE O/P EST HI 40 MIN: CPT | Mod: PBBFAC,PN | Performed by: PODIATRIST

## 2023-06-16 PROCEDURE — 73630 X-RAY EXAM OF FOOT: CPT | Mod: 26,RT,, | Performed by: RADIOLOGY

## 2023-06-16 PROCEDURE — 99999 PR PBB SHADOW E&M-EST. PATIENT-LVL V: CPT | Mod: PBBFAC,,, | Performed by: PODIATRIST

## 2023-06-16 PROCEDURE — 99999 PR PBB SHADOW E&M-EST. PATIENT-LVL V: ICD-10-PCS | Mod: PBBFAC,,, | Performed by: PODIATRIST

## 2023-06-16 PROCEDURE — 73630 X-RAY EXAM OF FOOT: CPT | Mod: TC,PN,RT

## 2023-06-16 PROCEDURE — 99024 PR POST-OP FOLLOW-UP VISIT: ICD-10-PCS | Mod: ,,, | Performed by: PODIATRIST

## 2023-06-16 PROCEDURE — 99024 POSTOP FOLLOW-UP VISIT: CPT | Mod: ,,, | Performed by: PODIATRIST

## 2023-06-16 RX ORDER — ONDANSETRON 4 MG/1
8 TABLET, ORALLY DISINTEGRATING ORAL EVERY 8 HOURS PRN
Qty: 30 TABLET | Refills: 0 | Status: SHIPPED | OUTPATIENT
Start: 2023-06-16 | End: 2023-09-05

## 2023-06-16 RX ORDER — TRAMADOL HYDROCHLORIDE 50 MG/1
50 TABLET ORAL EVERY 8 HOURS PRN
Qty: 30 TABLET | Refills: 0 | Status: SHIPPED | OUTPATIENT
Start: 2023-06-16 | End: 2023-08-24

## 2023-06-16 NOTE — PATIENT INSTRUCTIONS
You may transition to partial weightbearing on the right foot with CAM boot and walker for short distances for 4 weeks and then full weightbearing with boot as tolerated
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
## 2023-06-19 ENCOUNTER — TELEPHONE (OUTPATIENT)
Dept: PODIATRY | Facility: CLINIC | Age: 48
End: 2023-06-19
Payer: OTHER GOVERNMENT

## 2023-06-19 NOTE — TELEPHONE ENCOUNTER
Routed the referral to [facility].  Sent a message to the patient regarding this information.     Chelsy    Thank you!  Can you have someone electronic upload the referral to [facility]?  They don't have it and we need an authorization for physical therapy.       Thanks!        Paxton Sinclair, RUDY Allen Evelyn Cruz 3 days ago       Hi Ms. Cruz,     The x-ray shows normal healing changes to the heel bone. Please follow the recommendations discussed in clinic.     Take care,  Dr. Sinclair.

## 2023-06-23 ENCOUNTER — TELEPHONE (OUTPATIENT)
Dept: PODIATRY | Facility: CLINIC | Age: 48
End: 2023-06-23
Payer: OTHER GOVERNMENT

## 2023-06-23 NOTE — TELEPHONE ENCOUNTER
Faxed over the demographic sheet to [facility].     Chelsy    ----- Message from Blanca Olivas sent at 6/23/2023  3:56 PM CDT -----  Type: Return Call    Who Called: [facility]      Would the patient rather a call back or a response via [facility]? Call  Best Call Back Number: [phone]  Additional Information:returning a call for patient demographics fax:[phone]

## 2023-06-23 NOTE — TELEPHONE ENCOUNTER
I called [facility] and left a message for Abida.  I faxed the order and demographic sheet to fax # [phone].    Chelsy    ----- Message from Star Whalen sent at 6/23/2023  4:26 PM CDT -----  Contact: Abida  Type:  Needs Medical Advice    Who Called: Abida w/ [facility]  Would the patient rather a call back or a response via [facility]? call  Best Call Back Number: [phone]  Additional Information:   Abida requesting a call regarding if office will send pt's information to  [facility] in order to get the pt seen for PT   Abida stated she do not have enough information on pt ; she been trying to reach office regarding this matter

## 2023-07-17 ENCOUNTER — TELEPHONE (OUTPATIENT)
Dept: PODIATRY | Facility: CLINIC | Age: 48
End: 2023-07-17
Payer: OTHER GOVERNMENT

## 2023-07-17 NOTE — TELEPHONE ENCOUNTER
Spoke with Ms Anthony to assist her with rescheduling her appt to 7/28/23 to a different date and time per pt request. Accepted new appt date and time of 7/24/23 at 3:15 pm with an xray prior in [address] at 3 pm. No other needs voiced. Encouraged call back if needed

## 2023-07-21 ENCOUNTER — PATIENT MESSAGE (OUTPATIENT)
Dept: PSYCHIATRY | Facility: CLINIC | Age: 48
End: 2023-07-21
Payer: OTHER GOVERNMENT

## 2023-07-24 ENCOUNTER — OFFICE VISIT (OUTPATIENT)
Dept: PODIATRY | Facility: CLINIC | Age: 48
End: 2023-07-24
Payer: OTHER GOVERNMENT

## 2023-07-24 ENCOUNTER — HOSPITAL ENCOUNTER (OUTPATIENT)
Dept: RADIOLOGY | Facility: HOSPITAL | Age: 48
Discharge: HOME | End: 2023-07-24
Attending: PODIATRIST
Payer: OTHER GOVERNMENT

## 2023-07-24 VITALS
SYSTOLIC BLOOD PRESSURE: 166 MMHG | BODY MASS INDEX: 29.53 KG/M2 | WEIGHT: 173 LBS | DIASTOLIC BLOOD PRESSURE: 94 MMHG | HEIGHT: 64 IN | HEART RATE: 74 BPM

## 2023-07-24 DIAGNOSIS — Z98.890 POSTOPERATIVE STATE: Primary | ICD-10-CM

## 2023-07-24 DIAGNOSIS — Z98.890 POSTOPERATIVE STATE: ICD-10-CM

## 2023-07-24 PROCEDURE — 73630 XR FOOT COMPLETE 3 VIEW RIGHT: ICD-10-PCS | Mod: 26,RT,, | Performed by: RADIOLOGY

## 2023-07-24 PROCEDURE — 73630 X-RAY EXAM OF FOOT: CPT | Mod: TC,PN,RT

## 2023-07-24 PROCEDURE — 73630 X-RAY EXAM OF FOOT: CPT | Mod: 26,RT,, | Performed by: RADIOLOGY

## 2023-07-24 PROCEDURE — 99215 OFFICE O/P EST HI 40 MIN: CPT | Mod: PBBFAC,PN | Performed by: PODIATRIST

## 2023-07-24 PROCEDURE — 99999 PR PBB SHADOW E&M-EST. PATIENT-LVL V: ICD-10-PCS | Mod: PBBFAC,,, | Performed by: PODIATRIST

## 2023-07-24 PROCEDURE — 99024 PR POST-OP FOLLOW-UP VISIT: ICD-10-PCS | Mod: ,,, | Performed by: PODIATRIST

## 2023-07-24 PROCEDURE — 99999 PR PBB SHADOW E&M-EST. PATIENT-LVL V: CPT | Mod: PBBFAC,,, | Performed by: PODIATRIST

## 2023-07-24 PROCEDURE — 99024 POSTOP FOLLOW-UP VISIT: CPT | Mod: ,,, | Performed by: PODIATRIST

## 2023-07-24 NOTE — PATIENT INSTRUCTIONS
May begin gradual 30 minute daily increments to transition to a tennis shoe.    Avoid high impact activities such as running, jumping and squatting.
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
## 2023-08-04 ENCOUNTER — TELEPHONE (OUTPATIENT)
Dept: PODIATRY | Facility: CLINIC | Age: 48
End: 2023-08-04
Payer: OTHER GOVERNMENT

## 2023-08-04 ENCOUNTER — PATIENT MESSAGE (OUTPATIENT)
Dept: PODIATRY | Facility: CLINIC | Age: 48
End: 2023-08-04
Payer: OTHER GOVERNMENT

## 2023-08-04 ENCOUNTER — HOSPITAL ENCOUNTER (OUTPATIENT)
Dept: RADIOLOGY | Facility: HOSPITAL | Age: 48
Discharge: HOME | End: 2023-08-04
Attending: PODIATRIST
Payer: OTHER GOVERNMENT

## 2023-08-04 DIAGNOSIS — S99.921A RIGHT FOOT INJURY, INITIAL ENCOUNTER: Primary | ICD-10-CM

## 2023-08-04 DIAGNOSIS — S99.921A RIGHT FOOT INJURY, INITIAL ENCOUNTER: ICD-10-CM

## 2023-08-04 PROCEDURE — 73630 XR FOOT COMPLETE 3 VIEW RIGHT: ICD-10-PCS | Mod: 26,RT,, | Performed by: RADIOLOGY

## 2023-08-04 PROCEDURE — 73630 X-RAY EXAM OF FOOT: CPT | Mod: 26,RT,, | Performed by: RADIOLOGY

## 2023-08-04 PROCEDURE — 73630 X-RAY EXAM OF FOOT: CPT | Mod: TC,PN,RT
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
## 2023-08-04 NOTE — TELEPHONE ENCOUNTER
Spoke with Ms Anthony to assist her with scheduling her foot xray. Pt accepted appt to have her xray done today at 1 pm in [facility] [address]. No other needs voiced. Encouraged call back if needed
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
## 2023-08-23 ENCOUNTER — PATIENT MESSAGE (OUTPATIENT)
Dept: NEUROLOGY | Facility: CLINIC | Age: 48
End: 2023-08-23
Payer: OTHER GOVERNMENT

## 2023-08-23 DIAGNOSIS — G35 MULTIPLE SCLEROSIS: Primary | ICD-10-CM

## 2023-08-23 DIAGNOSIS — M62.838 MUSCLE SPASM: ICD-10-CM

## 2023-08-23 RX ORDER — BACLOFEN 10 MG/1
10 TABLET ORAL DAILY
Qty: 90 TABLET | Refills: 1 | Status: SHIPPED | OUTPATIENT
Start: 2023-08-23 | End: 2023-11-06 | Stop reason: SDUPTHER

## 2023-08-24 ENCOUNTER — HOSPITAL ENCOUNTER (OUTPATIENT)
Dept: RADIOLOGY | Facility: HOSPITAL | Age: 48
Discharge: HOME | End: 2023-08-24
Attending: PODIATRIST
Payer: OTHER GOVERNMENT

## 2023-08-24 ENCOUNTER — OFFICE VISIT (OUTPATIENT)
Dept: PODIATRY | Facility: CLINIC | Age: 48
End: 2023-08-24
Payer: OTHER GOVERNMENT

## 2023-08-24 VITALS
WEIGHT: 173 LBS | SYSTOLIC BLOOD PRESSURE: 150 MMHG | HEART RATE: 69 BPM | DIASTOLIC BLOOD PRESSURE: 78 MMHG | BODY MASS INDEX: 29.53 KG/M2 | HEIGHT: 64 IN

## 2023-08-24 DIAGNOSIS — Z98.890 POSTOPERATIVE STATE: Primary | ICD-10-CM

## 2023-08-24 DIAGNOSIS — Z98.890 POSTOPERATIVE STATE: ICD-10-CM

## 2023-08-24 PROCEDURE — 99024 POSTOP FOLLOW-UP VISIT: CPT | Mod: ,,, | Performed by: PODIATRIST

## 2023-08-24 PROCEDURE — 73630 X-RAY EXAM OF FOOT: CPT | Mod: 26,RT,, | Performed by: RADIOLOGY

## 2023-08-24 PROCEDURE — 99214 OFFICE O/P EST MOD 30 MIN: CPT | Mod: PBBFAC,PN | Performed by: PODIATRIST

## 2023-08-24 PROCEDURE — 99999 PR PBB SHADOW E&M-EST. PATIENT-LVL IV: CPT | Mod: PBBFAC,,, | Performed by: PODIATRIST

## 2023-08-24 PROCEDURE — 73630 X-RAY EXAM OF FOOT: CPT | Mod: TC,PN,RT

## 2023-08-24 PROCEDURE — 99999 PR PBB SHADOW E&M-EST. PATIENT-LVL IV: ICD-10-PCS | Mod: PBBFAC,,, | Performed by: PODIATRIST

## 2023-08-24 PROCEDURE — 99024 PR POST-OP FOLLOW-UP VISIT: ICD-10-PCS | Mod: ,,, | Performed by: PODIATRIST

## 2023-08-24 PROCEDURE — 73630 XR FOOT COMPLETE 3 VIEW RIGHT: ICD-10-PCS | Mod: 26,RT,, | Performed by: RADIOLOGY
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
## 2023-09-05 ENCOUNTER — LAB VISIT (OUTPATIENT)
Dept: LAB | Facility: HOSPITAL | Age: 48
End: 2023-09-05
Attending: PSYCHIATRY & NEUROLOGY
Payer: OTHER GOVERNMENT

## 2023-09-05 ENCOUNTER — OFFICE VISIT (OUTPATIENT)
Dept: NEUROLOGY | Facility: CLINIC | Age: 48
End: 2023-09-05
Payer: OTHER GOVERNMENT

## 2023-09-05 VITALS
HEART RATE: 76 BPM | WEIGHT: 173.31 LBS | DIASTOLIC BLOOD PRESSURE: 87 MMHG | HEIGHT: 64 IN | BODY MASS INDEX: 29.59 KG/M2 | SYSTOLIC BLOOD PRESSURE: 143 MMHG

## 2023-09-05 DIAGNOSIS — M79.605 LEFT LEG PAIN: Primary | ICD-10-CM

## 2023-09-05 DIAGNOSIS — D84.9 IMMUNOCOMPROMISED: ICD-10-CM

## 2023-09-05 DIAGNOSIS — G35 MS (MULTIPLE SCLEROSIS): ICD-10-CM

## 2023-09-05 DIAGNOSIS — E55.9 VITAMIN D DEFICIENCY: ICD-10-CM

## 2023-09-05 DIAGNOSIS — G35 MULTIPLE SCLEROSIS: ICD-10-CM

## 2023-09-05 PROCEDURE — 99215 OFFICE O/P EST HI 40 MIN: CPT | Mod: PBBFAC,25 | Performed by: PSYCHIATRY & NEUROLOGY

## 2023-09-05 PROCEDURE — 99215 OFFICE O/P EST HI 40 MIN: CPT | Mod: S$PBB,,, | Performed by: PSYCHIATRY & NEUROLOGY

## 2023-09-05 PROCEDURE — 36415 COLL VENOUS BLD VENIPUNCTURE: CPT | Performed by: PSYCHIATRY & NEUROLOGY

## 2023-09-05 PROCEDURE — 99999 PR PBB SHADOW E&M-EST. PATIENT-LVL V: ICD-10-PCS | Mod: PBBFAC,,, | Performed by: PSYCHIATRY & NEUROLOGY

## 2023-09-05 PROCEDURE — 88185 FLOWCYTOMETRY/TC ADD-ON: CPT | Mod: 59 | Performed by: PSYCHIATRY & NEUROLOGY

## 2023-09-05 PROCEDURE — 99215 PR OFFICE/OUTPT VISIT, EST, LEVL V, 40-54 MIN: ICD-10-PCS | Mod: S$PBB,,, | Performed by: PSYCHIATRY & NEUROLOGY

## 2023-09-05 PROCEDURE — 99999 PR PBB SHADOW E&M-EST. PATIENT-LVL V: CPT | Mod: PBBFAC,,, | Performed by: PSYCHIATRY & NEUROLOGY

## 2023-09-05 RX ORDER — ASPIRIN 325 MG
50000 TABLET, DELAYED RELEASE (ENTERIC COATED) ORAL
Qty: 12 CAPSULE | Refills: 3 | Status: SHIPPED | OUTPATIENT
Start: 2023-09-05 | End: 2024-02-29 | Stop reason: SDUPTHER

## 2023-09-05 RX ORDER — GABAPENTIN 100 MG/1
100 CAPSULE ORAL 3 TIMES DAILY
Qty: 90 CAPSULE | Refills: 11 | Status: SHIPPED | OUTPATIENT
Start: 2023-09-05 | End: 2023-11-06 | Stop reason: SDUPTHER

## 2023-09-05 RX ORDER — METOPROLOL TARTRATE 25 MG/1
TABLET, FILM COATED ORAL
COMMUNITY
End: 2023-11-07

## 2023-09-05 RX ORDER — ALPRAZOLAM 0.25 MG/1
0.25 TABLET ORAL DAILY PRN
COMMUNITY
Start: 2023-08-25

## 2023-09-05 NOTE — PROGRESS NOTES
Subjective:          Patient ID: Tiffany Cruz is a 47 y.o. female who presents today for a routine clinic visit for MS.      MS HPI:  DMT: Other Kesimpta -- takes it the last day of the month but has not taken August dose b/c of death in family; last dose end of July   Side effects from DMT? No  Taking vitamin D3 as recommended? Yes -- needs refill   Had surgery for ganglion cyst in May -- was non weight bearing for month; now using scooter;  cannot do timed walk today   Likes Eddiempta; prefers getting a 3 mo supply   [facility]     Medications:  Current Outpatient Medications   Medication Sig    ALPRAZolam (XANAX) 0.25 MG tablet Take 0.25 mg by mouth daily as needed.    baclofen (LIORESAL) 10 MG tablet Take 1 tablet (10 mg total) by mouth once daily.    cholecalciferol, vitamin D3, 1,250 mcg (50,000 unit) capsule Take 1 capsule (50,000 Units total) by mouth every 7 days.    diazePAM (VALIUM) 5 MG tablet Take 1 tablet by mouth 1 hour prior to MRI and 1 tablet at the time of the MRI if needed.    ferrous gluconate (FERGON) 324 MG tablet Take 324 mg by mouth.    gabapentin (NEURONTIN) 100 MG capsule Take 1 capsule (100 mg total) by mouth 3 (three) times daily.    gabapentin (NEURONTIN) 300 MG capsule TAKE 1 CAPSULE EVERY EVENING    metoprolol succinate (TOPROL-XL) 25 MG 24 hr tablet Take 25 mg by mouth.    metoprolol tartrate (LOPRESSOR) 25 MG tablet     ofatumumab (KESIMPTA PEN) 20 mg/0.4 mL PnIj Inject 20 mg into the skin every 28 days.    pantoprazole (PROTONIX) 40 MG tablet Take 40 mg by mouth every morning.    rosuvastatin (CRESTOR) 10 MG tablet     telmisartan-hydrochlorothiazide (MICARDIS HCT) 80-12.5 mg per tablet Take 1 tablet by mouth.    tiZANidine (ZANAFLEX) 6 mg capsule TAKE 1 CAPSULE EVERY EVENING     Current Facility-Administered Medications   Medication Frequency    sodium chloride 0.9% flush 10 mL PRN     Facility-Administered Medications Ordered in Other Visits   Medication  Frequency    0.9%  NaCl infusion Continuous    alteplase injection 2 mg PRN    ferric carboxymaltose (INJECTAFER) 750 mg in sodium chloride 0.9% 250 mL IVPB (ready to mix system) Once    heparin, porcine (PF) 100 unit/mL injection flush 500 Units PRN    mupirocin 2 % ointment On Call Procedure    sodium chloride 0.9% 100 mL flush bag 1 time in Clinic/HOD    sodium chloride 0.9% flush 10 mL PRN       SOCIAL HISTORY  Social History     Tobacco Use    Smoking status: Never    Smokeless tobacco: Never   Substance Use Topics    Alcohol use: No     Alcohol/week: 0.0 standard drinks of alcohol    Drug use: No           6/13/2022    11:10 AM   REVIEW OF SYMPTOMS   Do you feel abnormally tired on most days? No   Do you feel you generally sleep well? No   Do you have difficulty controlling your bladder?  No   Do you have difficulty controlling your bowels?  No   Do you have frequent muscle cramps, tightness or spasms in your limbs?  No   Do you have new visual symptoms?  No   Do you have worsening difficulty with your memory or thinking? No   Do you have worsening symptoms of anxiety or depression?  No   For patients who walk, Do you have more difficulty walking?  No   Have you fallen since your last visit?  No   For patients who use wheelchairs: Do you have any skin wounds or breakdown? Not Applicable   Do you have difficulty using your hands?  No   Do you have shooting or burning pain? No   If you are sexually active, are you using birth control? Y/N  N/A Not Applicable   Do you often choke when swallowing liquids or solid food?  No   Do you experience worsening symptoms when overheated? No   Do you need any new equipment such as a wheelchair, walker or shower chair? No   Do you receive co-pay financial assistance for your principal MS medicine? Not Applicable   Would you be interested in participating in an MS research trial in the future? Not Applicable   For patients on Gilenya, Tecfidera, Aubagio, Rituxan, Ocrevus,  Tysabri, Lemtrada or Methotrexate, are you aware that you should NOT receive live virus vaccines?  Not Applicable   Do you feel you have adequate family/friend support?  Yes   Do you have health insurance?   Yes   Are you currently employed? Yes   Do you receive SSDI/SSI?  Not Applicable   Do you use marijuana or cannabis products? No   Have you been diagnosed with a urinary tract infection since your last visit here? No   Have you been diagnosed with a respiratory tract infection since your last visit here? No   Have you been to the emergency room since your last visit here? No   Have you been hospitalized since your last visit here?  No                Objective:              10/18/2022    12:01 AM 5/10/2023    12:02 AM   Timed 25 Foot Walk:   Did patient wear an AFO? No No   Was assistive device used? No No   Time for 25 Foot Walk (seconds) 3.98 3.7   Time for 25 Foot Walk (seconds) 3.75 3.6       Neurologic Exam  Walk deferred    MENTAL STATUS: grossly intact  CRANIAL NERVE EXAM: There is no internuclear ophthalmoplegia. Extraocular   muscles are intact.  No facial   asymmetry.There is no dysarthria.   MOTOR EXAM: Normal bulk and tone throughout UE and LE bilaterally. Rapid sequential movements are normal. Strength is 5/5 in all groups   in the lower extremities and upper extremities.   REFLEXES: Symmetric and 1+ throughout in all 4 extremities.   SENSORY EXAM: Normal to vibration t/o  COORDINATION: Normal finger-to-nose exam.   GAIT: deferred      Imaging:     No results found for this or any previous visit.    No results found for this or any previous visit.    No results found for this or any previous visit.    Results for orders placed during the hospital encounter of 02/03/23    MRI Brain Demyelinating W W/O Contrast    Impression  Stable white matter lesions intracranially with no new or enhancing lesion.      Electronically signed by: Richard Cool  Date:    02/04/2023  Time:    12:53    Results for  "orders placed during the hospital encounter of 09/22/22    MRI Cervical Spine Demyelinating W W/O Contrast    Impression  1. Compared to prior MRI cervical spine 08/11/2021, similar nonenhancing focus of nonenhancing T2 weighted signal abnormality within the left hemicord at C2.  2. Additionally, there is linear increased T2 weighted signal on the axial GRE sequence not confirmed on additional T2 weighted/fluid sensitive sequences, potentially artifactual in etiology.  An equivocal focus of T2 weighted signal abnormality at the inferior margins of the examination within the cord at the T3 level is additionally noted; dedicated thoracic MRI could be performed, as clinically warranted.  3. No definite evidence of cord atrophy.  No pathologic enhancement.  4. Relatively similar degenerative findings, as discussed.      Electronically signed by: Huber Mistry  Date:    09/23/2022  Time:    07:39    Results for orders placed during the hospital encounter of 07/19/18    MRI Thoracic Spine Demyelinating W W/O Contrast    Impression  New tiny focus of T2 hyperintense signal in the left lateral cord at the level of C2, in keeping with demyelinating plaque given clinical history of multiple sclerosis.  No associated enhancement.  This lesion is distinct from the right dorsal lateral cord lesion at the level of C2 present on examination of 01/15/2017.    Unchanged diffusely decreased T1 marrow signal suggestive of diffuse marrow replacement process such as red marrow reconversion or myelodysplastic process.    Mild cervical spondylosis most notable at C5-6.    Electronically signed by resident: Juan Matias  Date:    07/20/2018  Time:    08:27    Electronically signed by: Mt Good MD  Date:    07/20/2018  Time:    12:41        Labs:     Lab Results   Component Value Date    EUKPCXTU97LE 34 07/28/2021    HKKXXPTC72AG 58 08/05/2020    LYRCFYAV33IV 62 03/03/2020     No results found for: "JCVINDEX", "JCVANTIBODY"  Lab Results "   Component Value Date    NE7HGGVY 81.5 02/21/2022    ABSOLUTECD3 2960 (H) 02/21/2022    VR5DHECO 20.2 02/21/2022    ABSOLUTECD8 735 02/21/2022    KL7CEAYR 61.3 (H) 02/21/2022    ABSOLUTECD4 2227 (H) 02/21/2022    LABCD48 3.03 02/21/2022     Lab Results   Component Value Date    WBC 7.76 03/20/2023    RBC 5.68 (H) 03/20/2023    HGB 12.1 03/20/2023    HCT 40.3 03/20/2023    MCV 71 (L) 03/20/2023    MCH 21.3 (L) 03/20/2023    MCHC 30.0 (L) 03/20/2023    RDW 14.3 03/20/2023     03/20/2023    MPV 11.2 03/20/2023    GRAN 3.5 03/20/2023    GRAN 44.5 03/20/2023    LYMPH 3.2 03/20/2023    LYMPH 40.6 03/20/2023    MONO 0.9 03/20/2023    MONO 12.0 03/20/2023    EOS 0.1 03/20/2023    BASO 0.07 03/20/2023    EOSINOPHIL 1.7 03/20/2023    BASOPHIL 0.9 03/20/2023     Sodium   Date Value Ref Range Status   05/24/2023 137 136 - 145 mmol/L Final     Potassium   Date Value Ref Range Status   05/24/2023 3.5 3.5 - 5.1 mmol/L Final     Chloride   Date Value Ref Range Status   05/24/2023 103 95 - 110 mmol/L Final     CO2   Date Value Ref Range Status   05/24/2023 24 23 - 29 mmol/L Final     Glucose   Date Value Ref Range Status   05/24/2023 86 70 - 110 mg/dL Final     BUN   Date Value Ref Range Status   05/24/2023 12 6 - 20 mg/dL Final     Creatinine   Date Value Ref Range Status   05/24/2023 0.8 0.5 - 1.4 mg/dL Final     Calcium   Date Value Ref Range Status   05/24/2023 8.9 8.7 - 10.5 mg/dL Final     Total Protein   Date Value Ref Range Status   03/20/2023 7.7 6.0 - 8.4 g/dL Final     Albumin   Date Value Ref Range Status   03/20/2023 4.2 3.5 - 5.2 g/dL Final     Total Bilirubin   Date Value Ref Range Status   03/20/2023 0.3 0.1 - 1.0 mg/dL Final     Comment:     For infants and newborns, interpretation of results should be based  on gestational age, weight and in agreement with clinical  observations.    Premature Infant recommended reference ranges:  Up to 24 hours.............<8.0 mg/dL  Up to 48 hours............<12.0  mg/dL  3-5 days..................<15.0 mg/dL  6-29 days.................<15.0 mg/dL       Alkaline Phosphatase   Date Value Ref Range Status   03/20/2023 79 55 - 135 U/L Final     AST   Date Value Ref Range Status   03/20/2023 27 10 - 40 U/L Final     ALT   Date Value Ref Range Status   03/20/2023 38 10 - 44 U/L Final     Anion Gap   Date Value Ref Range Status   05/24/2023 10 8 - 16 mmol/L Final     eGFR if    Date Value Ref Range Status   02/21/2022 >60 >60 mL/min/1.73 m^2 Final     eGFR    Date Value Ref Range Status   03/08/2023 88 (L) >=90 mL/min Final     Comment:     Calculation based on the Chronic Kidney Disease Epidemiology Collaboration (CKD-EPI) equation refit without adjustment for race.     eGFR if non    Date Value Ref Range Status   02/21/2022 >60 >60 mL/min/1.73 m^2 Final     Comment:     Calculation used to obtain the estimated glomerular filtration  rate (eGFR) is the CKD-EPI equation.        Lab Results   Component Value Date    HEPBSAG Non-reactive 03/20/2023    HEPBSAB <3.00 03/20/2023    HEPBSAB Non-reactive 03/20/2023    HEPBCAB Non-reactive 03/20/2023           MS Impression and Plan:     NEURO MULTIPLE SCLEROSIS IMPRESSION:   MS Status:     Number of relapses in the past year?:  0    Clinical Progression:  Clinically Stable    MRI Progression:  Stable  Plan:     DMT:  Other    Symptom Management:  No change in symptom management     Given her planned trip to [facility] in January, will refer her (again) to ID.   Assuming she needs Yellow Fever vaccine, would recommend she stop taking Kesimpta for now.  Last dose was in July; will check today to see if B cells have repopulated and again in October -probably best to hold off on that vaccine until B cells are back, but defer to ID ultimately     Nested retrospective case controled study done in [place] found no increase of MS relapse with yellow fever vaccine:  Romario et al. Mult Scler  . 2021  Dec;27(14):5661-4653. doi: 10.1177/49942845230396770. Epub 2021 Apr 19.    F/u in Feb with Roseanna Daleybeck CNS            Problem List Items Addressed This Visit          1 - High    Multiple sclerosis    Relevant Medications    gabapentin (NEURONTIN) 100 MG capsule       Unprioritized    MS (multiple sclerosis)    Relevant Orders    Ambulatory referral/consult to Infectious Disease    Rituxan Sensitivity     Other Visit Diagnoses       Left leg pain    -  Primary    Relevant Orders    Ambulatory referral/consult to Pain Clinic    Immunocompromised        Relevant Orders    Ambulatory referral/consult to Infectious Disease    Rituxan Sensitivity    Vitamin D deficiency        Relevant Medications    cholecalciferol, vitamin D3, 1,250 mcg (50,000 unit) capsule            Marika Tompkins MD    I spent a total of 40 minutes on the day of the visit.This includes face to face time and non-face to face time preparing to see the patient (eg, review of tests), obtaining and/or reviewing separately obtained history, documenting clinical information in the electronic or other health record, independently interpreting results and communicating results to the patient/family/caregiver, or care coordinator.

## 2023-09-07 LAB
PATH REPORT.FINAL DX SPEC: NORMAL
RITUXAN SENSITIVITY (CD20): NORMAL

## 2023-09-28 ENCOUNTER — OFFICE VISIT (OUTPATIENT)
Dept: INFECTIOUS DISEASES | Facility: CLINIC | Age: 48
End: 2023-09-28
Payer: OTHER GOVERNMENT

## 2023-09-28 VITALS
HEART RATE: 69 BPM | DIASTOLIC BLOOD PRESSURE: 100 MMHG | SYSTOLIC BLOOD PRESSURE: 160 MMHG | BODY MASS INDEX: 30.45 KG/M2 | WEIGHT: 178.38 LBS | TEMPERATURE: 99 F | HEIGHT: 64 IN

## 2023-09-28 DIAGNOSIS — Z71.84 TRAVEL ADVICE ENCOUNTER: Primary | ICD-10-CM

## 2023-09-28 DIAGNOSIS — D84.9 IMMUNOCOMPROMISED: ICD-10-CM

## 2023-09-28 DIAGNOSIS — G35 MS (MULTIPLE SCLEROSIS): ICD-10-CM

## 2023-09-28 DIAGNOSIS — Z23 IMMUNIZATION DUE: ICD-10-CM

## 2023-09-28 PROCEDURE — 99215 OFFICE O/P EST HI 40 MIN: CPT | Mod: PBBFAC | Performed by: INTERNAL MEDICINE

## 2023-09-28 PROCEDURE — 99999 PR PBB SHADOW E&M-EST. PATIENT-LVL V: CPT | Mod: PBBFAC,,, | Performed by: INTERNAL MEDICINE

## 2023-09-28 PROCEDURE — 99402 PREV MED CNSL INDIV APPRX 30: CPT | Mod: S$PBB,,, | Performed by: INTERNAL MEDICINE

## 2023-09-28 PROCEDURE — 99999 PR PBB SHADOW E&M-EST. PATIENT-LVL V: ICD-10-PCS | Mod: PBBFAC,,, | Performed by: INTERNAL MEDICINE

## 2023-09-28 PROCEDURE — 99402 PR PREVENT COUNSEL,INDIV,30 MIN: ICD-10-PCS | Mod: S$PBB,,, | Performed by: INTERNAL MEDICINE

## 2023-09-28 RX ORDER — AZITHROMYCIN 500 MG/1
500 TABLET, FILM COATED ORAL DAILY
Qty: 3 TABLET | Refills: 0 | Status: SHIPPED | OUTPATIENT
Start: 2023-09-28 | End: 2023-10-01

## 2023-09-28 RX ORDER — ATOVAQUONE AND PROGUANIL HYDROCHLORIDE 250; 100 MG/1; MG/1
TABLET, FILM COATED ORAL
Qty: 24 TABLET | Refills: 0 | Status: SHIPPED | OUTPATIENT
Start: 2023-09-28 | End: 2024-02-29

## 2023-09-28 NOTE — PROGRESS NOTES
Subjective:      Patient ID: Tiffany Cruz is a 47 y.o. female.    Chief Complaint:Travel Consult      History of Present Illness    47 year old female with a history of MS for which she is on ofatumumab.  She will be traveling to  [place] from dec 27 to jan 7 and then traveling to [facility] January 17 to January 28.    Review of Systems   All other systems reviewed and are negative.    Objective:   Physical Exam  Vitals and nursing note reviewed.   Constitutional:       Appearance: Normal appearance. She is not ill-appearing.   HENT:      Head: Normocephalic and atraumatic.   Eyes:      General: No scleral icterus.        Right eye: No discharge.         Left eye: No discharge.   Pulmonary:      Effort: Pulmonary effort is normal.   Skin:     Coloration: Skin is not jaundiced.   Neurological:      General: No focal deficit present.      Mental Status: She is alert and oriented to person, place, and time.       Assessment:     1. Travel advice encounter :  She has been advised to not get the yellow fever vaccine due to immune compromise.  Exemption letter for yellow fever was provided to the patient.  Malarone was prescribed for malaria prevention.  Azithromycin was prescribed for use in the event of a severe diarrhea.  She will also get polio, typhoid, and meningococcal vaccines.   2. MS (multiple sclerosis) :  Management per neurology.   3. Immunocompromised :  Will update her vaccines.   4. Immunization due :  Will also order influenza and hepatitis B vaccine.  She has had hepatitis A vaccine before.       Plan:      Travel advice encounter  -     atovaquone-proguaniL (MALARONE) 250-100 mg Tab; Take one tablet daily for malaria prevention. Begin one day before entering malarious area and continue for 1 week after return.  Dispense: 24 tablet; Refill: 0  -     azithromycin (ZITHROMAX) 500 MG tablet; Take 1 tablet (500 mg total) by mouth once daily. Take one tablet once a day x 3 days in the event of diarrhea.  for 3 days  Dispense: 3 tablet; Refill: 0    MS (multiple sclerosis)  -     Ambulatory referral/consult to Infectious Disease    Immunocompromised  -     Ambulatory referral/consult to Infectious Disease    Immunization due  -     Influenza (FLUAD) - Quadrivalent (Adjuvanted) *Preferred* (65+) (PF); Future; Expected date: 09/28/2023  -     (In Office Administered) Meningococcal Conjugate - MCV4O (MENVEO) 1 VIAL Ages 10 Years-55 Years; Future; Expected date: 09/28/2023  -     Hepatitis B (Recombinant) Adjuvanted, 2 dose; Standing  -     Typhoid Vaccine (ViCPs) (IM); Future; Expected date: 09/28/2023  -     Poliovirus Vaccine (IPV) (SQ/IM); Future; Expected date: 09/28/2023         30 minutes of time was spent on this encounter.

## 2023-09-28 NOTE — PATIENT INSTRUCTIONS
Immunizations recommended    Typhoid injection.  Polio injection.  Meningitis injection.  Influenza.  Hepatitis b vaccine.    Travel Accessories  Mosquito repellant for your skin.  The active ingredient is DEET.  It should contain about 20-40% DEET.  This will last for 6 hours on your skin.  Mosquito repellant for your clothes.  The active ingredient is permethrin.  Clothes treated with permethrin are good for 6 weeks.  Take hand  with you.  Sanitize prior to eating.    Other recommendations  Drink only bottled water.  Use bottled water to brush your teeth.  Food should be well done and properly cooked.  Meet should be well done.  Eat cooked vegetables instead of salads.

## 2023-10-19 ENCOUNTER — TELEPHONE (OUTPATIENT)
Dept: INFECTIOUS DISEASES | Facility: CLINIC | Age: 48
End: 2023-10-19
Payer: OTHER GOVERNMENT

## 2023-10-19 NOTE — TELEPHONE ENCOUNTER
----- Message from Sendy Mccall MA sent at 10/19/2023  3:36 PM CDT -----  Regarding: FW: Missed Appointment 10/19/23  Contact: [phone]    ----- Message -----  From: Ena Zapata  Sent: 10/19/2023   2:54 PM CDT  To: #  Subject: Missed Appointment 10/19/23                      Pt called in and is requesting a call back. She wants to know if she can still get injection or reschedule. Please call to further discuss. Thanks

## 2023-10-24 ENCOUNTER — CLINICAL SUPPORT (OUTPATIENT)
Dept: INFECTIOUS DISEASES | Facility: CLINIC | Age: 48
End: 2023-10-24
Payer: OTHER GOVERNMENT

## 2023-10-24 DIAGNOSIS — Z23 IMMUNIZATION DUE: ICD-10-CM

## 2023-10-24 PROCEDURE — 99999 PR PBB SHADOW E&M-EST. PATIENT-LVL I: CPT | Mod: PBBFAC,,,

## 2023-10-24 PROCEDURE — 99211 OFF/OP EST MAY X REQ PHY/QHP: CPT | Mod: PBBFAC

## 2023-10-24 PROCEDURE — 99999 PR PBB SHADOW E&M-EST. PATIENT-LVL I: ICD-10-PCS | Mod: PBBFAC,,,

## 2023-10-24 PROCEDURE — 99999PBSHW HEPATITIS B (RECOMBINANT) ADJUVANTED, 2 DOSE: ICD-10-PCS | Mod: PBBFAC,,,

## 2023-10-24 PROCEDURE — 90471 IMMUNIZATION ADMIN: CPT | Mod: PBBFAC

## 2023-10-24 PROCEDURE — 90739 HEPB VACC 2/4 DOSE ADULT IM: CPT | Mod: PBBFAC

## 2023-10-24 PROCEDURE — 99999PBSHW HEPATITIS B (RECOMBINANT) ADJUVANTED, 2 DOSE: Mod: PBBFAC,,,

## 2023-10-24 NOTE — PROGRESS NOTES
Patient received the Hep B #2 vaccine in the left deltoid. Pt tolerated well. Pt asked to wait in the clinic 15 minutes after injection in the event of an allergic reaction. Pt verbalized understanding. Pt left in NAD.

## 2023-11-06 ENCOUNTER — OFFICE VISIT (OUTPATIENT)
Dept: NEUROLOGY | Facility: CLINIC | Age: 48
End: 2023-11-06
Payer: OTHER GOVERNMENT

## 2023-11-06 ENCOUNTER — LAB VISIT (OUTPATIENT)
Dept: LAB | Facility: HOSPITAL | Age: 48
End: 2023-11-06
Attending: PSYCHIATRY & NEUROLOGY
Payer: OTHER GOVERNMENT

## 2023-11-06 VITALS
HEART RATE: 75 BPM | DIASTOLIC BLOOD PRESSURE: 79 MMHG | HEIGHT: 64 IN | BODY MASS INDEX: 30.39 KG/M2 | SYSTOLIC BLOOD PRESSURE: 123 MMHG | WEIGHT: 178 LBS

## 2023-11-06 DIAGNOSIS — M79.2 NERVE PAIN: ICD-10-CM

## 2023-11-06 DIAGNOSIS — M62.838 MUSCLE SPASM: ICD-10-CM

## 2023-11-06 DIAGNOSIS — D84.9 IMMUNOCOMPROMISED: ICD-10-CM

## 2023-11-06 DIAGNOSIS — G35 MS (MULTIPLE SCLEROSIS): ICD-10-CM

## 2023-11-06 DIAGNOSIS — G35 MULTIPLE SCLEROSIS: ICD-10-CM

## 2023-11-06 DIAGNOSIS — G35 MS (MULTIPLE SCLEROSIS): Primary | ICD-10-CM

## 2023-11-06 DIAGNOSIS — Z29.89 IMMUNOTHERAPY: ICD-10-CM

## 2023-11-06 DIAGNOSIS — Z71.89 COUNSELING REGARDING GOALS OF CARE: ICD-10-CM

## 2023-11-06 LAB
ALBUMIN SERPL BCP-MCNC: 3.9 G/DL (ref 3.5–5.2)
ALP SERPL-CCNC: 63 U/L (ref 55–135)
ALT SERPL W/O P-5'-P-CCNC: 25 U/L (ref 10–44)
ANION GAP SERPL CALC-SCNC: 10 MMOL/L (ref 8–16)
AST SERPL-CCNC: 20 U/L (ref 10–40)
BASOPHILS # BLD AUTO: 0.07 K/UL (ref 0–0.2)
BASOPHILS NFR BLD: 0.8 % (ref 0–1.9)
BILIRUB SERPL-MCNC: 0.2 MG/DL (ref 0.1–1)
BUN SERPL-MCNC: 10 MG/DL (ref 6–20)
CALCIUM SERPL-MCNC: 9 MG/DL (ref 8.7–10.5)
CHLORIDE SERPL-SCNC: 102 MMOL/L (ref 95–110)
CO2 SERPL-SCNC: 28 MMOL/L (ref 23–29)
CREAT SERPL-MCNC: 0.7 MG/DL (ref 0.5–1.4)
DIFFERENTIAL METHOD: ABNORMAL
EOSINOPHIL # BLD AUTO: 0.1 K/UL (ref 0–0.5)
EOSINOPHIL NFR BLD: 1.4 % (ref 0–8)
ERYTHROCYTE [DISTWIDTH] IN BLOOD BY AUTOMATED COUNT: 14.6 % (ref 11.5–14.5)
EST. GFR  (NO RACE VARIABLE): >60 ML/MIN/1.73 M^2
GLUCOSE SERPL-MCNC: 75 MG/DL (ref 70–110)
HCT VFR BLD AUTO: 36.9 % (ref 37–48.5)
HGB BLD-MCNC: 11.2 G/DL (ref 12–16)
IMM GRANULOCYTES # BLD AUTO: 0.01 K/UL (ref 0–0.04)
IMM GRANULOCYTES NFR BLD AUTO: 0.1 % (ref 0–0.5)
LYMPHOCYTES # BLD AUTO: 3 K/UL (ref 1–4.8)
LYMPHOCYTES NFR BLD: 35.5 % (ref 18–48)
MCH RBC QN AUTO: 21.8 PG (ref 27–31)
MCHC RBC AUTO-ENTMCNC: 30.4 G/DL (ref 32–36)
MCV RBC AUTO: 72 FL (ref 82–98)
MONOCYTES # BLD AUTO: 1.1 K/UL (ref 0.3–1)
MONOCYTES NFR BLD: 12.3 % (ref 4–15)
NEUTROPHILS # BLD AUTO: 4.2 K/UL (ref 1.8–7.7)
NEUTROPHILS NFR BLD: 49.9 % (ref 38–73)
NRBC BLD-RTO: 0 /100 WBC
PLATELET # BLD AUTO: 344 K/UL (ref 150–450)
PMV BLD AUTO: 11.8 FL (ref 9.2–12.9)
POTASSIUM SERPL-SCNC: 3.6 MMOL/L (ref 3.5–5.1)
PROT SERPL-MCNC: 7.3 G/DL (ref 6–8.4)
RBC # BLD AUTO: 5.14 M/UL (ref 4–5.4)
SODIUM SERPL-SCNC: 140 MMOL/L (ref 136–145)
WBC # BLD AUTO: 8.51 K/UL (ref 3.9–12.7)

## 2023-11-06 PROCEDURE — 99215 OFFICE O/P EST HI 40 MIN: CPT | Mod: S$PBB,,, | Performed by: PSYCHIATRY & NEUROLOGY

## 2023-11-06 PROCEDURE — 99215 PR OFFICE/OUTPT VISIT, EST, LEVL V, 40-54 MIN: ICD-10-PCS | Mod: S$PBB,,, | Performed by: PSYCHIATRY & NEUROLOGY

## 2023-11-06 PROCEDURE — 99999 PR PBB SHADOW E&M-EST. PATIENT-LVL IV: ICD-10-PCS | Mod: PBBFAC,,, | Performed by: PSYCHIATRY & NEUROLOGY

## 2023-11-06 PROCEDURE — 80053 COMPREHEN METABOLIC PANEL: CPT | Performed by: PSYCHIATRY & NEUROLOGY

## 2023-11-06 PROCEDURE — 88185 FLOWCYTOMETRY/TC ADD-ON: CPT | Mod: 59 | Performed by: PSYCHIATRY & NEUROLOGY

## 2023-11-06 PROCEDURE — 36415 COLL VENOUS BLD VENIPUNCTURE: CPT | Performed by: PSYCHIATRY & NEUROLOGY

## 2023-11-06 PROCEDURE — 99999 PR PBB SHADOW E&M-EST. PATIENT-LVL IV: CPT | Mod: PBBFAC,,, | Performed by: PSYCHIATRY & NEUROLOGY

## 2023-11-06 PROCEDURE — 99214 OFFICE O/P EST MOD 30 MIN: CPT | Mod: PBBFAC | Performed by: PSYCHIATRY & NEUROLOGY

## 2023-11-06 PROCEDURE — 85025 COMPLETE CBC W/AUTO DIFF WBC: CPT | Performed by: PSYCHIATRY & NEUROLOGY

## 2023-11-06 RX ORDER — LOSARTAN POTASSIUM AND HYDROCHLOROTHIAZIDE 25; 100 MG/1; MG/1
1 TABLET ORAL DAILY
COMMUNITY
Start: 2023-10-23 | End: 2023-11-22

## 2023-11-06 RX ORDER — ERGOCALCIFEROL 1.25 MG/1
CAPSULE ORAL
COMMUNITY
End: 2023-11-07 | Stop reason: SDUPTHER

## 2023-11-06 NOTE — PROGRESS NOTES
Subjective:          Patient ID: Tiffany Cruz is a 48 y.o. female who presents today for a routine clinic visit for MS.      MS HPI:  DMT: None Off K[facility]pta since July b/c planning to go to [place] and considering live virus vaccines  Taking vitamin D3 as recommended? Yes -   Saw Dr. Razo who recommended to hold off on yellow fever vaccine b/c she is immunocomprised and b/c B cells were still down last month.    She's feeling more fatigue lately, and admits she only gets about 4 hours of sleep   Walking is slowly improving after surgery in May on her foot    Medications:  Current Outpatient Medications   Medication Sig    baclofen (LIORESAL) 10 MG tablet Take 1 tablet (10 mg total) by mouth once daily. (Patient taking differently: Take 10 mg by mouth once daily. Pt takes PRN)    cholecalciferol, vitamin D3, 1,250 mcg (50,000 unit) capsule Take 1 capsule (50,000 Units total) by mouth every 7 days.    gabapentin (NEURONTIN) 300 MG capsule TAKE 1 CAPSULE EVERY EVENING    losartan-hydrochlorothiazide 100-25 mg (HYZAAR) 100-25 mg per tablet Take 1 tablet by mouth once daily.    tiZANidine (ZANAFLEX) 6 mg capsule TAKE 1 CAPSULE EVERY EVENING    ALPRAZolam (XANAX) 0.25 MG tablet Take 0.25 mg by mouth daily as needed.    atovaquone-proguaniL (MALARONE) 250-100 mg Tab Take one tablet daily for malaria prevention. Begin one day before entering malarious area and continue for 1 week after return. (Patient not taking: Reported on 11/6/2023)    diazePAM (VALIUM) 5 MG tablet Take 1 tablet by mouth 1 hour prior to MRI and 1 tablet at the time of the MRI if needed. (Patient not taking: Reported on 11/6/2023)    ergocalciferol (ERGOCALCIFEROL) 50,000 unit Cap     ferrous gluconate (FERGON) 324 MG tablet Take 324 mg by mouth.    gabapentin (NEURONTIN) 100 MG capsule Take 1 capsule (100 mg total) by mouth 3 (three) times daily. (Patient not taking: Reported on 11/6/2023)    metoprolol succinate (TOPROL-XL) 25 MG 24 hr  tablet Take 25 mg by mouth.    metoprolol tartrate (LOPRESSOR) 25 MG tablet     ofatumumab (KESIMPTA PEN) 20 mg/0.4 mL PnIj Inject 20 mg into the skin every 28 days. (Patient not taking: Reported on 11/6/2023)    pantoprazole (PROTONIX) 40 MG tablet Take 40 mg by mouth every morning.    rosuvastatin (CRESTOR) 10 MG tablet     telmisartan-hydrochlorothiazide (MICARDIS HCT) 80-12.5 mg per tablet Take 1 tablet by mouth.     Current Facility-Administered Medications   Medication Frequency    sodium chloride 0.9% flush 10 mL PRN     Facility-Administered Medications Ordered in Other Visits   Medication Frequency    0.9%  NaCl infusion Continuous    alteplase injection 2 mg PRN    ferric carboxymaltose (INJECTAFER) 750 mg in sodium chloride 0.9% 250 mL IVPB (ready to mix system) Once    heparin, porcine (PF) 100 unit/mL injection flush 500 Units PRN    mupirocin 2 % ointment On Call Procedure    sodium chloride 0.9% 100 mL flush bag 1 time in Clinic/HOD    sodium chloride 0.9% flush 10 mL PRN       SOCIAL HISTORY  Social History     Tobacco Use    Smoking status: Never    Smokeless tobacco: Never   Substance Use Topics    Alcohol use: No     Alcohol/week: 0.0 standard drinks of alcohol    Drug use: No                Objective:        Neurologic Exam  MENTAL STATUS: grossly intact  CRANIAL NERVE EXAM: There is no internuclear ophthalmoplegia. Extraocular   muscles are intact.  No facial   asymmetry.There is no dysarthria.   MOTOR EXAM: Normal bulk and tone throughout UE and LE bilaterally. Rapid sequential movements are normal. Strength is 5/5 in all groups   in the lower extremities and upper extremities.   REFLEXES: Symmetric and 1+ throughout in all 4 extremities.   SENSORY EXAM: Normal to vibration t/o  COORDINATION: Normal finger-to-nose exam.   GAIT: deferred    Imaging:       Results for orders placed during the hospital encounter of 02/03/23    MRI Brain Demyelinating W W/O Contrast    Impression  Stable white  matter lesions intracranially with no new or enhancing lesion.      Electronically signed by: Richard Cool  Date:    02/04/2023  Time:    12:53    Results for orders placed during the hospital encounter of 09/22/22    MRI Cervical Spine Demyelinating W W/O Contrast    Impression  1. Compared to prior MRI cervical spine 08/11/2021, similar nonenhancing focus of nonenhancing T2 weighted signal abnormality within the left hemicord at C2.  2. Additionally, there is linear increased T2 weighted signal on the axial GRE sequence not confirmed on additional T2 weighted/fluid sensitive sequences, potentially artifactual in etiology.  An equivocal focus of T2 weighted signal abnormality at the inferior margins of the examination within the cord at the T3 level is additionally noted; dedicated thoracic MRI could be performed, as clinically warranted.  3. No definite evidence of cord atrophy.  No pathologic enhancement.  4. Relatively similar degenerative findings, as discussed.      Electronically signed by: Huber Mistry  Date:    09/23/2022  Time:    07:39    Results for orders placed during the hospital encounter of 07/19/18    MRI Thoracic Spine Demyelinating W W/O Contrast    Impression  New tiny focus of T2 hyperintense signal in the left lateral cord at the level of C2, in keeping with demyelinating plaque given clinical history of multiple sclerosis.  No associated enhancement.  This lesion is distinct from the right dorsal lateral cord lesion at the level of C2 present on examination of 01/15/2017.    Unchanged diffusely decreased T1 marrow signal suggestive of diffuse marrow replacement process such as red marrow reconversion or myelodysplastic process.    Mild cervical spondylosis most notable at C5-6.    Electronically signed by resident: Juan Matias  Date:    07/20/2018  Time:    08:27    Electronically signed by: Mt Good MD  Date:    07/20/2018  Time:    12:41        Labs:     Lab Results   Component Value  "Date    SDZYOZOJ42BC 34 07/28/2021    FWVFIXLA07MF 58 08/05/2020    OEUPCKLT90PU 62 03/03/2020     No results found for: "JCVINDEX", "JCVANTIBODY"  Lab Results   Component Value Date    PX7SMHKU 81.5 02/21/2022    ABSOLUTECD3 2960 (H) 02/21/2022    CL5LQNXQ 20.2 02/21/2022    ABSOLUTECD8 735 02/21/2022    XL2TBQJP 61.3 (H) 02/21/2022    ABSOLUTECD4 2227 (H) 02/21/2022    LABCD48 3.03 02/21/2022     Lab Results   Component Value Date    WBC 7.76 03/20/2023    RBC 5.68 (H) 03/20/2023    HGB 12.1 03/20/2023    HCT 40.3 03/20/2023    MCV 71 (L) 03/20/2023    MCH 21.3 (L) 03/20/2023    MCHC 30.0 (L) 03/20/2023    RDW 14.3 03/20/2023     03/20/2023    MPV 11.2 03/20/2023    GRAN 3.5 03/20/2023    GRAN 44.5 03/20/2023    LYMPH 3.2 03/20/2023    LYMPH 40.6 03/20/2023    MONO 0.9 03/20/2023    MONO 12.0 03/20/2023    EOS 0.1 03/20/2023    BASO 0.07 03/20/2023    EOSINOPHIL 1.7 03/20/2023    BASOPHIL 0.9 03/20/2023     Sodium   Date Value Ref Range Status   05/24/2023 137 136 - 145 mmol/L Final     Potassium   Date Value Ref Range Status   05/24/2023 3.5 3.5 - 5.1 mmol/L Final     Chloride   Date Value Ref Range Status   05/24/2023 103 95 - 110 mmol/L Final     CO2   Date Value Ref Range Status   05/24/2023 24 23 - 29 mmol/L Final     Glucose   Date Value Ref Range Status   05/24/2023 86 70 - 110 mg/dL Final     BUN   Date Value Ref Range Status   05/24/2023 12 6 - 20 mg/dL Final     Creatinine   Date Value Ref Range Status   05/24/2023 0.8 0.5 - 1.4 mg/dL Final     Calcium   Date Value Ref Range Status   05/24/2023 8.9 8.7 - 10.5 mg/dL Final     Total Protein   Date Value Ref Range Status   03/20/2023 7.7 6.0 - 8.4 g/dL Final     Albumin   Date Value Ref Range Status   03/20/2023 4.2 3.5 - 5.2 g/dL Final     Total Bilirubin   Date Value Ref Range Status   03/20/2023 0.3 0.1 - 1.0 mg/dL Final     Comment:     For infants and newborns, interpretation of results should be based  on gestational age, weight and in " agreement with clinical  observations.    Premature Infant recommended reference ranges:  Up to 24 hours.............<8.0 mg/dL  Up to 48 hours............<12.0 mg/dL  3-5 days..................<15.0 mg/dL  6-29 days.................<15.0 mg/dL       Alkaline Phosphatase   Date Value Ref Range Status   03/20/2023 79 55 - 135 U/L Final     AST   Date Value Ref Range Status   03/20/2023 27 10 - 40 U/L Final     ALT   Date Value Ref Range Status   03/20/2023 38 10 - 44 U/L Final     Anion Gap   Date Value Ref Range Status   05/24/2023 10 8 - 16 mmol/L Final     eGFR if    Date Value Ref Range Status   02/21/2022 >60 >60 mL/min/1.73 m^2 Final     eGFR    Date Value Ref Range Status   03/08/2023 88 (L) >=90 mL/min Final     Comment:     Calculation based on the Chronic Kidney Disease Epidemiology Collaboration (CKD-EPI) equation refit without adjustment for race.     eGFR if non    Date Value Ref Range Status   02/21/2022 >60 >60 mL/min/1.73 m^2 Final     Comment:     Calculation used to obtain the estimated glomerular filtration  rate (eGFR) is the CKD-EPI equation.        Lab Results   Component Value Date    HEPBSAG Non-reactive 03/20/2023    HEPBSAB <3.00 03/20/2023    HEPBSAB Non-reactive 03/20/2023    HEPBCAB Non-reactive 03/20/2023           MS Impression and Plan:     NEURO MULTIPLE SCLEROSIS IMPRESSION:   MS Status:     Number of relapses in the past year?:  0    Clinical Progression:  Clinically Stable    MRI Progression:  Stable  Plan:     DMT:  No change in management    Symptom Management:  No change in symptom management     Will check B cells again today and in December. If B cells back, will discuss pros/cons of yellow fever vaccine with Dr. Razo.  Agree she should not proceed with yellow fever vaccine if B cells undetectable.  Has not been on Kesimpta since July  F/u [facility] in February       Problem List Items Addressed This Visit           Unprioritized    MS (multiple sclerosis) - Primary    Relevant Orders    CBC Auto Differential    Comprehensive Metabolic Panel    Rituxan Sensitivity     Other Visit Diagnoses       Immunotherapy        Relevant Orders    CBC Auto Differential    Comprehensive Metabolic Panel    Rituxan Sensitivity    Immunocompromised        Counseling regarding goals of care                Marika Tompkins MD    I spent a total of 40 minutes on the day of the visit.This includes face to face time and non-face to face time preparing to see the patient (eg, review of tests), obtaining and/or reviewing separately obtained history, documenting clinical information in the electronic or other health record, independently interpreting results and communicating results to the patient/family/caregiver, or care coordinator.

## 2023-11-07 RX ORDER — BACLOFEN 10 MG/1
10 TABLET ORAL DAILY
Qty: 90 TABLET | Refills: 1 | Status: SHIPPED | OUTPATIENT
Start: 2023-11-07 | End: 2023-11-07

## 2023-11-07 RX ORDER — BACLOFEN 10 MG/1
20 TABLET ORAL DAILY
Qty: 180 TABLET | Refills: 1 | Status: SHIPPED | OUTPATIENT
Start: 2023-11-07 | End: 2024-11-06

## 2023-11-07 RX ORDER — GABAPENTIN 300 MG/1
300 CAPSULE ORAL NIGHTLY
Qty: 90 CAPSULE | Refills: 3 | Status: SHIPPED | OUTPATIENT
Start: 2023-11-07

## 2023-11-07 RX ORDER — TIZANIDINE HYDROCHLORIDE 6 MG/1
6 CAPSULE, GELATIN COATED ORAL NIGHTLY
Qty: 90 CAPSULE | Refills: 3 | Status: SHIPPED | OUTPATIENT
Start: 2023-11-07

## 2023-11-07 RX ORDER — GABAPENTIN 100 MG/1
100 CAPSULE ORAL 3 TIMES DAILY
Qty: 90 CAPSULE | Refills: 11 | Status: SHIPPED | OUTPATIENT
Start: 2023-11-07

## 2023-11-08 LAB
PATH REPORT.FINAL DX SPEC: NORMAL
RITUXAN SENSITIVITY (CD20): NORMAL

## 2023-11-09 ENCOUNTER — TELEPHONE (OUTPATIENT)
Dept: NEUROLOGY | Facility: CLINIC | Age: 48
End: 2023-11-09
Payer: OTHER GOVERNMENT

## 2023-11-09 NOTE — TELEPHONE ENCOUNTER
----- Message from Yulisa Leggett RN sent at 11/8/2023  1:30 PM CST -----  Regarding: FW: Pharmacy Call back requested  Contact: [phone]    ----- Message -----  From: Collin Lynn  Sent: 11/8/2023   1:06 PM CST  To: [facility] Staff  Subject: Pharmacy Call back requested                     Hi, [facility] called to request a call back to discuss the medication  (2)baclofen (LIORESAL) 10 MG tablet, tiZANidine (ZANAFLEX) 6 mg capsule. Pls call the pharmacy at:        [facility] - [place], [place] - [address] [place] [address]      Phone number [phone] / Reference number [id number]

## 2023-11-09 NOTE — TELEPHONE ENCOUNTER
Spoke to Joanne bosworth, pharmacist, at [facility] and clarified RX for baclofen and tizanidine

## 2023-11-28 ENCOUNTER — TELEPHONE (OUTPATIENT)
Dept: PODIATRY | Facility: CLINIC | Age: 48
End: 2023-11-28

## 2023-11-28 ENCOUNTER — OFFICE VISIT (OUTPATIENT)
Dept: PODIATRY | Facility: CLINIC | Age: 48
End: 2023-11-28
Payer: OTHER GOVERNMENT

## 2023-11-28 ENCOUNTER — HOSPITAL ENCOUNTER (OUTPATIENT)
Dept: RADIOLOGY | Facility: HOSPITAL | Age: 48
Discharge: HOME | End: 2023-11-28
Attending: PODIATRIST
Payer: OTHER GOVERNMENT

## 2023-11-28 VITALS
BODY MASS INDEX: 30.55 KG/M2 | SYSTOLIC BLOOD PRESSURE: 153 MMHG | HEART RATE: 71 BPM | HEIGHT: 64 IN | DIASTOLIC BLOOD PRESSURE: 88 MMHG

## 2023-11-28 DIAGNOSIS — M76.71 PERONEAL TENDINITIS OF RIGHT LOWER EXTREMITY: ICD-10-CM

## 2023-11-28 DIAGNOSIS — G89.29 CHRONIC RIGHT-SIDED LOW BACK PAIN WITH RIGHT-SIDED SCIATICA: Primary | ICD-10-CM

## 2023-11-28 DIAGNOSIS — M54.41 ACUTE BACK PAIN WITH SCIATICA, RIGHT: Primary | ICD-10-CM

## 2023-11-28 DIAGNOSIS — M76.821 POSTERIOR TIBIAL TENDON DYSFUNCTION (PTTD) OF RIGHT LOWER EXTREMITY: ICD-10-CM

## 2023-11-28 DIAGNOSIS — M54.41 CHRONIC RIGHT-SIDED LOW BACK PAIN WITH RIGHT-SIDED SCIATICA: Primary | ICD-10-CM

## 2023-11-28 DIAGNOSIS — M76.71 PERONEAL TENDONITIS, RIGHT: ICD-10-CM

## 2023-11-28 DIAGNOSIS — M76.821 POSTERIOR TIBIAL TENDINITIS OF RIGHT LEG: ICD-10-CM

## 2023-11-28 DIAGNOSIS — Z98.890 POSTOPERATIVE STATE: ICD-10-CM

## 2023-11-28 DIAGNOSIS — M62.81 MUSCLE WEAKNESS OF LOWER EXTREMITY: ICD-10-CM

## 2023-11-28 PROCEDURE — 99213 OFFICE O/P EST LOW 20 MIN: CPT | Mod: S$PBB,,, | Performed by: PODIATRIST

## 2023-11-28 PROCEDURE — 99213 PR OFFICE/OUTPT VISIT, EST, LEVL III, 20-29 MIN: ICD-10-PCS | Mod: S$PBB,,, | Performed by: PODIATRIST

## 2023-11-28 PROCEDURE — 73630 XR FOOT COMPLETE 3 VIEW RIGHT: ICD-10-PCS | Mod: 26,RT,, | Performed by: INTERNAL MEDICINE

## 2023-11-28 PROCEDURE — 99214 OFFICE O/P EST MOD 30 MIN: CPT | Mod: PBBFAC,PN | Performed by: PODIATRIST

## 2023-11-28 PROCEDURE — 99999 PR PBB SHADOW E&M-EST. PATIENT-LVL IV: ICD-10-PCS | Mod: PBBFAC,,, | Performed by: PODIATRIST

## 2023-11-28 PROCEDURE — 73630 X-RAY EXAM OF FOOT: CPT | Mod: 26,RT,, | Performed by: INTERNAL MEDICINE

## 2023-11-28 PROCEDURE — 73630 X-RAY EXAM OF FOOT: CPT | Mod: TC,PN,RT

## 2023-11-28 PROCEDURE — 99999 PR PBB SHADOW E&M-EST. PATIENT-LVL IV: CPT | Mod: PBBFAC,,, | Performed by: PODIATRIST
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
## 2023-11-29 ENCOUNTER — TELEPHONE (OUTPATIENT)
Dept: PODIATRY | Facility: CLINIC | Age: 48
End: 2023-11-29
Payer: OTHER GOVERNMENT

## 2023-11-29 NOTE — TELEPHONE ENCOUNTER
Faxed PT referral and supporting documentation to [facility] as requested per pt. Phone:[phone]/Fax:[phone]

## 2023-11-29 NOTE — TELEPHONE ENCOUNTER
----- Message from Penelope Jacmkan RN sent at 11/28/2023  3:25 PM CST -----  Send referral to [facility]

## 2023-12-27 ENCOUNTER — LAB VISIT (OUTPATIENT)
Dept: LAB | Facility: HOSPITAL | Age: 48
End: 2023-12-27
Attending: PSYCHIATRY & NEUROLOGY
Payer: OTHER GOVERNMENT

## 2023-12-27 ENCOUNTER — CLINICAL SUPPORT (OUTPATIENT)
Dept: INFECTIOUS DISEASES | Facility: CLINIC | Age: 48
End: 2023-12-27
Payer: OTHER GOVERNMENT

## 2023-12-27 DIAGNOSIS — Z23 IMMUNIZATION DUE: ICD-10-CM

## 2023-12-27 DIAGNOSIS — Z29.89 IMMUNOTHERAPY: ICD-10-CM

## 2023-12-27 DIAGNOSIS — G35 MS (MULTIPLE SCLEROSIS): ICD-10-CM

## 2023-12-27 PROCEDURE — 90713 POLIOVIRUS IPV SC/IM: CPT | Mod: PBBFAC

## 2023-12-27 PROCEDURE — 36415 COLL VENOUS BLD VENIPUNCTURE: CPT | Performed by: PSYCHIATRY & NEUROLOGY

## 2023-12-27 PROCEDURE — 99999 PR PBB SHADOW E&M-EST. PATIENT-LVL I: CPT | Mod: PBBFAC,,,

## 2023-12-27 PROCEDURE — 99999PBSHW POLIOVIRUS VACCINE IPV SQ/IM: Mod: PBBFAC,,,

## 2023-12-27 PROCEDURE — 88185 FLOWCYTOMETRY/TC ADD-ON: CPT | Mod: 59 | Performed by: PSYCHIATRY & NEUROLOGY

## 2023-12-27 PROCEDURE — 99999PBSHW MENINGOCOCCAL CONJUGATE VACCINE 4-VALENT IM (MENVEO) 1 VIAL AGES 10 YEARS-55 YEARS: Mod: PBBFAC,,,

## 2023-12-27 PROCEDURE — 99999PBSHW POLIOVIRUS VACCINE IPV SQ/IM: ICD-10-PCS | Mod: PBBFAC,,,

## 2023-12-27 PROCEDURE — 99999 PR PBB SHADOW E&M-EST. PATIENT-LVL I: ICD-10-PCS | Mod: PBBFAC,,,

## 2023-12-27 PROCEDURE — 90734 MENACWYD/MENACWYCRM VACC IM: CPT | Mod: PBBFAC

## 2023-12-27 PROCEDURE — 99211 OFF/OP EST MAY X REQ PHY/QHP: CPT | Mod: PBBFAC,25

## 2023-12-27 NOTE — PROGRESS NOTES
Patient received 2 vaccines IM to the right deltoid menveo posterior and polio anterior.  Tolerated well and left in NAD

## 2023-12-28 ENCOUNTER — TELEPHONE (OUTPATIENT)
Dept: INFECTIOUS DISEASES | Facility: CLINIC | Age: 48
End: 2023-12-28
Payer: OTHER GOVERNMENT

## 2023-12-28 ENCOUNTER — CLINICAL SUPPORT (OUTPATIENT)
Dept: INFECTIOUS DISEASES | Facility: CLINIC | Age: 48
End: 2023-12-28

## 2023-12-28 DIAGNOSIS — Z23 IMMUNIZATION DUE: ICD-10-CM

## 2023-12-28 PROCEDURE — 99999 PR PBB SHADOW E&M-EST. PATIENT-LVL I: CPT | Mod: PBBFAC,,,

## 2023-12-28 PROCEDURE — 90471 IMMUNIZATION ADMIN: CPT | Mod: S$GLB,,, | Performed by: INTERNAL MEDICINE

## 2023-12-28 PROCEDURE — 90471 TYPHOID VICPS VACCINE IM: ICD-10-PCS | Mod: S$GLB,,, | Performed by: INTERNAL MEDICINE

## 2023-12-28 PROCEDURE — 90691 TYPHOID VICPS VACCINE IM: ICD-10-PCS | Mod: S$GLB,,, | Performed by: INTERNAL MEDICINE

## 2023-12-28 PROCEDURE — 99999 PR PBB SHADOW E&M-EST. PATIENT-LVL I: ICD-10-PCS | Mod: PBBFAC,,,

## 2023-12-28 PROCEDURE — 90691 TYPHOID VACCINE IM: CPT | Mod: S$GLB,,, | Performed by: INTERNAL MEDICINE

## 2023-12-28 NOTE — TELEPHONE ENCOUNTER
----- Message from Zara Peres sent at 12/28/2023  1:46 PM CST -----  Regarding: Injections  Contact: Pt  [phone]  Pt is calling to see if she can have injections today states she has to leave [place] tomorrow please call

## 2023-12-29 LAB
PATH REPORT.FINAL DX SPEC: NORMAL
RITUXAN SENSITIVITY (CD20): NORMAL

## 2024-01-16 ENCOUNTER — OFFICE VISIT (OUTPATIENT)
Dept: PODIATRY | Facility: CLINIC | Age: 49
End: 2024-01-16
Payer: OTHER GOVERNMENT

## 2024-01-16 ENCOUNTER — TELEPHONE (OUTPATIENT)
Dept: PODIATRY | Facility: CLINIC | Age: 49
End: 2024-01-16
Payer: OTHER GOVERNMENT

## 2024-01-16 VITALS
SYSTOLIC BLOOD PRESSURE: 151 MMHG | HEIGHT: 64 IN | BODY MASS INDEX: 30.55 KG/M2 | DIASTOLIC BLOOD PRESSURE: 89 MMHG | HEART RATE: 77 BPM

## 2024-01-16 DIAGNOSIS — G89.29 CHRONIC RIGHT-SIDED LOW BACK PAIN WITH RIGHT-SIDED SCIATICA: ICD-10-CM

## 2024-01-16 DIAGNOSIS — M76.71 PERONEAL TENDINITIS OF RIGHT LOWER EXTREMITY: ICD-10-CM

## 2024-01-16 DIAGNOSIS — M76.821 POSTERIOR TIBIAL TENDINITIS OF RIGHT LEG: Primary | ICD-10-CM

## 2024-01-16 DIAGNOSIS — M79.671 RIGHT FOOT PAIN: ICD-10-CM

## 2024-01-16 DIAGNOSIS — M54.41 CHRONIC RIGHT-SIDED LOW BACK PAIN WITH RIGHT-SIDED SCIATICA: ICD-10-CM

## 2024-01-16 PROCEDURE — 99213 OFFICE O/P EST LOW 20 MIN: CPT | Mod: S$PBB,,, | Performed by: PODIATRIST

## 2024-01-16 PROCEDURE — 99999 PR PBB SHADOW E&M-EST. PATIENT-LVL IV: CPT | Mod: PBBFAC,,, | Performed by: PODIATRIST

## 2024-01-16 PROCEDURE — 99214 OFFICE O/P EST MOD 30 MIN: CPT | Mod: PBBFAC,PN | Performed by: PODIATRIST
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
## 2024-01-22 ENCOUNTER — PATIENT MESSAGE (OUTPATIENT)
Dept: PSYCHIATRY | Facility: CLINIC | Age: 49
End: 2024-01-22
Payer: OTHER GOVERNMENT

## 2024-01-25 ENCOUNTER — TELEPHONE (OUTPATIENT)
Dept: PODIATRY | Facility: CLINIC | Age: 49
End: 2024-01-25
Payer: OTHER GOVERNMENT

## 2024-01-25 ENCOUNTER — PATIENT MESSAGE (OUTPATIENT)
Dept: PODIATRY | Facility: CLINIC | Age: 49
End: 2024-01-25
Payer: OTHER GOVERNMENT

## 2024-01-25 DIAGNOSIS — M76.71 PERONEAL TENDONITIS, RIGHT: ICD-10-CM

## 2024-01-25 DIAGNOSIS — M76.821 POSTERIOR TIBIAL TENDONITIS, RIGHT: ICD-10-CM

## 2024-01-25 DIAGNOSIS — G89.29 CHRONIC PAIN OF RIGHT ANKLE: Primary | ICD-10-CM

## 2024-01-25 DIAGNOSIS — M25.571 CHRONIC PAIN OF RIGHT ANKLE: Primary | ICD-10-CM

## 2024-01-25 NOTE — TELEPHONE ENCOUNTER
Patient continues to have pain despite bracing and physical therapy and is requesting a follow-up MRI of the right ankle.      Please assist patient was scheduling MRI right ankle.

## 2024-01-28 ENCOUNTER — HOSPITAL ENCOUNTER (OUTPATIENT)
Dept: RADIOLOGY | Facility: HOSPITAL | Age: 49
Discharge: HOME | End: 2024-01-28
Attending: PODIATRIST
Payer: OTHER GOVERNMENT

## 2024-01-28 DIAGNOSIS — M76.71 PERONEAL TENDONITIS, RIGHT: ICD-10-CM

## 2024-01-28 DIAGNOSIS — M76.821 POSTERIOR TIBIAL TENDONITIS, RIGHT: ICD-10-CM

## 2024-01-28 DIAGNOSIS — M25.571 CHRONIC PAIN OF RIGHT ANKLE: ICD-10-CM

## 2024-01-28 DIAGNOSIS — G89.29 CHRONIC PAIN OF RIGHT ANKLE: ICD-10-CM

## 2024-01-28 PROCEDURE — 73721 MRI JNT OF LWR EXTRE W/O DYE: CPT | Mod: 26,RT,, | Performed by: RADIOLOGY

## 2024-01-28 PROCEDURE — 73721 MRI JNT OF LWR EXTRE W/O DYE: CPT | Mod: TC,RT

## 2024-02-02 ENCOUNTER — PATIENT MESSAGE (OUTPATIENT)
Dept: PODIATRY | Facility: CLINIC | Age: 49
End: 2024-02-02
Payer: OTHER GOVERNMENT

## 2024-02-07 ENCOUNTER — OFFICE VISIT (OUTPATIENT)
Dept: UROLOGY | Facility: CLINIC | Age: 49
End: 2024-02-07
Payer: OTHER GOVERNMENT

## 2024-02-07 VITALS
HEART RATE: 77 BPM | BODY MASS INDEX: 30.14 KG/M2 | DIASTOLIC BLOOD PRESSURE: 87 MMHG | SYSTOLIC BLOOD PRESSURE: 128 MMHG | WEIGHT: 176.56 LBS | HEIGHT: 64 IN

## 2024-02-07 DIAGNOSIS — R39.11 HESITANCY: Primary | ICD-10-CM

## 2024-02-07 PROCEDURE — 99213 OFFICE O/P EST LOW 20 MIN: CPT | Mod: PBBFAC | Performed by: UROLOGY

## 2024-02-07 PROCEDURE — 99999 PR PBB SHADOW E&M-EST. PATIENT-LVL III: CPT | Mod: PBBFAC,,, | Performed by: UROLOGY

## 2024-02-07 PROCEDURE — 99204 OFFICE O/P NEW MOD 45 MIN: CPT | Mod: S$PBB,,, | Performed by: UROLOGY
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
## 2024-02-14 ENCOUNTER — TELEPHONE (OUTPATIENT)
Dept: PODIATRY | Facility: CLINIC | Age: 49
End: 2024-02-14
Payer: OTHER GOVERNMENT

## 2024-02-14 NOTE — TELEPHONE ENCOUNTER
Called [facility] and after explaining the lady what I am looking for she put me on hold, because she never saw the SFM form at [facility]. Patient came back and told me that [facility] don't have the SFM form and they found out it is a form from Workers comp. And I may have to call the patient and find out more about that. Will send message to Penelope to let her know

## 2024-02-14 NOTE — TELEPHONE ENCOUNTER
Called [facility] Authorization department and they told me they are out of SFM forms and I have to call [facility] so they can fax the form over to us.

## 2024-02-14 NOTE — TELEPHONE ENCOUNTER
----- Message from Dang Giron sent at 2/14/2024  9:04 AM CST -----  Type:  Needs Medical Advice    Who Called:  Pt  Symptoms (please be specific):  Right foot pain  How long has patient had these symptoms:    Pharmacy name and phone #:  2 weeks  Would the patient rather a call back or a response via [facility]?  call  Best Call Back Number:  [phone]  Additional Information:  Pt is requesting a call back from Dr Sinclair regarding her Physical Therapy Form form.

## 2024-02-14 NOTE — TELEPHONE ENCOUNTER
Called pt back and she told me she need a SFM Form filled out and submitted to [facility] so they can give her the Authorization to do the PT. Told the patient that I never heard anything about the SFM Form and will find out more about that. Send message to Penelope to fill her in

## 2024-02-15 ENCOUNTER — TELEPHONE (OUTPATIENT)
Dept: PODIATRY | Facility: CLINIC | Age: 49
End: 2024-02-15
Payer: OTHER GOVERNMENT

## 2024-02-15 NOTE — TELEPHONE ENCOUNTER
Spoke with Dr Fleming's office in regard to Ms Cruz needing a referral to PT as noted in her chart that per [facility] she needs a referral for PT per her PCP for appts. Faxed over referral and supporting documentation per Dr Sinclair's findings so that Dr Smith could send over referral. Call back encouraged if needed

## 2024-02-16 ENCOUNTER — TELEPHONE (OUTPATIENT)
Dept: PODIATRY | Facility: CLINIC | Age: 49
End: 2024-02-16
Payer: OTHER GOVERNMENT

## 2024-02-16 NOTE — TELEPHONE ENCOUNTER
Faxed referral information to [facility] to [phone] with awaiting referral form via fax that was submitted via phone when calling [phone]

## 2024-02-16 NOTE — TELEPHONE ENCOUNTER
Faxed PT referral and supporting documentation to [facility] per pt request.

## 2024-02-16 NOTE — TELEPHONE ENCOUNTER
Referral sent to Dr Fleming's office on 2/15/24; [facility] to see if they could offer assistance. Pending form from [facility]. Will plan to reach out Lonnie's office again if needed

## 2024-02-19 ENCOUNTER — TELEPHONE (OUTPATIENT)
Dept: PODIATRY | Facility: CLINIC | Age: 49
End: 2024-02-19
Payer: OTHER GOVERNMENT

## 2024-02-27 ENCOUNTER — TELEPHONE (OUTPATIENT)
Dept: NEUROLOGY | Facility: CLINIC | Age: 49
End: 2024-02-27
Payer: OTHER GOVERNMENT

## 2024-02-27 DIAGNOSIS — G35 MS (MULTIPLE SCLEROSIS): Primary | ICD-10-CM

## 2024-02-27 DIAGNOSIS — D84.9 IMMUNOCOMPROMISED: ICD-10-CM

## 2024-02-27 NOTE — TELEPHONE ENCOUNTER
[facility]. Patient will go to the lab on 2/28 to complete

## 2024-02-29 ENCOUNTER — PATIENT MESSAGE (OUTPATIENT)
Dept: NEUROLOGY | Facility: CLINIC | Age: 49
End: 2024-02-29

## 2024-02-29 ENCOUNTER — OFFICE VISIT (OUTPATIENT)
Dept: NEUROLOGY | Facility: CLINIC | Age: 49
End: 2024-02-29
Payer: OTHER GOVERNMENT

## 2024-02-29 VITALS
WEIGHT: 177.94 LBS | BODY MASS INDEX: 30.38 KG/M2 | HEART RATE: 67 BPM | SYSTOLIC BLOOD PRESSURE: 153 MMHG | DIASTOLIC BLOOD PRESSURE: 95 MMHG | HEIGHT: 64 IN

## 2024-02-29 DIAGNOSIS — Z79.899 HIGH RISK MEDICATION USE: ICD-10-CM

## 2024-02-29 DIAGNOSIS — R53.83 FATIGUE, UNSPECIFIED TYPE: ICD-10-CM

## 2024-02-29 DIAGNOSIS — E55.9 VITAMIN D DEFICIENCY: ICD-10-CM

## 2024-02-29 DIAGNOSIS — Z29.89 PROPHYLACTIC IMMUNOTHERAPY: ICD-10-CM

## 2024-02-29 DIAGNOSIS — Z71.89 COUNSELING REGARDING GOALS OF CARE: ICD-10-CM

## 2024-02-29 DIAGNOSIS — M54.9 DORSALGIA, UNSPECIFIED: ICD-10-CM

## 2024-02-29 DIAGNOSIS — G35 MULTIPLE SCLEROSIS: ICD-10-CM

## 2024-02-29 DIAGNOSIS — G35 MULTIPLE SCLEROSIS: Primary | ICD-10-CM

## 2024-02-29 PROCEDURE — 99214 OFFICE O/P EST MOD 30 MIN: CPT | Mod: PBBFAC | Performed by: CLINICAL NURSE SPECIALIST

## 2024-02-29 PROCEDURE — 99999 PR PBB SHADOW E&M-EST. PATIENT-LVL IV: CPT | Mod: PBBFAC,,, | Performed by: CLINICAL NURSE SPECIALIST

## 2024-02-29 PROCEDURE — 99215 OFFICE O/P EST HI 40 MIN: CPT | Mod: S$PBB,,, | Performed by: CLINICAL NURSE SPECIALIST

## 2024-02-29 PROCEDURE — G2211 COMPLEX E/M VISIT ADD ON: HCPCS | Mod: S$PBB,,, | Performed by: CLINICAL NURSE SPECIALIST

## 2024-02-29 RX ORDER — IBUPROFEN 800 MG/1
800 TABLET ORAL DAILY PRN
Qty: 20 TABLET | Refills: 0 | Status: SHIPPED | OUTPATIENT
Start: 2024-02-29 | End: 2024-02-29 | Stop reason: CLARIF

## 2024-02-29 RX ORDER — IBUPROFEN 800 MG/1
800 TABLET ORAL EVERY 6 HOURS PRN
COMMUNITY
Start: 2024-01-12 | End: 2024-02-29 | Stop reason: SDUPTHER

## 2024-02-29 RX ORDER — ASPIRIN 325 MG
50000 TABLET, DELAYED RELEASE (ENTERIC COATED) ORAL
Qty: 12 CAPSULE | Refills: 3 | Status: SHIPPED | OUTPATIENT
Start: 2024-02-29 | End: 2033-02-06

## 2024-02-29 NOTE — PROGRESS NOTES
Subjective:          Patient ID: Tiffany Cruz is a 48 y.o. female who presents today for a routine clinic visit for MS.  She was last seen in November 2023. The history has been provided by the patient.       MS HPI:  DMT: Kesimpta--she has been off of DMT for several months, but wants to restart now.   Side effects from DMT? No  Taking vitamin D3 as recommended? Yes--50,000 units, but not consistently  She has been traveling a lot. She took her daughter to [place].   Her grandmother passed away on Sunday.   She denies any recent infections.   She continues to have sciatica pain down the right leg that stems from the lower back. She is doing physical therapy, which is helpful. Gabapentin makes it tolerable. She also takes tizanidine and baclofen at night (not always together). Ibuprofen also helps (takes sparingly), but she generally has more pain during the day. There are no certain positions that make it worse. She denies any sense of weakness.     Medications:  Current Outpatient Medications   Medication Sig    ALPRAZolam (XANAX) 0.25 MG tablet Take 0.25 mg by mouth daily as needed.    atovaquone-proguaniL (MALARONE) 250-100 mg Tab Take one tablet daily for malaria prevention. Begin one day before entering malarious area and continue for 1 week after return.    baclofen (LIORESAL) 10 MG tablet Take 2 tablets (20 mg total) by mouth once daily.    cholecalciferol, vitamin D3, 1,250 mcg (50,000 unit) capsule Take 1 capsule (50,000 Units total) by mouth every 7 days.    gabapentin (NEURONTIN) 100 MG capsule Take 1 capsule (100 mg total) by mouth 3 (three) times daily.    gabapentin (NEURONTIN) 300 MG capsule Take 1 capsule (300 mg total) by mouth every evening.    losartan-hydrochlorothiazide 100-25 mg (HYZAAR) 100-25 mg per tablet Take 1 tablet by mouth once daily.    ofatumumab (KESIMPTA PEN) 20 mg/0.4 mL PnIj Inject 20 mg into the skin every 28 days.    pantoprazole (PROTONIX) 40 MG tablet Take  40 mg by mouth every morning.    rosuvastatin (CRESTOR) 10 MG tablet     tiZANidine (ZANAFLEX) 6 mg capsule Take 1 capsule (6 mg total) by mouth every evening.     SOCIAL HISTORY  Social History     Tobacco Use    Smoking status: Never    Smokeless tobacco: Never   Substance Use Topics    Alcohol use: No     Alcohol/week: 0.0 standard drinks of alcohol    Drug use: No       Living arrangements - the patient lives with her family     ROS:      2/29/24    REVIEW OF SYMPTOMS   Do you feel abnormally tired on most days? No--she doesn't pop up and get out of bed like she used to   Do you feel you generally sleep well? No--she is not getting enough sleep; goes to bed after midnight and wakes up by 6.    Do you have difficulty controlling your bladder?  No--recently saw urology--Dr. Grande; she has had some hesitancy; denies leakage and accidents   Do you have difficulty controlling your bowels?  No   Do you have frequent muscle cramps, tightness or spasms in your limbs?  No--right leg    Do you have new visual symptoms?  No   Do you have worsening difficulty with your memory or thinking? No--   Do you have worsening symptoms of anxiety or depression?  No    For patients who walk, Do you have more difficulty walking?  No--attributes any walking issues to past foot surgery    Have you fallen since your last visit?  No   For patients who use wheelchairs: Do you have any skin wounds or breakdown? Not Applicable   Do you have difficulty using your hands?  No   Do you have shooting or burning pain? No--right leg    If you are sexually active, are you using birth control? Y/N  N/A Not Applicable   Do you often choke when swallowing liquids or solid food?  No--takes pantoprazole; has a sensation that things sit in her throat after she eats; she plans to see GI soon    Do you experience worsening symptoms when overheated? No   Do you need any new equipment such as a wheelchair, walker or shower chair? No   Do you receive  co-pay financial assistance for your principal MS medicine? Had a copay on Kesimpta    Would you be interested in participating in an MS research trial in the future? Not Applicable   For patients on Gilenya, Tecfidera, Aubagio, Rituxan, Ocrevus, Tysabri, Lemtrada or Methotrexate, are you aware that you should NOT receive live virus vaccines?  Patient is aware    Do you feel you have adequate family/friend support?  Yes   Do you have health insurance?   Yes   Are you currently employed? Yes   Do you receive SSDI/SSI?  Not Applicable   Do you use marijuana or cannabis products? No   Have you been diagnosed with a urinary tract infection since your last visit here? No   Have you been diagnosed with a respiratory tract infection since your last visit here? No   Have you been to the emergency room since your last visit here? No   Have you been hospitalized since your last visit here?  No              Objective:        1. 25 foot timed walk:      5/10/2023    12:02 AM 2/29/2024    12:01 AM   Timed 25 Foot Walk:   Did patient wear an AFO? No No   Was assistive device used? No No   Time for 25 Foot Walk (seconds) 3.7 4.2   Time for 25 Foot Walk (seconds) 3.6 4       Neurologic Exam     Mental Status   Oriented to person, place, and time.   Attention: normal. Concentration: normal.   Speech: speech is normal   Level of consciousness: alert  Knowledge: good.   Normal comprehension.     Cranial Nerves     CN III, IV, VI   Extraocular motions are normal.   Nystagmus: none     CN V   Right facial sensation deficit: none  Left facial sensation deficit: none    CN VII   Right facial weakness: none  Left facial weakness: none    CN VIII   Hearing: intact    CN IX, X   Palate: symmetric    CN XI   Right sternocleidomastoid strength: normal  Left sternocleidomastoid strength: normal  Right trapezius strength: normal  Left trapezius strength: normal    CN XII   Tongue deviation: none    Motor Exam   Muscle bulk: normal  Overall  muscle tone: normal  Right arm tone: normal  Left arm tone: normal  Right leg tone: normal  Left leg tone: normal    Strength   Right neck flexion: 5/5  Left neck flexion: 5/5  Right neck extension: 5/5  Left neck extension: 5/5  Right deltoid: 5/5  Left deltoid: 5/5  Right biceps: 5/5  Left biceps: 5/5  Right triceps: 5/5  Left triceps: 5/5  Right wrist flexion: 5/5  Left wrist flexion: 5/5  Right wrist extension: 5/5  Left wrist extension: 5/5  Right interossei: 5/5  Left interossei: 5/5  Right iliopsoas: 5/5  Left iliopsoas: 5/5  Right quadriceps: 5/5  Left quadriceps: 5/5  Right hamstrin/5  Left hamstrin/5    Sensory Exam   Right arm vibration: normal  Left arm vibration: normal  Right leg vibration: normal  Left leg vibration: normal    Gait, Coordination, and Reflexes     Gait  Gait: normal    Coordination   Romberg: negative  Finger to nose coordination: normal  Heel to shin coordination: normal  Tandem walking coordination: normal    Tremor   Resting tremor: absent    Reflexes   Right brachioradialis: 1+  Left brachioradialis: 1+  Right biceps: 1+  Left biceps: 1+  Right triceps: 1+  Left triceps: 1+  Right patellar: 0  Left patellar: 1+  Right achilles: 0  Left achilles: 1+    Normal rapid sequential movements in upper and lower extremities.        Imaging:       Results for orders placed during the hospital encounter of 23    MRI Brain Demyelinating W W/O Contrast    Impression  Stable white matter lesions intracranially with no new or enhancing lesion.      Electronically signed by: Richard Cool  Date:    2023  Time:    12:53    Results for orders placed during the hospital encounter of 22    MRI Cervical Spine Demyelinating W W/O Contrast    Impression  1. Compared to prior MRI cervical spine 2021, similar nonenhancing focus of nonenhancing T2 weighted signal abnormality within the left hemicord at C2.  2. Additionally, there is linear increased T2 weighted signal on the  "axial GRE sequence not confirmed on additional T2 weighted/fluid sensitive sequences, potentially artifactual in etiology.  An equivocal focus of T2 weighted signal abnormality at the inferior margins of the examination within the cord at the T3 level is additionally noted; dedicated thoracic MRI could be performed, as clinically warranted.  3. No definite evidence of cord atrophy.  No pathologic enhancement.  4. Relatively similar degenerative findings, as discussed.      Electronically signed by: Huber Mistry  Date:    09/23/2022  Time:    07:39    Results for orders placed during the hospital encounter of 07/19/18    MRI Thoracic Spine Demyelinating W W/O Contrast    Impression  New tiny focus of T2 hyperintense signal in the left lateral cord at the level of C2, in keeping with demyelinating plaque given clinical history of multiple sclerosis.  No associated enhancement.  This lesion is distinct from the right dorsal lateral cord lesion at the level of C2 present on examination of 01/15/2017.    Unchanged diffusely decreased T1 marrow signal suggestive of diffuse marrow replacement process such as red marrow reconversion or myelodysplastic process.    Mild cervical spondylosis most notable at C5-6.    Electronically signed by resident: Juan Matias  Date:    07/20/2018  Time:    08:27    Electronically signed by: Mt Good MD  Date:    07/20/2018  Time:    12:41        Labs:     Lab Results   Component Value Date    QDACYUKE71TO 34 07/28/2021    OWOOSSQR47YN 58 08/05/2020    ADFJOZDG61PQ 62 03/03/2020     No results found for: "JCVINDEX", "JCVANTIBODY"  Lab Results   Component Value Date    IA0SIIBE 81.5 02/21/2022    ABSOLUTECD3 2960 (H) 02/21/2022    HV1KMLVY 20.2 02/21/2022    ABSOLUTECD8 735 02/21/2022    HB2CJIRH 61.3 (H) 02/21/2022    ABSOLUTECD4 2227 (H) 02/21/2022    LABCD48 3.03 02/21/2022     Lab Results   Component Value Date    WBC 8.51 11/06/2023    RBC 5.14 11/06/2023    HGB 11.2 (L) 11/06/2023 "    HCT 36.9 (L) 11/06/2023    MCV 72 (L) 11/06/2023    MCH 21.8 (L) 11/06/2023    MCHC 30.4 (L) 11/06/2023    RDW 14.6 (H) 11/06/2023     11/06/2023    MPV 11.8 11/06/2023    GRAN 4.2 11/06/2023    GRAN 49.9 11/06/2023    LYMPH 3.0 11/06/2023    LYMPH 35.5 11/06/2023    MONO 1.1 (H) 11/06/2023    MONO 12.3 11/06/2023    EOS 0.1 11/06/2023    BASO 0.07 11/06/2023    EOSINOPHIL 1.4 11/06/2023    BASOPHIL 0.8 11/06/2023     Sodium   Date Value Ref Range Status   11/06/2023 140 136 - 145 mmol/L Final     Potassium   Date Value Ref Range Status   11/06/2023 3.6 3.5 - 5.1 mmol/L Final     Chloride   Date Value Ref Range Status   11/06/2023 102 95 - 110 mmol/L Final     CO2   Date Value Ref Range Status   11/06/2023 28 23 - 29 mmol/L Final     Glucose   Date Value Ref Range Status   11/06/2023 75 70 - 110 mg/dL Final     BUN   Date Value Ref Range Status   11/06/2023 10 6 - 20 mg/dL Final     Creatinine   Date Value Ref Range Status   11/06/2023 0.7 0.5 - 1.4 mg/dL Final     Calcium   Date Value Ref Range Status   11/06/2023 9.0 8.7 - 10.5 mg/dL Final     Total Protein   Date Value Ref Range Status   11/06/2023 7.3 6.0 - 8.4 g/dL Final     Albumin   Date Value Ref Range Status   11/06/2023 3.9 3.5 - 5.2 g/dL Final     Total Bilirubin   Date Value Ref Range Status   11/06/2023 0.2 0.1 - 1.0 mg/dL Final     Comment:     For infants and newborns, interpretation of results should be based  on gestational age, weight and in agreement with clinical  observations.    Premature Infant recommended reference ranges:  Up to 24 hours.............<8.0 mg/dL  Up to 48 hours............<12.0 mg/dL  3-5 days..................<15.0 mg/dL  6-29 days.................<15.0 mg/dL       Alkaline Phosphatase   Date Value Ref Range Status   11/06/2023 63 55 - 135 U/L Final     AST   Date Value Ref Range Status   11/06/2023 20 10 - 40 U/L Final     ALT   Date Value Ref Range Status   11/06/2023 25 10 - 44 U/L Final     Anion Gap   Date  Value Ref Range Status   11/06/2023 10 8 - 16 mmol/L Final     eGFR if    Date Value Ref Range Status   02/21/2022 >60 >60 mL/min/1.73 m^2 Final     eGFR    Date Value Ref Range Status   03/08/2023 88 (L) >=90 mL/min Final     Comment:     Calculation based on the Chronic Kidney Disease Epidemiology Collaboration (CKD-EPI) equation refit without adjustment for race.     eGFR if non    Date Value Ref Range Status   02/21/2022 >60 >60 mL/min/1.73 m^2 Final     Comment:     Calculation used to obtain the estimated glomerular filtration  rate (eGFR) is the CKD-EPI equation.        Lab Results   Component Value Date    HEPBSAG Non-reactive 03/20/2023    HEPBSAB <3.00 03/20/2023    HEPBSAB Non-reactive 03/20/2023    HEPBCAB Non-reactive 03/20/2023           MS Impression and Plan:     NEURO MULTIPLE SCLEROSIS IMPRESSION:   Number of relapses in the past year?:  0  Clinical Progression:  Clinically Stable  MS Classification:  Relapsing-Remitting MS  DMT:  No change in management  DMT comment:  She is ready to restart Kesimpta. Labs are planned today. She is aware of the risks associated with immunosuppressant therapy, including increased risk of infection.     Symptom Management:  Implement change in symptom management  Implement Change in Symptom Management:  Sleep (Discussed magnesium glycinate for sleep.)  Supplements:  Vit D (Will check B12, as she has been borderline low in the past and inquires about supplementation.)     MRI brain is due now. Will order L-spine in light of lower back pain and likely right sciatica.   She will follow up with Dr. Tompkins in 6 months or sooner if needed.   The visit today is associated with current or anticipated ongoing medical care related to this patient's single serious condition/complex condition of multiple sclerosis.        Total time spent with patient: 52 minutes   Total time spent on encounter: 65 minutes     Problem List Items  Addressed This Visit       Fatigue    Overview     Some microcytosis.  Healthy diet encouraged.  Daily women's vitamin recommended         Relevant Orders    Vitamin B12     Other Visit Diagnoses       Demyelinating disease of central nervous system, unspecified    -  Primary    Relevant Orders    MRI Brain Demyelinating Without Contrast    MRI Lumbar Spine Without Contrast    Ambulatory referral/consult to Physical/Occupational Therapy    Dorsalgia, unspecified        Relevant Orders    MRI Lumbar Spine Without Contrast    Ambulatory referral/consult to Physical/Occupational Therapy    Vitamin D deficiency        Relevant Medications    cholecalciferol, vitamin D3, 1,250 mcg (50,000 unit) capsule              DIEUDONNE Shane, CNS

## 2024-03-08 ENCOUNTER — DOCUMENTATION ONLY (OUTPATIENT)
Dept: NEUROLOGY | Facility: CLINIC | Age: 49
End: 2024-03-08
Payer: OTHER GOVERNMENT

## 2024-03-08 NOTE — PROGRESS NOTES
Faxed referral for PT/OT to [facility] [place] at [phone].    I will STOP taking the medications listed below when I get home from the hospital:  None

## 2024-03-20 ENCOUNTER — HOSPITAL ENCOUNTER (OUTPATIENT)
Dept: RADIOLOGY | Facility: HOSPITAL | Age: 49
Discharge: HOME | End: 2024-03-20
Attending: CLINICAL NURSE SPECIALIST
Payer: OTHER GOVERNMENT

## 2024-03-20 DIAGNOSIS — R13.10 DYSPHAGIA: Primary | ICD-10-CM

## 2024-03-20 DIAGNOSIS — Z86.010 HISTORY OF COLON POLYPS: ICD-10-CM

## 2024-03-20 DIAGNOSIS — G35 MULTIPLE SCLEROSIS: ICD-10-CM

## 2024-03-20 DIAGNOSIS — K57.30 DIVERTICULOSIS LARGE INTESTINE W/O PERFORATION OR ABSCESS W/O BLEEDING: ICD-10-CM

## 2024-03-20 DIAGNOSIS — K21.9 GERD (GASTROESOPHAGEAL REFLUX DISEASE): ICD-10-CM

## 2024-03-20 DIAGNOSIS — M54.9 DORSALGIA, UNSPECIFIED: ICD-10-CM

## 2024-03-20 PROCEDURE — 72148 MRI LUMBAR SPINE W/O DYE: CPT | Mod: 26,,, | Performed by: RADIOLOGY

## 2024-03-20 PROCEDURE — 25500020 PHARM REV CODE 255: Performed by: CLINICAL NURSE SPECIALIST

## 2024-03-20 PROCEDURE — 70553 MRI BRAIN STEM W/O & W/DYE: CPT | Mod: TC

## 2024-03-20 PROCEDURE — 72148 MRI LUMBAR SPINE W/O DYE: CPT | Mod: TC

## 2024-03-20 PROCEDURE — A9585 GADOBUTROL INJECTION: HCPCS | Performed by: CLINICAL NURSE SPECIALIST

## 2024-03-20 PROCEDURE — 70553 MRI BRAIN STEM W/O & W/DYE: CPT | Mod: 26,,, | Performed by: RADIOLOGY

## 2024-03-20 RX ORDER — GADOBUTROL 604.72 MG/ML
9 INJECTION INTRAVENOUS
Status: COMPLETED | OUTPATIENT
Start: 2024-03-20 | End: 2024-03-20

## 2024-03-20 RX ADMIN — GADOBUTROL 9 ML: 604.72 INJECTION INTRAVENOUS at 08:03

## 2024-03-22 ENCOUNTER — HOSPITAL ENCOUNTER (OUTPATIENT)
Dept: RADIOLOGY | Facility: OTHER | Age: 49
Discharge: HOME | End: 2024-03-22
Attending: INTERNAL MEDICINE
Payer: OTHER GOVERNMENT

## 2024-03-22 DIAGNOSIS — K57.30 DIVERTICULOSIS LARGE INTESTINE W/O PERFORATION OR ABSCESS W/O BLEEDING: ICD-10-CM

## 2024-03-22 DIAGNOSIS — K21.9 GERD (GASTROESOPHAGEAL REFLUX DISEASE): ICD-10-CM

## 2024-03-22 DIAGNOSIS — R13.10 DYSPHAGIA: ICD-10-CM

## 2024-03-22 DIAGNOSIS — Z86.010 HISTORY OF COLON POLYPS: ICD-10-CM

## 2024-03-22 PROCEDURE — 74220 X-RAY XM ESOPHAGUS 1CNTRST: CPT | Mod: TC

## 2024-03-22 PROCEDURE — A9698 NON-RAD CONTRAST MATERIALNOC: HCPCS | Performed by: INTERNAL MEDICINE

## 2024-03-22 PROCEDURE — 25500020 PHARM REV CODE 255: Performed by: INTERNAL MEDICINE

## 2024-03-22 PROCEDURE — 74220 X-RAY XM ESOPHAGUS 1CNTRST: CPT | Mod: 26,,, | Performed by: RADIOLOGY

## 2024-03-22 RX ADMIN — BARIUM SULFATE 225 ML: 0.6 SUSPENSION ORAL at 09:03

## 2024-03-24 RX ORDER — OFATUMUMAB 20 MG/.4ML
INJECTION, SOLUTION SUBCUTANEOUS
Qty: 0.4 ML | Refills: 2 | Status: SHIPPED | OUTPATIENT
Start: 2024-03-24 | End: 2024-05-17 | Stop reason: SDUPTHER

## 2024-03-24 RX ORDER — OFATUMUMAB 20 MG/.4ML
INJECTION, SOLUTION SUBCUTANEOUS
Qty: 1.2 ML | Refills: 0 | Status: SHIPPED | OUTPATIENT
Start: 2024-03-24

## 2024-03-26 ENCOUNTER — PATIENT MESSAGE (OUTPATIENT)
Dept: PSYCHIATRY | Facility: CLINIC | Age: 49
End: 2024-03-26
Payer: OTHER GOVERNMENT

## 2024-04-17 ENCOUNTER — PATIENT MESSAGE (OUTPATIENT)
Dept: NEUROLOGY | Facility: CLINIC | Age: 49
End: 2024-04-17
Payer: OTHER GOVERNMENT

## 2024-04-17 DIAGNOSIS — R93.7 ABNORMAL MRI, LUMBAR SPINE: ICD-10-CM

## 2024-04-17 DIAGNOSIS — M54.31 RIGHT SIDED SCIATICA: Primary | ICD-10-CM

## 2024-04-18 NOTE — TELEPHONE ENCOUNTER
Spoke to pt and scheduled her for a back and spine appt on 5/1 at 10:00 AM at [facility] with Daniela Samuel PA-C.

## 2024-05-02 ENCOUNTER — PATIENT MESSAGE (OUTPATIENT)
Dept: PSYCHIATRY | Facility: CLINIC | Age: 49
End: 2024-05-02
Payer: OTHER GOVERNMENT

## 2024-05-06 ENCOUNTER — PATIENT MESSAGE (OUTPATIENT)
Dept: NEUROLOGY | Facility: CLINIC | Age: 49
End: 2024-05-06
Payer: OTHER GOVERNMENT

## 2024-05-09 ENCOUNTER — PATIENT MESSAGE (OUTPATIENT)
Dept: NEUROLOGY | Facility: CLINIC | Age: 49
End: 2024-05-09
Payer: OTHER GOVERNMENT

## 2024-05-09 ENCOUNTER — TELEPHONE (OUTPATIENT)
Dept: NEUROLOGY | Facility: CLINIC | Age: 49
End: 2024-05-09
Payer: OTHER GOVERNMENT

## 2024-05-09 NOTE — TELEPHONE ENCOUNTER
----- Message from Erica Whelan sent at 5/9/2024  1:27 PM CDT -----  Type:  Needs Medical Advice    Who Called: Pt   Symptoms (please be specific): Having severe leg pain    Would the patient rather a call back or a response via [facility]? Call back   Best Call Back Number:  [phone]  Additional Information: Please be advised, pt states that she had referral put in for Dr. Cisneros and she wanted to she if she had to specifically or if she can see someone else who has the soonest available, pt states she is trying to be seen for today

## 2024-05-10 DIAGNOSIS — M54.50 LOW BACK PAIN, UNSPECIFIED BACK PAIN LATERALITY, UNSPECIFIED CHRONICITY, UNSPECIFIED WHETHER SCIATICA PRESENT: Primary | ICD-10-CM
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
## 2024-05-13 ENCOUNTER — OFFICE VISIT (OUTPATIENT)
Dept: ORTHOPEDICS | Facility: CLINIC | Age: 49
End: 2024-05-13
Payer: OTHER GOVERNMENT

## 2024-05-13 ENCOUNTER — HOSPITAL ENCOUNTER (OUTPATIENT)
Dept: RADIOLOGY | Facility: HOSPITAL | Age: 49
Discharge: HOME | End: 2024-05-13
Attending: ORTHOPAEDIC SURGERY
Payer: OTHER GOVERNMENT

## 2024-05-13 ENCOUNTER — PATIENT MESSAGE (OUTPATIENT)
Dept: PAIN MEDICINE | Facility: OTHER | Age: 49
End: 2024-05-13
Payer: OTHER GOVERNMENT

## 2024-05-13 DIAGNOSIS — M54.50 LOW BACK PAIN, UNSPECIFIED BACK PAIN LATERALITY, UNSPECIFIED CHRONICITY, UNSPECIFIED WHETHER SCIATICA PRESENT: ICD-10-CM

## 2024-05-13 DIAGNOSIS — R93.7 ABNORMAL MRI, LUMBAR SPINE: ICD-10-CM

## 2024-05-13 DIAGNOSIS — M54.31 RIGHT SIDED SCIATICA: ICD-10-CM

## 2024-05-13 DIAGNOSIS — M54.31 RIGHT SIDED SCIATICA: Primary | ICD-10-CM

## 2024-05-13 PROCEDURE — 72110 X-RAY EXAM L-2 SPINE 4/>VWS: CPT | Mod: 26,,, | Performed by: RADIOLOGY

## 2024-05-13 PROCEDURE — 72110 X-RAY EXAM L-2 SPINE 4/>VWS: CPT | Mod: TC

## 2024-05-13 PROCEDURE — 99999 PR PBB SHADOW E&M-EST. PATIENT-LVL III: CPT | Mod: PBBFAC,,, | Performed by: ORTHOPAEDIC SURGERY

## 2024-05-13 PROCEDURE — 96372 THER/PROPH/DIAG INJ SC/IM: CPT | Mod: PBBFAC | Performed by: ORTHOPAEDIC SURGERY

## 2024-05-13 PROCEDURE — 99999PBSHW PR PBB SHADOW TECHNICAL ONLY FILED TO HB: Mod: PBBFAC,,,

## 2024-05-13 PROCEDURE — 99213 OFFICE O/P EST LOW 20 MIN: CPT | Mod: PBBFAC,25 | Performed by: ORTHOPAEDIC SURGERY

## 2024-05-13 PROCEDURE — 96372 THER/PROPH/DIAG INJ SC/IM: CPT | Mod: S$PBB,,, | Performed by: ORTHOPAEDIC SURGERY

## 2024-05-13 PROCEDURE — 99214 OFFICE O/P EST MOD 30 MIN: CPT | Mod: S$PBB,25,, | Performed by: ORTHOPAEDIC SURGERY

## 2024-05-13 RX ORDER — KETOROLAC TROMETHAMINE 30 MG/ML
30 INJECTION, SOLUTION INTRAMUSCULAR; INTRAVENOUS ONCE
Status: COMPLETED | OUTPATIENT
Start: 2024-05-13 | End: 2024-05-13

## 2024-05-13 RX ORDER — METHYLPREDNISOLONE 4 MG/1
TABLET ORAL
Qty: 1 EACH | Refills: 0 | Status: SHIPPED | OUTPATIENT
Start: 2024-05-13 | End: 2024-06-03

## 2024-05-13 RX ADMIN — KETOROLAC TROMETHAMINE 30 MG: 30 INJECTION, SOLUTION INTRAMUSCULAR; INTRAVENOUS at 02:05

## 2024-05-13 NOTE — PROGRESS NOTES
Subjective:          Patient ID: Tiffany Cruz is a 48 y.o. female who presents today for a routine clinic visit for MS.  She was last seen in February 2024. The history has been provided by the patient.     MS HPI:  DMT: She did not restart Kesimpta   Taking vitamin D3 as recommended? Yes   She has been sitting a lot at work for the past 5 weeks. She attributes this to some of her increased pain--numbness, tingling, pins/needles in the right leg.   She saw her PCP last week, and she reported some numbness and tingling in the right arm (and leg) x2 days. The right leg numbness transitioned into heaviness, and she was unable to feel the gas pedal on her car (per [facility] [place] She was referred to the ER to be evaluated for stroke--this was on 5/10. She had head CT and MRIs of the brain and cervical spine. MRI brain did not suggest stroke or active MS lesions. C-spine MRI did not suggest any new or active MS lesions.   She was also having chest pain, severe at times. This lasted for 45 minutes. She was given nitroglycerin, morphine, and Toradol. She reports that the morphine alone did not help, but she did get relief after Toradol.  Her EKG showed some abnormalities. She was advised to follow up with cardiology as an outpatient. She has known hypertension and takes blood pressure medication. She denies any shortness of breath.   She saw back and spine this week (RICARDO Donahue) and will have MATT for lumbar radiculopathy. She has this scheduled on Monday.   She is going to physical therapy right now at [facility] on the [facility].   She denies any recent infections.   She has Xanax for sleep, but is open to taking magnesium glycinate.       Medications:  Current Outpatient Medications   Medication Sig    ALPRAZolam (XANAX) 0.25 MG tablet Take 0.25 mg by mouth daily as needed.    baclofen (LIORESAL) 10 MG tablet Take 2 tablets (20 mg total) by mouth once daily.    cholecalciferol, vitamin D3, 1,250 mcg  (50,000 unit) capsule Take 1 capsule (50,000 Units total) by mouth every 7 days.    gabapentin (NEURONTIN) 100 MG capsule Take 1 capsule (100 mg total) by mouth 3 (three) times daily.    gabapentin (NEURONTIN) 300 MG capsule Take 1 capsule (300 mg total) by mouth every evening.    losartan-hydrochlorothiazide 100-25 mg (HYZAAR) 100-25 mg per tablet Take 1 tablet by mouth once daily.    methylPREDNISolone (MEDROL DOSEPACK) 4 mg tablet use as directed    ofatumumab (KESIMPTA PEN) 20 mg/0.4 mL PnIj Inject 20 mg (1 pen) into the skin every 28 days starting at week 4    ofatumumab (KESIMPTA PEN) 20 mg/0.4 mL PnIj Inject 20 mg (1 pen) into the skin on week 0, 1, and 2.    rosuvastatin (CRESTOR) 10 MG tablet     tiZANidine (ZANAFLEX) 6 mg capsule Take 1 capsule (6 mg total) by mouth every evening.     Current Facility-Administered Medications   Medication Frequency    sodium chloride 0.9% flush 10 mL PRN     Facility-Administered Medications Ordered in Other Visits   Medication Frequency    0.9%  NaCl infusion Continuous    alteplase injection 2 mg PRN    ferric carboxymaltose (INJECTAFER) 750 mg in sodium chloride 0.9% 250 mL IVPB (ready to mix system) Once    heparin, porcine (PF) 100 unit/mL injection flush 500 Units PRN    mupirocin 2 % ointment On Call Procedure    sodium chloride 0.9% 100 mL flush bag 1 time in Clinic/HOD    sodium chloride 0.9% flush 10 mL PRN       SOCIAL HISTORY  Social History     Tobacco Use    Smoking status: Never    Smokeless tobacco: Never   Substance Use Topics    Alcohol use: No     Alcohol/week: 0.0 standard drinks of alcohol    Drug use: No       Living arrangements - the patient lives with her family                Objective:        1. 25 foot timed walk:      2/29/2024    12:01 AM 5/15/2024    12:01 AM   Timed 25 Foot Walk:   Did patient wear an AFO? No No   Was assistive device used? No No   Time for 25 Foot Walk (seconds) 4.2 3.8   Time for 25 Foot Walk (seconds) 4 3.7        Neurologic Exam     Mental Status   Oriented to person, place, and time.   Attention: normal. Concentration: normal.   Speech: speech is normal   Level of consciousness: alert  Knowledge: good.   Normal comprehension.     Cranial Nerves     CN III, IV, VI   Extraocular motions are normal.   Nystagmus: none     CN V   Right facial sensation deficit: none  Left facial sensation deficit: none    CN VII   Right facial weakness: none  Left facial weakness: none    CN VIII   Hearing: intact    CN IX, X   Palate: symmetric    CN XI   Right sternocleidomastoid strength: normal  Left sternocleidomastoid strength: normal  Right trapezius strength: normal  Left trapezius strength: normal    CN XII   Tongue deviation: none    Motor Exam   Muscle bulk: normal  Overall muscle tone: normal  Right arm tone: normal  Left arm tone: normal  Right leg tone: normal  Left leg tone: normal    Strength   Right neck flexion: 5/5  Left neck flexion: 5/5  Right neck extension: 5/5  Left neck extension: 5/5  Right deltoid: 5/5  Left deltoid: 5/5  Right biceps: 5/5  Left biceps: 5/5  Right triceps: 5/5  Left triceps: 5/5  Right wrist flexion: 5/5  Left wrist flexion: 5/5  Right wrist extension: 5/5  Left wrist extension: 5/5  Right interossei: 5/5  Left interossei: 5/5  Right iliopsoas: 5/5  Left iliopsoas: 5/5  Right quadriceps: 5/5  Left quadriceps: 5/5  Right hamstrin/5  Left hamstrin/5  Right anterior tibial: 5/5  Left anterior tibial: 5/5  Right gastroc: 5/5  Left gastroc: 5/5    Sensory Exam   Right arm vibration: normal  Left arm vibration: normal  Right leg vibration: normal  Left leg vibration: normal    Gait, Coordination, and Reflexes     Gait  Gait: normal    Coordination   Romberg: negative  Finger to nose coordination: normal  Heel to shin coordination: normal  Tandem walking coordination: normal    Tremor   Resting tremor: absent    Reflexes   Right brachioradialis: 1+  Left brachioradialis: 1+  Right biceps:  1+  Left biceps: 1+  Right triceps: 1+  Left triceps: 1+  Right patellar: 1+  Left patellar: 2+  Right achilles: 1+  Left achilles: 2+    Normal rapid sequential movements in upper and lower extremities. She can walk on toes and heels.            Imaging:     Results for orders placed during the hospital encounter of 03/20/24    MRI Brain Demyelinating W W/O Contrast    Impression  No significant change from prior.  Essentially stable T2 FLAIR lesion burden throughout the brain parenchyma while nonspecific remains concerning for mild degree of prior demyelinating plaque    No evidence for new lesion or enhancing lesion to suggest significant interval or active demyelination allowing for artifacts as detailed above    Clinical correlation and follow-up advised      Electronically signed by: Charlie Lainez DO  Date:    03/21/2024  Time:    07:48    MRI Brain W WO Contrast  Order: [id number]  Impression    1.   There are multiple T2/FLAIR hyperintensities throughout the white matter as detailed above. Although nonspecific, the distribution is suggestive of demyelinating plaques related to multiple sclerosis. The lesions do not enhance.  2.   The exam is otherwise unremarkable. There is no acute infarct. There is no intracranial hemorrhage.    Electronically Signed By: Ashish Cornejo MD 5/11/2024 13:06 CDT  Narrative    Technique: Axial T1, T2, Flair, Diffusion, T2*, sagittal and coronal FLAIR images of the brain were obtained without IV contrast. The patient was administered 7 mL Gadavist intravenously and T1 weighted images were performed in all three planes.    Comparison: CT of the head May 10, 2024, report from an outside MRI of the brain March 20, 2024    Clinical:   Neurological deficit, right sided paresthesias, suspected multiple sclerosis    Findings:  The images from the outside MRI of the brain are not available for comparison.    There are multiple T2/FLAIR hyperintensities throughout the white matter  involving the periventricular white matter predominantly but also the bilateral subcortical white matter. These lesions demonstrate increased signal on the diffusion-weighted images as well as on the ADC map consistent with T2 shine through. The lesions do not enhance.    There is no evidence of parenchymal hemorrhage. There is no mass effect. There are no areas of restricted diffusion. There are no foci of abnormal contrast enhancement.  There is no hydrocephalus. There are no extraaxial fluid collections.    Normal T2 flow voids are present. The visualized paranasal sinuses are unremarkable. The orbital soft tissues are normal.  Exam End: 05/11/24 12:40    Specimen Collected: 05/11/24 12:56 Last Resulted: 05/11/24 13:06   [facility]: [facility]  Result Received: 05/12/24 01:01        MRI Cervical Spine W WO Cont  Order: [id number]  Impression    1.   Normal appearance of the cervical cord. No demyelinating plaques are seen. There is no abnormal cord enhancement.  2.   There are degenerative changes throughout the cervical spine as above.    Electronically Signed By: Ashish Cornejo MD 5/11/2024 13:20 CDT  Narrative    Technique: Sagittal T1, T2, proton density, and STIR, axial T1 and T2 weighted images were obtained through the cervical spine without intravenous contrast. The patient was administered 7 mL Gadavist and T1 weighted images were obtained in the axial and sagittal planes.    Comparison: CT of the cervical spine May 10, 2024    Clinical:  Right-sided weakness, suspected multiple sclerosis      Findings:  The cervical cord is normal. There is no increased T2 signal. There is no abnormal cord enhancement.    There is minimal retrolisthesis of C5 on C6. The alignment is otherwise normal.  The vertebral body heights are preserved.  There is mild endplate edema anteriorly at C3-C4, C4-C5, and C5-C6.    There is multilevel degenerative spurring. There is mild loss of disc height from C3 through C7.    C2-3:  There is no significant disc protrusion. There is left-sided facet hypertrophy causing mild left neuroforaminal stenosis.    C3-4: There is no significant disc protrusion or central canal or neuroforaminal stenosis.    C4-5: There is no significant disc protrusion or central canal or neuroforaminal stenosis.    C5-6: There is a mild broad-based disc protrusion which does not abut the cord. There is slight inferior extrusion of disc material centrally. There is bilateral uncovertebral joint hypertrophy. There is mild to moderate right and mild left neuroforaminal stenosis.    C6-7: There is a central disc protrusion effacing the thecal sac and slightly effacing the ventral cord. There is an associated annular tear. There is mild bilateral uncovertebral joint hypertrophy. There is no significant neuroforaminal stenosis.    C7-T1: There is a small broad-based disc protrusion which does not abut the cord. The neuroforamina are widely patent.  Exam End: 05/11/24 12:33    Specimen Collected: 05/11/24 13:12 Last Resulted: 05/11/24 13:20   Received From: [facility]      CT Head Without Contrast  Order: [id number]  Impression    No acute intracranial abnormality. Question underlying mild chronic microangiopathy.    Electronically Signed By: Deondre Hardy MD 5/10/2024 14:31 CDT  Narrative    PROCEDURE: [facility] [place] [facility] HEAD WITHOUT CONTRAST  INDICATION: Headache, neuro deficit  Headache, increasing frequency or severity  COMPARISON:  None    TECHNIQUE:CT of the brain was obtained without intravenous contrast. Per CMS specifications, dose optimization techniques including at least one of the following were performed, as appropriate: Automated exposure control, Adjustment of the mA and/or kV according to the patient's size, use of iterative reconstruction techniques.    FINDINGS:  There is no evidence for acute cortical-based ischemia. Question mild underlying chronic microangiopathy most conspicuous in the right frontal lobe  region.  No mass effect or edema.  No acute intracranial hemorrhage is identified.  The ventricles are symmetric and appropriate.  No acute depressed calvarial fracture.  The paranasal sinuses and mastoid air cells are clear.  Exam End: 05/10/24 14:19    Specimen Collected: 05/10/24 14:28 Last Resulted: 05/10/24 14:31   Received From: [facility]  Result Received: 05/12/24 01:01          Labs:     Lab Results   Component Value Date    WIDITYVX01PI 34 07/28/2021    ZBVJMUWU89HJ 58 08/05/2020    MZFOFIHP16TF 62 03/03/2020     Lab Results   Component Value Date    LJ3UGDEL 81.5 02/21/2022    ABSOLUTECD3 2960 (H) 02/21/2022    ZI0RWRFU 20.2 02/21/2022    ABSOLUTECD8 735 02/21/2022    EU7PFMJD 61.3 (H) 02/21/2022    ABSOLUTECD4 2227 (H) 02/21/2022    LABCD48 3.03 02/21/2022     Lab Results   Component Value Date    WBC 7.00 03/20/2024    RBC 5.62 (H) 03/20/2024    HGB 12.4 03/20/2024    HCT 39.8 03/20/2024    MCV 71 (L) 03/20/2024    MCH 22.1 (L) 03/20/2024    MCHC 31.2 (L) 03/20/2024    RDW 14.0 03/20/2024     03/20/2024    MPV 10.5 03/20/2024    GRAN 3.2 03/20/2024    GRAN 45.8 03/20/2024    LYMPH 2.8 03/20/2024    LYMPH 40.1 03/20/2024    MONO 0.8 03/20/2024    MONO 11.3 03/20/2024    EOS 0.1 03/20/2024    BASO 0.08 03/20/2024    EOSINOPHIL 1.6 03/20/2024    BASOPHIL 1.1 03/20/2024     Sodium   Date Value Ref Range Status   03/20/2024 142 136 - 145 mmol/L Final     Potassium   Date Value Ref Range Status   03/20/2024 4.1 3.5 - 5.1 mmol/L Final     Chloride   Date Value Ref Range Status   03/20/2024 102 95 - 110 mmol/L Final     CO2   Date Value Ref Range Status   03/20/2024 27 23 - 29 mmol/L Final     Glucose   Date Value Ref Range Status   03/20/2024 97 70 - 110 mg/dL Final     BUN   Date Value Ref Range Status   03/20/2024 16 6 - 20 mg/dL Final     Creatinine   Date Value Ref Range Status   03/20/2024 0.9 0.5 - 1.4 mg/dL Final     Calcium   Date Value Ref Range Status   03/20/2024 9.3 8.7 - 10.5 mg/dL Final      Total Protein   Date Value Ref Range Status   03/20/2024 7.7 6.0 - 8.4 g/dL Final     Albumin   Date Value Ref Range Status   03/20/2024 4.2 3.5 - 5.2 g/dL Final     Total Bilirubin   Date Value Ref Range Status   03/20/2024 0.2 0.1 - 1.0 mg/dL Final     Comment:     For infants and newborns, interpretation of results should be based  on gestational age, weight and in agreement with clinical  observations.    Premature Infant recommended reference ranges:  Up to 24 hours.............<8.0 mg/dL  Up to 48 hours............<12.0 mg/dL  3-5 days..................<15.0 mg/dL  6-29 days.................<15.0 mg/dL       Alkaline Phosphatase   Date Value Ref Range Status   03/20/2024 69 55 - 135 U/L Final     AST   Date Value Ref Range Status   03/20/2024 27 10 - 40 U/L Final     ALT   Date Value Ref Range Status   03/20/2024 37 10 - 44 U/L Final     Anion Gap   Date Value Ref Range Status   03/20/2024 13 8 - 16 mmol/L Final     eGFR if    Date Value Ref Range Status   02/21/2022 >60 >60 mL/min/1.73 m^2 Final     eGFR    Date Value Ref Range Status   03/08/2023 88 (L) >=90 mL/min Final     Comment:     Calculation based on the Chronic Kidney Disease Epidemiology Collaboration (CKD-EPI) equation refit without adjustment for race.     eGFR if non    Date Value Ref Range Status   02/21/2022 >60 >60 mL/min/1.73 m^2 Final     Comment:     Calculation used to obtain the estimated glomerular filtration  rate (eGFR) is the CKD-EPI equation.        Lab Results   Component Value Date    HEPBSAG Non-reactive 03/20/2024    HEPBSAB <3.00 03/20/2023    HEPBSAB Non-reactive 03/20/2023    HEPBCAB Non-reactive 03/20/2024           MS Impression and Plan:     NEURO MULTIPLE SCLEROSIS IMPRESSION:   Number of relapses in the past year?:  0  Clinical Progression:  Clinically Stable  Clinical Progression comment:  MRIs did not show any active lesions. Increased symptoms last week may have been  pseudorelapse due to stress. There is no residual weakness or sensory impairment on her exam today.     I encouraged her to follow up with cardiology re: chest pain.   MS Classification:  Relapsing-Remitting MS  DMT:  No change in management  DMT comment:  She will restart Kesimpta. Next labs are due in September. She is aware of the risks associated with immunosuppressant therapy, including increased risk of infection.     Symptom Management:  No change in symptom management     We will have her sign a release form so we can get MRI images from recent hospitalization.     She will follow up with Dr. Tompkins in October.   The visit today is associated with current or anticipated ongoing medical care related to this patient's single serious condition/complex condition of multiple sclerosis.     Total time spent with patient: 41 minutes   Total time spent on encounter: 50 minutes        DIEUDONNE Shane, CNS    Problem List Items Addressed This Visit          Neurologic Problems    Multiple sclerosis - Primary    Relevant Orders    CBC Auto Differential    Comprehensive Metabolic Panel    Hepatitis B Core Antibody, Total    Hepatitis B Surface Antigen    Immunoglobulins (IgG, IgA, IgM) Quantitative    Vitamin D

## 2024-05-15 ENCOUNTER — OFFICE VISIT (OUTPATIENT)
Dept: NEUROLOGY | Facility: CLINIC | Age: 49
End: 2024-05-15
Payer: OTHER GOVERNMENT

## 2024-05-15 VITALS
DIASTOLIC BLOOD PRESSURE: 83 MMHG | HEIGHT: 64 IN | HEART RATE: 67 BPM | SYSTOLIC BLOOD PRESSURE: 143 MMHG | BODY MASS INDEX: 29.81 KG/M2 | WEIGHT: 174.63 LBS

## 2024-05-15 DIAGNOSIS — M79.2 NERVE PAIN: ICD-10-CM

## 2024-05-15 DIAGNOSIS — Z71.89 COUNSELING REGARDING GOALS OF CARE: ICD-10-CM

## 2024-05-15 DIAGNOSIS — Z79.899 HIGH RISK MEDICATION USE: ICD-10-CM

## 2024-05-15 DIAGNOSIS — G35 MULTIPLE SCLEROSIS: Primary | ICD-10-CM

## 2024-05-15 DIAGNOSIS — Z29.89 PROPHYLACTIC IMMUNOTHERAPY: ICD-10-CM

## 2024-05-15 PROCEDURE — 99213 OFFICE O/P EST LOW 20 MIN: CPT | Mod: PBBFAC | Performed by: CLINICAL NURSE SPECIALIST

## 2024-05-15 PROCEDURE — 99999 PR PBB SHADOW E&M-EST. PATIENT-LVL III: CPT | Mod: PBBFAC,,, | Performed by: CLINICAL NURSE SPECIALIST

## 2024-05-15 PROCEDURE — 99215 OFFICE O/P EST HI 40 MIN: CPT | Mod: S$PBB,,, | Performed by: CLINICAL NURSE SPECIALIST

## 2024-05-15 PROCEDURE — G2211 COMPLEX E/M VISIT ADD ON: HCPCS | Mod: S$PBB,,, | Performed by: CLINICAL NURSE SPECIALIST

## 2024-05-15 NOTE — PATIENT INSTRUCTIONS
Cardiac PET scan     [phone] to set up delivery of Kesimpta.

## 2024-05-15 NOTE — Clinical Note
Daniela Stafford! Thanks for seeing Ms. Cruz. Once her L-spine issues are addressed, could you take a look at her c-spine MRI? She has intermittent paresthesias to her right arm, and I wonder if this might be related to degenerative changes there. I appreciate your help! Thanks!  Roseanna

## 2024-05-15 NOTE — Clinical Note
All, she's going to call [facility] to get the titration pack of Kesimpta sent. She wants us to send a 3 month supply of the maintenance dose. Can we do that?

## 2024-05-17 ENCOUNTER — PATIENT MESSAGE (OUTPATIENT)
Dept: NEUROLOGY | Facility: CLINIC | Age: 49
End: 2024-05-17
Payer: OTHER GOVERNMENT

## 2024-05-17 ENCOUNTER — TELEPHONE (OUTPATIENT)
Dept: NEUROLOGY | Facility: CLINIC | Age: 49
End: 2024-05-17
Payer: OTHER GOVERNMENT

## 2024-05-17 DIAGNOSIS — G35 MULTIPLE SCLEROSIS: ICD-10-CM

## 2024-05-17 RX ORDER — OFATUMUMAB 20 MG/.4ML
INJECTION, SOLUTION SUBCUTANEOUS
Qty: 1.2 ML | Refills: 0 | Status: SHIPPED | OUTPATIENT
Start: 2024-05-17

## 2024-05-17 NOTE — TELEPHONE ENCOUNTER
----- Message from DIEUDONNE Hernandez, CNS sent at 5/15/2024  5:34 PM CDT -----  All, [facility] tit[facility] of Kesimpta sent. She wants us to send a 3 month supply of the maintenance dose. Can we do that?

## 2024-06-05 ENCOUNTER — PATIENT MESSAGE (OUTPATIENT)
Dept: NEUROLOGY | Facility: CLINIC | Age: 49
End: 2024-06-05
Payer: OTHER GOVERNMENT

## 2024-06-05 ENCOUNTER — TELEPHONE (OUTPATIENT)
Dept: OPHTHALMOLOGY | Facility: CLINIC | Age: 49
End: 2024-06-05
Payer: OTHER GOVERNMENT

## 2024-06-05 DIAGNOSIS — G35 MULTIPLE SCLEROSIS: Primary | ICD-10-CM

## 2024-06-05 NOTE — TELEPHONE ENCOUNTER
----- Message from Lizette Barreto sent at 6/5/2024  3:02 PM CDT -----  Regarding: Appointment      Name Of Caller:    Tiffany      Contact Preference:   [phone]       Nature of call:   Pt states they're experiencing eye pain in both eyes. She rates it at an 8 out of 10. Please call back to assist.

## 2024-07-25 ENCOUNTER — PATIENT MESSAGE (OUTPATIENT)
Dept: PSYCHIATRY | Facility: CLINIC | Age: 49
End: 2024-07-25
Payer: OTHER GOVERNMENT

## 2024-08-05 ENCOUNTER — PATIENT MESSAGE (OUTPATIENT)
Dept: PSYCHIATRY | Facility: CLINIC | Age: 49
End: 2024-08-05
Payer: OTHER GOVERNMENT

## 2024-08-13 ENCOUNTER — OFFICE VISIT (OUTPATIENT)
Dept: NEUROLOGY | Facility: CLINIC | Age: 49
End: 2024-08-13
Payer: OTHER GOVERNMENT

## 2024-08-13 VITALS
BODY MASS INDEX: 29.96 KG/M2 | WEIGHT: 175.5 LBS | HEART RATE: 65 BPM | HEIGHT: 64 IN | DIASTOLIC BLOOD PRESSURE: 84 MMHG | SYSTOLIC BLOOD PRESSURE: 160 MMHG

## 2024-08-13 DIAGNOSIS — Z79.899 HIGH RISK MEDICATION USE: ICD-10-CM

## 2024-08-13 DIAGNOSIS — Z29.89 PROPHYLACTIC IMMUNOTHERAPY: ICD-10-CM

## 2024-08-13 DIAGNOSIS — G35 MULTIPLE SCLEROSIS: Primary | ICD-10-CM

## 2024-08-13 DIAGNOSIS — Z71.89 COUNSELING REGARDING GOALS OF CARE: ICD-10-CM

## 2024-08-13 DIAGNOSIS — M54.31 RIGHT SIDED SCIATICA: ICD-10-CM

## 2024-08-13 PROCEDURE — 99213 OFFICE O/P EST LOW 20 MIN: CPT | Mod: PBBFAC | Performed by: CLINICAL NURSE SPECIALIST

## 2024-08-13 PROCEDURE — 99999 PR PBB SHADOW E&M-EST. PATIENT-LVL III: CPT | Mod: PBBFAC,,, | Performed by: CLINICAL NURSE SPECIALIST

## 2024-08-13 PROCEDURE — 99214 OFFICE O/P EST MOD 30 MIN: CPT | Mod: S$PBB,,, | Performed by: CLINICAL NURSE SPECIALIST

## 2024-08-13 RX ORDER — METHYLPREDNISOLONE 4 MG/1
TABLET ORAL
Qty: 21 EACH | Refills: 0 | Status: SHIPPED | OUTPATIENT
Start: 2024-08-13 | End: 2024-09-03

## 2024-08-13 NOTE — PROGRESS NOTES
Subjective:          Patient ID: Tiffany Cruz is a 48 y.o. female who presents today for a routine clinic visit for MS.  She was last seen in May 2024. The history has been provided by the patient.     MS HPI:  DMT: Kesimpta--she has not yet restarted, but she does have the medication in hand   Side effects from DMT? No  Taking vitamin D3 as recommended? Yes   She has intermittent episodes of lightheadedness that last for a second or two. She denies vertigo. She denies starting any new medications. She does not drink a lot of water. She has been without one of her blood pressure medications.   She denies falls, and her mobility has been good.   She has an aching pain all the time in her right leg, mostly between the hip and the knee, sometimes pain in the buttocks. Positions do not seem to make a difference. If she stretches more, it makes it easier to walk. She reports numbness and aching to the right leg. She has known   She has an appt pending with pain management for an injection. She received a Toradol shot and medrol pack in May, which was somewhat helpful. She feels like it is difficult to walk when the pain is present.   She denies any excessive fatigue.   She denies any bowel or bladder issues--more controlled than it has been.   She is having any tightness in the right leg--only takes tizanidine and/or baclofen at night because it makes her too sleepy. She is taking magnesium.   She denies any vision changes.   Hand coordination is fine.   She has had hot flashes, so she wonders if she is going through menopause.   She has been without one of her blood pressure medications.   She took 2 predisone 20mg pills in July for a cold.     Medications:  Current Outpatient Medications   Medication Sig    baclofen (LIORESAL) 10 MG tablet Take 2 tablets (20 mg total) by mouth once daily.    cholecalciferol, vitamin D3, 1,250 mcg (50,000 unit) capsule Take 1 capsule (50,000 Units total) by mouth every 7  days.    gabapentin (NEURONTIN) 100 MG capsule Take 1 capsule (100 mg total) by mouth 3 (three) times daily.    gabapentin (NEURONTIN) 300 MG capsule Take 1 capsule (300 mg total) by mouth every evening.    ofatumumab (KESIMPTA PEN) 20 mg/0.4 mL PnIj Inject 20 mg (1 pen) into the skin on week 0, 1, and 2.    ofatumumab (KESIMPTA PEN) 20 mg/0.4 mL PnIj Inject 20 mg (1 pen) into the skin every 28 days starting at week 4    rosuvastatin (CRESTOR) 10 MG tablet     tiZANidine (ZANAFLEX) 6 mg capsule Take 1 capsule (6 mg total) by mouth every evening.    ALPRAZolam (XANAX) 0.25 MG tablet Take 0.25 mg by mouth daily as needed. (Patient not taking: Reported on 8/13/2024)    losartan-hydrochlorothiazide 100-25 mg (HYZAAR) 100-25 mg per tablet Take 1 tablet by mouth once daily.     Current Facility-Administered Medications   Medication Frequency    sodium chloride 0.9% flush 10 mL PRN     Facility-Administered Medications Ordered in Other Visits   Medication Frequency    0.9%  NaCl infusion Continuous    alteplase injection 2 mg PRN    ferric carboxymaltose (INJECTAFER) 750 mg in sodium chloride 0.9% 250 mL IVPB (ready to mix system) Once    heparin, porcine (PF) 100 unit/mL injection flush 500 Units PRN    mupirocin 2 % ointment On Call Procedure    sodium chloride 0.9% 100 mL flush bag 1 time in Clinic/HOD    sodium chloride 0.9% flush 10 mL PRN       SOCIAL HISTORY  Social History     Tobacco Use    Smoking status: Never    Smokeless tobacco: Never   Substance Use Topics    Alcohol use: No     Alcohol/week: 0.0 standard drinks of alcohol    Drug use: No       Living arrangements - the patient lives with her family           Objective:        1. 25 foot timed walk:      5/15/2024    12:01 AM 8/13/2024    12:01 AM   Timed 25 Foot Walk:   Did patient wear an AFO? No No   Was assistive device used? No No   Time for 25 Foot Walk (seconds) 3.8 4   Time for 25 Foot Walk (seconds) 3.7 3.7       Neurologic Exam     Mental Status    Oriented to person, place, and time.   Attention: normal. Concentration: normal.   Speech: speech is normal   Level of consciousness: alert  Knowledge: good.   Normal comprehension.     Cranial Nerves     CN VIII   Hearing: intact    Motor Exam     Strength   Right iliopsoas: 5/5  Left iliopsoas: 5/5  Right quadriceps: 5/5  Left quadriceps: 5/5  Right hamstrin/5  Left hamstrin/5  Right anterior tibial: 5/5  Left anterior tibial: 5/5  Right gastroc: 5/5  Left gastroc: 5/5  She has pain upon straight leg raise on the right leg.      Sensory Exam   Right leg vibration: normal  Left leg vibration: normal    Gait, Coordination, and Reflexes     Reflexes   Right patellar: 0  Left patellar: 2+  Right achilles: 0  Left achilles: 2+  Diminished reflex on right leg          Imaging:         Results for orders placed during the hospital encounter of 24    MRI Brain Demyelinating W W/O Contrast    Impression  No significant change from prior.  Essentially stable T2 FLAIR lesion burden throughout the brain parenchyma while nonspecific remains concerning for mild degree of prior demyelinating plaque    No evidence for new lesion or enhancing lesion to suggest significant interval or active demyelination allowing for artifacts as detailed above    Clinical correlation and follow-up advised      Electronically signed by: Charlie Lainez DO  Date:    2024  Time:    07:48      Labs:     Lab Results   Component Value Date    [id number] 34 2021    CRMKPEOD80RM 58 2020    RYCBESVN80PM 62 2020     Lab Results   Component Value Date    WBC 7.00 2024    RBC 5.62 (H) 2024    HGB 12.4 2024    HCT 39.8 2024    MCV 71 (L) 2024    MCH 22.1 (L) 2024    MCHC 31.2 (L) 2024    RDW 14.0 2024     2024    MPV 10.5 2024    GRAN 3.2 2024    GRAN 45.8 2024    LYMPH 2.8 2024    LYMPH 40.1 2024    MONO 0.8 2024    MONO  11.3 03/20/2024    EOS 0.1 03/20/2024    BASO 0.08 03/20/2024    EOSINOPHIL 1.6 03/20/2024    BASOPHIL 1.1 03/20/2024     Sodium   Date Value Ref Range Status   03/20/2024 142 136 - 145 mmol/L Final     Potassium   Date Value Ref Range Status   03/20/2024 4.1 3.5 - 5.1 mmol/L Final     Chloride   Date Value Ref Range Status   03/20/2024 102 95 - 110 mmol/L Final     CO2   Date Value Ref Range Status   03/20/2024 27 23 - 29 mmol/L Final     Glucose   Date Value Ref Range Status   03/20/2024 97 70 - 110 mg/dL Final     BUN   Date Value Ref Range Status   03/20/2024 16 6 - 20 mg/dL Final     Creatinine   Date Value Ref Range Status   03/20/2024 0.9 0.5 - 1.4 mg/dL Final     Calcium   Date Value Ref Range Status   03/20/2024 9.3 8.7 - 10.5 mg/dL Final     Total Protein   Date Value Ref Range Status   03/20/2024 7.7 6.0 - 8.4 g/dL Final     Albumin   Date Value Ref Range Status   03/20/2024 4.2 3.5 - 5.2 g/dL Final     Total Bilirubin   Date Value Ref Range Status   03/20/2024 0.2 0.1 - 1.0 mg/dL Final     Comment:     For infants and newborns, interpretation of results should be based  on gestational age, weight and in agreement with clinical  observations.    Premature Infant recommended reference ranges:  Up to 24 hours.............<8.0 mg/dL  Up to 48 hours............<12.0 mg/dL  3-5 days..................<15.0 mg/dL  6-29 days.................<15.0 mg/dL       Alkaline Phosphatase   Date Value Ref Range Status   03/20/2024 69 55 - 135 U/L Final     AST   Date Value Ref Range Status   03/20/2024 27 10 - 40 U/L Final     ALT   Date Value Ref Range Status   03/20/2024 37 10 - 44 U/L Final     Anion Gap   Date Value Ref Range Status   03/20/2024 13 8 - 16 mmol/L Final     eGFR if    Date Value Ref Range Status   02/21/2022 >60 >60 mL/min/1.73 m^2 Final     eGFR    Date Value Ref Range Status   03/08/2023 88 (L) >=90 mL/min Final     Comment:     Calculation based on the Chronic Kidney  Disease Epidemiology Collaboration (CKD-EPI) equation refit without adjustment for race.     eGFR if non    Date Value Ref Range Status   02/21/2022 >60 >60 mL/min/1.73 m^2 Final     Comment:     Calculation used to obtain the estimated glomerular filtration  rate (eGFR) is the CKD-EPI equation.        Lab Results   Component Value Date    HEPBSAG Non-reactive 03/20/2024    HEPBSAB <3.00 03/20/2023    HEPBSAB Non-reactive 03/20/2023    HEPBCAB Non-reactive 03/20/2024           MS Impression and Plan:     NEURO MULTIPLE SCLEROSIS IMPRESSION:   Number of relapses in the past year?:  0  Clinical Progression comment:  Evelyn is interested in a steroid infusion for leg pain, but I do not suspect that this is an MS relapse. She has known sciatica in the right leg and is pending a pain procedure to treat this pain. I do not feel that IV steroids are indicated at this time. However, I will prescribe a Medrol pack to help her pain until she is scheduled for procedure.     Lightheadedness may be related to changes in blood pressure, dehydration, and/or heat sensitivity/possible menopause. We will monitor closely. We will check UA today, as well.   DMT:  No change in management  DMT comment:  We will check Kesimpta safety labs today, and she can restart titration pack when labs are completed and reviewed.   Symptom Management:  No change in symptom management    She will follow up with Dr. Tompkins in December.     Total time spent with patient: 30 minutes   Total time spent on encounter: 37 minutes             DIEUDONNE Shane, CNS

## 2024-08-15 ENCOUNTER — TELEPHONE (OUTPATIENT)
Dept: NEUROLOGY | Facility: CLINIC | Age: 49
End: 2024-08-15
Payer: OTHER GOVERNMENT

## 2024-08-15 DIAGNOSIS — G35 MULTIPLE SCLEROSIS: Primary | ICD-10-CM

## 2024-08-15 NOTE — TELEPHONE ENCOUNTER
Spoke with pt in regards to getting her scheduled for a f/u. Pt is scheduled on 12/23/24 with ANITA.

## 2024-09-12 ENCOUNTER — LAB VISIT (OUTPATIENT)
Dept: LAB | Facility: HOSPITAL | Age: 49
End: 2024-09-12
Attending: PSYCHIATRY & NEUROLOGY
Payer: OTHER GOVERNMENT

## 2024-09-12 DIAGNOSIS — G35 MULTIPLE SCLEROSIS: ICD-10-CM

## 2024-09-12 LAB
ALBUMIN SERPL BCP-MCNC: 3.8 G/DL (ref 3.5–5.2)
ALP SERPL-CCNC: 63 U/L (ref 55–135)
ALT SERPL W/O P-5'-P-CCNC: 16 U/L (ref 10–44)
ANION GAP SERPL CALC-SCNC: 8 MMOL/L (ref 8–16)
AST SERPL-CCNC: 15 U/L (ref 10–40)
BACTERIA #/AREA URNS HPF: ABNORMAL /HPF
BASOPHILS # BLD AUTO: 0.07 K/UL (ref 0–0.2)
BASOPHILS NFR BLD: 1 % (ref 0–1.9)
BILIRUB SERPL-MCNC: 0.2 MG/DL (ref 0.1–1)
BILIRUB UR QL STRIP: NEGATIVE
BUN SERPL-MCNC: 12 MG/DL (ref 6–20)
CALCIUM SERPL-MCNC: 8.9 MG/DL (ref 8.7–10.5)
CHLORIDE SERPL-SCNC: 106 MMOL/L (ref 95–110)
CLARITY UR: CLEAR
CO2 SERPL-SCNC: 25 MMOL/L (ref 23–29)
COLOR UR: YELLOW
CREAT SERPL-MCNC: 0.9 MG/DL (ref 0.5–1.4)
DIFFERENTIAL METHOD BLD: ABNORMAL
EOSINOPHIL # BLD AUTO: 0.1 K/UL (ref 0–0.5)
EOSINOPHIL NFR BLD: 1.8 % (ref 0–8)
ERYTHROCYTE [DISTWIDTH] IN BLOOD BY AUTOMATED COUNT: 14.6 % (ref 11.5–14.5)
EST. GFR  (NO RACE VARIABLE): >60 ML/MIN/1.73 M^2
GLUCOSE SERPL-MCNC: 98 MG/DL (ref 70–110)
GLUCOSE UR QL STRIP: NEGATIVE
HCT VFR BLD AUTO: 38.5 % (ref 37–48.5)
HGB BLD-MCNC: 11.7 G/DL (ref 12–16)
HGB UR QL STRIP: ABNORMAL
IMM GRANULOCYTES # BLD AUTO: 0.01 K/UL (ref 0–0.04)
IMM GRANULOCYTES NFR BLD AUTO: 0.1 % (ref 0–0.5)
KETONES UR QL STRIP: NEGATIVE
LEUKOCYTE ESTERASE UR QL STRIP: NEGATIVE
LYMPHOCYTES # BLD AUTO: 2.6 K/UL (ref 1–4.8)
LYMPHOCYTES NFR BLD: 36.9 % (ref 18–48)
MCH RBC QN AUTO: 21.8 PG (ref 27–31)
MCHC RBC AUTO-ENTMCNC: 30.4 G/DL (ref 32–36)
MCV RBC AUTO: 72 FL (ref 82–98)
MICROSCOPIC COMMENT: ABNORMAL
MONOCYTES # BLD AUTO: 0.8 K/UL (ref 0.3–1)
MONOCYTES NFR BLD: 11.6 % (ref 4–15)
NEUTROPHILS # BLD AUTO: 3.4 K/UL (ref 1.8–7.7)
NEUTROPHILS NFR BLD: 48.6 % (ref 38–73)
NITRITE UR QL STRIP: NEGATIVE
NRBC BLD-RTO: 0 /100 WBC
PH UR STRIP: 7 [PH] (ref 5–8)
PLATELET # BLD AUTO: 322 K/UL (ref 150–450)
PMV BLD AUTO: 10.3 FL (ref 9.2–12.9)
POTASSIUM SERPL-SCNC: 4 MMOL/L (ref 3.5–5.1)
PROT SERPL-MCNC: 7.4 G/DL (ref 6–8.4)
PROT UR QL STRIP: NEGATIVE
RBC # BLD AUTO: 5.36 M/UL (ref 4–5.4)
RBC #/AREA URNS HPF: 10 /HPF (ref 0–4)
SODIUM SERPL-SCNC: 139 MMOL/L (ref 136–145)
SP GR UR STRIP: 1.02 (ref 1–1.03)
URN SPEC COLLECT METH UR: ABNORMAL
UROBILINOGEN UR STRIP-ACNC: NEGATIVE EU/DL
WBC # BLD AUTO: 7.07 K/UL (ref 3.9–12.7)

## 2024-09-12 PROCEDURE — 82784 ASSAY IGA/IGD/IGG/IGM EACH: CPT | Mod: 59 | Performed by: CLINICAL NURSE SPECIALIST

## 2024-09-12 PROCEDURE — 81000 URINALYSIS NONAUTO W/SCOPE: CPT | Performed by: CLINICAL NURSE SPECIALIST

## 2024-09-12 PROCEDURE — 85025 COMPLETE CBC W/AUTO DIFF WBC: CPT | Performed by: CLINICAL NURSE SPECIALIST

## 2024-09-12 PROCEDURE — 82306 VITAMIN D 25 HYDROXY: CPT | Performed by: CLINICAL NURSE SPECIALIST

## 2024-09-12 PROCEDURE — 86704 HEP B CORE ANTIBODY TOTAL: CPT | Performed by: CLINICAL NURSE SPECIALIST

## 2024-09-12 PROCEDURE — 87340 HEPATITIS B SURFACE AG IA: CPT | Performed by: CLINICAL NURSE SPECIALIST

## 2024-09-12 PROCEDURE — 80053 COMPREHEN METABOLIC PANEL: CPT | Performed by: CLINICAL NURSE SPECIALIST

## 2024-09-12 PROCEDURE — 36415 COLL VENOUS BLD VENIPUNCTURE: CPT | Performed by: CLINICAL NURSE SPECIALIST

## 2024-09-13 ENCOUNTER — TELEPHONE (OUTPATIENT)
Dept: NEUROLOGY | Facility: CLINIC | Age: 49
End: 2024-09-13

## 2024-09-13 DIAGNOSIS — R76.8 ELEVATED IMMUNOGLOBULIN A: Primary | ICD-10-CM

## 2024-09-13 LAB
25(OH)D3+25(OH)D2 SERPL-MCNC: 33 NG/ML (ref 30–96)
HBV CORE AB SERPL QL IA: NORMAL
HBV SURFACE AG SERPL QL IA: NORMAL
IGA SERPL-MCNC: 460 MG/DL (ref 40–350)
IGG SERPL-MCNC: 1310 MG/DL (ref 650–1600)
IGM SERPL-MCNC: 38 MG/DL (ref 50–300)

## 2024-09-27 ENCOUNTER — PATIENT MESSAGE (OUTPATIENT)
Dept: NEUROLOGY | Facility: CLINIC | Age: 49
End: 2024-09-27
Payer: OTHER GOVERNMENT

## 2024-10-07 NOTE — PROGRESS NOTES
Ms. Alireza Roa is a 67-year-old female with a history of GERD and colon cancer with right hemicolectomy who presents with epigastric pain and abdominal pain. She did have hiatal hernia surgery 2 years ago. She is having some lower abdominal pain. She does have a bowel movement twice daily she states it is normal. She denies any hematochezia or melena. She has lost 50 pounds in the last 2 years. She lost her daughter earlier this year and has been very stressed. She is having significant epigastric pain and acid reflux she denies any dysphagia. She was due for her colonoscopy in 2022 and is also interested in having an EGD at this time due to her symptoms.Family history: Mother with colon cancer, daughter with colon polyps, family history of Terrazas syndrome.Data review: Labs 5/2024 normal LFTs, TSH 3.14.  Subjective:          Patient ID: Tiffany Cruz is a 46 y.o. female who presents today for a routine clinic visit for MS. She was last seen by Roseanna on 2/21/22.     MS HPI:  · DMT: She was suppose to take the Kesimpta but as not started it. She use to take aubagio   · Taking vitamin D3 as recommended? Yes - 53253 Units weekly   · The reason that she did not start the Kisempta is because her insurance requires the medications to go [facility]. She never verified her address with [facility] which is why they never sent it. She is on the phone with them   · She has not had any DMT for the past year and a half and she is worried   · For the past three weeks, she has had eyelid twitching.    · She states she is really bad with eating and drinking water   · She gets baclofen PRN --> last took it one month ago   · She takes gabapentin which helps her with the sleeping. She takes it as a PRN basis    · Ongoing right leg and foot pain which she takes is neuropathic pain. I offered therapy and she is not amenable       Medications:  Current Outpatient Medications   Medication Sig    baclofen (LIORESAL) 10 MG tablet Take 1/2 to 1 tab by mouth at bedtime.    diazePAM (VALIUM) 5 MG tablet Take 1 tablet by mouth 1 hour prior to MRI and 1 tab at the time of the MRI if needed.    ergocalciferol (ERGOCALCIFEROL) 50,000 unit Cap     gabapentin (NEURONTIN) 100 MG capsule TAKE 1 CAPSULE(100 MG) BY MOUTH THREE TIMES DAILY    ibuprofen (ADVIL,MOTRIN) 800 MG tablet Take 1 tablet (800 mg total) by mouth every 6 (six) hours as needed for Pain.    metoprolol succinate (TOPROL-XL) 25 MG 24 hr tablet     ofatumumab (KESIMPTA PEN) 20 mg/0.4 mL PnIj Loading Dose: Inject 20 mg (0.4 ml) SQ at week 0, 1 and 2.    ofatumumab (KESIMPTA PEN) 20 mg/0.4 mL PnIj Inject 20 mg into the skin every 28 days.    telmisartan-hydrochlorothiazide (MICARDIS HCT) 80-12.5 mg per tablet Take 1 tablet by mouth.    tiZANidine  (ZANAFLEX) 6 mg capsule Take 1 capsule (6 mg total) by mouth every evening.    zolpidem (AMBIEN) 5 MG Tab Take 1 tablet (5 mg total) by mouth nightly as needed.         SOCIAL HISTORY  Social History     Tobacco Use    Smoking status: Never Smoker    Smokeless tobacco: Never Used   Substance Use Topics    Alcohol use: No     Alcohol/week: 0.0 standard drinks    Drug use: No       Living arrangements - the patient lives with their family.    REVIEW OF SYMPTOMS 6/13/2022   Do you feel abnormally tired on most days? No   Do you feel you generally sleep well? No   Do you have difficulty controlling your bladder?  No   Do you have difficulty controlling your bowels?  No   Do you have frequent muscle cramps, tightness or spasms in your limbs?  No   Do you have new visual symptoms?  No   Do you have worsening difficulty with your memory or thinking? No   Do you have worsening symptoms of anxiety or depression?  No   For patients who walk, Do you have more difficulty walking?  No   Have you fallen since your last visit?  No   For patients who use wheelchairs: Do you have any skin wounds or breakdown? Not Applicable   Do you have difficulty using your hands?  No   Do you have shooting or burning pain? No   Do you have difficulty with sexual function?  -   If you are sexually active, are you using birth control? Y/N  N/A Not Applicable   Do you often choke when swallowing liquids or solid food?  No   Do you experience worsening symptoms when overheated? No   Do you need any new equipment such as a wheelchair, walker or shower chair? No   Do you receive co-pay financial assistance for your principal MS medicine? Not Applicable   Would you be interested in participating in an MS research trial in the future? Not Applicable   For patients on Gilenya, Tecfidera, Aubagio, Rituxan, Ocrevus, Tysabri, Lemtrada or Methotrexate, are you aware that you should NOT receive live virus vaccines?  Not Applicable   Do you feel you have  adequate family/friend support?  Yes   Do you have health insurance?   Yes   Are you currently employed? Yes   Do you receive SSDI/SSI?  Not Applicable   Do you use marijuana or cannabis products? No   Have you been diagnosed with a urinary tract infection since your last visit here? No   Have you been diagnosed with a respiratory tract infection since your last visit here? No   Have you been to the emergency room since your last visit here? No   Have you been hospitalized since your last visit here?  No                Objective:          Timed 25 Foot Walk: 2022   Did patient wear an AFO? No No   Was assistive device used? No No   Time for 25 Foot Walk (seconds) 4.6 5   Time for 25 Foot Walk (seconds) 4.1 -   Neurologic Exam     Mental Status   Oriented to person, place, and time.   Follows 3 step commands.   Attention: normal.   Level of consciousness: alert  Knowledge: good.     Cranial Nerves     CN III, IV, VI   Pupils are equal, round, and reactive to light.  Extraocular motions are normal.     CN V   Facial sensation intact.     CN VII   Facial expression full, symmetric.     CN VIII   CN VIII normal.     Motor Exam   Muscle bulk: normal  Overall muscle tone: normal  Right arm tone: normal  Left arm tone: normal  Right leg tone: normal  Left leg tone: normal    Strength   Right deltoid: 5/5  Left deltoid: 5/5  Right biceps: 5/5  Left biceps: 5/5  Right triceps: 5/5  Left triceps: 5/5  Right wrist flexion: 5/5  Left wrist flexion: 5/5  Right wrist extension: 5/5  Left wrist extension: 5/5  Right iliopsoas: 5/5  Left iliopsoas: 5/5  Right quadriceps: 5/5  Left quadriceps: 5/5  Right hamstrin/5  Left hamstrin/5    Sensory Exam   Vibration normal.   Proprioception normal.     Gait, Coordination, and Reflexes     Gait  Gait: normal    Coordination   Romberg: negative    Tremor   Resting tremor: absent    Reflexes   Right García: absent  Left García: absent         Imaging:       Results  for orders placed during the hospital encounter of 08/11/21    MRI Brain Demyelinating W W/O Contrast    Impression  The brain and cervical spinal cord appears stable from prior exam, again demonstrating findings compatible with the reported history of multiple sclerosis.  No new or enhancing lesions identified to suggest ongoing or active demyelination.    Stable mild cervical spondylosis.    Electronically signed by resident: Manish Caputo MD  Date:    08/12/2021  Time:    08:35    Electronically signed by: Luis Bridges MD  Date:    08/12/2021  Time:    10:18    Results for orders placed during the hospital encounter of 08/11/21    MRI Cervical Spine Demyelinating W W/O Contrast    Impression  The brain and cervical spinal cord appears stable from prior exam, again demonstrating findings compatible with the reported history of multiple sclerosis.  No new or enhancing lesions identified to suggest ongoing or active demyelination.    Stable mild cervical spondylosis.    Electronically signed by resident: Manish Caputo MD  Date:    08/12/2021  Time:    08:35    Electronically signed by: Luis Bridges MD  Date:    08/12/2021  Time:    10:18    Results for orders placed during the hospital encounter of 07/19/18    MRI Thoracic Spine Demyelinating W W/O Contrast    Impression  New tiny focus of T2 hyperintense signal in the left lateral cord at the level of C2, in keeping with demyelinating plaque given clinical history of multiple sclerosis.  No associated enhancement.  This lesion is distinct from the right dorsal lateral cord lesion at the level of C2 present on examination of 01/15/2017.    Unchanged diffusely decreased T1 marrow signal suggestive of diffuse marrow replacement process such as red marrow reconversion or myelodysplastic process.    Mild cervical spondylosis most notable at C5-6.    Electronically signed by resident: Juan Matias  Date:    07/20/2018  Time:    08:27    Electronically signed by: Mt  MD Florentino  Date:    07/20/2018  Time:    12:41        Labs:     Lab Results   Component Value Date    AAAFQEDG90ME 34 07/28/2021    RSYJXBZB48RI 58 08/05/2020    OERYLWED69EO 62 03/03/2020     No results found for: JCVINDEX, JCVANTIBODY  Lab Results   Component Value Date    NH5JZFBT 81.5 02/21/2022    ABSOLUTECD3 2960 (H) 02/21/2022    GR0KLKOY 20.2 02/21/2022    ABSOLUTECD8 735 02/21/2022    MI9MCCTA 61.3 (H) 02/21/2022    ABSOLUTECD4 2227 (H) 02/21/2022    LABCD48 3.03 02/21/2022     Lab Results   Component Value Date    WBC 8.05 02/21/2022    RBC 5.37 02/21/2022    HGB 11.4 (L) 02/21/2022    HCT 37.9 02/21/2022    MCV 71 (L) 02/21/2022    MCH 21.2 (L) 02/21/2022    MCHC 30.1 (L) 02/21/2022    RDW 13.8 02/21/2022     02/21/2022    MPV 10.3 02/21/2022    GRAN 3.8 02/21/2022    GRAN 47.5 02/21/2022    LYMPH 3.0 02/21/2022    LYMPH 37.4 02/21/2022    MONO 1.0 02/21/2022    MONO 11.9 02/21/2022    EOS 0.2 02/21/2022    BASO 0.08 02/21/2022    EOSINOPHIL 2.0 02/21/2022    BASOPHIL 1.0 02/21/2022     Sodium   Date Value Ref Range Status   02/21/2022 139 136 - 145 mmol/L Final     Potassium   Date Value Ref Range Status   02/21/2022 3.7 3.5 - 5.1 mmol/L Final     Chloride   Date Value Ref Range Status   02/21/2022 103 95 - 110 mmol/L Final     CO2   Date Value Ref Range Status   02/21/2022 29 23 - 29 mmol/L Final     Glucose   Date Value Ref Range Status   02/21/2022 86 70 - 110 mg/dL Final     BUN   Date Value Ref Range Status   02/21/2022 10 6 - 20 mg/dL Final     Creatinine   Date Value Ref Range Status   02/21/2022 0.8 0.5 - 1.4 mg/dL Final     Calcium   Date Value Ref Range Status   02/21/2022 8.7 8.7 - 10.5 mg/dL Final     Total Protein   Date Value Ref Range Status   02/21/2022 7.1 6.0 - 8.4 g/dL Final     Albumin   Date Value Ref Range Status   02/21/2022 3.7 3.5 - 5.2 g/dL Final     Total Bilirubin   Date Value Ref Range Status   02/21/2022 0.3 0.1 - 1.0 mg/dL Final     Comment:     For infants and  newborns, interpretation of results should be based  on gestational age, weight and in agreement with clinical  observations.    Premature Infant recommended reference ranges:  Up to 24 hours.............<8.0 mg/dL  Up to 48 hours............<12.0 mg/dL  3-5 days..................<15.0 mg/dL  6-29 days.................<15.0 mg/dL       Alkaline Phosphatase   Date Value Ref Range Status   02/21/2022 57 55 - 135 U/L Final     AST   Date Value Ref Range Status   02/21/2022 17 10 - 40 U/L Final     ALT   Date Value Ref Range Status   02/21/2022 24 10 - 44 U/L Final     Anion Gap   Date Value Ref Range Status   02/21/2022 7 (L) 8 - 16 mmol/L Final     eGFR if    Date Value Ref Range Status   02/21/2022 >60 >60 mL/min/1.73 m^2 Final     eGFR if non    Date Value Ref Range Status   02/21/2022 >60 >60 mL/min/1.73 m^2 Final     Comment:     Calculation used to obtain the estimated glomerular filtration  rate (eGFR) is the CKD-EPI equation.        Lab Results   Component Value Date    HEPBSAG Negative 02/21/2022    HEPBSAB Negative 02/21/2022    HEPBCAB Negative 02/21/2022           MS Impression and Plan:     NEURO MULTIPLE SCLEROSIS IMPRESSION:   MS Status:     Number of relapses in the past year?:  0    Clinical Progression:  Clinically Stable    MRI Progression:  Stable  Plan:     DMT:  No change in management    DMT comment:  She will start Kisempta. We spoke about doing the loading dose initially and then once monthly afterwards       Symptom Management:  No change in symptom management (Told her to continue taking her gabapentin daily)     Ms Cruz  was supposed to start the Kisempta however, she did not verify her address with pharmacy [facility] today which is why it wasn't delivered. We explained the loading dose and instructions for the Kesimpta. She is amenable to the plan.     She hurt her knee by accidentally hitting a chair. She is scheduled for PT/OT for that tomorrow. After  seeing orthopedics, she will consider PT for her right leg (possibly neuropathic pain)     She complains of eyelid twitching (intermittent) that correlate with her hydration status. She is instructed to continue intensive hydration with electrolytes.     Tizanidine refilled    Follow up with Roseanna Riddle in 3 months       Billy Tran MD  Neurology Resident PGY IV  [facility]     Problem List Items Addressed This Visit        1 - High    Multiple sclerosis     Continue Kisempta--> she did not start it yet, thus instructions of loading dose and monthly dose afterwards explained               Unprioritized    Immunosuppression - Primary      Other Visit Diagnoses     Muscle spasm        Relevant Medications    tiZANidine (ZANAFLEX) 6 mg capsule    Counseling regarding goals of care              Marika Tompkins MD     I spent a total of 40 minutes on the day of the visit.This includes face to face time and non-face to face time preparing to see the patient (eg, review of tests), obtaining and/or reviewing separately obtained history, documenting clinical information in the electronic or other health record, independently interpreting results and communicating results to the patient/family/caregiver, or care coordinator.

## 2024-10-24 ENCOUNTER — TELEPHONE (OUTPATIENT)
Dept: ORTHOPEDICS | Facility: CLINIC | Age: 49
End: 2024-10-24
Payer: OTHER GOVERNMENT

## 2024-10-25 ENCOUNTER — LAB VISIT (OUTPATIENT)
Dept: LAB | Facility: OTHER | Age: 49
End: 2024-10-25
Attending: CLINICAL NURSE SPECIALIST
Payer: OTHER GOVERNMENT

## 2024-10-25 DIAGNOSIS — G35 MULTIPLE SCLEROSIS: ICD-10-CM

## 2024-10-25 DIAGNOSIS — R76.8 ELEVATED IMMUNOGLOBULIN A: ICD-10-CM

## 2024-10-25 LAB
BACTERIA #/AREA URNS HPF: ABNORMAL /HPF
BILIRUB UR QL STRIP: NEGATIVE
CLARITY UR: CLEAR
COLOR UR: YELLOW
GLUCOSE UR QL STRIP: NEGATIVE
HGB UR QL STRIP: ABNORMAL
KETONES UR QL STRIP: NEGATIVE
LEUKOCYTE ESTERASE UR QL STRIP: NEGATIVE
MICROSCOPIC COMMENT: ABNORMAL
NITRITE UR QL STRIP: NEGATIVE
PH UR STRIP: 6 [PH] (ref 5–8)
PROT UR QL STRIP: ABNORMAL
RBC #/AREA URNS HPF: 10 /HPF (ref 0–4)
SP GR UR STRIP: 1.03 (ref 1–1.03)
SQUAMOUS #/AREA URNS HPF: 2 /HPF
URN SPEC COLLECT METH UR: ABNORMAL
WBC #/AREA URNS HPF: 2 /HPF (ref 0–5)

## 2024-10-25 PROCEDURE — 84165 PROTEIN E-PHORESIS SERUM: CPT | Mod: 26,,, | Performed by: PATHOLOGY

## 2024-10-25 PROCEDURE — 84165 PROTEIN E-PHORESIS SERUM: CPT | Performed by: CLINICAL NURSE SPECIALIST

## 2024-10-25 PROCEDURE — 86334 IMMUNOFIX E-PHORESIS SERUM: CPT | Mod: 26,,, | Performed by: PATHOLOGY

## 2024-10-25 PROCEDURE — 81000 URINALYSIS NONAUTO W/SCOPE: CPT | Performed by: CLINICAL NURSE SPECIALIST

## 2024-10-25 PROCEDURE — 36415 COLL VENOUS BLD VENIPUNCTURE: CPT | Performed by: CLINICAL NURSE SPECIALIST

## 2024-10-25 PROCEDURE — 86334 IMMUNOFIX E-PHORESIS SERUM: CPT | Performed by: CLINICAL NURSE SPECIALIST

## 2024-10-28 ENCOUNTER — TELEPHONE (OUTPATIENT)
Dept: UROLOGY | Facility: CLINIC | Age: 49
End: 2024-10-28
Payer: OTHER GOVERNMENT

## 2024-10-28 LAB
ALBUMIN SERPL ELPH-MCNC: 4.04 G/DL (ref 3.35–5.55)
ALPHA1 GLOB SERPL ELPH-MCNC: 0.22 G/DL (ref 0.17–0.41)
ALPHA2 GLOB SERPL ELPH-MCNC: 0.71 G/DL (ref 0.43–0.99)
B-GLOBULIN SERPL ELPH-MCNC: 0.94 G/DL (ref 0.5–1.1)
GAMMA GLOB SERPL ELPH-MCNC: 1.19 G/DL (ref 0.67–1.58)
PROT SERPL-MCNC: 7.1 G/DL (ref 6–8.4)

## 2024-10-29 ENCOUNTER — OFFICE VISIT (OUTPATIENT)
Dept: ORTHOPEDICS | Facility: CLINIC | Age: 49
End: 2024-10-29
Payer: OTHER GOVERNMENT

## 2024-10-29 ENCOUNTER — HOSPITAL ENCOUNTER (OUTPATIENT)
Dept: RADIOLOGY | Facility: HOSPITAL | Age: 49
Discharge: HOME | End: 2024-10-29
Attending: PHYSICIAN ASSISTANT
Payer: OTHER GOVERNMENT

## 2024-10-29 VITALS
DIASTOLIC BLOOD PRESSURE: 87 MMHG | BODY MASS INDEX: 29.96 KG/M2 | HEART RATE: 68 BPM | HEIGHT: 64 IN | RESPIRATION RATE: 18 BRPM | WEIGHT: 175.5 LBS | SYSTOLIC BLOOD PRESSURE: 140 MMHG

## 2024-10-29 DIAGNOSIS — M25.512 ACUTE PAIN OF LEFT SHOULDER: ICD-10-CM

## 2024-10-29 DIAGNOSIS — M25.512 ACUTE PAIN OF LEFT SHOULDER: Primary | ICD-10-CM

## 2024-10-29 DIAGNOSIS — M75.32 CALCIFIC TENDONITIS OF LEFT SHOULDER: ICD-10-CM

## 2024-10-29 LAB — PATHOLOGIST INTERPRETATION SPE: NORMAL

## 2024-10-29 PROCEDURE — 99214 OFFICE O/P EST MOD 30 MIN: CPT | Mod: PBBFAC,25 | Performed by: PHYSICIAN ASSISTANT

## 2024-10-29 PROCEDURE — 99999 PR PBB SHADOW E&M-EST. PATIENT-LVL IV: CPT | Mod: PBBFAC,,, | Performed by: PHYSICIAN ASSISTANT

## 2024-10-29 PROCEDURE — 73030 X-RAY EXAM OF SHOULDER: CPT | Mod: 26,LT,, | Performed by: RADIOLOGY

## 2024-10-29 PROCEDURE — 73030 X-RAY EXAM OF SHOULDER: CPT | Mod: TC,LT

## 2024-10-29 PROCEDURE — 99213 OFFICE O/P EST LOW 20 MIN: CPT | Mod: S$PBB,,, | Performed by: PHYSICIAN ASSISTANT

## 2024-10-30 ENCOUNTER — TELEPHONE (OUTPATIENT)
Dept: PODIATRY | Facility: CLINIC | Age: 49
End: 2024-10-30
Payer: OTHER GOVERNMENT

## 2024-10-30 LAB
INTERPRETATION SERPL IFE-IMP: NORMAL
PATHOLOGIST INTERPRETATION IFE: NORMAL

## 2024-11-05 ENCOUNTER — OFFICE VISIT (OUTPATIENT)
Dept: PODIATRY | Facility: CLINIC | Age: 49
End: 2024-11-05
Payer: OTHER GOVERNMENT

## 2024-11-05 ENCOUNTER — TELEPHONE (OUTPATIENT)
Dept: PODIATRY | Facility: CLINIC | Age: 49
End: 2024-11-05

## 2024-11-05 ENCOUNTER — OFFICE VISIT (OUTPATIENT)
Dept: UROLOGY | Facility: CLINIC | Age: 49
End: 2024-11-05
Payer: OTHER GOVERNMENT

## 2024-11-05 ENCOUNTER — LAB VISIT (OUTPATIENT)
Dept: LAB | Facility: OTHER | Age: 49
End: 2024-11-05
Attending: UROLOGY
Payer: OTHER GOVERNMENT

## 2024-11-05 VITALS
HEART RATE: 71 BPM | SYSTOLIC BLOOD PRESSURE: 141 MMHG | WEIGHT: 181.44 LBS | DIASTOLIC BLOOD PRESSURE: 82 MMHG | HEIGHT: 64 IN | BODY MASS INDEX: 30.98 KG/M2

## 2024-11-05 VITALS
DIASTOLIC BLOOD PRESSURE: 81 MMHG | SYSTOLIC BLOOD PRESSURE: 137 MMHG | WEIGHT: 181.44 LBS | HEART RATE: 84 BPM | BODY MASS INDEX: 31.14 KG/M2

## 2024-11-05 DIAGNOSIS — M24.573 EQUINUS CONTRACTURE OF ANKLE: ICD-10-CM

## 2024-11-05 DIAGNOSIS — M21.41 PES PLANUS OF BOTH FEET: ICD-10-CM

## 2024-11-05 DIAGNOSIS — M21.42 PES PLANUS OF BOTH FEET: ICD-10-CM

## 2024-11-05 DIAGNOSIS — R31.29 MICROSCOPIC HEMATURIA: ICD-10-CM

## 2024-11-05 DIAGNOSIS — R31.29 MICROSCOPIC HEMATURIA: Primary | ICD-10-CM

## 2024-11-05 DIAGNOSIS — M67.88 RIGHT PERONEAL TENDINOSIS: Primary | ICD-10-CM

## 2024-11-05 DIAGNOSIS — R29.898 WEAKNESS OF RIGHT LOWER EXTREMITY: ICD-10-CM

## 2024-11-05 DIAGNOSIS — M76.821 POSTERIOR TIBIAL TENDONITIS, RIGHT: ICD-10-CM

## 2024-11-05 LAB
ALBUMIN SERPL BCP-MCNC: 3.7 G/DL (ref 3.5–5.2)
ALP SERPL-CCNC: 72 U/L (ref 40–150)
ALT SERPL W/O P-5'-P-CCNC: 15 U/L (ref 10–44)
ANION GAP SERPL CALC-SCNC: 11 MMOL/L (ref 8–16)
AST SERPL-CCNC: 13 U/L (ref 10–40)
BILIRUB SERPL-MCNC: 0.2 MG/DL (ref 0.1–1)
BUN SERPL-MCNC: 18 MG/DL (ref 6–20)
CALCIUM SERPL-MCNC: 9.5 MG/DL (ref 8.7–10.5)
CHLORIDE SERPL-SCNC: 103 MMOL/L (ref 95–110)
CO2 SERPL-SCNC: 25 MMOL/L (ref 23–29)
CREAT SERPL-MCNC: 0.9 MG/DL (ref 0.5–1.4)
EST. GFR  (NO RACE VARIABLE): >60 ML/MIN/1.73 M^2
GLUCOSE SERPL-MCNC: 79 MG/DL (ref 70–110)
POTASSIUM SERPL-SCNC: 3.8 MMOL/L (ref 3.5–5.1)
PROT SERPL-MCNC: 7.6 G/DL (ref 6–8.4)
SODIUM SERPL-SCNC: 139 MMOL/L (ref 136–145)

## 2024-11-05 PROCEDURE — 80053 COMPREHEN METABOLIC PANEL: CPT | Performed by: UROLOGY

## 2024-11-05 PROCEDURE — 99214 OFFICE O/P EST MOD 30 MIN: CPT | Mod: S$PBB,,, | Performed by: UROLOGY

## 2024-11-05 PROCEDURE — 99213 OFFICE O/P EST LOW 20 MIN: CPT | Mod: S$PBB,,, | Performed by: PODIATRIST

## 2024-11-05 PROCEDURE — 99999PBSHW PR PBB SHADOW TECHNICAL ONLY FILED TO HB: Mod: PBBFAC,,,

## 2024-11-05 PROCEDURE — 99999 PR PBB SHADOW E&M-EST. PATIENT-LVL IV: CPT | Mod: PBBFAC,,, | Performed by: PODIATRIST

## 2024-11-05 PROCEDURE — 99999 PR PBB SHADOW E&M-EST. PATIENT-LVL III: CPT | Mod: PBBFAC,,, | Performed by: UROLOGY

## 2024-11-05 PROCEDURE — 99213 OFFICE O/P EST LOW 20 MIN: CPT | Mod: PBBFAC | Performed by: UROLOGY

## 2024-11-05 PROCEDURE — 99214 OFFICE O/P EST MOD 30 MIN: CPT | Mod: PBBFAC,27,PN | Performed by: PODIATRIST

## 2024-11-05 PROCEDURE — 81002 URINALYSIS NONAUTO W/O SCOPE: CPT | Mod: PBBFAC | Performed by: UROLOGY

## 2024-11-05 PROCEDURE — 36415 COLL VENOUS BLD VENIPUNCTURE: CPT | Performed by: UROLOGY

## 2024-11-05 RX ORDER — LIDOCAINE HYDROCHLORIDE 20 MG/ML
JELLY TOPICAL ONCE
OUTPATIENT
Start: 2024-11-05 | End: 2024-11-05

## 2024-11-05 RX ORDER — DOXYCYCLINE HYCLATE 100 MG
100 TABLET ORAL ONCE
OUTPATIENT
Start: 2024-11-05 | End: 2024-11-05
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
## 2024-11-06 ENCOUNTER — TELEPHONE (OUTPATIENT)
Dept: UROLOGY | Facility: CLINIC | Age: 49
End: 2024-11-06
Payer: OTHER GOVERNMENT

## 2024-11-07 ENCOUNTER — HOSPITAL ENCOUNTER (OUTPATIENT)
Dept: RADIOLOGY | Facility: OTHER | Age: 49
Discharge: HOME | End: 2024-11-07
Attending: UROLOGY
Payer: OTHER GOVERNMENT

## 2024-11-07 DIAGNOSIS — R31.29 MICROSCOPIC HEMATURIA: ICD-10-CM

## 2024-11-07 PROCEDURE — 25500020 PHARM REV CODE 255: Performed by: UROLOGY

## 2024-11-07 PROCEDURE — 74178 CT ABD&PLV WO CNTR FLWD CNTR: CPT | Mod: TC

## 2024-11-07 PROCEDURE — 74178 CT ABD&PLV WO CNTR FLWD CNTR: CPT | Mod: 26,,, | Performed by: INTERNAL MEDICINE

## 2024-11-07 RX ADMIN — IOHEXOL 125 ML: 350 INJECTION, SOLUTION INTRAVENOUS at 09:11

## 2024-11-11 ENCOUNTER — TELEPHONE (OUTPATIENT)
Dept: PODIATRY | Facility: CLINIC | Age: 49
End: 2024-11-11
Payer: OTHER GOVERNMENT

## 2024-11-11 ENCOUNTER — PATIENT MESSAGE (OUTPATIENT)
Dept: OBSTETRICS AND GYNECOLOGY | Facility: CLINIC | Age: 49
End: 2024-11-11
Payer: OTHER GOVERNMENT

## 2024-11-11 ENCOUNTER — HOSPITAL ENCOUNTER (OUTPATIENT)
Dept: RADIOLOGY | Facility: HOSPITAL | Age: 49
Discharge: HOME | End: 2024-11-11
Attending: PODIATRIST
Payer: OTHER GOVERNMENT

## 2024-11-11 ENCOUNTER — PROCEDURE VISIT (OUTPATIENT)
Facility: CLINIC | Age: 49
End: 2024-11-11
Payer: OTHER GOVERNMENT

## 2024-11-11 ENCOUNTER — TELEPHONE (OUTPATIENT)
Dept: HEPATOLOGY | Facility: CLINIC | Age: 49
End: 2024-11-11
Payer: OTHER GOVERNMENT

## 2024-11-11 ENCOUNTER — PATIENT MESSAGE (OUTPATIENT)
Dept: NEUROLOGY | Facility: CLINIC | Age: 49
End: 2024-11-11

## 2024-11-11 VITALS
DIASTOLIC BLOOD PRESSURE: 99 MMHG | TEMPERATURE: 98 F | SYSTOLIC BLOOD PRESSURE: 156 MMHG | BODY MASS INDEX: 30.69 KG/M2 | RESPIRATION RATE: 16 BRPM | HEART RATE: 66 BPM | WEIGHT: 178.81 LBS

## 2024-11-11 DIAGNOSIS — N83.202 BILATERAL OVARIAN CYSTS: Primary | ICD-10-CM

## 2024-11-11 DIAGNOSIS — M62.838 MUSCLE SPASM: ICD-10-CM

## 2024-11-11 DIAGNOSIS — M76.821 POSTERIOR TIBIAL TENDONITIS, RIGHT: Primary | ICD-10-CM

## 2024-11-11 DIAGNOSIS — N83.201 CYSTS OF BOTH OVARIES: Primary | ICD-10-CM

## 2024-11-11 DIAGNOSIS — M25.571 CHRONIC PAIN OF RIGHT ANKLE: ICD-10-CM

## 2024-11-11 DIAGNOSIS — R31.29 MICROSCOPIC HEMATURIA: ICD-10-CM

## 2024-11-11 DIAGNOSIS — G89.29 CHRONIC PAIN OF RIGHT ANKLE: ICD-10-CM

## 2024-11-11 DIAGNOSIS — K76.9 LIVER LESION: ICD-10-CM

## 2024-11-11 DIAGNOSIS — M76.821 POSTERIOR TIBIAL TENDONITIS, RIGHT: ICD-10-CM

## 2024-11-11 DIAGNOSIS — N83.202 CYSTS OF BOTH OVARIES: Primary | ICD-10-CM

## 2024-11-11 DIAGNOSIS — N83.201 BILATERAL OVARIAN CYSTS: Primary | ICD-10-CM

## 2024-11-11 PROCEDURE — 52000 CYSTOURETHROSCOPY: CPT | Mod: S$PBB,,, | Performed by: UROLOGY

## 2024-11-11 PROCEDURE — 73610 X-RAY EXAM OF ANKLE: CPT | Mod: TC,RT

## 2024-11-11 PROCEDURE — 73610 X-RAY EXAM OF ANKLE: CPT | Mod: 26,RT,, | Performed by: RADIOLOGY

## 2024-11-11 PROCEDURE — 52000 CYSTOURETHROSCOPY: CPT | Mod: PBBFAC | Performed by: UROLOGY

## 2024-11-11 RX ORDER — BACLOFEN 10 MG/1
20 TABLET ORAL DAILY
Qty: 180 TABLET | Refills: 1 | Status: SHIPPED | OUTPATIENT
Start: 2024-11-11 | End: 2025-11-11

## 2024-11-11 RX ORDER — DOXYCYCLINE HYCLATE 100 MG
100 TABLET ORAL ONCE
Status: COMPLETED | OUTPATIENT
Start: 2024-11-11 | End: 2024-11-11

## 2024-11-11 RX ORDER — TIZANIDINE HYDROCHLORIDE 6 MG/1
6 CAPSULE, GELATIN COATED ORAL NIGHTLY
Qty: 90 CAPSULE | Refills: 3 | Status: SHIPPED | OUTPATIENT
Start: 2024-11-11

## 2024-11-11 RX ORDER — LIDOCAINE HYDROCHLORIDE 20 MG/ML
JELLY TOPICAL ONCE
Status: COMPLETED | OUTPATIENT
Start: 2024-11-11 | End: 2024-11-11

## 2024-11-11 RX ADMIN — Medication 100 MG: at 10:11

## 2024-11-11 RX ADMIN — LIDOCAINE HYDROCHLORIDE: 20 JELLY TOPICAL at 10:11

## 2024-11-11 NOTE — PROCEDURES
Procedures    CYSTOSCOPY REPORT    Pre Procedure Diagnosis:  microscopic hematuria    Post Procedure Diagnosis:  normal lower urinary tract    Anesthesia: 10 cc 2% lidocaine jelly applied per urethra.    14 FR Flexible Olympus cystoscope used.    FINDINGS:  Dome, anterior, posterior, lateral walls and bladder base free of urothelial abnormalities. Right and left ureteral orifices in the normal postion and configuration, both effluxed clear urine.  Bladder neck and urethra were normal.    Specimen:  none    The patient was taken to the cystoscopy suite and placed in dorsal lithotomy position.  The genitalia was prepped and draped  in the usual sterile fashion.  Time out was performed.  Two percent lidocaine jelly was inserted in the urethra.  After sufficent time had passed to allow good local anesthesia, the cystoscope was inserted in the urethra and passed into the bladder visualizing the urethra along its entire course.  The dome, anterior, posterior and lateral walls were examined systematically.  The ureteral orifices were in their usual position and configuration.  The cystoscope was turned upon itself 180 degrees to visualize the bladder neck.  The cystoscope was then brought to the level of the bladder neck, the water was turned on and the urethra was visualized.  The cystoscope was removed and the patient was instructed to urinate prior to leaving the office.     Post procedure medication:  doxycycline 100 mg x 1    ADDITIONAL NOTES:  CT urogram 11/7/2024 showed no kidney, ureter or bladder abnormality.  She does have bilateral ovarian cysts (has had one oophorectomy per the patient) and she had a liver lesion. (Focal fat along the fissure for the ligamentum teres)  stable from 2020.      ASSESSMENT/PLAN:  49 year old woman status post flexible cystoscopy.  1. Push fluids for 24 hours.  2. May see blood in the urine, this should gradually improve over the next 2-3 days.  3. The patient was instructed to  return to the office or go to the emergency should fever, chills, cloudy urine, or inability to urinate develop.  4. Follow up with me in 1 year.  Sooner if develops gross hematuria.  Consults to Dr. Wilkins for the bilateral ovarian cysts and to GI hepatology for the liver lesion.

## 2024-11-11 NOTE — TELEPHONE ENCOUNTER
----- Message from Monse Grande MD sent at 11/11/2024 10:30 AM CST -----  Hello!     This is a patient you did a hysterectomy in 2016.  She was under the impression that you left one of her ovaries.  She has bilateral ovarian cysts on a CT Urogram that I did for hematuria.  The hematuria workup was negative but I asked that she follow up with you.    Radiology recommended follow up ultrasound in 6-12 weeks.  Would you mind reaching out to the patient for an appointment?      Thanks for all you do.    DELISA

## 2024-11-11 NOTE — PATIENT INSTRUCTIONS
What to Expect After a Cystoscopy  For the next 24-48 hours, you may feel a mild burning when you urinate. This burning is normal and expected. Drink plenty of water to dilute the urine to help relieve the burning sensation. You may also see a small amount of blood in your urine after the procedure.    Unless you are already taking antibiotics, you may be given an antibiotic after the test to prevent infection.    Signs and Symptoms to Report  Call the [facility] at [phone] if you develop any of the following:  Fever of 101 degrees or higher  Chills or persistent bleeding  Inability to urinate

## 2024-11-11 NOTE — TELEPHONE ENCOUNTER
Spoke with Ms Anthony's right ankle who reports she was having more pain than normal  to her right ankle and was concerned. Reports she was at [facility] today and wanted to go ahead and try to get the xray done while she was there. Order placed for xray of right ankle 11/11/24 at 10:45 am. Call back encouraged if needed.

## 2024-11-12 NOTE — TELEPHONE ENCOUNTER
----- Message from Monse Grande MD sent at 11/11/2024 10:30 AM CST -----  Hello!     This is a patient you did a hysterectomy in 2016.  She was under the impression that you left one of her ovaries.  She has bilateral ovarian cysts on a CT Urogram that I did for hematuria.  The hematuria workup was negative but I asked that she follow up with you.    Radiology recommended follow up ultrasound in 6-12 weeks.  Would you mind reaching out to the patient for an appointment?      Thanks for all you do.    DELISA  
Please schedule TVUS  
Spoke with pt. Pt scheduled TVUS on her own for 11/19. Confirmed with pt that  did not state any time constraints for the u/s so next week is fine. Pt is aware that once results come in,  will analyze them and reach out   
Intact

## 2024-11-13 ENCOUNTER — PATIENT MESSAGE (OUTPATIENT)
Dept: PODIATRY | Facility: HOSPITAL | Age: 49
End: 2024-11-13
Payer: OTHER GOVERNMENT

## 2024-11-15 RX ORDER — NABUMETONE 750 MG/1
750 TABLET, FILM COATED ORAL 2 TIMES DAILY
Qty: 30 TABLET | Refills: 1 | Status: SHIPPED | OUTPATIENT
Start: 2024-11-15

## 2024-11-18 ENCOUNTER — TELEPHONE (OUTPATIENT)
Dept: PODIATRY | Facility: CLINIC | Age: 49
End: 2024-11-18
Payer: OTHER GOVERNMENT

## 2024-11-18 NOTE — TELEPHONE ENCOUNTER
Spoke with Ms Cruz to assist her with making an appt with Dr Sinclair for ankle pain. Accepted appt date and time of 11/25/24 at 10:15 am. No other needs voiced. Call back encouraged if needed.

## 2024-11-19 NOTE — PROGRESS NOTES
Subjective     Patient ID: Tiffany Cruz is a 49 y.o. female.    Chief Complaint: Pain of the Left Shoulder    HPI    Patient is a 49 year old female who presents to clinic with chief complaint of left shoulder pain x 3 weeks. Pain is increased with activity. Radiates down the arm. At times. Muscle relaxer, ibuprofen not helping.     Review of Systems   Constitutional: Negative for chills and fever.   Cardiovascular:  Negative for chest pain.   Respiratory:  Negative for cough and shortness of breath.    Skin:  Negative for color change, dry skin, itching, nail changes, poor wound healing and rash.   Musculoskeletal:         Left shoulder pain    Neurological:  Negative for dizziness.   Psychiatric/Behavioral:  Negative for altered mental status. The patient is not nervous/anxious.    All other systems reviewed and are negative.         Objective     General    Constitutional: She is oriented to person, place, and time. She appears well-developed and well-nourished. No distress.   HENT:   Head: Atraumatic.   Eyes: Conjunctivae are normal.   Cardiovascular:  Normal rate.            Pulmonary/Chest: Effort normal.   Neurological: She is alert and oriented to person, place, and time.   Psychiatric: She has a normal mood and affect. Her behavior is normal.         Back (L-Spine & T-Spine) / Neck (C-Spine) Exam     Tenderness   The patient is tender to palpation of the left trapezial.       Left Shoulder Exam     Tenderness   The patient is tender to palpation of the biceps tendon.    Range of Motion   Active abduction:  abnormal   Forward Flexion:  abnormal   Adduction: abnormal  External Rotation 0 degrees:  abnormal   External Rotation 90 degrees: abnormal  Internal rotation 0 degrees:  abnormal   Internal rotation 90 degrees:  abnormal     Tests & Signs   Cross arm: negative  Drop arm: negative  Crane test: positive  Impingement: positive  Speed's Test: negative       Muscle Strength   Left Upper  Extremity  Shoulder Abduction: 4/5   Shoulder Internal Rotation: 4/5   Shoulder External Rotation: 4/5   Supraspinatus: 4/5   Biceps: 4/5       Physical Exam  Constitutional:       General: She is not in acute distress.     Appearance: She is well-developed and well-nourished. She is not diaphoretic.   HENT:      Head: Atraumatic.   Eyes:      Conjunctiva/sclera: Conjunctivae normal.   Cardiovascular:      Rate and Rhythm: Normal rate.   Pulmonary:      Effort: Pulmonary effort is normal.   Neurological:      Mental Status: She is alert and oriented to person, place, and time.   Psychiatric:         Mood and Affect: Mood and affect normal.         Behavior: Behavior normal.             Assessment and Plan     Encounter Diagnoses   Name Primary?    Acute pain of left shoulder Yes    Calcific tendonitis of left shoulder          Discussed options with the patient. At this time will treat with ice and PT. Order placed. Patient is to return to clinic as needed.

## 2024-12-09 ENCOUNTER — PATIENT MESSAGE (OUTPATIENT)
Dept: OBSTETRICS AND GYNECOLOGY | Facility: CLINIC | Age: 49
End: 2024-12-09
Payer: OTHER GOVERNMENT

## 2024-12-16 ENCOUNTER — PATIENT MESSAGE (OUTPATIENT)
Dept: PSYCHIATRY | Facility: CLINIC | Age: 49
End: 2024-12-16
Payer: OTHER GOVERNMENT

## 2024-12-27 NOTE — PROGRESS NOTES
Subjective:          Patient ID: Tiffany Cruz is a 49 y.o. female who presents today for a routine clinic visit for MS.  She was last seen in August 2024. The history has been provided by the patient.     MS HPI:  DMT: Kesimpta--she has not been taking it; she thinks it has probably been a year since she has taken it   Side effects from DMT? No  Taking vitamin D3 as recommended? Yes  She has a large blister on her right hand from burning herself with hot grits. She is going to see her PCP this afternoon.   She reports ongoing right leg pain. This is similar to pain that she has had in the past. She takes gabapentin and baclofen as needed. She did not do MATT, but she is open to following back up with pain management. She is no longer doing physical therapy. She is not exercising regularly.   She has ovarian cysts and had transvaginal US this weekend. No follow up is recommended.   She saw Dr. Grande re: blood in urine.   She had a cold/sinus infection recently and took Theraflu.   She is planning to give up candy in the new year.     Medications:  Current Outpatient Medications   Medication Sig    ALPRAZolam (XANAX) 0.25 MG tablet Take 0.25 mg by mouth daily as needed.    baclofen (LIORESAL) 10 MG tablet Take 2 tablets (20 mg total) by mouth once daily.    cholecalciferol, vitamin D3, 1,250 mcg (50,000 unit) capsule Take 1 capsule (50,000 Units total) by mouth every 7 days.    gabapentin (NEURONTIN) 300 MG capsule Take 1 capsule (300 mg total) by mouth every evening.    tiZANidine (ZANAFLEX) 6 mg capsule Take 1 capsule (6 mg total) by mouth every evening.    gabapentin (NEURONTIN) 100 MG capsule Take 1 capsule (100 mg total) by mouth 3 (three) times daily. (Patient not taking: Reported on 12/30/2024)    losartan-hydrochlorothiazide 100-25 mg (HYZAAR) 100-25 mg per tablet Take 1 tablet by mouth once daily.    nabumetone (RELAFEN) 750 MG tablet Take 1 tablet (750 mg total) by mouth 2 (two) times daily. (Patient not  taking: Reported on 12/30/2024)    ofatumumab (KESIMPTA PEN) 20 mg/0.4 mL PnIj Inject 20 mg (1 pen) into the skin on week 0, 1, and 2. (Patient not taking: Reported on 12/30/2024)    ofatumumab (KESIMPTA PEN) 20 mg/0.4 mL PnIj Inject 20 mg (1 pen) into the skin every 28 days starting at week 4 (Patient not taking: Reported on 12/30/2024)    rosuvastatin (CRESTOR) 10 MG tablet  (Patient not taking: Reported on 12/30/2024)     Current Facility-Administered Medications   Medication Frequency    sodium chloride 0.9% flush 10 mL PRN     Facility-Administered Medications Ordered in Other Visits   Medication Frequency    0.9%  NaCl infusion Continuous    alteplase injection 2 mg PRN    ferric carboxymaltose (INJECTAFER) 750 mg in sodium chloride 0.9% 250 mL IVPB (ready to mix system) Once    heparin, porcine (PF) 100 unit/mL injection flush 500 Units PRN    mupirocin 2 % ointment On Call Procedure    sodium chloride 0.9% 100 mL flush bag 1 time in Clinic/HOD    sodium chloride 0.9% flush 10 mL PRN       SOCIAL HISTORY  Social History     Tobacco Use    Smoking status: Never    Smokeless tobacco: Never   Substance Use Topics    Alcohol use: No     Alcohol/week: 0.0 standard drinks of alcohol    Drug use: No       Living arrangements - the patient lives with her family              Objective:        1. 25 foot timed walk:      8/13/2024     8:30 AM 12/30/2024    10:30 AM   Timed 25 Foot Walk:   Did patient wear an AFO? No No   Was assistive device used? No No   Time for 25 Foot Walk (seconds) 4 3.5   Time for 25 Foot Walk (seconds) 3.7 3.7     Neurological Exam    Mental Status   Oriented to person, place, and time.   Attention: normal. Concentration: normal.   Speech: speech is normal   Level of consciousness: alert  Knowledge: good.   Normal comprehension.      Cranial Nerves      CN III, IV, VI   Extraocular motions are normal.   Nystagmus: none      CN V   Right facial sensation deficit: none  Left facial sensation  deficit: none     CN VII   Right facial weakness: none  Left facial weakness: none     CN VIII   Hearing: intact     CN IX, X   Palate: symmetric     CN XI   Right sternocleidomastoid strength: normal  Left sternocleidomastoid strength: normal  Right trapezius strength: normal  Left trapezius strength: normal     CN XII   Tongue deviation: none     Motor Exam   Muscle bulk: normal  Overall muscle tone: normal  Right arm tone: normal  Left arm tone: normal  Right leg tone: normal  Left leg tone: normal     Strength   Right neck flexion: 5/5  Left neck flexion: 5/5  Right neck extension: 5/5  Left neck extension: 5/5  Right deltoid: 5/5  Left deltoid: 5/5  Right biceps: 5/5  Left biceps: 5/5  Right triceps: 5/5  Left triceps: 5/5  Right wrist flexion: 5/5  Left wrist flexion: 5/5  Right wrist extension: 5/5  Left wrist extension: 5/5  Right interossei: 5/5  Left interossei: 5/5  Right iliopsoas: 5/5  Left iliopsoas: 5/5  Right quadriceps: 5/5  Left quadriceps: 5/5  Right hamstrin/5  Left hamstrin/5  Right anterior tibial: 5/5  Left anterior tibial: 5/5  Right gastroc: 5/5  Left gastroc: 5/5     Sensory Exam   Right arm vibration: normal  Left arm vibration: normal  Right leg vibration: normal  Left leg vibration: normal     Gait, Coordination, and Reflexes      Gait  Gait: normal     Coordination   Romberg: negative  Finger to nose coordination: normal  Heel to shin coordination: normal  Tandem walking coordination: normal     Tremor   Resting tremor: absent     Reflexes   Right brachioradialis: 1+  Left brachioradialis: 1+  Right biceps: 1+  Left biceps: 1+  Right triceps: 1+  Left triceps: 1+  Right patellar: 1+  Left patellar: 2+  Right achilles: 1+  Left achilles: 2+     Normal rapid sequential movements in upper and lower extremities.          Imaging:       Results for orders placed during the hospital encounter of 24    MRI Brain Demyelinating W W/O Contrast    Impression  No significant change  from prior.  Essentially stable T2 FLAIR lesion burden throughout the brain parenchyma while nonspecific remains concerning for mild degree of prior demyelinating plaque    No evidence for new lesion or enhancing lesion to suggest significant interval or active demyelination allowing for artifacts as detailed above    Clinical correlation and follow-up advised      Electronically signed by: Charlie Lainez DO  Date:    03/21/2024  Time:    07:48        Labs:     Lab Results   Component Value Date    PVXSZFOT03DO 33 09/12/2024    [id number] 34 07/28/2021    [id number] 58 08/05/2020         Lab Results   Component Value Date    WBC 7.07 09/12/2024    RBC 5.36 09/12/2024    HGB 11.7 (L) 09/12/2024    HCT 38.5 09/12/2024    MCV 72 (L) 09/12/2024    MCH 21.8 (L) 09/12/2024    MCHC 30.4 (L) 09/12/2024    RDW 14.6 (H) 09/12/2024     09/12/2024    MPV 10.3 09/12/2024    GRAN 3.4 09/12/2024    GRAN 48.6 09/12/2024    LYMPH 2.6 09/12/2024    LYMPH 36.9 09/12/2024    MONO 0.8 09/12/2024    MONO 11.6 09/12/2024    EOS 0.1 09/12/2024    BASO 0.07 09/12/2024    EOSINOPHIL 1.8 09/12/2024    BASOPHIL 1.0 09/12/2024     Sodium   Date Value Ref Range Status   11/05/2024 139 136 - 145 mmol/L Final     Potassium   Date Value Ref Range Status   11/05/2024 3.8 3.5 - 5.1 mmol/L Final     Chloride   Date Value Ref Range Status   11/05/2024 103 95 - 110 mmol/L Final     CO2   Date Value Ref Range Status   11/05/2024 25 23 - 29 mmol/L Final     Glucose   Date Value Ref Range Status   11/05/2024 79 70 - 110 mg/dL Final     BUN   Date Value Ref Range Status   11/05/2024 18 6 - 20 mg/dL Final     Creatinine   Date Value Ref Range Status   11/05/2024 0.9 0.5 - 1.4 mg/dL Final     Calcium   Date Value Ref Range Status   11/05/2024 9.5 8.7 - 10.5 mg/dL Final     Total Protein   Date Value Ref Range Status   11/05/2024 7.6 6.0 - 8.4 g/dL Final     Albumin   Date Value Ref Range Status   11/05/2024 3.7 3.5 - 5.2 g/dL Final     Total  Bilirubin   Date Value Ref Range Status   11/05/2024 0.2 0.1 - 1.0 mg/dL Final     Comment:     For infants and newborns, interpretation of results should be based  on gestational age, weight and in agreement with clinical  observations.    Premature Infant recommended reference ranges:  Up to 24 hours.............<8.0 mg/dL  Up to 48 hours............<12.0 mg/dL  3-5 days..................<15.0 mg/dL  6-29 days.................<15.0 mg/dL       Alkaline Phosphatase   Date Value Ref Range Status   11/05/2024 72 40 - 150 U/L Final     AST   Date Value Ref Range Status   11/05/2024 13 10 - 40 U/L Final     ALT   Date Value Ref Range Status   11/05/2024 15 10 - 44 U/L Final     Anion Gap   Date Value Ref Range Status   11/05/2024 11 8 - 16 mmol/L Final     eGFR if    Date Value Ref Range Status   02/21/2022 >60 >60 mL/min/1.73 m^2 Final     eGFR    Date Value Ref Range Status   03/08/2023 88 (L) >=90 mL/min Final     Comment:     Calculation based on the Chronic Kidney Disease Epidemiology Collaboration (CKD-EPI) equation refit without adjustment for race.     eGFR if non    Date Value Ref Range Status   02/21/2022 >60 >60 mL/min/1.73 m^2 Final     Comment:     Calculation used to obtain the estimated glomerular filtration  rate (eGFR) is the CKD-EPI equation.        Lab Results   Component Value Date    HEPBSAG Non-reactive 09/12/2024    HEPBSAB <3.00 03/20/2023    HEPBSAB Non-reactive 03/20/2023    HEPBCAB Non-reactive 09/12/2024           MS Impression and Plan:     NEURO MULTIPLE SCLEROSIS IMPRESSION:   Number of relapses in the past year?:  0  Clinical Progression:  Clinically Stable  MS Classification:  Relapsing-Remitting MS  Current DMT: ofatumumab  DMT:  No change in management  DMT comment:  I recommend that she restart Kesimpta. Safety labs are due in March. She is aware of the risks associated with immunosuppressant therapy, including increased risk of  infection.     Symptom Management:  No change in symptom management  Additional Impressions:   She will reach back out to pain management for possible MATT injection to treat right lumbar radiculopathy.   MRI brain is due in May 2025.   She will follow up with Dr. Tompkins in April.   The visit today is associated with current or anticipated ongoing medical care related to this patient's single serious condition/complex condition of multiple sclerosis.      Total time spent with patient: 33 minutes   Total time spent on encounter: 40 minutes         DIEUDONNE Shane, CNS    Problem List Items Addressed This Visit          Neurologic Problems    Multiple sclerosis - Primary    Relevant Medications    gabapentin (NEURONTIN) 300 MG capsule    Other Relevant Orders    CBC Auto Differential    Comprehensive Metabolic Panel    Hepatitis B Core Antibody, Total    Hepatitis B Surface Antigen    Immunoglobulins (IgG, IgA, IgM) Quantitative       Other    Prophylactic immunotherapy     Other Visit Diagnoses       Nerve pain        Relevant Medications    gabapentin (NEURONTIN) 300 MG capsule    Counseling regarding goals of care        High risk medication use

## 2024-12-28 ENCOUNTER — HOSPITAL ENCOUNTER (OUTPATIENT)
Dept: RADIOLOGY | Facility: OTHER | Age: 49
Discharge: HOME | End: 2024-12-28
Attending: OBSTETRICS & GYNECOLOGY
Payer: OTHER GOVERNMENT

## 2024-12-28 DIAGNOSIS — N83.201 CYSTS OF BOTH OVARIES: ICD-10-CM

## 2024-12-28 DIAGNOSIS — N83.202 CYSTS OF BOTH OVARIES: ICD-10-CM

## 2024-12-28 PROCEDURE — 76856 US EXAM PELVIC COMPLETE: CPT | Mod: 26,,, | Performed by: RADIOLOGY

## 2024-12-28 PROCEDURE — 76830 TRANSVAGINAL US NON-OB: CPT | Mod: 26,,, | Performed by: RADIOLOGY

## 2024-12-28 PROCEDURE — 76856 US EXAM PELVIC COMPLETE: CPT | Mod: TC

## 2024-12-30 ENCOUNTER — OFFICE VISIT (OUTPATIENT)
Dept: NEUROLOGY | Facility: CLINIC | Age: 49
End: 2024-12-30
Payer: OTHER GOVERNMENT

## 2024-12-30 VITALS
DIASTOLIC BLOOD PRESSURE: 92 MMHG | HEIGHT: 64 IN | WEIGHT: 174.81 LBS | SYSTOLIC BLOOD PRESSURE: 154 MMHG | BODY MASS INDEX: 29.84 KG/M2 | HEART RATE: 71 BPM

## 2024-12-30 DIAGNOSIS — G35 MULTIPLE SCLEROSIS: Primary | ICD-10-CM

## 2024-12-30 DIAGNOSIS — M79.2 NERVE PAIN: ICD-10-CM

## 2024-12-30 DIAGNOSIS — Z71.89 COUNSELING REGARDING GOALS OF CARE: ICD-10-CM

## 2024-12-30 DIAGNOSIS — Z79.899 HIGH RISK MEDICATION USE: ICD-10-CM

## 2024-12-30 DIAGNOSIS — Z29.89 PROPHYLACTIC IMMUNOTHERAPY: ICD-10-CM

## 2024-12-30 PROCEDURE — G2211 COMPLEX E/M VISIT ADD ON: HCPCS | Mod: S$PBB,,, | Performed by: CLINICAL NURSE SPECIALIST

## 2024-12-30 PROCEDURE — 99213 OFFICE O/P EST LOW 20 MIN: CPT | Mod: PBBFAC | Performed by: CLINICAL NURSE SPECIALIST

## 2024-12-30 PROCEDURE — 99999 PR PBB SHADOW E&M-EST. PATIENT-LVL III: CPT | Mod: PBBFAC,,, | Performed by: CLINICAL NURSE SPECIALIST

## 2024-12-30 PROCEDURE — 99215 OFFICE O/P EST HI 40 MIN: CPT | Mod: S$PBB,,, | Performed by: CLINICAL NURSE SPECIALIST

## 2024-12-30 RX ORDER — GABAPENTIN 300 MG/1
300 CAPSULE ORAL NIGHTLY
Qty: 90 CAPSULE | Refills: 3 | Status: SHIPPED | OUTPATIENT
Start: 2024-12-30

## 2024-12-30 NOTE — Clinical Note
She has not been taking Kesimpta---it's probably been a year; please submit Rx for titration pack and maintenance dose (3 month supply). We will do labs in March.

## 2024-12-31 ENCOUNTER — PATIENT MESSAGE (OUTPATIENT)
Dept: OBSTETRICS AND GYNECOLOGY | Facility: HOSPITAL | Age: 49
End: 2024-12-31
Payer: OTHER GOVERNMENT

## 2024-12-31 ENCOUNTER — TELEPHONE (OUTPATIENT)
Dept: NEUROLOGY | Facility: CLINIC | Age: 49
End: 2024-12-31
Payer: OTHER GOVERNMENT

## 2024-12-31 DIAGNOSIS — G35 MULTIPLE SCLEROSIS: Primary | ICD-10-CM

## 2024-12-31 RX ORDER — OFATUMUMAB 20 MG/.4ML
INJECTION, SOLUTION SUBCUTANEOUS
Qty: 1.2 ML | Refills: 0 | Status: SHIPPED | OUTPATIENT
Start: 2024-12-31

## 2024-12-31 NOTE — TELEPHONE ENCOUNTER
----- Message from Roseanna Riddle sent at 12/30/2024 11:10 AM CST -----  She has not been taking Kesimpta---it's probably been a year; please submit Rx for titration pack and maintenance dose (3 month supply). We will do labs in March.

## 2025-03-07 ENCOUNTER — PATIENT MESSAGE (OUTPATIENT)
Dept: PSYCHIATRY | Facility: CLINIC | Age: 50
End: 2025-03-07

## 2025-03-13 ENCOUNTER — TELEPHONE (OUTPATIENT)
Dept: NEUROLOGY | Facility: CLINIC | Age: 50
End: 2025-03-13

## 2025-03-14 ENCOUNTER — TELEPHONE (OUTPATIENT)
Dept: NEUROLOGY | Facility: CLINIC | Age: 50
End: 2025-03-14

## 2025-03-14 NOTE — TELEPHONE ENCOUNTER
Pt states she will go next week to [facility] or  location to get lab work done and conatact our office after to schedule follow up.

## 2025-05-13 ENCOUNTER — PATIENT MESSAGE (OUTPATIENT)
Dept: PSYCHIATRY | Facility: CLINIC | Age: 50
End: 2025-05-13

## 2025-06-19 ENCOUNTER — PATIENT MESSAGE (OUTPATIENT)
Dept: PSYCHIATRY | Facility: CLINIC | Age: 50
End: 2025-06-19
Payer: OTHER GOVERNMENT

## 2025-06-26 ENCOUNTER — LAB VISIT (OUTPATIENT)
Dept: LAB | Facility: HOSPITAL | Age: 50
End: 2025-06-26
Attending: PSYCHIATRY & NEUROLOGY
Payer: OTHER GOVERNMENT

## 2025-06-26 ENCOUNTER — RESULTS FOLLOW-UP (OUTPATIENT)
Dept: NEUROLOGY | Facility: CLINIC | Age: 50
End: 2025-06-26

## 2025-06-26 DIAGNOSIS — G35 MULTIPLE SCLEROSIS: ICD-10-CM

## 2025-06-26 LAB
ABSOLUTE EOSINOPHIL (OHS): 0.13 K/UL
ABSOLUTE MONOCYTE (OHS): 0.89 K/UL (ref 0.3–1)
ABSOLUTE NEUTROPHIL COUNT (OHS): 3.34 K/UL (ref 1.8–7.7)
ALBUMIN SERPL BCP-MCNC: 4 G/DL (ref 3.5–5.2)
ALP SERPL-CCNC: 68 UNIT/L (ref 40–150)
ALT SERPL W/O P-5'-P-CCNC: 25 UNIT/L (ref 10–44)
ANION GAP (OHS): 11 MMOL/L (ref 8–16)
AST SERPL-CCNC: 20 UNIT/L (ref 11–45)
BASOPHILS # BLD AUTO: 0.07 K/UL
BASOPHILS NFR BLD AUTO: 1 %
BILIRUB SERPL-MCNC: 0.2 MG/DL (ref 0.1–1)
BUN SERPL-MCNC: 16 MG/DL (ref 6–20)
CALCIUM SERPL-MCNC: 9.4 MG/DL (ref 8.7–10.5)
CHLORIDE SERPL-SCNC: 102 MMOL/L (ref 95–110)
CO2 SERPL-SCNC: 28 MMOL/L (ref 23–29)
CREAT SERPL-MCNC: 0.9 MG/DL (ref 0.5–1.4)
ERYTHROCYTE [DISTWIDTH] IN BLOOD BY AUTOMATED COUNT: 14.3 % (ref 11.5–14.5)
GFR SERPLBLD CREATININE-BSD FMLA CKD-EPI: >60 ML/MIN/1.73/M2
GLUCOSE SERPL-MCNC: 76 MG/DL (ref 70–110)
HBV CORE AB SERPL QL IA: NORMAL
HBV SURFACE AG SERPL QL IA: NORMAL
HCT VFR BLD AUTO: 39.9 % (ref 37–48.5)
HGB BLD-MCNC: 12 GM/DL (ref 12–16)
IGA SERPL-MCNC: 502 MG/DL (ref 40–350)
IGG SERPL-MCNC: 1432 MG/DL (ref 650–1600)
IGM SERPL-MCNC: 44 MG/DL (ref 50–300)
IMM GRANULOCYTES # BLD AUTO: 0.02 K/UL (ref 0–0.04)
IMM GRANULOCYTES NFR BLD AUTO: 0.3 % (ref 0–0.5)
LYMPHOCYTES # BLD AUTO: 2.81 K/UL (ref 1–4.8)
MCH RBC QN AUTO: 21.4 PG (ref 27–31)
MCHC RBC AUTO-ENTMCNC: 30.1 G/DL (ref 32–36)
MCV RBC AUTO: 71 FL (ref 82–98)
NUCLEATED RBC (/100WBC) (OHS): 0 /100 WBC
PLATELET # BLD AUTO: 327 K/UL (ref 150–450)
PMV BLD AUTO: 10.3 FL (ref 9.2–12.9)
POTASSIUM SERPL-SCNC: 4 MMOL/L (ref 3.5–5.1)
PROT SERPL-MCNC: 8.2 GM/DL (ref 6–8.4)
RBC # BLD AUTO: 5.61 M/UL (ref 4–5.4)
RELATIVE EOSINOPHIL (OHS): 1.8 %
RELATIVE LYMPHOCYTE (OHS): 38.7 % (ref 18–48)
RELATIVE MONOCYTE (OHS): 12.3 % (ref 4–15)
RELATIVE NEUTROPHIL (OHS): 45.9 % (ref 38–73)
SODIUM SERPL-SCNC: 141 MMOL/L (ref 136–145)
WBC # BLD AUTO: 7.26 K/UL (ref 3.9–12.7)

## 2025-06-26 PROCEDURE — 87340 HEPATITIS B SURFACE AG IA: CPT

## 2025-06-26 PROCEDURE — 82040 ASSAY OF SERUM ALBUMIN: CPT

## 2025-06-26 PROCEDURE — 82784 ASSAY IGA/IGD/IGG/IGM EACH: CPT | Mod: 59

## 2025-06-26 PROCEDURE — 36415 COLL VENOUS BLD VENIPUNCTURE: CPT

## 2025-06-26 PROCEDURE — 85025 COMPLETE CBC W/AUTO DIFF WBC: CPT

## 2025-06-26 PROCEDURE — 86704 HEP B CORE ANTIBODY TOTAL: CPT

## 2025-07-01 ENCOUNTER — OFFICE VISIT (OUTPATIENT)
Dept: NEUROLOGY | Facility: CLINIC | Age: 50
End: 2025-07-01
Payer: OTHER GOVERNMENT

## 2025-07-01 VITALS
DIASTOLIC BLOOD PRESSURE: 76 MMHG | WEIGHT: 182.56 LBS | BODY MASS INDEX: 31.17 KG/M2 | HEART RATE: 98 BPM | HEIGHT: 64 IN | SYSTOLIC BLOOD PRESSURE: 119 MMHG

## 2025-07-01 DIAGNOSIS — G35 MULTIPLE SCLEROSIS: ICD-10-CM

## 2025-07-01 DIAGNOSIS — M62.838 MUSCLE SPASM: ICD-10-CM

## 2025-07-01 DIAGNOSIS — Z71.89 COUNSELING REGARDING GOALS OF CARE: ICD-10-CM

## 2025-07-01 DIAGNOSIS — M54.31 RIGHT SCIATIC NERVE PAIN: Primary | ICD-10-CM

## 2025-07-01 DIAGNOSIS — M79.2 NERVE PAIN: ICD-10-CM

## 2025-07-01 PROCEDURE — 99999 PR PBB SHADOW E&M-EST. PATIENT-LVL IV: CPT | Mod: PBBFAC,,, | Performed by: PSYCHIATRY & NEUROLOGY

## 2025-07-01 PROCEDURE — 99214 OFFICE O/P EST MOD 30 MIN: CPT | Mod: PBBFAC | Performed by: PSYCHIATRY & NEUROLOGY

## 2025-07-01 RX ORDER — GABAPENTIN 100 MG/1
100 CAPSULE ORAL 3 TIMES DAILY
Qty: 90 CAPSULE | Refills: 11 | Status: SHIPPED | OUTPATIENT
Start: 2025-07-01

## 2025-07-01 RX ORDER — GABAPENTIN 300 MG/1
300 CAPSULE ORAL NIGHTLY
Qty: 90 CAPSULE | Refills: 3 | Status: SHIPPED | OUTPATIENT
Start: 2025-07-01

## 2025-07-01 RX ORDER — TIZANIDINE HYDROCHLORIDE 6 MG/1
6 CAPSULE, GELATIN COATED ORAL NIGHTLY
Qty: 90 CAPSULE | Refills: 3 | Status: SHIPPED | OUTPATIENT
Start: 2025-07-01

## 2025-07-01 RX ORDER — BACLOFEN 10 MG/1
20 TABLET ORAL DAILY
Qty: 180 TABLET | Refills: 1 | Status: SHIPPED | OUTPATIENT
Start: 2025-07-01 | End: 2026-07-01

## 2025-07-01 NOTE — Clinical Note
She's here in clinic; we reviewed labs - ready to restart Kesimpta; she going to do the titration; she's asking for a 90 day supply -- let me know if possible ;

## 2025-07-01 NOTE — PROGRESS NOTES
Subjective:          Patient ID: Tiffany Cruz is a 49 y.o. female who presents today for a routine  visit for MS.      NEURO MULTIPLE SCLEROISIS SUMMARY:   Disease type currently:  Relapsing-Remitting MS  Current Therapy:  None       MS HPI:  DMT: not taking Kesimpta but ready to start; had labs done last week; going to start the titration;  Here for routine f/u   Had a facet injection on the right 6 weeks ago - Dr. Dinh.  She reports no improvement..  Two years ago, Dr. Dinh did a similar procedure that was very successful; back pain is her most bothersome symptoms.   Xanax comes from primary care MD  Takes gabapentin every other day she estimates;    She's feeling pretty stable in general    Medications:             Objective:              12/30/2024    10:30 AM 7/1/2025     1:00 PM   Timed 25 Foot Walk:   Did patient wear an AFO? No No   Was assistive device used? No No   Time for 25 Foot Walk (seconds) 3.5 3.4   Time for 25 Foot Walk (seconds) 3.7      Neurological Exam  MENTAL STATUS: grossly intact  CRANIAL NERVE EXAM: There is no internuclear ophthalmoplegia. Extraocular   muscles are intact.  No facial   asymmetry.There is no dysarthria.   MOTOR EXAM: Normal bulk and tone throughout UE and LE bilaterally. Rapid sequential movements are normal. Strength is 5/5 in all groups   in the lower extremities and upper extremities.   REFLEXES: Symmetric and 1+ throughout in all 4 extremities.   SENSORY EXAM: Normal to vibration t/o  COORDINATION: Normal finger-to-nose exam.   GAIT: narrow based and stable    Imaging:       Results for orders placed during the hospital encounter of 03/20/24    MRI Brain Demyelinating W W/O Contrast    Impression  No significant change from prior.  Essentially stable T2 FLAIR lesion burden throughout the brain parenchyma while nonspecific remains concerning for mild degree of prior demyelinating plaque    No evidence for new lesion or enhancing lesion to suggest significant  interval or active demyelination allowing for artifacts as detailed above    Clinical correlation and follow-up advised      Electronically signed by: Charlie Lainez DO  Date:    03/21/2024  Time:    07:48    Results for orders placed during the hospital encounter of 09/22/22    MRI Cervical Spine Demyelinating W W/O Contrast    Impression  1. Compared to prior MRI cervical spine 08/11/2021, similar nonenhancing focus of nonenhancing T2 weighted signal abnormality within the left hemicord at C2.  2. Additionally, there is linear increased T2 weighted signal on the axial GRE sequence not confirmed on additional T2 weighted/fluid sensitive sequences, potentially artifactual in etiology.  An equivocal focus of T2 weighted signal abnormality at the inferior margins of the examination within the cord at the T3 level is additionally noted; dedicated thoracic MRI could be performed, as clinically warranted.  3. No definite evidence of cord atrophy.  No pathologic enhancement.  4. Relatively similar degenerative findings, as discussed.      Electronically signed by: Huber Mistry  Date:    09/23/2022  Time:    07:39    Results for orders placed during the hospital encounter of 07/19/18    MRI Thoracic Spine Demyelinating W W/O Contrast    Impression  New tiny focus of T2 hyperintense signal in the left lateral cord at the level of C2, in keeping with demyelinating plaque given clinical history of multiple sclerosis.  No associated enhancement.  This lesion is distinct from the right dorsal lateral cord lesion at the level of C2 present on examination of 01/15/2017.    Unchanged diffusely decreased T1 marrow signal suggestive of diffuse marrow replacement process such as red marrow reconversion or myelodysplastic process.    Mild cervical spondylosis most notable at C5-6.    Electronically signed by resident: Juan Matias  Date:    07/20/2018  Time:    08:27    Electronically signed by: Mt Good  "MD  Date:    07/20/2018  Time:    12:41        Labs:     Lab Results   Component Value Date    KDBMZNYX16RM 33 09/12/2024    TZRAVOWC72ME 34 07/28/2021    MTAJAESJ55OL 58 08/05/2020     No results found for: "JCVINDEX", "JCVANTIBODY"  @WriteOn(NEUROLGTCHN:3)   Lab Results   Component Value Date    RD7HKWGA 81.5 02/21/2022    ABSOLUTECD3 2960 (H) 02/21/2022    IA2MZSAJ 20.2 02/21/2022    ABSOLUTECD8 735 02/21/2022    LE2JBIZB 61.3 (H) 02/21/2022    ABSOLUTECD4 2227 (H) 02/21/2022    LABCD48 3.03 02/21/2022     Lab Results   Component Value Date    WBC 7.26 06/26/2025    RBC 5.61 (H) 06/26/2025    HGB 12.0 06/26/2025    HCT 39.9 06/26/2025    MCV 71 (L) 06/26/2025    MCH 21.4 (L) 06/26/2025    MCHC 30.1 (L) 06/26/2025    RDW 14.3 06/26/2025     06/26/2025    MPV 10.3 06/26/2025    GRAN 3.4 09/12/2024    GRAN 48.6 09/12/2024    LYMPH 38.7 06/26/2025    LYMPH 2.81 06/26/2025    MONO 12.3 06/26/2025    MONO 0.89 06/26/2025    EOS 1.8 06/26/2025    EOS 0.13 06/26/2025    BASO 0.07 09/12/2024    EOSINOPHIL 1.8 09/12/2024    BASOPHIL 1.0 06/26/2025    BASOPHIL 0.07 06/26/2025     Sodium   Date Value Ref Range Status   06/26/2025 141 136 - 145 mmol/L Final   11/05/2024 139 136 - 145 mmol/L Final     Potassium   Date Value Ref Range Status   06/26/2025 4.0 3.5 - 5.1 mmol/L Final   11/05/2024 3.8 3.5 - 5.1 mmol/L Final     Chloride   Date Value Ref Range Status   06/26/2025 102 95 - 110 mmol/L Final   11/05/2024 103 95 - 110 mmol/L Final     CO2   Date Value Ref Range Status   06/26/2025 28 23 - 29 mmol/L Final   11/05/2024 25 23 - 29 mmol/L Final     Glucose   Date Value Ref Range Status   06/26/2025 76 70 - 110 mg/dL Final   11/05/2024 79 70 - 110 mg/dL Final     BUN   Date Value Ref Range Status   06/26/2025 16 6 - 20 mg/dL Final     Creatinine   Date Value Ref Range Status   06/26/2025 0.9 0.5 - 1.4 mg/dL Final     Calcium   Date Value Ref Range Status   06/26/2025 9.4 8.7 - 10.5 mg/dL Final   11/05/2024 9.5 8.7 - " 10.5 mg/dL Final     Protein Total   Date Value Ref Range Status   06/26/2025 8.2 6.0 - 8.4 gm/dL Final     Total Protein   Date Value Ref Range Status   11/05/2024 7.6 6.0 - 8.4 g/dL Final     Albumin   Date Value Ref Range Status   06/26/2025 4.0 3.5 - 5.2 g/dL Final   11/05/2024 3.7 3.5 - 5.2 g/dL Final     Total Bilirubin   Date Value Ref Range Status   11/05/2024 0.2 0.1 - 1.0 mg/dL Final     Comment:     For infants and newborns, interpretation of results should be based  on gestational age, weight and in agreement with clinical  observations.    Premature Infant recommended reference ranges:  Up to 24 hours.............<8.0 mg/dL  Up to 48 hours............<12.0 mg/dL  3-5 days..................<15.0 mg/dL  6-29 days.................<15.0 mg/dL       Bilirubin Total   Date Value Ref Range Status   06/26/2025 0.2 0.1 - 1.0 mg/dL Final     Comment:     For infants and newborns, interpretation of results should be based   on gestational age, weight and in agreement with clinical   observations.    Premature Infant recommended reference ranges:   0-24 hours:  <8.0 mg/dL   24-48 hours: <12.0 mg/dL   3-5 days:    <15.0 mg/dL   6-29 days:   <15.0 mg/dL     Alkaline Phosphatase   Date Value Ref Range Status   11/05/2024 72 40 - 150 U/L Final     ALP   Date Value Ref Range Status   06/26/2025 68 40 - 150 unit/L Final     AST   Date Value Ref Range Status   06/26/2025 20 11 - 45 unit/L Final   11/05/2024 13 10 - 40 U/L Final     ALT   Date Value Ref Range Status   06/26/2025 25 10 - 44 unit/L Final   11/05/2024 15 10 - 44 U/L Final     Anion Gap   Date Value Ref Range Status   06/26/2025 11 8 - 16 mmol/L Final     eGFR if    Date Value Ref Range Status   02/21/2022 >60 >60 mL/min/1.73 m^2 Final     eGFR if non    Date Value Ref Range Status   02/21/2022 >60 >60 mL/min/1.73 m^2 Final     Comment:     Calculation used to obtain the estimated glomerular filtration  rate (eGFR) is the  CKD-EPI equation.        Lab Results   Component Value Date    HEPBSAG Non-Reactive 06/26/2025    HEPBSAB <3.00 03/20/2023    HEPBSAB Non-reactive 03/20/2023    HEPBCAB Non-Reactive 06/26/2025           MS Impression and Plan:     NEURO MULTIPLE SCLEROSIS IMPRESSION:   Number of relapses in the past year?:  0  Clinical Progression:  Clinically Stable  MS Classification:  Relapsing-Remitting MS  Current DMT: none  DMT:  No change in management  DMT comment:  She is planning to start Kesimpta titration this week; labs done last week reviewed  Symptom Management:  Implement change in symptom management  Implement Change in Symptom Management:  Pain  Implement Change in Symptom Management comment: Refer to [facility]  Additional Impressions: F/u Roseanna Peskin CNS in 3 mo VV             Problem List Items Addressed This Visit          1 - High    Multiple sclerosis    Relevant Medications    gabapentin (NEURONTIN) 300 MG capsule    gabapentin (NEURONTIN) 100 MG capsule     Other Visit Diagnoses         Right sciatic nerve pain    -  Primary    Relevant Orders    Ambulatory referral/consult to [facility]      Nerve pain        Relevant Medications    gabapentin (NEURONTIN) 300 MG capsule      Muscle spasm        Relevant Medications    tiZANidine (ZANAFLEX) 6 mg capsule    baclofen (LIORESAL) 10 MG tablet      Counseling regarding goals of care                Marika Tompkins MD    I spent a total of 40 minutes on the day of the visit.This includes face to face time and non-face to face time preparing to see the patient (eg, review of tests), obtaining and/or reviewing separately obtained history, documenting clinical information in the electronic or other health record, independently interpreting results and communicating results to the patient/family/caregiver, or care coordinator.  Visit today included increased complexity associated with the care of the episodic problem : chronic immunotherapy;  addressed and managing the longitudinal care of the patient due to the serious and/or complex managed problem(s) MS.

## 2025-07-02 ENCOUNTER — TELEPHONE (OUTPATIENT)
Dept: NEUROLOGY | Facility: CLINIC | Age: 50
End: 2025-07-02
Payer: OTHER GOVERNMENT

## 2025-07-02 DIAGNOSIS — D84.9 IMMUNOCOMPROMISED: ICD-10-CM

## 2025-07-02 DIAGNOSIS — G35 MULTIPLE SCLEROSIS: Primary | ICD-10-CM

## 2025-07-02 RX ORDER — OFATUMUMAB 20 MG/.4ML
INJECTION, SOLUTION SUBCUTANEOUS
Qty: 0.4 ML | Refills: 4 | Status: SHIPPED | OUTPATIENT
Start: 2025-07-02 | End: 2025-07-02 | Stop reason: SDUPTHER

## 2025-07-02 RX ORDER — OFATUMUMAB 20 MG/.4ML
INJECTION, SOLUTION SUBCUTANEOUS
Qty: 1.2 ML | Refills: 0 | Status: SHIPPED | OUTPATIENT
Start: 2025-07-02

## 2025-07-02 NOTE — TELEPHONE ENCOUNTER
----- Message from Marika Tompkins MD sent at 7/1/2025  1:43 PM CDT -----  She's here in clinic; we reviewed labs - ready to restart Kesimpta; she going to do the titration; she's asking for a 90 day supply -- let me know if possible ;

## 2025-07-02 NOTE — TELEPHONE ENCOUNTER
Labs   6/26/25  WBC 7.26   ALC 2810  AST 20, ALT 25  IgG 1432 IgM 44 IgA 502  HBsAg - anti-Hbc  -, no current or past infection

## 2025-07-14 ENCOUNTER — TELEPHONE (OUTPATIENT)
Dept: PAIN MEDICINE | Facility: CLINIC | Age: 50
End: 2025-07-14
Payer: OTHER GOVERNMENT

## 2025-07-14 NOTE — TELEPHONE ENCOUNTER
Spoke with patient. Confirmed appointment location & time on 07/16/25  with Dr. Schultz.  Patient expressed understanding & gratitude. Call ended.

## 2025-07-16 ENCOUNTER — TELEPHONE (OUTPATIENT)
Dept: OPTOMETRY | Facility: CLINIC | Age: 50
End: 2025-07-16
Payer: OTHER GOVERNMENT

## 2025-07-16 NOTE — TELEPHONE ENCOUNTER
Copied from CRM #[id number]. Topic: General Inquiry - Patient Advice  >> Jul 15, 2025  4:47 PM [place] wrote:  Type:  Sooner Apoointment Request  Caller is requesting a sooner appointment.  Name of Caller:Tiffany  When is the first available appointment?sooner then schedule   Symptoms:Vision has become worse   Would the patient rather a call back or a response via [facility]? Call   Best Call Back Number:[phone]  Additional Information:

## 2025-07-30 ENCOUNTER — PATIENT MESSAGE (OUTPATIENT)
Dept: PSYCHIATRY | Facility: CLINIC | Age: 50
End: 2025-07-30
Payer: OTHER GOVERNMENT

## 2025-08-01 ENCOUNTER — PATIENT MESSAGE (OUTPATIENT)
Dept: PSYCHIATRY | Facility: CLINIC | Age: 50
End: 2025-08-01
Payer: OTHER GOVERNMENT

## 2025-08-01 ENCOUNTER — TELEPHONE (OUTPATIENT)
Dept: PAIN MEDICINE | Facility: CLINIC | Age: 50
End: 2025-08-01
Payer: OTHER GOVERNMENT

## 2025-08-01 NOTE — TELEPHONE ENCOUNTER
Spoke with patient. Confirmed appointment location & time on 08/04/25  with Dr. Schultz.  Patient expressed understanding & gratitude. Call ended.

## 2025-08-07 ENCOUNTER — TELEPHONE (OUTPATIENT)
Dept: PAIN MEDICINE | Facility: CLINIC | Age: 50
End: 2025-08-07
Payer: OTHER GOVERNMENT

## 2025-08-07 NOTE — TELEPHONE ENCOUNTER
Staff attempted to contact patient to assist with scheduling New Patient appointment. It was stated by Allyson Mai that the patient is requesting to be seen by Erica Ledesma but the New Patient slots with Erica does not work for there patient. Patient will need to be scheduled with another physician in office for a later time. Staff left brief voicemail.

## 2025-08-11 ENCOUNTER — TELEPHONE (OUTPATIENT)
Dept: OPTOMETRY | Facility: CLINIC | Age: 50
End: 2025-08-11
Payer: OTHER GOVERNMENT

## 2025-08-20 ENCOUNTER — TELEPHONE (OUTPATIENT)
Dept: OPTOMETRY | Facility: CLINIC | Age: 50
End: 2025-08-20

## 2025-09-03 ENCOUNTER — OFFICE VISIT (OUTPATIENT)
Dept: OPTOMETRY | Facility: CLINIC | Age: 50
End: 2025-09-03
Payer: OTHER GOVERNMENT

## 2025-09-03 DIAGNOSIS — H53.19 VITREOUS FLASHES OF BOTH EYES: ICD-10-CM

## 2025-09-03 DIAGNOSIS — G44.89 OTHER HEADACHE SYNDROME: ICD-10-CM

## 2025-09-03 DIAGNOSIS — G35 MS (MULTIPLE SCLEROSIS): ICD-10-CM

## 2025-09-03 DIAGNOSIS — H04.123 DRY EYE SYNDROME OF BOTH EYES: Primary | ICD-10-CM

## 2025-09-03 DIAGNOSIS — H52.4 BILATERAL PRESBYOPIA: ICD-10-CM

## 2025-09-03 PROCEDURE — 99203 OFFICE O/P NEW LOW 30 MIN: CPT | Mod: S$PBB,,, | Performed by: OPTOMETRIST

## 2025-09-03 PROCEDURE — 99999 PR PBB SHADOW E&M-EST. PATIENT-LVL III: CPT | Mod: PBBFAC,,, | Performed by: OPTOMETRIST

## 2025-09-03 PROCEDURE — 99213 OFFICE O/P EST LOW 20 MIN: CPT | Mod: PBBFAC,PO | Performed by: OPTOMETRIST

## 2025-09-03 RX ORDER — PREDNISOLONE ACETATE 10 MG/ML
SUSPENSION/ DROPS OPHTHALMIC
Qty: 10 ML | Refills: 0 | Status: SHIPPED | OUTPATIENT
Start: 2025-09-03 | End: 2025-10-01

## (undated) DEVICE — GAUZE SPONGE 4'X4 12 PLY

## (undated) DEVICE — PACK UPPER EXTREMITY BAPTIST

## (undated) DEVICE — CORD BIPOLAR 12 FOOT

## (undated) DEVICE — ELECTRODE REM PLYHSV RETURN 9

## (undated) DEVICE — BLADE SAW OSC NAR MED

## (undated) DEVICE — FORCEP BIPOLAR ADSON 1MM TIP

## (undated) DEVICE — SUT 3-0 VICRYL / FS-2

## (undated) DEVICE — BANDAGE ELASTIC 2X5 VELCRO ST

## (undated) DEVICE — SEALANT VISTASEAL FIBRIN 2ML

## (undated) DEVICE — DRESSING N ADH OIL EMUL 3X3

## (undated) DEVICE — BANDAGE MATRIX HK LOOP 4IN 5YD

## (undated) DEVICE — DRESSING GAUZE XEROFORM 5X9

## (undated) DEVICE — PACK CYSTO

## (undated) DEVICE — UNDERGLOVES BIOGEL PI SZ 7 LF

## (undated) DEVICE — SYR 10CC LUER LOCK

## (undated) DEVICE — APPLICATOR CHLORAPREP ORN 26ML

## (undated) DEVICE — SLING ARM SIZE MEDIUM

## (undated) DEVICE — STOCKINET 4INX48

## (undated) DEVICE — BLADE SCALP OPHTL BEVEL STR

## (undated) DEVICE — DRESSING XEROFORM FOIL PK 1X8

## (undated) DEVICE — GLOVE BIOGEL PI MICRO INDIC 8

## (undated) DEVICE — GLOVE BIOGEL 7.5

## (undated) DEVICE — BANDAGE CONFORM 3IN STRL

## (undated) DEVICE — SUT VICRYL PLUS 4-0 P3 18IN

## (undated) DEVICE — SEE MEDLINE ITEM 152515

## (undated) DEVICE — SET CYSTO IRRIGATION UNIV SPIK

## (undated) DEVICE — SOL IRR WATER STRL 3000 ML

## (undated) DEVICE — SYR B-D DISP CONTROL 10CC100/C

## (undated) DEVICE — BANDAGE ELASTIC 3IN ACE NS

## (undated) DEVICE — NDL SAFETY 22G X 1.5 ECLIPSE

## (undated) DEVICE — BANDAGE ESMARK ELASTIC ST 6X9

## (undated) DEVICE — FORCEP STRAIGHT DISP

## (undated) DEVICE — PAD CAST SPECIALIST STRL 3

## (undated) DEVICE — SEE L#120831

## (undated) DEVICE — GLOVE BIOGEL SKINSENSE PI 7.0

## (undated) DEVICE — TOURNIQUET SB QC DP 18X4IN

## (undated) DEVICE — STOCKINET TUBULAR 1 PLY 6X60IN

## (undated) DEVICE — BLADE MICRO SAGGITAL SAW 5/BX

## (undated) DEVICE — PAD UNDERPAD 30X30

## (undated) DEVICE — SPLINT PLASTER FS 5IN X 30IN

## (undated) DEVICE — BANDAGE DERMACEA STRETCH 4X1IN

## (undated) DEVICE — PADDING CAST 2IN DELTA ROLL

## (undated) DEVICE — PAD PREP CUFFED NS 24X48IN

## (undated) DEVICE — GLOVE BIOGEL ECLIPSE SZ 8

## (undated) DEVICE — SUT MCRYL PLUS 4-0 PS2 27IN

## (undated) DEVICE — SUT PROLENE 4-0 MONO 18IN

## (undated) DEVICE — DRAPE SURG W/TWL 17 5/8X23

## (undated) DEVICE — NDL 18GA X1 1/2 REG BEVEL

## (undated) DEVICE — PADDING CAST 4IN DELTA ROLL

## (undated) DEVICE — TOURNIQUET 1 PORT YELLOW24X4IN

## (undated) DEVICE — COVER OVERHEAD SURG LT BLUE

## (undated) DEVICE — SUT 4/0 18IN ETHILON BL P3

## (undated) DEVICE — GOWN SMARTGOWN LVL4 X-LONG XL

## (undated) DEVICE — Device

## (undated) DEVICE — SEE MEDLINE ITEM 146268

## (undated) DEVICE — SOL PVP-I SCRUB 7.5% 4OZ

## (undated) DEVICE — NDL HYPO REG 25G X 1 1/2

## (undated) DEVICE — GAUZE SPONGE 4X4 12PLY

## (undated) DEVICE — TRAY CYSTO BASIN

## (undated) DEVICE — BLADE SURG #15 CARBON STEEL